# Patient Record
Sex: FEMALE | Race: WHITE | NOT HISPANIC OR LATINO | Employment: FULL TIME | URBAN - METROPOLITAN AREA
[De-identification: names, ages, dates, MRNs, and addresses within clinical notes are randomized per-mention and may not be internally consistent; named-entity substitution may affect disease eponyms.]

---

## 2017-01-06 ENCOUNTER — ROUTINE PRENATAL (OUTPATIENT)
Dept: OBSTETRICS AND GYNECOLOGY | Facility: CLINIC | Age: 32
End: 2017-01-06
Payer: COMMERCIAL

## 2017-01-06 VITALS — WEIGHT: 179 LBS | BODY MASS INDEX: 26.43 KG/M2 | SYSTOLIC BLOOD PRESSURE: 130 MMHG | DIASTOLIC BLOOD PRESSURE: 80 MMHG

## 2017-01-06 DIAGNOSIS — Z34.02 ENCOUNTER FOR PRENATAL CARE IN SECOND TRIMESTER OF FIRST PREGNANCY: Primary | ICD-10-CM

## 2017-01-06 PROCEDURE — 99999 PR PBB SHADOW E&M-EST. PATIENT-LVL II: CPT | Mod: PBBFAC,,, | Performed by: ADVANCED PRACTICE MIDWIFE

## 2017-01-06 PROCEDURE — 0502F SUBSEQUENT PRENATAL CARE: CPT | Mod: S$GLB,,, | Performed by: ADVANCED PRACTICE MIDWIFE

## 2017-01-06 PROCEDURE — 99212 OFFICE O/P EST SF 10 MIN: CPT | Mod: PBBFAC | Performed by: ADVANCED PRACTICE MIDWIFE

## 2017-01-06 NOTE — PROGRESS NOTES
Here for routine OB appt, with no complaints.  Here with her sister today.  Reports good FM; daily FMC reinforced. Denies LOF, denies VB, denies contractions.  Glucola & CBC with next appt  Reviewed warning signs of PTL and Preeclampsia

## 2017-01-13 ENCOUNTER — PATIENT MESSAGE (OUTPATIENT)
Dept: OBSTETRICS AND GYNECOLOGY | Facility: CLINIC | Age: 32
End: 2017-01-13

## 2017-01-19 ENCOUNTER — LAB VISIT (OUTPATIENT)
Dept: LAB | Facility: OTHER | Age: 32
End: 2017-01-19
Attending: ADVANCED PRACTICE MIDWIFE
Payer: COMMERCIAL

## 2017-01-19 ENCOUNTER — ROUTINE PRENATAL (OUTPATIENT)
Dept: OBSTETRICS AND GYNECOLOGY | Facility: CLINIC | Age: 32
End: 2017-01-19
Payer: COMMERCIAL

## 2017-01-19 VITALS — BODY MASS INDEX: 26.14 KG/M2 | WEIGHT: 177 LBS | DIASTOLIC BLOOD PRESSURE: 76 MMHG | SYSTOLIC BLOOD PRESSURE: 120 MMHG

## 2017-01-19 DIAGNOSIS — Z34.02 ENCOUNTER FOR PRENATAL CARE IN SECOND TRIMESTER OF FIRST PREGNANCY: ICD-10-CM

## 2017-01-19 DIAGNOSIS — O22.13: ICD-10-CM

## 2017-01-19 DIAGNOSIS — Z34.93 PRENATAL CARE IN THIRD TRIMESTER: Primary | ICD-10-CM

## 2017-01-19 LAB
BASOPHILS # BLD AUTO: 0.02 K/UL
BASOPHILS NFR BLD: 0.2 %
DIFFERENTIAL METHOD: ABNORMAL
EOSINOPHIL # BLD AUTO: 0.1 K/UL
EOSINOPHIL NFR BLD: 0.8 %
ERYTHROCYTE [DISTWIDTH] IN BLOOD BY AUTOMATED COUNT: 12.4 %
GLUCOSE SERPL-MCNC: 86 MG/DL
HCT VFR BLD AUTO: 36.7 %
HGB BLD-MCNC: 12.4 G/DL
LYMPHOCYTES # BLD AUTO: 1.5 K/UL
LYMPHOCYTES NFR BLD: 14.2 %
MCH RBC QN AUTO: 31.4 PG
MCHC RBC AUTO-ENTMCNC: 33.8 %
MCV RBC AUTO: 93 FL
MONOCYTES # BLD AUTO: 1 K/UL
MONOCYTES NFR BLD: 9.4 %
NEUTROPHILS # BLD AUTO: 7.8 K/UL
NEUTROPHILS NFR BLD: 73.6 %
PLATELET # BLD AUTO: 237 K/UL
PMV BLD AUTO: 10.1 FL
RBC # BLD AUTO: 3.95 M/UL
WBC # BLD AUTO: 10.59 K/UL

## 2017-01-19 PROCEDURE — 82950 GLUCOSE TEST: CPT

## 2017-01-19 PROCEDURE — 0502F SUBSEQUENT PRENATAL CARE: CPT | Mod: S$GLB,,, | Performed by: ADVANCED PRACTICE MIDWIFE

## 2017-01-19 PROCEDURE — 36415 COLL VENOUS BLD VENIPUNCTURE: CPT

## 2017-01-19 PROCEDURE — 99999 PR PBB SHADOW E&M-EST. PATIENT-LVL II: CPT | Mod: PBBFAC,,, | Performed by: ADVANCED PRACTICE MIDWIFE

## 2017-01-19 PROCEDURE — 85025 COMPLETE CBC W/AUTO DIFF WBC: CPT

## 2017-01-19 RX ORDER — FAMOTIDINE 20 MG/1
20 TABLET, FILM COATED ORAL 2 TIMES DAILY
COMMUNITY
End: 2017-09-21

## 2017-01-19 NOTE — PROGRESS NOTES
31 y.o. female  at 28w4d by LMP c/w 8 week US  Doing well except for constant left labia pain which she describes as stabbing in nature, has caused her to stop jogging.  On exam she points to the site of her pain as a mild varicosity of left labia majora.  Comfort measures discussed.  TW lbs   To have 28wk labs today (A POS)  Reviewed warning signs, normal FKCs,  labor precautions and how/when to call.  RTC x 2 wks, call or present sooner prn.

## 2017-02-01 ENCOUNTER — ROUTINE PRENATAL (OUTPATIENT)
Dept: OBSTETRICS AND GYNECOLOGY | Facility: CLINIC | Age: 32
End: 2017-02-01
Payer: COMMERCIAL

## 2017-02-01 VITALS — BODY MASS INDEX: 26.29 KG/M2 | SYSTOLIC BLOOD PRESSURE: 112 MMHG | DIASTOLIC BLOOD PRESSURE: 68 MMHG | WEIGHT: 178 LBS

## 2017-02-01 DIAGNOSIS — Z34.02 ENCOUNTER FOR PRENATAL CARE IN SECOND TRIMESTER OF FIRST PREGNANCY: Primary | ICD-10-CM

## 2017-02-01 PROCEDURE — 99999 PR PBB SHADOW E&M-EST. PATIENT-LVL I: CPT | Mod: PBBFAC,,, | Performed by: ADVANCED PRACTICE MIDWIFE

## 2017-02-01 PROCEDURE — 0502F SUBSEQUENT PRENATAL CARE: CPT | Mod: S$GLB,,, | Performed by: ADVANCED PRACTICE MIDWIFE

## 2017-02-01 NOTE — PROGRESS NOTES
31 y.o. female  at 30w3d by LMP c/w 8wk US  Reports + FM, denies VB, LOF or CTX  Doing well without concerns  Plans to breastfeed, anabel Adams, considering PP BCMs  [place] gender, will plan circ if boy  TW lbs for pre preg BMI of 22 - great job!  Reviewed 28wk lab results (A POS)  Tdap offered and ordered  Reviewed upcoming 36wk labs   Reviewed warning signs, normal FKCs,  labor precautions and how/when to call.  RTC x 2 wks, call or present sooner prn.

## 2017-02-15 ENCOUNTER — CLINICAL SUPPORT (OUTPATIENT)
Dept: OBSTETRICS AND GYNECOLOGY | Facility: CLINIC | Age: 32
End: 2017-02-15
Payer: COMMERCIAL

## 2017-02-15 ENCOUNTER — ROUTINE PRENATAL (OUTPATIENT)
Dept: OBSTETRICS AND GYNECOLOGY | Facility: CLINIC | Age: 32
End: 2017-02-15
Payer: COMMERCIAL

## 2017-02-15 ENCOUNTER — OFFICE VISIT (OUTPATIENT)
Dept: OPTOMETRY | Facility: CLINIC | Age: 32
End: 2017-02-15
Payer: COMMERCIAL

## 2017-02-15 VITALS — SYSTOLIC BLOOD PRESSURE: 120 MMHG | DIASTOLIC BLOOD PRESSURE: 80 MMHG | WEIGHT: 181 LBS | BODY MASS INDEX: 26.73 KG/M2

## 2017-02-15 DIAGNOSIS — Z46.0 FITTING AND ADJUSTMENT OF SPECTACLES AND CONTACT LENSES: ICD-10-CM

## 2017-02-15 DIAGNOSIS — Z46.0 FITTING AND ADJUSTMENT OF SPECTACLES AND CONTACT LENSES: Primary | ICD-10-CM

## 2017-02-15 DIAGNOSIS — Z3A.32 32 WEEKS GESTATION OF PREGNANCY: ICD-10-CM

## 2017-02-15 DIAGNOSIS — Z23 NEED FOR TDAP VACCINATION: Primary | ICD-10-CM

## 2017-02-15 DIAGNOSIS — H52.13 MYOPIA, BILATERAL: Primary | ICD-10-CM

## 2017-02-15 DIAGNOSIS — Z34.03 ENCOUNTER FOR SUPERVISION OF NORMAL FIRST PREGNANCY IN THIRD TRIMESTER: Primary | ICD-10-CM

## 2017-02-15 PROCEDURE — 99999 PR PBB SHADOW E&M-EST. PATIENT-LVL I: CPT | Mod: PBBFAC,,, | Performed by: ADVANCED PRACTICE MIDWIFE

## 2017-02-15 PROCEDURE — 99213 OFFICE O/P EST LOW 20 MIN: CPT | Mod: S$GLB,,, | Performed by: ADVANCED PRACTICE MIDWIFE

## 2017-02-15 PROCEDURE — 92014 COMPRE OPH EXAM EST PT 1/>: CPT | Mod: S$GLB,,, | Performed by: OPTOMETRIST

## 2017-02-15 PROCEDURE — 90715 TDAP VACCINE 7 YRS/> IM: CPT | Mod: S$GLB,,, | Performed by: ADVANCED PRACTICE MIDWIFE

## 2017-02-15 PROCEDURE — 99999 PR PBB SHADOW E&M-EST. PATIENT-LVL II: CPT | Mod: PBBFAC,,, | Performed by: OPTOMETRIST

## 2017-02-15 PROCEDURE — 92310 CONTACT LENS FITTING OU: CPT | Mod: S$GLB,,, | Performed by: OPTOMETRIST

## 2017-02-15 PROCEDURE — 99999 PR PBB SHADOW E&M-EST. PATIENT-LVL I: CPT | Mod: PBBFAC,,,

## 2017-02-15 PROCEDURE — 92015 DETERMINE REFRACTIVE STATE: CPT | Mod: S$GLB,,, | Performed by: OPTOMETRIST

## 2017-02-15 PROCEDURE — 90471 IMMUNIZATION ADMIN: CPT | Mod: S$GLB,,, | Performed by: ADVANCED PRACTICE MIDWIFE

## 2017-02-15 NOTE — PROGRESS NOTES
Doing well today except for teary. Insomnia and labia varicosity   Recommended benedryl HS for insomnia  Using compression pants for varicosity  Discussed delivery options  Answered questions regarding breech lie

## 2017-02-15 NOTE — PROGRESS NOTES
HPI     Patient's age: 31 y.o.      Approximate date of last eye examination:  5/20/15  Name of last eye doctor seen: Dr. walsh    Wears glasses? yes     Wears CLs?:  yes           If yes:              Type of CL worn:  Dailies              Wears full-time or part-time:  paRT-TIME               Sleeps with contact lenses:  NO                 Headaches?  no  Eye pain/discomfort?  no                                                                                     Flashes?  no  Floaters?  no  Diplopia/Double vision?  no    Patient's Ocular History:         Any eye surgeries? no           Family history of eye disease?  no  Significant patient medical history:         1. Diabetes?  no       If yes, IDDM or NIDDM? no   2. HBP?  no                 ! OTC eyedrops currently using:  none   ! Prescription eye meds currently using:  none            Last edited by Brittney Workman MA on 2/15/2017  8:12 AM.         Assessment /Plan     For exam results, see Encounter Report.    Myopia, bilateral   Rx specs    Fitting and adjustment of spectacles and contact lenses   Dispensed trials of acuvue 1 day moist    Remove/ replace daily   Ok to order if happy  RTC 1 year DFE

## 2017-02-27 ENCOUNTER — ROUTINE PRENATAL (OUTPATIENT)
Dept: OBSTETRICS AND GYNECOLOGY | Facility: CLINIC | Age: 32
End: 2017-02-27
Payer: COMMERCIAL

## 2017-02-27 VITALS — WEIGHT: 185 LBS | DIASTOLIC BLOOD PRESSURE: 80 MMHG | SYSTOLIC BLOOD PRESSURE: 120 MMHG | BODY MASS INDEX: 27.32 KG/M2

## 2017-02-27 DIAGNOSIS — Z34.02 ENCOUNTER FOR PRENATAL CARE IN SECOND TRIMESTER OF FIRST PREGNANCY: Primary | ICD-10-CM

## 2017-02-27 PROCEDURE — 0502F SUBSEQUENT PRENATAL CARE: CPT | Mod: S$GLB,,, | Performed by: ADVANCED PRACTICE MIDWIFE

## 2017-02-27 PROCEDURE — 99999 PR PBB SHADOW E&M-EST. PATIENT-LVL I: CPT | Mod: PBBFAC,,, | Performed by: ADVANCED PRACTICE MIDWIFE

## 2017-02-27 NOTE — PROGRESS NOTES
31 y.o. female  at 34w1d by LMP c/w 8wk US  Reports + FM, denies VB, LOF or CTX  Accompanied by Rishabh today  Enjoying some of the [place] parades  Varicosities improving with compression shorts  TW lbs for pre preg BMI of 22; discussed recommendations and criteria (she is already aware)  Reviewed 28wk lab results (A POS)  S/p Tdap  Discussed breastfeeding and BCM: mini pill  Reviewed upcoming 36wk labs - she plans to consent to GBS and repeat HIV/RPR (orders placed for serum labs)  Reviewed warning signs, normal FKCs,  labor precautions and how/when to call.  RTC x 2 wks, call or present sooner prn.

## 2017-03-14 ENCOUNTER — LAB VISIT (OUTPATIENT)
Dept: LAB | Facility: OTHER | Age: 32
End: 2017-03-14
Attending: ADVANCED PRACTICE MIDWIFE
Payer: COMMERCIAL

## 2017-03-14 ENCOUNTER — LACTATION CONSULT (OUTPATIENT)
Dept: LACTATION | Facility: CLINIC | Age: 32
End: 2017-03-14
Payer: COMMERCIAL

## 2017-03-14 DIAGNOSIS — Z71.9 HEALTH EDUCATION/COUNSELING: ICD-10-CM

## 2017-03-14 DIAGNOSIS — Z34.02 ENCOUNTER FOR PRENATAL CARE IN SECOND TRIMESTER OF FIRST PREGNANCY: ICD-10-CM

## 2017-03-14 PROCEDURE — 86592 SYPHILIS TEST NON-TREP QUAL: CPT

## 2017-03-14 PROCEDURE — 36415 COLL VENOUS BLD VENIPUNCTURE: CPT

## 2017-03-14 PROCEDURE — S9443 LACTATION CLASS: HCPCS | Mod: S$GLB,,,

## 2017-03-14 PROCEDURE — 86703 HIV-1/HIV-2 1 RESULT ANTBDY: CPT

## 2017-03-14 NOTE — PROGRESS NOTES
Presents for basic breastfeeding information. Questions about sizing for nursing bra and pump flange. Recommended most accurate time to be fitted for bra is a couple weeks after delivery. Reinforced wearing something that provides good support but is not too tight. Intends to get breast pump through insurance, but isn't sure yet which brand. Assessed breasts which are both round and soft with elastic areola and everted nipples, no retraction. Recommended starting with 24 or 27 mm flanges, and explained how to ensure proper fit. Reviewed basics including skin to skin, infant positioning and latch, feeding on cue, etc. Encouraged to call lactation warmline as needed with any questions/concerns, and to request help from staff as needed, while in hospital. Voices understanding.

## 2017-03-15 LAB
HIV 1+2 AB+HIV1 P24 AG SERPL QL IA: NEGATIVE
RPR SER QL: NORMAL

## 2017-03-16 ENCOUNTER — HOSPITAL ENCOUNTER (OUTPATIENT)
Dept: PERINATAL CARE | Facility: OTHER | Age: 32
Discharge: HOME | End: 2017-03-16
Attending: ADVANCED PRACTICE MIDWIFE
Payer: COMMERCIAL

## 2017-03-16 ENCOUNTER — ROUTINE PRENATAL (OUTPATIENT)
Dept: OBSTETRICS AND GYNECOLOGY | Facility: CLINIC | Age: 32
End: 2017-03-16
Payer: COMMERCIAL

## 2017-03-16 VITALS — SYSTOLIC BLOOD PRESSURE: 128 MMHG | DIASTOLIC BLOOD PRESSURE: 82 MMHG | WEIGHT: 190 LBS | BODY MASS INDEX: 28.06 KG/M2

## 2017-03-16 DIAGNOSIS — O22.13: Primary | ICD-10-CM

## 2017-03-16 DIAGNOSIS — N90.89 VULVAR LESION: ICD-10-CM

## 2017-03-16 DIAGNOSIS — Z91.89 AT RISK FOR SEXUALLY TRANSMITTED DISEASE DUE TO PARTNER WITH GENITAL HERPES: ICD-10-CM

## 2017-03-16 DIAGNOSIS — Z34.03 ENCOUNTER FOR PRENATAL CARE IN THIRD TRIMESTER OF FIRST PREGNANCY: ICD-10-CM

## 2017-03-16 PROCEDURE — 76815 OB US LIMITED FETUS(S): CPT

## 2017-03-16 PROCEDURE — 76815 OB US LIMITED FETUS(S): CPT | Mod: 26,59,, | Performed by: OBSTETRICS & GYNECOLOGY

## 2017-03-16 PROCEDURE — 87081 CULTURE SCREEN ONLY: CPT

## 2017-03-16 PROCEDURE — 0502F SUBSEQUENT PRENATAL CARE: CPT | Mod: S$GLB,,, | Performed by: ADVANCED PRACTICE MIDWIFE

## 2017-03-16 PROCEDURE — 99999 PR PBB SHADOW E&M-EST. PATIENT-LVL II: CPT | Mod: PBBFAC,,, | Performed by: ADVANCED PRACTICE MIDWIFE

## 2017-03-16 NOTE — PROGRESS NOTES
"3T warning signs, FMR protocol reviewed.   states he had one episode of genital HSV type 1 10 years ago. Recommendation of using condoms during second half of pregnancy discussed.  Planning on-line classes.  GBS today.  TWG of 40 pounds discussed. When asked about diet and exercise, stated, "I am doing the best I can!" States she is feeling fatigued since she has trouble sleeping due to discomfort.     S:C/o of severe almost constant pain in her vulva and vagina. Reports vulvar varicosities and "a painful bump" on her right labia. 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 Position of hips raised on pillows and feet elevated up the wall suggested.   O:Examination of vulva revealed mild vulvar varisocities and a flesh colored papule with a visible "head" on the right labia.   A: Vulvar lesion probable sebaceous gland cyst. Will rule out Herpes.  P: Secretion from papule collected on swab and sent to lab for Herpes by rapid PCR testing.    GBS done. Presentation could not be confidently identified with Leopolds or vaginal exam today. Patient sent to PN testing for JULIETTE. Vertex presentation confirmed with ultrasound.  "

## 2017-03-18 LAB — BACTERIA SPEC AEROBE CULT: NORMAL

## 2017-03-20 PROBLEM — Z67.10 BLOOD TYPE A+: Status: ACTIVE | Noted: 2017-03-20

## 2017-03-21 ENCOUNTER — ROUTINE PRENATAL (OUTPATIENT)
Dept: OBSTETRICS AND GYNECOLOGY | Facility: CLINIC | Age: 32
End: 2017-03-21
Payer: COMMERCIAL

## 2017-03-21 VITALS — WEIGHT: 191 LBS | DIASTOLIC BLOOD PRESSURE: 82 MMHG | BODY MASS INDEX: 28.21 KG/M2 | SYSTOLIC BLOOD PRESSURE: 122 MMHG

## 2017-03-21 DIAGNOSIS — Z34.03 ENCOUNTER FOR SUPERVISION OF NORMAL FIRST PREGNANCY IN THIRD TRIMESTER: Primary | ICD-10-CM

## 2017-03-21 DIAGNOSIS — Z3A.37 37 WEEKS GESTATION OF PREGNANCY: ICD-10-CM

## 2017-03-21 PROCEDURE — 99213 OFFICE O/P EST LOW 20 MIN: CPT | Mod: S$GLB,,, | Performed by: ADVANCED PRACTICE MIDWIFE

## 2017-03-21 PROCEDURE — 99999 PR PBB SHADOW E&M-EST. PATIENT-LVL II: CPT | Mod: PBBFAC,,, | Performed by: ADVANCED PRACTICE MIDWIFE

## 2017-03-28 ENCOUNTER — PATIENT MESSAGE (OUTPATIENT)
Dept: OBSTETRICS AND GYNECOLOGY | Facility: CLINIC | Age: 32
End: 2017-03-28

## 2017-03-28 ENCOUNTER — ROUTINE PRENATAL (OUTPATIENT)
Dept: OBSTETRICS AND GYNECOLOGY | Facility: CLINIC | Age: 32
End: 2017-03-28
Payer: COMMERCIAL

## 2017-03-28 VITALS — BODY MASS INDEX: 28.35 KG/M2 | SYSTOLIC BLOOD PRESSURE: 128 MMHG | DIASTOLIC BLOOD PRESSURE: 84 MMHG | WEIGHT: 192 LBS

## 2017-03-28 DIAGNOSIS — Z34.93 PREGNANCY, OBSTETRICAL CARE, THIRD TRIMESTER: Primary | ICD-10-CM

## 2017-03-28 DIAGNOSIS — Z34.03 ENCOUNTER FOR PRENATAL CARE IN THIRD TRIMESTER OF FIRST PREGNANCY: Primary | ICD-10-CM

## 2017-03-28 PROCEDURE — 99999 PR PBB SHADOW E&M-EST. PATIENT-LVL II: CPT | Mod: PBBFAC,,, | Performed by: ADVANCED PRACTICE MIDWIFE

## 2017-03-28 PROCEDURE — 0502F SUBSEQUENT PRENATAL CARE: CPT | Mod: S$GLB,,, | Performed by: ADVANCED PRACTICE MIDWIFE

## 2017-03-28 NOTE — MR AVS SNAPSHOT
[facility]  [address]  [place] [address]  Phone: [phone]  Fax: [phone]                  Catie Monge   3/28/2017 11:00 AM   Appointment    Description:  Female : 1985   Provider:  Jacklyn Kerr CNM   Department:  [facility]                To Do List           Future Appointments        Provider Department Dept Phone    3/28/2017 11:00 AM NAREN Leonardo [phone]      Goals (5 Years of Data)     None      [facility] On Call     [facility] On Call Nurse Care Line -  Assistance  Registered nurses in the [facility] On Call Center provide clinical advisement, health education, appointment booking, and other advisory services.  Call for this free service at [phone].             Medications           Message regarding Medications     Verify the changes and/or additions to your medication regime listed below are the same as discussed with your clinician today.  If any of these changes or additions are incorrect, please notify your healthcare provider.             Verify that the below list of medications is an accurate representation of the medications you are currently taking.  If none reported, the list may be blank. If incorrect, please contact your healthcare provider. Carry this list with you in case of emergency.           Current Medications     cetirizine (ZYRTEC) 10 MG tablet Take 10 mg by mouth every evening.    famotidine (PEPCID) 20 MG tablet Take 20 mg by mouth 2 (two) times daily.    PNV95/FERROUS FUMARATE/FA (PRENATAL ORAL) Take by mouth.           Clinical Reference Information           Prenatal Vitals     Enc. Date GA Prenatal Vitals Prenatal Pulse Pain Level Urine Albumin/Glucose Edema Presentation Dilation/Effacement/Station    3/21/17 37w2d 122/82 / 86.6 kg (191 lb) 38 cm / 147 / Present   Negative / Negative  Vertex 0 / 0 / -3    3/16/17 36w4d 128/82 / 86.2 kg (190 lb) 36.5 cm / present / Present    Negative / Negative  Vertex 0 / 0 / -3    17 34w1d 120/80 / 83.9 kg (185 lb) 34 cm / 140 / Present   Negative / Negative       2/15/17 32w3d 120/80 / 82.1 kg (181 lb) 33 cm / 146 / Present   Negative / Negative       17 30w3d 112/68 / 80.7 kg (178 lb) 31 cm / 145 / Present   Negative / Negative       17 28w4d 120/76 / 80.3 kg (177 lb) 29.5 cm / 140 / Present   Negative / Negative       17 26w5d 130/80 / 81.2 kg (179 lb) 28 cm / 142 / Present   Negative / Negative       16 22w1d 132/82 / 77.1 kg (170 lb) 22 cm / 146 / Present   Negative / Negative       16 17w3d 140/90 (A) / 73 kg (161 lb) 17 cm / 143   Negative / Negative       10/6/16 13w4d 134/82 / 70.5 kg (155 lb 8 oz) 13 cm / 150 / Absent   Negative / Negative       16 9w4d 130/74              TW.6 kg (41 lb)   Pregravid weight: 68 kg (150 lb)   Number of fetuses: 1       Your Vitals Were     Last Period                   2016           Allergies as of 3/28/2017     No Known Allergies      Immunizations Administered on Date of Encounter - 3/28/2017     None      Language Assistance Services     ATTENTION: Language assistance services are available, free of charge. Please call [phone].      ATENCIÓN: Si habla español, tiene a moffett disposición servicios gratuitos de asistencia lingüística. Llame al [phone].     CHÚ Ý: N?u b?n nói Ti?ng Vi?t, có các d?ch v? h? tr? ngôn ng? mi?n phí dành cho b?n. G?i s? [phone].         [facility] complies with applicable Federal civil rights laws and does not discriminate on the basis of race, color, national origin, age, disability, or sex.

## 2017-03-28 NOTE — PROGRESS NOTES
Irregular UCs, especially at night.  No LOF or VB.  Reports good FM; daily FMC reinforced.   Reviewed labor precautions

## 2017-04-03 ENCOUNTER — ROUTINE PRENATAL (OUTPATIENT)
Dept: OBSTETRICS AND GYNECOLOGY | Facility: CLINIC | Age: 32
End: 2017-04-03
Payer: COMMERCIAL

## 2017-04-03 VITALS — DIASTOLIC BLOOD PRESSURE: 78 MMHG | BODY MASS INDEX: 28.5 KG/M2 | SYSTOLIC BLOOD PRESSURE: 118 MMHG | WEIGHT: 193 LBS

## 2017-04-03 DIAGNOSIS — Z34.93 PREGNANCY, OBSTETRICAL CARE, THIRD TRIMESTER: ICD-10-CM

## 2017-04-03 DIAGNOSIS — Z87.59 PERSONAL HISTORY OF PREVIOUS POSTDATES PREGNANCY: Primary | ICD-10-CM

## 2017-04-03 DIAGNOSIS — O48.0 POST-TERM PREGNANCY, 40-42 WEEKS OF GESTATION: ICD-10-CM

## 2017-04-03 PROCEDURE — 0502F SUBSEQUENT PRENATAL CARE: CPT | Mod: S$GLB,,, | Performed by: ADVANCED PRACTICE MIDWIFE

## 2017-04-03 PROCEDURE — 99999 PR PBB SHADOW E&M-EST. PATIENT-LVL II: CPT | Mod: PBBFAC,,, | Performed by: ADVANCED PRACTICE MIDWIFE

## 2017-04-03 NOTE — PROGRESS NOTES
31 y.o. female  at 39w1d by LMP c/w 8wk US  Reports + FM, denies VB, LOF or regular CTX  Accompanied by Rishabh today  Has stopped working and happy about this decision  Requesting SVE  She wants to discuss postdates management/planning  Discussed postdates management and IOL - she prefers to defer IOL/schedule at 41w6d prn  Scheduled NST/JULIETTE at 41w1d on  and again at 41w4d on   Would need IOL at 41w6d on  if still pregnant  Reviewed warning signs, normal FKCs, labor precautions and how/when to call.  RTC x 1 wks, call or present sooner prn.

## 2017-04-11 ENCOUNTER — TELEPHONE (OUTPATIENT)
Dept: OBSTETRICS AND GYNECOLOGY | Facility: CLINIC | Age: 32
End: 2017-04-11

## 2017-04-11 ENCOUNTER — ROUTINE PRENATAL (OUTPATIENT)
Dept: OBSTETRICS AND GYNECOLOGY | Facility: CLINIC | Age: 32
End: 2017-04-11
Payer: COMMERCIAL

## 2017-04-11 VITALS — SYSTOLIC BLOOD PRESSURE: 118 MMHG | DIASTOLIC BLOOD PRESSURE: 70 MMHG | BODY MASS INDEX: 28.65 KG/M2 | WEIGHT: 194 LBS

## 2017-04-11 DIAGNOSIS — Z34.03 ENCOUNTER FOR PRENATAL CARE IN THIRD TRIMESTER OF FIRST PREGNANCY: Primary | ICD-10-CM

## 2017-04-11 PROCEDURE — 0502F SUBSEQUENT PRENATAL CARE: CPT | Mod: S$GLB,,, | Performed by: ADVANCED PRACTICE MIDWIFE

## 2017-04-11 PROCEDURE — 99999 PR PBB SHADOW E&M-EST. PATIENT-LVL I: CPT | Mod: PBBFAC,,, | Performed by: ADVANCED PRACTICE MIDWIFE

## 2017-04-11 NOTE — PROGRESS NOTES
31 y.o. female  at 40w2d by LMP c/w 8wk US  Reports + FM, denies VB, LOF or regular CTX  She and Rishabh want to talk about postdates management and want to schedule IOL at 41w6d  Prenatal testing has already been scheduled   Scheduled IOL at 41w6d on  at 8pm   Reviewed warning signs, normal FKCs, labor precautions and how/when to call.  RTC x 1 wks, call or present sooner prn.

## 2017-04-12 ENCOUNTER — TELEPHONE (OUTPATIENT)
Dept: OBSTETRICS AND GYNECOLOGY | Facility: HOSPITAL | Age: 32
End: 2017-04-12

## 2017-04-12 ENCOUNTER — ROUTINE PRENATAL (OUTPATIENT)
Dept: OBSTETRICS AND GYNECOLOGY | Facility: CLINIC | Age: 32
End: 2017-04-12
Payer: COMMERCIAL

## 2017-04-12 DIAGNOSIS — O26.893 VAGINAL DISCHARGE IN PREGNANCY IN THIRD TRIMESTER: Primary | ICD-10-CM

## 2017-04-12 DIAGNOSIS — Z34.93 PRENATAL CARE IN THIRD TRIMESTER: ICD-10-CM

## 2017-04-12 DIAGNOSIS — N89.8 VAGINAL DISCHARGE IN PREGNANCY IN THIRD TRIMESTER: Primary | ICD-10-CM

## 2017-04-12 PROCEDURE — 0502F SUBSEQUENT PRENATAL CARE: CPT | Mod: S$GLB,,, | Performed by: ADVANCED PRACTICE MIDWIFE

## 2017-04-12 PROCEDURE — 99999 PR PBB SHADOW E&M-EST. PATIENT-LVL I: CPT | Mod: PBBFAC,,, | Performed by: ADVANCED PRACTICE MIDWIFE

## 2017-04-12 NOTE — TELEPHONE ENCOUNTER
Phone call to pt, states slept through the night with a pad on but is not sure if it's really wet or not.  Feels damp now but no active leaking.  Did soak two pairs of panties last night however and is certain it was not urine.  No fever, no chills, no bleeding.  Pos fetal movement.  Mild ctx q 30 minutes or so.  Discussed PROM again with pt and offered come to hospital now for evaluation and IOL if membranes ruptured vs waiting for up to 24 hours.  Pt will come to office at 4:30 for evaluation if no active labor by then and does understand will need to stay for IOL if membranes are ruptured.  To call with fever, bleeding, foul smell, chills, decreased fetal movement or active labor.

## 2017-04-12 NOTE — TELEPHONE ENCOUNTER
Returned Catie's call  Reports possible LOF at ~ 2100  Continues to have leakage of clear fluid since  Reports + normal FM  Denies VB or CTX  Denies fever, chills, malaise  Reviewed that GBS is neg     Reviewed PROM information and document sent to [email or web address]    Offered immediate evaluation and discussed that if she opted for immediate evaluation and was determined to have ROM we would admit and offer either augmentation of prelabor PROM at term.     Also discussed option of expectant management including staying at home and awaiting labor.     Catie communicates her desire for expectant management at home as she was hoping to labor and birth at the [facility].    Reviewed r/b of expectant management vs augmentation/induction of prelabor PROM at term.    She communicates her desire to stay home.    Encouraged her to  -- Take T minimum of every 4 hours and call with fever  -- Call or present immediately with fever, chills, malaise, rafaela VB, change in fluid color, malodor of fluid, decrease in FM, or onset of labor/regular ctx  -- Present for induction of labor at any time should she decide to change her mind from expectant management  -- Present no later than 24 hours post ROM for evaluation and augmentation/induction of labor

## 2017-04-12 NOTE — PROGRESS NOTES
Pt and  here with reports of 3 cm damp spot on pt's underwear which happened yesterday after intercourse followed by walking.  Subsequently noted continued damp feeling but no actual fluid or soaking of pad or underwear.  SSE done, some white, mucous discharge noted.  No pooling, no fluid with cough, nitrazine negative.    Denies any hx bleeding and has mild, occasional ctx only.  Normal fetal movement.  Discussed do not think ruptured but should call with fever, chills, gush of fluid or leaking fluid  To keep next scheduled prenatal appointment.

## 2017-04-17 ENCOUNTER — TELEPHONE (OUTPATIENT)
Dept: OBSTETRICS AND GYNECOLOGY | Facility: HOSPITAL | Age: 32
End: 2017-04-17

## 2017-04-17 ENCOUNTER — ANESTHESIA (OUTPATIENT)
Dept: OBSTETRICS AND GYNECOLOGY | Facility: OTHER | Age: 32
End: 2017-04-17
Payer: COMMERCIAL

## 2017-04-17 ENCOUNTER — ANESTHESIA EVENT (OUTPATIENT)
Dept: OBSTETRICS AND GYNECOLOGY | Facility: OTHER | Age: 32
End: 2017-04-17
Payer: COMMERCIAL

## 2017-04-17 ENCOUNTER — HOSPITAL ENCOUNTER (INPATIENT)
Facility: OTHER | Age: 32
LOS: 3 days | Discharge: HOME | End: 2017-04-20
Attending: OBSTETRICS & GYNECOLOGY | Admitting: OBSTETRICS & GYNECOLOGY
Payer: COMMERCIAL

## 2017-04-17 DIAGNOSIS — Z37.9 NORMAL LABOR: ICD-10-CM

## 2017-04-17 DIAGNOSIS — O16.3 HYPERTENSION AFFECTING PREGNANCY IN THIRD TRIMESTER: ICD-10-CM

## 2017-04-17 DIAGNOSIS — O48.0 POST TERM PREGNANCY, 41 WEEKS: Primary | ICD-10-CM

## 2017-04-17 DIAGNOSIS — Z3A.41 POST TERM PREGNANCY, 41 WEEKS: Primary | ICD-10-CM

## 2017-04-17 PROBLEM — N90.89 VULVAR LESION: Status: RESOLVED | Noted: 2017-03-16 | Resolved: 2017-04-17

## 2017-04-17 LAB
ABO + RH BLD: NORMAL
ALBUMIN SERPL BCP-MCNC: 3 G/DL
ALP SERPL-CCNC: 155 U/L
ALT SERPL W/O P-5'-P-CCNC: 17 U/L
ANION GAP SERPL CALC-SCNC: 12 MMOL/L
AST SERPL-CCNC: 26 U/L
BASOPHILS # BLD AUTO: ABNORMAL K/UL
BASOPHILS NFR BLD: 0 %
BILIRUB SERPL-MCNC: 0.6 MG/DL
BLD GP AB SCN CELLS X3 SERPL QL: NORMAL
BUN SERPL-MCNC: 12 MG/DL
CALCIUM SERPL-MCNC: 8.8 MG/DL
CHLORIDE SERPL-SCNC: 107 MMOL/L
CO2 SERPL-SCNC: 17 MMOL/L
CREAT SERPL-MCNC: 0.8 MG/DL
CREAT UR-MCNC: 46.8 MG/DL
DIFFERENTIAL METHOD: ABNORMAL
EOSINOPHIL # BLD AUTO: ABNORMAL K/UL
EOSINOPHIL NFR BLD: 0 %
ERYTHROCYTE [DISTWIDTH] IN BLOOD BY AUTOMATED COUNT: 13.3 %
EST. GFR  (AFRICAN AMERICAN): >60 ML/MIN/1.73 M^2
EST. GFR  (NON AFRICAN AMERICAN): >60 ML/MIN/1.73 M^2
GLUCOSE SERPL-MCNC: 81 MG/DL
HCT VFR BLD AUTO: 39.8 %
HGB BLD-MCNC: 13.4 G/DL
LDH SERPL L TO P-CCNC: 326 U/L
LYMPHOCYTES # BLD AUTO: ABNORMAL K/UL
LYMPHOCYTES NFR BLD: 3 %
MCH RBC QN AUTO: 30.7 PG
MCHC RBC AUTO-ENTMCNC: 33.7 %
MCV RBC AUTO: 91 FL
METAMYELOCYTES NFR BLD MANUAL: 1 %
MONOCYTES # BLD AUTO: ABNORMAL K/UL
MONOCYTES NFR BLD: 3 %
NEUTROPHILS NFR BLD: 93 %
PLATELET # BLD AUTO: 200 K/UL
PMV BLD AUTO: 11.2 FL
POTASSIUM SERPL-SCNC: 4.3 MMOL/L
PROT SERPL-MCNC: 7 G/DL
PROT UR-MCNC: <7 MG/DL
PROT/CREAT RATIO, UR: NORMAL
RBC # BLD AUTO: 4.37 M/UL
SODIUM SERPL-SCNC: 136 MMOL/L
URATE SERPL-MCNC: 5.5 MG/DL
WBC # BLD AUTO: 19.12 K/UL

## 2017-04-17 PROCEDURE — 62326 NJX INTERLAMINAR LMBR/SAC: CPT | Performed by: ANESTHESIOLOGY

## 2017-04-17 PROCEDURE — 25000003 PHARM REV CODE 250: Performed by: STUDENT IN AN ORGANIZED HEALTH CARE EDUCATION/TRAINING PROGRAM

## 2017-04-17 PROCEDURE — 84550 ASSAY OF BLOOD/URIC ACID: CPT

## 2017-04-17 PROCEDURE — 63600175 PHARM REV CODE 636 W HCPCS: Performed by: STUDENT IN AN ORGANIZED HEALTH CARE EDUCATION/TRAINING PROGRAM

## 2017-04-17 PROCEDURE — 82570 ASSAY OF URINE CREATININE: CPT

## 2017-04-17 PROCEDURE — 27800517 HC TRAY,EPIDURAL-CONTINUOUS: Performed by: ANESTHESIOLOGY

## 2017-04-17 PROCEDURE — 72100002 HC LABOR CARE, 1ST 8 HOURS

## 2017-04-17 PROCEDURE — 86850 RBC ANTIBODY SCREEN: CPT

## 2017-04-17 PROCEDURE — 85007 BL SMEAR W/DIFF WBC COUNT: CPT

## 2017-04-17 PROCEDURE — 99283 EMERGENCY DEPT VISIT LOW MDM: CPT | Mod: ,,, | Performed by: OBSTETRICS & GYNECOLOGY

## 2017-04-17 PROCEDURE — 11000001 HC ACUTE MED/SURG PRIVATE ROOM

## 2017-04-17 PROCEDURE — 85027 COMPLETE CBC AUTOMATED: CPT

## 2017-04-17 PROCEDURE — 80053 COMPREHEN METABOLIC PANEL: CPT

## 2017-04-17 PROCEDURE — 25000003 PHARM REV CODE 250: Performed by: ADVANCED PRACTICE MIDWIFE

## 2017-04-17 PROCEDURE — 36415 COLL VENOUS BLD VENIPUNCTURE: CPT

## 2017-04-17 PROCEDURE — 99284 EMERGENCY DEPT VISIT MOD MDM: CPT

## 2017-04-17 PROCEDURE — 86900 BLOOD TYPING SEROLOGIC ABO: CPT

## 2017-04-17 PROCEDURE — 51702 INSERT TEMP BLADDER CATH: CPT

## 2017-04-17 PROCEDURE — 59409 OBSTETRICAL CARE: CPT | Mod: QK,,, | Performed by: ANESTHESIOLOGY

## 2017-04-17 PROCEDURE — 83615 LACTATE (LD) (LDH) ENZYME: CPT

## 2017-04-17 PROCEDURE — 27200710 HC EPIDURAL INFUSION PUMP SET: Performed by: ANESTHESIOLOGY

## 2017-04-17 RX ORDER — OXYTOCIN/RINGER'S LACTATE 20/1000 ML
41.65 PLASTIC BAG, INJECTION (ML) INTRAVENOUS ONCE AS NEEDED
Status: DISPENSED | OUTPATIENT
Start: 2017-04-17 | End: 2017-04-17

## 2017-04-17 RX ORDER — MISOPROSTOL 200 UG/1
800 TABLET ORAL
Status: DISCONTINUED | OUTPATIENT
Start: 2017-04-17 | End: 2017-04-19

## 2017-04-17 RX ORDER — FENTANYL CITRATE 50 UG/ML
INJECTION, SOLUTION INTRAMUSCULAR; INTRAVENOUS
Status: DISPENSED
Start: 2017-04-17 | End: 2017-04-17

## 2017-04-17 RX ORDER — METHYLERGONOVINE MALEATE 0.2 MG/ML
200 INJECTION INTRAVENOUS
Status: DISCONTINUED | OUTPATIENT
Start: 2017-04-17 | End: 2017-04-19

## 2017-04-17 RX ORDER — BUPIVACAINE HYDROCHLORIDE 2.5 MG/ML
INJECTION, SOLUTION EPIDURAL; INFILTRATION; INTRACAUDAL
Status: DISPENSED
Start: 2017-04-17 | End: 2017-04-17

## 2017-04-17 RX ORDER — LIDOCAINE HYDROCHLORIDE AND EPINEPHRINE 15; 5 MG/ML; UG/ML
INJECTION, SOLUTION EPIDURAL
Status: DISCONTINUED | OUTPATIENT
Start: 2017-04-17 | End: 2017-04-18

## 2017-04-17 RX ORDER — FENTANYL CITRATE 50 UG/ML
INJECTION, SOLUTION INTRAMUSCULAR; INTRAVENOUS
Status: DISPENSED
Start: 2017-04-17 | End: 2017-04-18

## 2017-04-17 RX ORDER — SODIUM CHLORIDE, SODIUM LACTATE, POTASSIUM CHLORIDE, CALCIUM CHLORIDE 600; 310; 30; 20 MG/100ML; MG/100ML; MG/100ML; MG/100ML
INJECTION, SOLUTION INTRAVENOUS CONTINUOUS
Status: DISCONTINUED | OUTPATIENT
Start: 2017-04-17 | End: 2017-04-19

## 2017-04-17 RX ORDER — BUPIVACAINE HYDROCHLORIDE 2.5 MG/ML
INJECTION, SOLUTION EPIDURAL; INFILTRATION; INTRACAUDAL
Status: DISPENSED
Start: 2017-04-17 | End: 2017-04-18

## 2017-04-17 RX ORDER — BUTORPHANOL TARTRATE 1 MG/ML
2 INJECTION INTRAMUSCULAR; INTRAVENOUS
Status: DISCONTINUED | OUTPATIENT
Start: 2017-04-17 | End: 2017-04-19

## 2017-04-17 RX ORDER — FENTANYL CITRATE 50 UG/ML
INJECTION, SOLUTION INTRAMUSCULAR; INTRAVENOUS
Status: DISCONTINUED | OUTPATIENT
Start: 2017-04-17 | End: 2017-04-18

## 2017-04-17 RX ORDER — FENTANYL/BUPIVACAINE/NS/PF 2MCG/ML-.1
PLASTIC BAG, INJECTION (ML) INJECTION CONTINUOUS PRN
Status: DISCONTINUED | OUTPATIENT
Start: 2017-04-17 | End: 2017-04-18

## 2017-04-17 RX ORDER — METOCLOPRAMIDE HYDROCHLORIDE 5 MG/ML
10 INJECTION INTRAMUSCULAR; INTRAVENOUS ONCE
Status: DISCONTINUED | OUTPATIENT
Start: 2017-04-17 | End: 2017-04-18

## 2017-04-17 RX ORDER — SODIUM CITRATE AND CITRIC ACID MONOHYDRATE 334; 500 MG/5ML; MG/5ML
30 SOLUTION ORAL ONCE
Status: DISCONTINUED | OUTPATIENT
Start: 2017-04-17 | End: 2017-04-18

## 2017-04-17 RX ORDER — FENTANYL/BUPIVACAINE/NS/PF 2MCG/ML-.1
PLASTIC BAG, INJECTION (ML) INJECTION
Status: DISPENSED
Start: 2017-04-17 | End: 2017-04-17

## 2017-04-17 RX ORDER — OXYTOCIN/RINGER'S LACTATE 20/1000 ML
2 PLASTIC BAG, INJECTION (ML) INTRAVENOUS CONTINUOUS
Status: DISCONTINUED | OUTPATIENT
Start: 2017-04-17 | End: 2017-04-18

## 2017-04-17 RX ORDER — FAMOTIDINE 10 MG/ML
20 INJECTION INTRAVENOUS ONCE
Status: DISCONTINUED | OUTPATIENT
Start: 2017-04-17 | End: 2017-04-18

## 2017-04-17 RX ORDER — ONDANSETRON 8 MG/1
8 TABLET, ORALLY DISINTEGRATING ORAL EVERY 8 HOURS PRN
Status: DISCONTINUED | OUTPATIENT
Start: 2017-04-17 | End: 2017-04-19

## 2017-04-17 RX ADMIN — Medication 10 ML: at 08:04

## 2017-04-17 RX ADMIN — LIDOCAINE HYDROCHLORIDE AND EPINEPHRINE 3 ML: 15; 5 INJECTION, SOLUTION EPIDURAL at 09:04

## 2017-04-17 RX ADMIN — Medication 10 ML: at 09:04

## 2017-04-17 RX ADMIN — FENTANYL CITRATE 100 MCG: 50 INJECTION, SOLUTION INTRAMUSCULAR; INTRAVENOUS at 08:04

## 2017-04-17 RX ADMIN — Medication 10 ML/HR: at 09:04

## 2017-04-17 RX ADMIN — FENTANYL CITRATE 100 MCG: 50 INJECTION, SOLUTION INTRAMUSCULAR; INTRAVENOUS at 09:04

## 2017-04-17 RX ADMIN — Medication 2 MILLI-UNITS/MIN: at 08:04

## 2017-04-17 NOTE — TELEPHONE ENCOUNTER
Returned phone call and spoke with Catie Keating's .  Having ctx since 0100, every 4 min for several hours.  No bleeding or ROM, t states ready to come in.  Rishabh ruiz will meet me at 0700 on 6th floor.

## 2017-04-17 NOTE — ANESTHESIA PREPROCEDURE EVALUATION
2017  Catie Monge is a 31 y.o., female.  Catie Monge is a 31 y.o. female     OB History    Para Term  AB SAB TAB Ectopic Multiple Living   1 0 0 0 0 0 0 0 0 0      # Outcome Date GA Lbr Roger/2nd Weight Sex Delivery Anes PTL Lv   1 Current                   Wt Readings from Last 1 Encounters:   17 1602 88 kg (194 lb)       BP Readings from Last 3 Encounters:   17 (!) 161/96   17 118/70   17 118/78       Patient Active Problem List   Diagnosis    Pityriasis rosea    Encounter for prenatal care in third trimester of first pregnancy    Varicose veins of vulva and perineum in third trimester, antepartum    Partner with genital herpes    Vulvar lesion    Body mass index (BMI) 22.0-22.9, adult    Blood type A POS    Hypertension affecting pregnancy in third trimester    Normal labor    Post term pregnancy, 41 weeks       Past Surgical History:   Procedure Laterality Date    NASAL SEPTUM SURGERY      OTHER SURGICAL HISTORY      anal fissure     TONSILLECTOMY, ADENOIDECTOMY      WISDOM TOOTH EXTRACTION         Social History     Social History    Marital status:      Spouse name: N/A    Number of children: N/A    Years of education: N/A     Occupational History    Not on file.     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         Chemistry        Component Value Date/Time     2016 1135    K 4.4 2016 1135     2016 1135    CO2 21 (L) 2016 1135     BUN 11 09/08/2016 1135    CREATININE 0.6 09/08/2016 1135    GLU 63 (L) 09/08/2016 1135        Component Value Date/Time    CALCIUM 9.5 09/08/2016 1135            Lab Results   Component Value Date    WBC 19.12 (H) 04/17/2017    HGB 13.4 04/17/2017    HCT 39.8 04/17/2017    MCV 91 04/17/2017     04/17/2017       No results for input(s): INR, PROTIME, APTT in the last 72 hours.    Invalid input(s): PT                  Anesthesia Evaluation    I have reviewed the Patient Summary Reports.    I have reviewed the Nursing Notes.   I have reviewed the Medications.     Review of Systems  Anesthesia Hx:  History of prior surgery of interest to airway management or planning: Previous anesthesia: General Denies Family Hx of Anesthesia complications.   Denies Personal Hx of Anesthesia complications.          Anesthesia Plan  Type of Anesthesia, risks & benefits discussed:  Anesthesia Type:  CSE, epidural, general, spinal  Patient's Preference:   Intra-op Monitoring Plan:   Intra-op Monitoring Plan Comments:   Post Op Pain Control Plan:   Post Op Pain Control Plan Comments:   Induction:    Beta Blocker:  Patient is not currently on a Beta-Blocker (No further documentation required).       Informed Consent: Patient understands risks and agrees with Anesthesia plan.  Questions answered. Anesthesia consent signed with patient.  ASA Score: 2     Day of Surgery Review of History & Physical:    H&P update referred to the surgeon.         Ready For Surgery From Anesthesia Perspective.

## 2017-04-17 NOTE — ASSESSMENT & PLAN NOTE
Blood pressures are now in the mild hypertensive to normal range of 150/85, 143/83, 127/72 post epidural administration. PreE labs essentially WNL  Will continue expectant manage of hypertension at this time.

## 2017-04-17 NOTE — ANESTHESIA PROCEDURE NOTES
Epidural    Patient location during procedure: OB   Reason for block: primary anesthetic   Diagnosis: IUP   Start time: 4/17/2017 9:18 AM  Timeout: 4/17/2017 9:17 AM  End time: 4/17/2017 9:30 AM  Staffing  Anesthesiologist: DAINA JIMENEZ  Resident/CRNA: ANUPAMA TURNER  Performed by: resident/CRNA   Preanesthetic Checklist  Completed: patient identified, site marked, surgical consent, pre-op evaluation, timeout performed, IV checked, risks and benefits discussed, monitors and equipment checked, anesthesia consent given, hand hygiene performed and patient being monitored  Preparation  Patient position: sitting  Prep: ChloraPrep  Epidural  Skin Anesthetic: lidocaine 1%  Skin Wheal: 3 mL  Administration type: continuous  Approach: midline  Interspace: L3-4  Injection technique: BRIA saline  Needle and Epidural Catheter  Needle type: Tuohy   Needle gauge: 17  Needle length: 3.5 inches  Needle insertion depth: 5 cm  Catheter type: end hole, springwound and multi-orifice  Catheter size: 18 G  Catheter at skin depth: 9 cm  Test dose: 3 mL of lidocaine 1.5% with Epi 1-to-200,000  Additional Documentation: incremental injection, negative aspiration for heme and CSF, no paresthesia on injection, no signs/symptoms of IV or SA injection, no significant complaints from patient and no significant pain on injection  Needle localization: anatomical landmarks  Medications:  Bolus administered: 10 mL of 0.125% bupivacaine  Opioid administered: 100 mcg of   fentanyl  Assessment  Ease of block: easy  Patient's tolerance of the procedure: comfortable throughout block and no complaints

## 2017-04-17 NOTE — IP AVS SNAPSHOT
[facility], [place], operated by [facility]  [address] [place], [place] [id number]  Phone: [phone]           Patient Discharge Instructions   Our goal is to set you up for success. This packet includes information on your condition, medications, and your home care.  It will help you care for yourself to prevent having to return to the hospital.     Please ask your nurse if you have any questions.      There are many details to remember when preparing to leave the hospital. Here is what you will need to do:    1. Take your medicine. If you are prescribed medications, review your Medication List on the following pages. You may have new medications to  at the pharmacy and others that you'll need to stop taking. Review the instructions for how and when to take your medications. Talk with your doctor or nurses if you are unsure of what to do.     2. Go to your follow-up appointments. Specific follow-up information is listed in the following pages. Your may be contacted by a nurse or clinical provider about future appointments. Be sure we have all of the phone numbers to reach you. Please contact your provider's office if you are unable to make an appointment.     3. Watch for warning signs. Your doctor or nurse will give you detailed warning signs to watch for and when to call for assistance. These instructions may also include educational information about your condition. If you experience any of warning signs to your health, call your doctor.           [facility]  Unless otherwise directed by your provider, please   contact [facility], our nurse care line   that is available for 24/7 assistance.     [phone] (toll-free)     Registered nurses in the [facility]   provide: appointment scheduling, clinical advisement, health education, and other advisory services.                  ** Verify the list of medication(s) below is accurate and up to date. Carry this with you in case of  emergency. If your medications have changed, please notify your healthcare provider.             Medication List      CONTINUE taking these medications        Additional Info                      cetirizine 10 MG tablet   Commonly known as:  ZYRTEC   Refills:  0   Dose:  10 mg    Instructions:  Take 10 mg by mouth every evening.     Begin Date    AM    Noon    PM    Bedtime       famotidine 20 MG tablet   Commonly known as:  PEPCID   Refills:  0   Dose:  20 mg    Instructions:  Take 20 mg by mouth 2 (two) times daily.     Begin Date    AM    Noon    PM    Bedtime       PRENATAL ORAL   Refills:  0    Instructions:  Take by mouth.     Begin Date    AM    Noon    PM    Bedtime                  Please bring to all follow up appointments:    1. A copy of your discharge instructions.  2. All medicines you are currently taking in their original bottles.  3. Identification and insurance card.    Please arrive 15 minutes ahead of scheduled appointment time.    Please call 24 hours in advance if you must reschedule your appointment and/or time.        Follow-up Information     Follow up In 1 week.    Why:  B/P check         Discharge Instructions     Future Orders    Activity as tolerated     Diet general     Questions:    Total calories:      Fat restriction, if any:      Protein restriction, if any:      Na restriction, if any:      Fluid restriction:      Additional restrictions:          Discharge Instructions       Breastfeeding Discharge Instructions       Feed the baby at the earliest sign of hunger or comfort  o Hands to mouth, sucking motions  o Rooting or searching for something to suck on  o Dont wait for crying - it is a late sign of hunger and comfort.     The feedings may be 8-12 times per 24hrs and will not follow a schedule   Avoid pacifiers and bottles for the first 4 weeks   Alternate the breast you start the feeding with, or start with the breast that feels the fullest   Switch breasts when the baby  takes himself off the breast or falls asleep   Keep offering breasts until the baby looks full, no longer gives hunger signs, and stays asleep when placed on his back in the crib   If the baby is sleepy and wont wake for a feeding, put the baby skin-to-skin dressed in a diaper against the mothers bare chest   Sleep near your baby   The baby should be positioned and latched on to the breast correctly  o Chest-to-chest, chin in the breast  o Babys lips are flipped outward  o Babys mouth is stretched open wide like a shout  o Babys sucking should feel like tugging to the mother  - The baby should be drinking at the breast:  o You should hear swallowing or gulping throughout the feeding  o You should see milk on the babys lips when he comes off the breast  o Your breasts should be softer when the baby is finished feeding  o The baby should look relaxed at the end of feedings  o After the 4th day and your milk is in:  o The babys poop should turn bright yellow and be loose, watery, and seedy  o The baby should have at least 3-4 poops the size of the palm of your hand per day  o The baby should have at least 6-8 wet diapers per day  o The urine should be light yellow in color  You should drink when you are thirsty and eat a healthy diet when you are    hungry.     Take naps to get the rest you need.   Take medications and/or drink alcohol only with permission of your obstetrician    or the babys pediatrician.  You can also call the [facility] Risk [facility],   ([phone]), Monday-Friday, 8am-5pm Central time, to get the most   up-to-date evidence-based information on the use of medications during   pregnancy and breastfeeding.      The baby should be examined by a pediatrician at 3-5 days of age.  Once your   milk comes in, the baby should be gaining at least ½ - 1oz each day and should be back to birthweight no later than 10-14 days of age.          Community Resources    [facility]  Warmline:  [phone]  Local [facility] clinics: provide incentives and breastpumps to eligible mothers  La Leche League International (LLLI):  mother-to-mother support group website        www.lll.org  Local La Leche League mother-to-mother support groups:        www.llpaulPrefundia.HouseTab        La Leche League [facility]         [email or web address]  Dr. Maninder Reid website for latch videos and general information:        www.breastfeedinginc.ca  [facility] is a call center that provides information about the safety of taking medications while breastfeeding.  Call [phone], M-F, 8am-5pm, [place].  International Lactation Consultant Association provides resources for assistance:        www.ilca.org  [place] Breastfeeding Coalition provides informationand resources for parents  and the community    http://[facility].org     Christal mom provides resources for assistance:        www.nolamom.org  [employer] for Healthy Babies:  5-444-194-BABY(9049)  [employer] provides a list of breastfeeding services by zip code:        www.Change [address].The [place]  Caf au Lait:  459.401.6961 a breastfeeding support group for women of color.    Primary Engorgement    If the milk is flowing, use wet or dry heat applied to the breasts for approximately 10min prior to each feeding as a comfort measure to facilitate the milk ejection reflex    Follow heat treatment with breast massage to soften hard/lumpy areas of the breast    Use unrestricted, frequent, effective feedings.      Wake baby to feed if necessary    Avoid pacifier and bottle feedings    Hand express or pump breasts to the point of comfort prn    Use cold treatments in the form of ice packs/gel packs/ frozen vegetables wrapped in a soft thin cloth and applied to the breasts for approximately 20min after each feeding until engorgement is resolved    Wear comfortable, supportive bra    Take pain medicine prn    Use anti-inflammatory medications if prescribed by  "physician    Other:                    Primary Diagnosis     Your primary diagnosis was:  Normal Vaginal Delivery      Admission Information     Date & Time Provider Department CSN    4/17/2017  7:11 AM Pricila Hannah MD [facility] [id number]      Care Providers     Provider Role Specialty Primary office phone    Pricila Hannah MD Attending Provider Obstetrics [phone]    Brianna Kelley MD Consulting Physician  Obstetrics and Gynecology [phone]      Your Vitals Were     BP Pulse Temp Resp Height Weight    133/82 75 98.3 °F (36.8 °C) (Oral) 18 5' 9" (1.753 m) 88 kg (194 lb)    Last Period SpO2 BMI          07/03/2016 99% 28.65 kg/m2        Recent Lab Values     No lab values to display.      Allergies as of 4/20/2017     No Known Allergies      Advance Directives     An advance directive is a document which, in the event you are no longer able to make decisions for yourself, tells your healthcare team what kind of treatment you do or do not want to receive, or who you would like to make those decisions for you.  If you do not currently have an advance directive, [facility] encourages you to create one.  For more information call:  [phone]), [phone] ([phone]),  or log on to [email or web address].        Language Assistance Services     ATTENTION: Language assistance services are available, free of charge. Please call [phone].      ATENCIÓN: Si habla español, tiene a moffett disposición servicios gratuitos de asistencia lingüística. Llame al [phone].     CHÚ Ý: N?u b?n nói Ti?ng Vi?t, có các d?ch v? h? tr? ngôn ng? mi?n phí dành cho b?n. G?i s? [phone].         [facility] complies with applicable Federal civil rights laws and does not discriminate on the basis of race, color, national origin, age, disability, or sex.        "

## 2017-04-17 NOTE — H&P
[facility]  Obstetrics  History & Physical    Patient Name: Catie Monge  MRN: [id number]  Admission Date: 2017  Primary Care Provider: Primary Doctor No    Subjective:     Principal Problem:Hypertension affecting pregnancy in third trimester    History of Present Illness: Patient reported onset of regular uterine contractions at 0100 this morning and mucous discharge without blood or clear fluid. Denies headache, visual disturbances and epigastric pain. Reports normal fetal movement.     Obstetric HPI:     This pregnancy has been complicated by late term gestation and varicose veins of vulva and perineum. Her  has a history of genital herpes, Type 1 with only one noted outbreak 10 years ago. A vulvar papule during pregnancy was not typical of a herpes lesion and PCR for Herpes was negative.     Obstetric History       T0      TAB0   SAB0   E0   M0   L0       # Outcome Date GA Lbr Roger/2nd Weight Sex Delivery Anes PTL Lv   1 Current                 Past Medical History:   Diagnosis Date    Allergy     Blood type A POS 3/20/2017    Fever blister     Oral cold sores only; no hx genital outbreak    Hypertension affecting pregnancy in third trimester 2017    Other and unspecified ovarian cysts      Past Surgical History:   Procedure Laterality Date    NASAL SEPTUM SURGERY      OTHER SURGICAL HISTORY      anal fissure     TONSILLECTOMY, ADENOIDECTOMY      WISDOM TOOTH EXTRACTION         PTA Medications   Medication Sig    cetirizine (ZYRTEC) 10 MG tablet Take 10 mg by mouth every evening.    famotidine (PEPCID) 20 MG tablet Take 20 mg by mouth 2 (two) times daily.    PNV95/FERROUS FUMARATE/FA (PRENATAL ORAL) Take by mouth.       Review of patient's allergies indicates:  No Known Allergies     Family History     Problem Relation (Age of Onset)    Glaucoma Father    Melanoma Paternal Grandfather        Social History Main Topics    Smoking status: Never  Smoker    Smokeless tobacco: Never Used    Alcohol use No      Comment: sociallly    Drug use: No    Sexual activity: Yes     Partners: Male     Birth control/ protection: None     Review of Systems   Objective:     Vital Signs (Most Recent):  Temp: 97.5 °F (36.4 °C) (17 0747)  Pulse: 83 (17 0820)  Resp: 20 (17 07)  BP: (!) 161/96 (17 08)  SpO2: 100 % (17) Vital Signs (24h Range):  Temp:  [97.5 °F (36.4 °C)-98 °F (36.7 °C)] 97.5 °F (36.4 °C)  Pulse:  [75-85] 83  Resp:  [20] 20  SpO2:  [100 %] 100 %  BP: (156-161)/(94-96) 161/96     Weight: 88 kg (194 lb)  Body mass index is 28.65 kg/(m^2).    FHT: 125 with Moderate variability Cat 1   TOCO: contraction Q 3-4 minutes/ seconds/moderate    Physical Exam    Cervix:  Dilation:  3  Effacement:  805%  Station: -3  Presentation: Vertex     Significant Labs:  Lab Results   Component Value Date    GROUPTRH A POS 2017    HEPBSAG Negative 2016       I have personallly reviewed all pertinent lab results from the last 24 hours.  GBS negative  Rubella: Immune  HepBsAg: Negative  HIV and RPR: Negative first and third trimesters  PreE lab work WNL  PCR: unable to calculate        Assessment/Plan:     31 y.o. female  at 41w1d for:  Early labor at late term gestation  Hypertension on admission to [facility] without other S&S of PreE  Desired epidural anesthesia so epidural now in place with good results    Active Diagnoses:    Diagnosis Date Noted POA    PRINCIPAL PROBLEM:  Hypertension affecting pregnancy in third trimester [O16.3] 2017 Yes    Normal labor [O80, Z37.9] 2017 Not Applicable    Post term pregnancy, 41 weeks [O48.0, Z3A.41] 2017 Yes    Varicose veins of vulva and perineum in third trimester, antepartum [O22.13] 2017 Yes      Problems Resolved During this Admission:    Diagnosis Date Noted Date Resolved POA     Expectant management at this time.  Dr. Pricila Hannah is aware of this  patient's status.  Shona De Paz CNM  Obstetrics  [facility]

## 2017-04-17 NOTE — ASSESSMENT & PLAN NOTE
Thin rim of cervix still felt with vertex in OA position. Cervix not reduced with position change. Patient was uncomfortable breathing through contractions until anesthesiologist adjusted catheter and redosed epidural. Patient now more comfortable.  Contractions inadequate..  Will start Pitocin augmentation per protocol.

## 2017-04-17 NOTE — ED PROVIDER NOTES
Encounter Date: 4/17/2017  Lesley is a 32yo     History     Chief Complaint   Patient presents with    Contractions     Review of patient's allergies indicates:  No Known Allergies  Patient is a 31 y.o. female presenting with the following complaint: abdominal pain. The history is provided by the patient and the spouse. No  was used.   Abdominal Pain   The current episode started today (contractions started at 0100). The onset of the illness was abrupt. The problem has been gradually worsening. The abdominal pain is generalized (abdomen and back). The abdominal pain radiates to the back. The abdominal pain is relieved by nothing (improved with warm bath). The abdominal pain is exacerbated by certain positions. The other symptoms of the illness include vomiting and vaginal discharge. Primary symptoms comment: no ROM, mucousy d/c with spotting.   The vaginal discharge was first noticed today. Vaginal discharge is a new problem. The amount of discharge is profuse. The color of the discharge is clear. The discharge is described as high viscosity.   The vomiting began today. Vomiting occurred once. The emesis contains stomach contents.   The patient states that she believes she is currently pregnant. The patient has not had a change in bowel habit. Additional symptoms associated with the illness include back pain.     Past Medical History:   Diagnosis Date    Allergy     Blood type A POS 3/20/2017    Fever blister     Oral cold sores only; no hx genital outbreak    Hypertension affecting pregnancy in third trimester 4/17/2017    Other and unspecified ovarian cysts 2003     Past Surgical History:   Procedure Laterality Date    NASAL SEPTUM SURGERY      OTHER SURGICAL HISTORY      anal fissure     TONSILLECTOMY, ADENOIDECTOMY      WISDOM TOOTH EXTRACTION       Family History   Problem Relation Age of Onset    Glaucoma Father     Melanoma Paternal Grandfather     Breast cancer Neg Hx      Cancer Neg Hx     Ovarian cancer Neg Hx     Colon cancer Neg Hx     Macular degeneration Neg Hx     Retinal detachment Neg Hx      Social History   Substance Use Topics    Smoking status: Never Smoker    Smokeless tobacco: Never Used    Alcohol use No      Comment: sociallly     Review of Systems   HENT: Negative.    Eyes: Negative.    Gastrointestinal: Positive for abdominal pain and vomiting.   Endocrine: Negative.    Genitourinary: Positive for vaginal discharge.   Musculoskeletal: Positive for back pain.   Neurological: Negative.  Negative for headaches.       Physical Exam   Initial Vitals   BP Pulse Resp Temp SpO2   04/17/17 0727 04/17/17 0727 -- -- --   156/94 75        Physical Exam    Constitutional: She appears well-developed and well-nourished.   HENT:   Head: Normocephalic.   Cardiovascular: Normal rate, regular rhythm, normal heart sounds and intact distal pulses.   Pulmonary/Chest: Breath sounds normal.   Abdominal: Soft.   Genitourinary: Vaginal discharge found.   Genitourinary Comments: Reports mucous discharge only. Denies leaking clear fluids.   Neurological: She has normal reflexes.     OB LABOR EXAM:   Pre-Term Labor: No.   Membranes ruptured: No.   Method: Other (see comments).   Vaginal Bleeding: none present.   Engagement: engaged.   Dilatation: 3.   Station: -3.   Amniotic Fluid Color: no fluid.   Amniotic Fluid Amount: none noted.         ED Course   Procedures  Labs Reviewed   PROTEIN / CREATININE RATIO, URINE             Medical Decision Making:   Initial Assessment:   Hypertension third trimester  Early labor  Differential Diagnosis:   R/o Preeclampsia  Clinical Tests:   Lab Tests: Ordered  Medical Tests: Ordered  ED Management:  Admit to L&D  Consult with MD regarding hypertension                     ED Course     Clinical Impression:   Early labor  Hypertension    Disposition:   Disposition: Admitted  PreE labs   Consult with LORRI Mix  04/17/17 6467

## 2017-04-17 NOTE — PROGRESS NOTES
Principal Problem: Normal labor at late term gestation      History of Present Illness: Patient reported onset of regular uterine contractions at 0100 this morning and mucous discharge without blood or clear fluid. Denies headache, visual disturbances and epigastric pain. Reports normal fetal movement. Was admitted to L&D for early labor due to elevated blood pressures with no S&S of PreE. Blood pressures are now in the mild hypertensive to normal range of 150/85, 143/83, 127/72 post epidural administration. PreE labs essentially WNL            Obstetric HPI:       This pregnancy has been complicated by late term gestation and varicose veins of vulva and perineum. Her  has a history of genital herpes, Type 1 with only one noted outbreak 10 years ago. A vulvar papule during pregnancy was not typical of a herpes lesion and PCR for Herpes was negative.                               Obstetric History    T0  TAB0 SAB0 E0 M0 L0         # Outcome Date GA Lbr Roger/2nd Weight Sex Delivery Anes PTL Lv   1 Current                                               Past Medical History:   Diagnosis Date    Allergy       Blood type A POS 3/20/2017    Fever blister         Oral cold sores only; no hx genital outbreak    Hypertension affecting pregnancy in third trimester 2017    Other and unspecified ovarian cysts                  Past Surgical History:   Procedure Laterality Date    NASAL SEPTUM SURGERY          OTHER SURGICAL HISTORY            anal fissure     TONSILLECTOMY, ADENOIDECTOMY          WISDOM TOOTH EXTRACTION                         PTA Medications   Medication Sig    cetirizine (ZYRTEC) 10 MG tablet Take 10 mg by mouth every evening.    famotidine (PEPCID) 20 MG tablet Take 20 mg by mouth 2 (two) times daily.    PNV95/FERROUS FUMARATE/FA (PRENATAL ORAL) Take by mouth.           Review of patient's allergies indicates:  No Known Allergies            Family History      Problem  Relation (Age of Onset)      Glaucoma Father      Melanoma Paternal Grandfather                       Social History Main Topics    Smoking status: Never Smoker    Smokeless tobacco: Never Used    Alcohol use No            Comment: sociallly    Drug use: No    Sexual activity: Yes         Partners: Male         Birth control/ protection: None       Review of Systems   Objective:         Weight: 88 kg (194 lb)  Body mass index is 28.65 kg/(m^2).      FHT: 135 with moderate variability and accelerations; Cat 1   TOCO: contraction Q 2.5-4.5 minutes/ seconds/moderate      Physical Exam      Cervix per SVE:  Dilation: 9, left side only  Effacement: 100%  Station: -1  Presentation: Vertex     BPs: 140/87, 143/83            Lab Results   Component Value Date      GROUPTRH A POS 2017      HEPBSAG Negative 2016           I have personallly reviewed all pertinent lab results from the last 24 hours.  GBS negative  Rubella: Immune  HepBsAg: Negative  HIV and RPR: Negative first and third trimesters  PreE lab work WNL  PCR: unable to calculate              Assessment/Plan:       31 y.o. female  at 41w1d for:  Active labor at late term gestation  Hypertension without other S&S of PreE  Epidural anesthesia in place                  Active Diagnoses:      Diagnosis Date Noted POA    PRINCIPAL PROBLEM: Hypertension affecting pregnancy in third trimester [O16.3] 2017 Yes    Normal labor [O80, Z37.9] 2017 Not Applicable    Post term pregnancy, 41 weeks [O48.0, Z3A.41] 2017 Yes    Varicose veins of vulva and perineum in third trimester, antepartum [O22.13] 2017 Yes        Problems Resolved During this Admission:      Diagnosis Date Noted Date Resolved POA       Continue expectant management at this time.     Shona De Paz CNM  Obstetrics  [facility]

## 2017-04-17 NOTE — PROGRESS NOTES
Principal Problem: Normal labor at late term gestation     History of Present Illness: Patient reported onset of regular uterine contractions at 0100 this morning and mucous discharge without blood or clear fluid. Denies headache, visual disturbances and epigastric pain. Reports normal fetal movement. Was admitted to L&D for early labor due to elevated blood pressures with no S&S of PreE. Blood pressures are now in the mild hypertensive to normal range of 150/85, 143/83, 127/72 post epidural administration. PreE labs essentially WNL         Obstetric HPI:      This pregnancy has been complicated by late term gestation and varicose veins of vulva and perineum. Her  has a history of genital herpes, Type 1 with only one noted outbreak 10 years ago. A vulvar papule during pregnancy was not typical of a herpes lesion and PCR for Herpes was negative.                   Obstetric History    T0  TAB0 SAB0 E0 M0 L0        # Outcome Date GA Lbr Roger/2nd Weight Sex Delivery Anes PTL Lv   1 Current                                 Past Medical History:   Diagnosis Date    Allergy      Blood type A POS 3/20/2017    Fever blister       Oral cold sores only; no hx genital outbreak    Hypertension affecting pregnancy in third trimester 2017    Other and unspecified ovarian cysts             Past Surgical History:   Procedure Laterality Date    NASAL SEPTUM SURGERY        OTHER SURGICAL HISTORY         anal fissure     TONSILLECTOMY, ADENOIDECTOMY        WISDOM TOOTH EXTRACTION                  PTA Medications   Medication Sig    cetirizine (ZYRTEC) 10 MG tablet Take 10 mg by mouth every evening.    famotidine (PEPCID) 20 MG tablet Take 20 mg by mouth 2 (two) times daily.    PNV95/FERROUS FUMARATE/FA (PRENATAL ORAL) Take by mouth.         Review of patient's allergies indicates:  No Known Allergies      Family History     Problem Relation (Age of Onset)     Glaucoma Father     Melanoma Paternal  Grandfather                Social History Main Topics    Smoking status: Never Smoker    Smokeless tobacco: Never Used    Alcohol use No         Comment: sociallly    Drug use: No    Sexual activity: Yes       Partners: Male       Birth control/ protection: None      Review of Systems   Objective:      Vital Signs (Most Recent):  Temp: 97.5 °F (36.4 °C) (17 0747)  Pulse: 83 (17)  Resp: 20 (17)  BP: (!) 161/96 (17)  SpO2: 100 % (17) Vital Signs (24h Range):  Temp: [97.5 °F (36.4 °C)-98 °F (36.7 °C)] 97.5 °F (36.4 °C)  Pulse: [75-85] 83  Resp: [20] 20  SpO2: [100 %] 100 %  BP: (156-161)/(94-96) 161/96      Weight: 88 kg (194 lb)  Body mass index is 28.65 kg/(m^2).     FHT: 125 with Moderate variability Cat 1   TOCO: contraction Q 3-4 minutes/ seconds/moderate     Physical Exam     Cervix:  Dilation:  7  Effacement:  100%  Station: -3  Presentation: Vertex      Significant Labs:        Lab Results   Component Value Date     GROUPTRH A POS 2017     HEPBSAG Negative 2016         I have personallly reviewed all pertinent lab results from the last 24 hours.  GBS negative  Rubella: Immune  HepBsAg: Negative  HIV and RPR: Negative first and third trimesters  PreE lab work WNL  PCR: unable to calculate           Assessment/Plan:      31 y.o. female  at 41w1d for:  Active labor at late term gestation  Hypertension without other S&S of PreE  Epidural anesthesia in place            Active Diagnoses:     Diagnosis Date Noted POA    PRINCIPAL PROBLEM: Hypertension affecting pregnancy in third trimester [O16.3] 2017 Yes    Normal labor [O80, Z37.9] 2017 Not Applicable    Post term pregnancy, 41 weeks [O48.0, Z3A.41] 2017 Yes    Varicose veins of vulva and perineum in third trimester, antepartum [O22.13] 2017 Yes       Problems Resolved During this Admission:     Diagnosis Date Noted Date Resolved POA      Continue expectant  management at this time.    Shona De Paz CNM  Obstetrics  [facility]

## 2017-04-18 PROBLEM — O48.0 POST TERM PREGNANCY, 41 WEEKS: Status: RESOLVED | Noted: 2017-04-17 | Resolved: 2017-04-18

## 2017-04-18 PROBLEM — Z37.9 NORMAL LABOR: Status: RESOLVED | Noted: 2017-04-17 | Resolved: 2017-04-18

## 2017-04-18 PROBLEM — Z3A.41 POST TERM PREGNANCY, 41 WEEKS: Status: RESOLVED | Noted: 2017-04-17 | Resolved: 2017-04-18

## 2017-04-18 LAB
CREAT UR-MCNC: 23.4 MG/DL
PROT UR-MCNC: 12 MG/DL
PROT/CREAT RATIO, UR: 0.51

## 2017-04-18 PROCEDURE — 25000003 PHARM REV CODE 250: Performed by: ADVANCED PRACTICE MIDWIFE

## 2017-04-18 PROCEDURE — 84156 ASSAY OF PROTEIN URINE: CPT

## 2017-04-18 PROCEDURE — 63600175 PHARM REV CODE 636 W HCPCS: Performed by: STUDENT IN AN ORGANIZED HEALTH CARE EDUCATION/TRAINING PROGRAM

## 2017-04-18 PROCEDURE — 72200005 HC VAGINAL DELIVERY LEVEL II

## 2017-04-18 PROCEDURE — 11000001 HC ACUTE MED/SURG PRIVATE ROOM

## 2017-04-18 RX ORDER — MAGNESIUM SULFATE HEPTAHYDRATE 40 MG/ML
2 INJECTION, SOLUTION INTRAVENOUS ONCE
Status: COMPLETED | OUTPATIENT
Start: 2017-04-18 | End: 2017-04-18

## 2017-04-18 RX ORDER — MAGNESIUM SULFATE HEPTAHYDRATE 40 MG/ML
2 INJECTION, SOLUTION INTRAVENOUS CONTINUOUS
Status: DISCONTINUED | OUTPATIENT
Start: 2017-04-18 | End: 2017-04-19

## 2017-04-18 RX ORDER — SODIUM CHLORIDE, SODIUM LACTATE, POTASSIUM CHLORIDE, CALCIUM CHLORIDE 600; 310; 30; 20 MG/100ML; MG/100ML; MG/100ML; MG/100ML
1000 INJECTION, SOLUTION INTRAVENOUS CONTINUOUS
Status: DISCONTINUED | OUTPATIENT
Start: 2017-04-18 | End: 2017-04-19

## 2017-04-18 RX ORDER — OXYCODONE AND ACETAMINOPHEN 5; 325 MG/1; MG/1
1 TABLET ORAL EVERY 4 HOURS PRN
Status: DISCONTINUED | OUTPATIENT
Start: 2017-04-18 | End: 2017-04-20 | Stop reason: HOSPADM

## 2017-04-18 RX ORDER — CALCIUM GLUCONATE 98 MG/ML
1 INJECTION, SOLUTION INTRAVENOUS
Status: DISCONTINUED | OUTPATIENT
Start: 2017-04-18 | End: 2017-04-19

## 2017-04-18 RX ORDER — ACETAMINOPHEN 325 MG/1
650 TABLET ORAL EVERY 6 HOURS PRN
Status: DISCONTINUED | OUTPATIENT
Start: 2017-04-18 | End: 2017-04-19

## 2017-04-18 RX ORDER — OXYTOCIN/RINGER'S LACTATE 20/1000 ML
41.65 PLASTIC BAG, INJECTION (ML) INTRAVENOUS
Status: ACTIVE | OUTPATIENT
Start: 2017-04-18 | End: 2017-04-18

## 2017-04-18 RX ORDER — AMOXICILLIN 250 MG
1 CAPSULE ORAL DAILY PRN
Status: DISCONTINUED | OUTPATIENT
Start: 2017-04-18 | End: 2017-04-20 | Stop reason: HOSPADM

## 2017-04-18 RX ORDER — ONDANSETRON 8 MG/1
8 TABLET, ORALLY DISINTEGRATING ORAL EVERY 8 HOURS PRN
Status: DISCONTINUED | OUTPATIENT
Start: 2017-04-18 | End: 2017-04-20 | Stop reason: HOSPADM

## 2017-04-18 RX ORDER — HYDRALAZINE HYDROCHLORIDE 20 MG/ML
5 INJECTION INTRAMUSCULAR; INTRAVENOUS ONCE
Status: COMPLETED | OUTPATIENT
Start: 2017-04-18 | End: 2017-04-18

## 2017-04-18 RX ORDER — ACETAMINOPHEN 325 MG/1
650 TABLET ORAL EVERY 6 HOURS PRN
Status: DISCONTINUED | OUTPATIENT
Start: 2017-04-18 | End: 2017-04-20 | Stop reason: HOSPADM

## 2017-04-18 RX ADMIN — ACETAMINOPHEN 650 MG: 325 TABLET ORAL at 04:04

## 2017-04-18 RX ADMIN — HYDRALAZINE HYDROCHLORIDE 5 MG: 20 INJECTION INTRAMUSCULAR; INTRAVENOUS at 03:04

## 2017-04-18 RX ADMIN — MAGNESIUM SULFATE IN WATER 2 G: 40 INJECTION, SOLUTION INTRAVENOUS at 03:04

## 2017-04-18 RX ADMIN — MISOPROSTOL 800 MCG: 200 TABLET ORAL at 12:04

## 2017-04-18 RX ADMIN — ACETAMINOPHEN 650 MG: 325 TABLET ORAL at 10:04

## 2017-04-18 RX ADMIN — ACETAMINOPHEN 650 MG: 325 TABLET ORAL at 09:04

## 2017-04-18 NOTE — SUBJECTIVE & OBJECTIVE
Interval History:  Catie is a 31 y.o.  at 41w1d     Objective:     Vital Signs (Most Recent):  Temp: 97.9 °F (36.6 °C) (17)  Pulse: 90 (17)  Resp: 16 (17)  BP: (!) 151/90 (17)  SpO2: 100 % (17) Vital Signs (24h Range):  Temp:  [97 °F (36.1 °C)-98 °F (36.7 °C)] 97.9 °F (36.6 °C)  Pulse:  [] 90  Resp:  [16-20] 16  SpO2:  [91 %-100 %] 100 %  BP: (121-189)/() 151/90     Weight: 88 kg (194 lb)  Body mass index is 28.65 kg/(m^2).    FHT: 135 with moderate variability and accelerations Cat 1 (reassuring)  TOCO:  Q 3-5 minutes    Cervical Exam:  Dilation:  9  Effacement:  100%  Station: -1  Presentation: Vertex     Significant Labs:  Lab Results   Component Value Date    GROUPTRH A POS 2017    HEPBSAG Negative 2016       CBC:   Recent Labs  Lab 17  0852   WBC 19.12*   RBC 4.37   HGB 13.4   HCT 39.8      MCV 91   MCH 30.7   MCHC 33.7     CMP:   Recent Labs  Lab 17  0852   GLU 81   CALCIUM 8.8   ALBUMIN 3.0*   PROT 7.0      K 4.3   CO2 17*      BUN 12   CREATININE 0.8   ALKPHOS 155*   ALT 17   AST 26   BILITOT 0.6     I have personallly reviewed all pertinent lab results from the last 24 hours.    Physical Exam: See above.

## 2017-04-18 NOTE — MEDICAL/APP STUDENT
"MAG NOTE     Catie Monge is a 31 y.o. female    Patient {Denies / Reports:78155} headaches, {Denies / Reports:34755} blurry vision and {Denies / Reports:79969} right upper quadrant pain. {Denies / Reports:82648} CP, {Denies / Reports:17719} SOB. Patient reports {Nausea/vomitin}.     Objective:       BP (!) 142/93  Pulse 93  Temp 98.5 °F (36.9 °C) (Oral)   Resp 18  Ht 5' 9" (1.753 m)  Wt 88 kg (194 lb)  LMP 2016  SpO2 100%  Breastfeeding? Unknown  BMI 28.65 kg/m2  Vitals:    17 0329 17 0430 17 0542 17 0614   BP: (!) 144/80 (!) 148/91 137/86 (!) 142/93   BP Location:  Left arm Left arm    Patient Position:  Sitting Sitting    BP Method:  Automatic Automatic    Pulse: 99 80  93   Resp:  18     Temp:  98 °F (36.7 °C)  98.5 °F (36.9 °C)   TempSrc:    Oral   SpO2:  100%     Weight:       Height:             I/O last 3 completed shifts:  In: -   Out:  [Urine:]    I/O this shift:  In: 3863.3 [I.V.:2863.3; IV Piggyback:1000]  Out: 4350 [Urine:3950; Blood:400]           General:   {gen appearance:49758::"alert","appears stated age","cooperative"}   Lungs:   {lun::"clear to auscultation bilaterally"}   Heart:   {heart exam:5510::"regular rate and rhythm, S1, S2 normal, no murmur, click, rub or gallop"}   Abdomen:  {abdomen exam:24009::"soft, non-tender; bowel sounds normal; no masses,  no organomegaly"}   Uterine Size:  {firm/med/soft:56636} located at the umblicus.    Extremities: {extremities:993442::"peripheral pulses normal, no pedal edema, no clubbing or cyanosis"}   Reflexes - {Exam; reflexes:01131} bilaterally     SVE: ***/***/***   FHTs: ***, Cat {NUMBERS; 0-3:75937}, {gen reassuring/ unreassurin}    Lab Review  Recent Results (from the past 48 hour(s))   Protein / creatinine ratio, urine    Collection Time: 17  7:45 AM   Result Value Ref Range    Protein, Urine Random <7 0 - 15 mg/dL    Creatinine, Random Ur 46.8 15.0 - 325.0 mg/dL    " Prot/Creat Ratio, Ur Unable to calculate 0.00 - 0.20   CBC auto differential    Collection Time: 04/17/17  8:52 AM   Result Value Ref Range    WBC 19.12 (H) 3.90 - 12.70 K/uL    RBC 4.37 4.00 - 5.40 M/uL    Hemoglobin 13.4 12.0 - 16.0 g/dL    Hematocrit 39.8 37.0 - 48.5 %    MCV 91 82 - 98 fL    MCH 30.7 27.0 - 31.0 pg    MCHC 33.7 32.0 - 36.0 %    RDW 13.3 11.5 - 14.5 %    Platelets 200 150 - 350 K/uL    MPV 11.2 9.2 - 12.9 fL    Lymph # CANCELED 1.0 - 4.8 K/uL    Mono # CANCELED 0.3 - 1.0 K/uL    Eos # CANCELED 0.0 - 0.5 K/uL    Baso # CANCELED 0.00 - 0.20 K/uL    Gran% 93.0 (H) 38.0 - 73.0 %    Lymph% 3.0 (L) 18.0 - 48.0 %    Mono% 3.0 (L) 4.0 - 15.0 %    Eosinophil% 0.0 0.0 - 8.0 %    Basophil% 0.0 0.0 - 1.9 %    Metamyelocytes 1.0 %    Differential Method Manual    Comprehensive metabolic panel    Collection Time: 04/17/17  8:52 AM   Result Value Ref Range    Sodium 136 136 - 145 mmol/L    Potassium 4.3 3.5 - 5.1 mmol/L    Chloride 107 95 - 110 mmol/L    CO2 17 (L) 23 - 29 mmol/L    Glucose 81 70 - 110 mg/dL    BUN, Bld 12 6 - 20 mg/dL    Creatinine 0.8 0.5 - 1.4 mg/dL    Calcium 8.8 8.7 - 10.5 mg/dL    Total Protein 7.0 6.0 - 8.4 g/dL    Albumin 3.0 (L) 3.5 - 5.2 g/dL    Total Bilirubin 0.6 0.1 - 1.0 mg/dL    Alkaline Phosphatase 155 (H) 55 - 135 U/L    AST 26 10 - 40 U/L    ALT 17 10 - 44 U/L    Anion Gap 12 8 - 16 mmol/L    eGFR if African American >60 >60 mL/min/1.73 m^2    eGFR if non African American >60 >60 mL/min/1.73 m^2   Lactate dehydrogenase    Collection Time: 04/17/17  8:52 AM   Result Value Ref Range     (H) 110 - 260 U/L   Uric acid    Collection Time: 04/17/17  8:52 AM   Result Value Ref Range    Uric Acid 5.5 2.4 - 5.7 mg/dL   Type & Screen    Collection Time: 04/17/17  8:53 AM   Result Value Ref Range    Group & Rh A POS     Indirect Ynes NEG    Protein / creatinine ratio, urine    Collection Time: 04/18/17  2:17 AM   Result Value Ref Range    Protein, Urine Random 12 0 - 15 mg/dL     Creatinine, Random Ur 23.4 15.0 - 325.0 mg/dL    Prot/Creat Ratio, Ur 0.51 (H) 0.00 - 0.20          Assessment:     31 y.o.  female , on magnesium sulfate    Active Hospital Problems    Diagnosis  POA    * (normal spontaneous vaginal delivery) [O80]  Not Applicable    Pregnancy induced hypertension, postpartum [O13.5]  Yes    Post term pregnancy, 41 weeks [O48.0, Z3A.41]  Yes    Varicose veins of vulva and perineum in third trimester, antepartum [O22.13]  Yes      Resolved Hospital Problems    Diagnosis Date Resolved POA    Normal labor [O80, Z37.9] 2017 Not Applicable        Plan:   1. Continue magnesium sulfate, no signs of toxicity at this time  2. Monitor for s/s of eclampsia  3. Close maternal monitoring including UOP and BP, ***  4. Recheck in 2-4 hours or PRN  5. Intrapartum - ***

## 2017-04-18 NOTE — PROGRESS NOTES
[facility]  Obstetrics  Postpartum Progress Note    Patient Name: Catie Monge  MRN: [id number]  Admission Date: 2017  Hospital Length of Stay: 1 days  Attending Physician: Pricila Hannah MD  Primary Care Provider: Primary Doctor No    Subjective:     Principal Problem: (normal spontaneous vaginal delivery)    Interval History:  Catie Monge is a 31 y.o. female status post Spontaneous vaginal delivery on 17 12:09 AM  at 41w2d. Patient is doing well today. Vaginal bleeding is moderate..        Objective:     Vital Signs (Most Recent):  Temp: 98.1 °F (36.7 °C) (17)  Pulse: 90 (17)  Resp: 16 (17)  BP: (!) 167/83 (17)  SpO2: (!) 90 % (17) Vital Signs (24h Range):  Temp:  [97 °F (36.1 °C)-98.8 °F (37.1 °C)] 98.1 °F (36.7 °C)  Pulse:  [] 90  Resp:  [16-20] 16  SpO2:  [84 %-100 %] 90 %  BP: (121-189)/() 167/83   Other PP BPs 152/66 and 176/97        Intake/Output Summary (Last 24 hours) at 17 0126  Last data filed at 17 0009   Gross per 24 hour   Intake                0 ml   Output             2575 ml   Net            -2575 ml       Significant Labs:  Lab Results   Component Value Date    GROUPTRH A POS 2017    HEPBSAG Negative 2016       Recent Labs  Lab 17  0852   HGB 13.4   HCT 39.8       Physical Exam   FF deviated to right with moderate rubra lochia.    Assessment/Plan:     31 y.o. female  at 41w2d for:    *  (normal spontaneous vaginal delivery)  Abrasions not needing repair noted.    Varicose veins of vulva and perineum in third trimester, antepartum  Moderate varicosities of vulva were very uncomfortable for patient during her pregnancy.  Anticipate partial to full relief pp.    Pregnancy induced hypertension, postpartum  Blood pressures pp have been elevated 160s-170/70s-90s. PreE labs essentially WNL  Dr. Pricila Hannah is aware of status. Will continue  expectant management and reevaluate if systolic BPs do not resolve to mild range.      Disposition: Will transfer to L&D when stable.    Shona De Paz CNM  Obstetrics  [facility]

## 2017-04-18 NOTE — ASSESSMENT & PLAN NOTE
Moderate varicosities of vulva were very uncomfortable for patient during her pregnancy.  Anticipate partial to full relief pp.

## 2017-04-18 NOTE — ADDENDUM NOTE
Addendum  created 04/18/17 0902 by Iván Gonzales MD    Anesthesia Event edited, Procedure Event Log accessed

## 2017-04-18 NOTE — SUBJECTIVE & OBJECTIVE
Interval History:  Catie Monge is a 31 y.o. female status post Spontaneous vaginal delivery on 4/18/17 12:09 AM  at 41w2d. Patient is doing well today. Vaginal bleeding is moderate..        Objective:     Vital Signs (Most Recent):  Temp: 98.1 °F (36.7 °C) (04/18/17 0115)  Pulse: 90 (04/18/17 0117)  Resp: 16 (04/18/17 0115)  BP: (!) 167/83 (04/18/17 0117)  SpO2: (!) 90 % (04/18/17 0116) Vital Signs (24h Range):  Temp:  [97 °F (36.1 °C)-98.8 °F (37.1 °C)] 98.1 °F (36.7 °C)  Pulse:  [] 90  Resp:  [16-20] 16  SpO2:  [84 %-100 %] 90 %  BP: (121-189)/() 167/83   Other PP BPs 152/66 and 176/97        Intake/Output Summary (Last 24 hours) at 04/18/17 0126  Last data filed at 04/18/17 0009   Gross per 24 hour   Intake                0 ml   Output             2575 ml   Net            -2575 ml       Significant Labs:  Lab Results   Component Value Date    GROUPTRH A POS 04/17/2017    HEPBSAG Negative 09/08/2016       Recent Labs  Lab 04/17/17  0852   HGB 13.4   HCT 39.8       Physical Exam   FF deviated to right with moderate rubra lochia.

## 2017-04-18 NOTE — DISCHARGE INSTRUCTIONS
Breastfeeding Discharge Instructions       Feed the baby at the earliest sign of hunger or comfort  o Hands to mouth, sucking motions  o Rooting or searching for something to suck on  o Dont wait for crying - it is a late sign of hunger and comfort.     The feedings may be 8-12 times per 24hrs and will not follow a schedule   Avoid pacifiers and bottles for the first 4 weeks   Alternate the breast you start the feeding with, or start with the breast that feels the fullest   Switch breasts when the baby takes himself off the breast or falls asleep   Keep offering breasts until the baby looks full, no longer gives hunger signs, and stays asleep when placed on his back in the crib   If the baby is sleepy and wont wake for a feeding, put the baby skin-to-skin dressed in a diaper against the mothers bare chest   Sleep near your baby   The baby should be positioned and latched on to the breast correctly  o Chest-to-chest, chin in the breast  o Babys lips are flipped outward  o Babys mouth is stretched open wide like a shout  o Babys sucking should feel like tugging to the mother  - The baby should be drinking at the breast:  o You should hear swallowing or gulping throughout the feeding  o You should see milk on the babys lips when he comes off the breast  o Your breasts should be softer when the baby is finished feeding  o The baby should look relaxed at the end of feedings  o After the 4th day and your milk is in:  o The babys poop should turn bright yellow and be loose, watery, and seedy  o The baby should have at least 3-4 poops the size of the palm of your hand per day  o The baby should have at least 6-8 wet diapers per day  o The urine should be light yellow in color  You should drink when you are thirsty and eat a healthy diet when you are    hungry.     Take naps to get the rest you need.   Take medications and/or drink alcohol only with permission of your obstetrician    or the babys  pediatrician.  You can also call the Infant Risk Center,   ([phone]), Monday-Friday, 8am-5pm Central time, to get the most   up-to-date evidence-based information on the use of medications during   pregnancy and breastfeeding.      The baby should be examined by a pediatrician at 3-5 days of age.  Once your   milk comes in, the baby should be gaining at least ½ - 1oz each day and should be back to birthweight no later than 10-14 days of age.          Community Resources    [facility] Breastfeeding Warmline:  [phone]  Local [facility] clinics: provide incentives and breastpumps to eligible mothers  La Leche League International (LLLI):  mother-to-mother support group website        www.Holidog.AppSlingr  Local La Leche League mother-to-mother support groups:        www.AMX.SecondMarket        [facility] Leche [facility]         [email or web address]@[employer].com  Dr. Maninder Reid website for latch videos and general information:        www.breastfeedinginc.ca  [facility] Risk Center is a call center that provides information about the safety of taking medications while breastfeeding.  Call 7-116-902-3730, M-F, 8am-5pm, [place].  International Lactation Consultant Association provides resources for assistance:        www.ilca.org  [place] Breastfeeding Coalition provides informationand resources for parents  and the community    http://[place]abreastfeeding.org     Christal mom provides resources for assistance:        www.nolamom.org  [employer] for Healthy Babies:  9-827-962-BABY(9739)  [facility] provides a list of breastfeeding services by zip code:        www.[facility] Center[employer] Media Education Ltd.AppSlingr  C[employer]ait:  [phone] a breastfeeding support group for women of color.    Primary Engorgement    If the milk is flowing, use wet or dry heat applied to the breasts for approximately 10min prior to each feeding as a comfort measure to facilitate the milk ejection reflex    Follow heat treatment with breast massage to soften hard/lumpy areas of the  breast    Use unrestricted, frequent, effective feedings.      Wake baby to feed if necessary    Avoid pacifier and bottle feedings    Hand express or pump breasts to the point of comfort prn    Use cold treatments in the form of ice packs/gel packs/ frozen vegetables wrapped in a soft thin cloth and applied to the breasts for approximately 20min after each feeding until engorgement is resolved    Wear comfortable, supportive bra    Take pain medicine prn    Use anti-inflammatory medications if prescribed by physician    Other:

## 2017-04-18 NOTE — L&D DELIVERY NOTE
Delivery Information for  Zonia Monge    Birth information:  YOB: 2015   Time of birth: 12:09 AM   Sex: female   Head Delivery Date/Time: 2017 12:09 AM   Delivery type: Vaginal, Spontaneous Delivery   Gestational Age: 41w2d    Delivery Providers    Delivering clinician:  WILFRIDO HANSEN   Other personnel:   Provider Role   DAWIT KELLEY Registered Nurse   MAJO SONG Registered Nurse   MARIA T RODRIGUEZ Registered Nurse                       Assessment    Living status:  Yes   Apgars    1 Minute:   5 Minute:   10 Minute 15 Minute 20 Minute   Skin Color: 0  1       Heart Rate: 2  2       Reflex Irritability: 2  2       Muscle Tone: 2  2       Respiratory Effort: 1  2       Total: 7  9                  Apgars Assigned By:  JOHANA SONG          Assisted Delivery Details:    Forceps attempted?:  No   Vacuum extractor attempted?:  No             Shoulder Dystocia    Shoulder dystocia present?:  No                                             Presentation and Position    Presentation: Vertex   Position: Left    Occiput    Anterior               Interventions/Resuscitation    Method:  Tactile Stimulation, Bulb Suctioning        Cord    Vessels:  3 vessels   Complications:  None   Delayed Cord Clamping?:  Yes   Cord Clamped Date/Time:  2017 12:14 AM   Cord Blood Disposition:  Sent with Baby   Gases Sent?:  No   Stem Cell Collection (by MD):  No        Placenta    Date and time:  2017 12:17 AM            Labor Events:       labor: No     Labor Onset Date/Time: 2017 08:40     Dilation Complete Date/Time: 2017 23:00     Start Pushing Date/Time: 2017 23:00     Rupture Date/Time:              Rupture type:           Fluid Amount:        Fluid Color:        Fluid Odor:        Membrane Status (PeriCalm): SRM (Spontaneous Rupture)      Rupture Date/Time (PeriCalm): 2017 13:09:00      Fluid Amount (PeriCalm): Small      Fluid Color (PeriCalm):  Clear       steroids:       Antibiotics given for GBS: No     Induction:       Indications for induction:        Augmentation: oxytocin     Indications for augmentation: Ineffective Contraction Pattern     Labor complications: None     Additional complications:          Cervical ripening:                     Delivery:      Episiotomy: None     Indication for Episiotomy:       Perineal Lacerations: None Repaired:      Periurethral Laceration: none Repaired:     Labial Laceration: none Repaired:     Sulcus Laceration: none Repaired:     Vaginal Laceration: No Repaired:     Cervical Laceration: No Repaired:     Repair suture: None     Repair # of packets:       Blood loss (ml): 400     Vaginal Sweep Performed: No     Surgicount Correct: No       Other providers:       Anesthesia    Method:  Epidural              Details (if applicable):  Trial of Labor      Categorization:      Priority:     Indications for :     Incision Type:       Additional  information:  Forceps:    Vacuum:    Breech:    Observed anomalies    Other (Comments):          of female infant over intact perineum lifted by mother to her chest. Tactile stimulation and bulb suctioning only needed for resuscitation. Placenta delivered spontaneously and was found to be intact with three vessel cord. Hemostasis achieved with infusion of 20u Pitocin in 1000cc IV fluid and 800mcg Cytotec given pr. Inspection of perineum revealed several abrasions which were not bleeding and did not warrant repair. Infant held skin to skin with mom, then dad, then back to mom for nursing.

## 2017-04-18 NOTE — LACTATION NOTE
"   04/18/17 1114   Maternal Infant Assessment   Breast Shape round   Breast Density soft   Areola elastic   Infant Assessment   Sucking Reflex present   Rooting Reflex present   Swallow Reflex present   LATCH Score   Latch 1-->repeated attempts, holds nipple in mouth, stimulate to suck   Audible Swallowing 1-->a few with stimulation   Type Of Nipple 2-->everted (after stimulation)   Comfort (Breast/Nipple) 2-->soft/nontender   Hold (Positioning) 0-->full assist (staff holds infant at breast)   Score (less than 7 for 2/more consecutive times, consult Lactation Consultant) 6   Maternal Infant Feeding   Maternal Emotional State assist needed;relaxed   Infant Positioning clutch/"football"  (did R & L)   Signs of Milk Transfer audible swallow;infant jaw motion present   Time Spent (min) 15-30 min   Latch Assistance yes   Feeding Infant   Feeding Readiness Cues rooting;smacking;sucking motion present   Satiety Cues calm after feeding   Audible Swallow yes   Lactation Interventions   Attachment Promotion breastfeeding assistance provided;infant-mother separation minimized;skin-to-skin contact encouraged;rooming-in promoted;role responsibility promoted;face-to-face positioning promoted;family involvement promoted   Breastfeeding Assistance infant latch-on verified;infant suck/swallow verified;feeding on demand promoted;both breasts offered each feeding;assisted with positioning   Maternal Breastfeeding Support lactation counseling provided   LC asst mother nursing baby on both sides. Baby nursed well with good pulls and tugs.  "

## 2017-04-18 NOTE — PROGRESS NOTES
Magnesium Assessment Note    Subjective:         Catie Monge is a 31 y.o. female at PPD #0 s/p  on IV MgSO4 for seizure prophylaxis.    Patient denies headaches, denies blurry vision and denies right upper quadrant pain. Denies CP, denies SOB. Patient reports no nausea/no vomiting.     Objective:      Temp:  [97.9 °F (36.6 °C)-98.8 °F (37.1 °C)] 98.5 °F (36.9 °C)  Pulse:  [] 85  Resp:  [16-18] 18  SpO2:  [16 %-100 %] 98 %  BP: (120-176)/() 143/91    I/O last 3 completed shifts:  In: 3863.3 [I.V.:2863.3; IV Piggyback:1000]  Out: 6275 [Urine:5875; Blood:400]  I/O this shift:  In: 300 [I.V.:300]  Out: 2650 [Urine:2650]    General:   alert, appears stated age and cooperative   Lungs:   clear to auscultation bilaterally   Heart::   regular rate and rhythm, S1, S2 normal, no murmur, click, rub or gallop   Extremities: peripheral pulses normal, no pedal edema, no clubbing or cyanosis   DTRs- 2+  bilaterally biceps brachii       Lab Review  Recent Results (from the past 24 hour(s))   Protein / creatinine ratio, urine    Collection Time: 17  2:17 AM   Result Value Ref Range    Protein, Urine Random 12 0 - 15 mg/dL    Creatinine, Random Ur 23.4 15.0 - 325.0 mg/dL    Prot/Creat Ratio, Ur 0.51 (H) 0.00 - 0.20        Assessment:     31 y.o.  female PPD #0, on IV magnesium sulfate.    Active Hospital Problems    Diagnosis  POA    * (normal spontaneous vaginal delivery) [O80]  Not Applicable    GHTN ; postpartum preE requiring magnesium [O13.5]  Yes    Varicose veins of vulva and perineum in third trimester, antepartum [O22.13]  Yes      Resolved Hospital Problems    Diagnosis Date Resolved POA    Normal labor [O80, Z37.9] 2017 Not Applicable    Post term pregnancy, 41 weeks [O48.0, Z3A.41] 2017 Yes        Plan:     - Continue magnesium sulfate, no signs of toxicity at this time  - Close maternal monitoring including UOP and BP   - BP: (120-176)/() 143/91   - UOP:  300cc.hr  - Recheck in 4 hours or PRN        B Sheldon Reza MD

## 2017-04-18 NOTE — ASSESSMENT & PLAN NOTE
Blood pressures pp have been elevated 160s-170/70s-90s. PreE labs essentially WNL  Dr. Pricila Hannah is aware of status. Will continue expectant management and reevaluate if systolic BPs do not resolve to mild range.

## 2017-04-18 NOTE — ANESTHESIA POSTPROCEDURE EVALUATION
"Anesthesia Post Evaluation    Patient: Catie Monge    Procedure(s) Performed: * No procedures listed *    Final Anesthesia Type: epidural  Patient location during evaluation: labor & delivery  Patient participation: Yes- Able to Participate  Level of consciousness: awake and alert  Post-procedure vital signs: reviewed and stable  Pain management: adequate  Airway patency: patent  PONV status at discharge: No PONV  Anesthetic complications: no      Cardiovascular status: hemodynamically stable  Respiratory status: unassisted, spontaneous ventilation and room air  Hydration status: euvolemic  Follow-up not needed.        Visit Vitals    BP (!) 149/92    Pulse 93    Temp 36.9 °C (98.5 °F) (Oral)    Resp 18    Ht 5' 9" (1.753 m)    Wt 88 kg (194 lb)    LMP 07/03/2016    SpO2 99%    Breastfeeding Unknown    BMI 28.65 kg/m2       Pain/Kelsi Score: Presence of Pain: complains of pain/discomfort (4/17/2017  8:05 AM)      "

## 2017-04-18 NOTE — PLAN OF CARE
Problem: Patient Care Overview  Goal: Plan of Care Review  Outcome: Ongoing (interventions implemented as appropriate)  Pt on bedrest, but moving in bed independently without assistance. Patient safety maintained, side rails up, bed low and locked position.  Pain well controlled without PRN pain medication. Fundus midline, firm, with moderate lochia. VSS. Significant other at bedside; parents responding to infant cues and bonding appropriately.Magnesium and LR infusing per MAR orders. Pt. With no complaints of visual changes, R upper Quad pain, HA, or shortness of breath. DTR WNL. Huff in place, UOP WNL. Will continue to monitor.

## 2017-04-18 NOTE — PROGRESS NOTES
Magnesium Assessment Note    Subjective:         Catie Monge is a 31 y.o. female at PPD #0 s/p  on IV MgSO4 for seizure prophylaxis.    Patient denies headaches, denies blurry vision and denies right upper quadrant pain. Denies CP, denies SOB. Patient reports no nausea/no vomiting.     Objective:      Temp:  [97.5 °F (36.4 °C)-98.8 °F (37.1 °C)] 98.5 °F (36.9 °C)  Pulse:  [] 87  Resp:  [16-18] 18  SpO2:  [16 %-100 %] 99 %  BP: (121-176)/(63-98) 144/94    I/O last 3 completed shifts:  In: 3863.3 [I.V.:2863.3; IV Piggyback:1000]  Out: 6275 [Urine:5875; Blood:400]  I/O this shift:  In: 300 [I.V.:300]  Out: 1300 [Urine:1300]    General:   alert, appears stated age and cooperative   Lungs:   clear to auscultation bilaterally   Heart::   regular rate and rhythm, S1, S2 normal, no murmur, click, rub or gallop   Extremities: peripheral pulses normal, no pedal edema, no clubbing or cyanosis   DTRs- 2+  bilaterally biceps brachii       Lab Review  Recent Results (from the past 24 hour(s))   Protein / creatinine ratio, urine    Collection Time: 17  2:17 AM   Result Value Ref Range    Protein, Urine Random 12 0 - 15 mg/dL    Creatinine, Random Ur 23.4 15.0 - 325.0 mg/dL    Prot/Creat Ratio, Ur 0.51 (H) 0.00 - 0.20        Assessment:     31 y.o.  female PPD #0, on IV magnesium sulfate.    Active Hospital Problems    Diagnosis  POA    * (normal spontaneous vaginal delivery) [O80]  Not Applicable    GHTN ; postpartum preE requiring magnesium [O13.5]  Yes    Varicose veins of vulva and perineum in third trimester, antepartum [O22.13]  Yes      Resolved Hospital Problems    Diagnosis Date Resolved POA    Normal labor [O80, Z37.9] 2017 Not Applicable    Post term pregnancy, 41 weeks [O48.0, Z3A.41] 2017 Yes        Plan:     - Continue magnesium sulfate, no signs of toxicity at this time  - Close maternal monitoring including UOP and BP   - BP: (121-176)/(63-98) 144/94   - UOP:  175cc.hr  - Recheck in 4 hours or PRN        B Sheldon Reza MD

## 2017-04-18 NOTE — PROGRESS NOTES
Magnesium Assessment Note    Subjective:         Catie Monge is a 31 y.o. female at PPD #0 s/p  on IV MgSO4 for seizure prophylaxis.    Patient denies headaches, denies blurry vision and denies right upper quadrant pain. Denies CP, denies SOB. Patient reports no nausea/no vomiting.     Objective:      Temp:  [97 °F (36.1 °C)-98.8 °F (37.1 °C)] 98.5 °F (36.9 °C)  Pulse:  [] 93  Resp:  [16-20] 18  SpO2:  [16 %-100 %] 100 %  BP: (121-189)/() 142/93    I/O last 3 completed shifts:  In: -   Out:  [Urine:]  I/O this shift:  In: 3863.3 [I.V.:2863.3; IV Piggyback:1000]  Out: 4350 [Urine:3950; Blood:400]    General:   alert, appears stated age and cooperative   Lungs:   clear to auscultation bilaterally   Heart::   regular rate and rhythm, S1, S2 normal, no murmur, click, rub or gallop   Extremities: peripheral pulses normal, no pedal edema, no clubbing or cyanosis   DTRs- 2+  bilaterally biceps        Lab Review  Recent Results (from the past 24 hour(s))   Protein / creatinine ratio, urine    Collection Time: 17  7:45 AM   Result Value Ref Range    Protein, Urine Random <7 0 - 15 mg/dL    Creatinine, Random Ur 46.8 15.0 - 325.0 mg/dL    Prot/Creat Ratio, Ur Unable to calculate 0.00 - 0.20   CBC auto differential    Collection Time: 17  8:52 AM   Result Value Ref Range    WBC 19.12 (H) 3.90 - 12.70 K/uL    RBC 4.37 4.00 - 5.40 M/uL    Hemoglobin 13.4 12.0 - 16.0 g/dL    Hematocrit 39.8 37.0 - 48.5 %    MCV 91 82 - 98 fL    MCH 30.7 27.0 - 31.0 pg    MCHC 33.7 32.0 - 36.0 %    RDW 13.3 11.5 - 14.5 %    Platelets 200 150 - 350 K/uL    MPV 11.2 9.2 - 12.9 fL    Lymph # CANCELED 1.0 - 4.8 K/uL    Mono # CANCELED 0.3 - 1.0 K/uL    Eos # CANCELED 0.0 - 0.5 K/uL    Baso # CANCELED 0.00 - 0.20 K/uL    Gran% 93.0 (H) 38.0 - 73.0 %    Lymph% 3.0 (L) 18.0 - 48.0 %    Mono% 3.0 (L) 4.0 - 15.0 %    Eosinophil% 0.0 0.0 - 8.0 %    Basophil% 0.0 0.0 - 1.9 %    Metamyelocytes 1.0 %    Differential  Method Manual    Comprehensive metabolic panel    Collection Time: 17  8:52 AM   Result Value Ref Range    Sodium 136 136 - 145 mmol/L    Potassium 4.3 3.5 - 5.1 mmol/L    Chloride 107 95 - 110 mmol/L    CO2 17 (L) 23 - 29 mmol/L    Glucose 81 70 - 110 mg/dL    BUN, Bld 12 6 - 20 mg/dL    Creatinine 0.8 0.5 - 1.4 mg/dL    Calcium 8.8 8.7 - 10.5 mg/dL    Total Protein 7.0 6.0 - 8.4 g/dL    Albumin 3.0 (L) 3.5 - 5.2 g/dL    Total Bilirubin 0.6 0.1 - 1.0 mg/dL    Alkaline Phosphatase 155 (H) 55 - 135 U/L    AST 26 10 - 40 U/L    ALT 17 10 - 44 U/L    Anion Gap 12 8 - 16 mmol/L    eGFR if African American >60 >60 mL/min/1.73 m^2    eGFR if non African American >60 >60 mL/min/1.73 m^2   Lactate dehydrogenase    Collection Time: 17  8:52 AM   Result Value Ref Range     (H) 110 - 260 U/L   Uric acid    Collection Time: 17  8:52 AM   Result Value Ref Range    Uric Acid 5.5 2.4 - 5.7 mg/dL   Type & Screen    Collection Time: 17  8:53 AM   Result Value Ref Range    Group & Rh A POS     Indirect Ynes NEG    Protein / creatinine ratio, urine    Collection Time: 17  2:17 AM   Result Value Ref Range    Protein, Urine Random 12 0 - 15 mg/dL    Creatinine, Random Ur 23.4 15.0 - 325.0 mg/dL    Prot/Creat Ratio, Ur 0.51 (H) 0.00 - 0.20        Assessment:     31 y.o.  female PPD #0, on IV magnesium sulfate.    Active Hospital Problems    Diagnosis  POA    * (normal spontaneous vaginal delivery) [O80]  Not Applicable    Pregnancy induced hypertension, postpartum [O13.5]  Yes    Post term pregnancy, 41 weeks [O48.0, Z3A.41]  Yes    Varicose veins of vulva and perineum in third trimester, antepartum [O22.13]  Yes      Resolved Hospital Problems    Diagnosis Date Resolved POA    Normal labor [O80, Z37.9] 2017 Not Applicable        Plan:     - Continue magnesium sulfate, no signs of toxicity at this time  - Close maternal monitoring including UOP and BP   - BP: (121-189)/()  142/93   - UOP: 680 cc/hr  - Recheck in 4 hours or PRN        Pro Chavarria MD

## 2017-04-18 NOTE — PROGRESS NOTES
[facility]  Obstetrics  Postpartum Progress Note    Patient Name: Catie Monge  MRN: [id number]  Admission Date: 2017  Hospital Length of Stay: 1 days  Attending Physician: Pricila Hannah MD  Primary Care Provider: Primary Doctor No    Subjective:     Principal Problem: (normal spontaneous vaginal delivery)    Interval History:  Catie Monge is a 31 y.o. female status post Spontaneous vaginal delivery on 17 12:09 AM  at 41w2d.     Patient is doing well today. She denies nausea, vomiting, fever or chills.Patient reports mild abdominal pain that is well relieved by oral pain medications. Vaginal bleeding is moderate. Huff catheter in place draining urine d/t magnesium drip. Breastfeeding well without difficulty. Not ambulating d/t magnesium. She is holding her infant Caliope and  Rishabh rat bedside.      Objective:     Vital Signs (Most Recent):  Temp: 98.5 °F (36.9 °C) (17 06)  Pulse: 93 (1714)  Resp: 18 (17 0430)  BP: (!) 142/93 (17)  SpO2: 100 % (17 0430) Vital Signs (24h Range):  Temp:  [97 °F (36.1 °C)-98.8 °F (37.1 °C)] 98.5 °F (36.9 °C)  Pulse:  [] 93  Resp:  [16-18] 18  SpO2:  [16 %-100 %] 100 %  BP: (121-189)/() 142/93     Weight: 88 kg (194 lb)  Body mass index is 28.65 kg/(m^2).      Intake/Output Summary (Last 24 hours) at 17 0743  Last data filed at 17 0620   Gross per 24 hour   Intake           3863.3 ml   Output             6275 ml   Net          -2411.7 ml       Significant Labs:  Lab Results   Component Value Date    GROUPTRH A POS 2017    HEPBSAG Negative 2016       Recent Labs  Lab 17  0852   HGB 13.4   HCT 39.8       CBC:   Recent Labs  Lab 17  0852   WBC 19.12*   RBC 4.37   HGB 13.4   HCT 39.8      MCV 91   MCH 30.7   MCHC 33.7     CMP:   Recent Labs  Lab 17  0852   GLU 81   CALCIUM 8.8   ALBUMIN 3.0*   PROT 7.0      K 4.3   CO2 17*   CL  107   BUN 12   CREATININE 0.8   ALKPHOS 155*   ALT 17   AST 26   BILITOT 0.6     LFTs:   Recent Labs  Lab 17  0852   ALT 17   AST 26   ALKPHOS 155*   BILITOT 0.6   PROT 7.0   ALBUMIN 3.0*       Physical Exam:   Constitutional: She is oriented to person, place, and time. She appears well-developed and well-nourished.        Pulmonary/Chest: Effort normal.   Breasts: bilaterally soft, non-tender, nipples intact        Abdominal: Soft.   Non-tender, uterus firm at U-2     Genitourinary:   Genitourinary Comments: Perineum: approximated, no edema  Lochia: minimal rubra           Musculoskeletal:   Ext: bilaterally trace pedal edema without signs of DVT         Neurological: She is alert and oriented to person, place, and time.     Psychiatric: She has a normal mood and affect. Her behavior is normal.       Assessment/Plan:     31 y.o. female  at 41w2d for:    *  (normal spontaneous vaginal delivery)  Continue routine postpartum care    Varicose veins of vulva and perineum in third trimester, antepartum  Moderate varicosities of vulva were very uncomfortable for patient during her pregnancy.  Anticipate partial to full relief pp.    GHTN ; postpartum preE requiring magnesium  OBGYN service consulted last night and started magnesium  Appreciate management of BPs by OBGYN service    Post term pregnancy, 41 weeks, resolved as of 2017  Induction had been scheduled for next week but patient went into spontaneous labor.    Disposition:   -- BP and magnesium under management by OBGYN service; appreciate consult and involvement in patient's care.   -- Continue routine postpartum care  -- Consider discharge tomorrow or Thursday pending status    Bria Emerson CNM  Obstetrics  [facility]

## 2017-04-18 NOTE — SUBJECTIVE & OBJECTIVE
Interval History:  Catie Monge is a 31 y.o. female status post Spontaneous vaginal delivery on 4/18/17 12:09 AM  at 41w2d.     Patient is doing well today. She denies nausea, vomiting, fever or chills.Patient reports mild abdominal pain that is well relieved by oral pain medications. Vaginal bleeding is moderate. Huff catheter in place draining urine d/t magnesium drip. Breastfeeding well without difficulty. Not ambulating d/t magnesium. She is holding her infant Caliope and  Rishabh rat bedside.      Objective:     Vital Signs (Most Recent):  Temp: 98.5 °F (36.9 °C) (04/18/17 0614)  Pulse: 93 (04/18/17 0614)  Resp: 18 (04/18/17 0430)  BP: (!) 142/93 (04/18/17 0614)  SpO2: 100 % (04/18/17 0430) Vital Signs (24h Range):  Temp:  [97 °F (36.1 °C)-98.8 °F (37.1 °C)] 98.5 °F (36.9 °C)  Pulse:  [] 93  Resp:  [16-18] 18  SpO2:  [16 %-100 %] 100 %  BP: (121-189)/() 142/93     Weight: 88 kg (194 lb)  Body mass index is 28.65 kg/(m^2).      Intake/Output Summary (Last 24 hours) at 04/18/17 0743  Last data filed at 04/18/17 0620   Gross per 24 hour   Intake           3863.3 ml   Output             6275 ml   Net          -2411.7 ml       Significant Labs:  Lab Results   Component Value Date    GROUPTRH A POS 04/17/2017    HEPBSAG Negative 09/08/2016       Recent Labs  Lab 04/17/17  0852   HGB 13.4   HCT 39.8       CBC:   Recent Labs  Lab 04/17/17  0852   WBC 19.12*   RBC 4.37   HGB 13.4   HCT 39.8      MCV 91   MCH 30.7   MCHC 33.7     CMP:   Recent Labs  Lab 04/17/17  0852   GLU 81   CALCIUM 8.8   ALBUMIN 3.0*   PROT 7.0      K 4.3   CO2 17*      BUN 12   CREATININE 0.8   ALKPHOS 155*   ALT 17   AST 26   BILITOT 0.6     LFTs:   Recent Labs  Lab 04/17/17  0852   ALT 17   AST 26   ALKPHOS 155*   BILITOT 0.6   PROT 7.0   ALBUMIN 3.0*       Physical Exam:   Constitutional: She is oriented to person, place, and time. She appears well-developed and well-nourished.        Pulmonary/Chest:  Effort normal.   Breasts: bilaterally soft, non-tender, nipples intact        Abdominal: Soft.   Non-tender, uterus firm at U-2     Genitourinary:   Genitourinary Comments: Perineum: approximated, no edema  Lochia: minimal rubra           Musculoskeletal:   Ext: bilaterally trace pedal edema without signs of DVT         Neurological: She is alert and oriented to person, place, and time.     Psychiatric: She has a normal mood and affect. Her behavior is normal.

## 2017-04-18 NOTE — ASSESSMENT & PLAN NOTE
OBGYN service consulted last night and started magnesium  Appreciate management of BPs by OBGYN service

## 2017-04-18 NOTE — PROGRESS NOTES
[facility]  Obstetrics  Labor Progress Note    Patient Name: Catie Monge  MRN: [id number]  Admission Date: 2017  Hospital Length of Stay: 0 days  Attending Physician: Pricila Hannah MD  Primary Care Provider: Primary Doctor No    Subjective:     Principal Problem:Normal labor    Interval History:  Catie is a 31 y.o.  at 41w1d     Objective:     Vital Signs (Most Recent):  Temp: 97.9 °F (36.6 °C) (17)  Pulse: 90 (17)  Resp: 16 (17)  BP: (!) 151/90 (17)  SpO2: 100 % (17) Vital Signs (24h Range):  Temp:  [97 °F (36.1 °C)-98 °F (36.7 °C)] 97.9 °F (36.6 °C)  Pulse:  [] 90  Resp:  [16-20] 16  SpO2:  [91 %-100 %] 100 %  BP: (121-189)/() 151/90     Weight: 88 kg (194 lb)  Body mass index is 28.65 kg/(m^2).    FHT: 135 with moderate variability and accelerations Cat 1 (reassuring)  TOCO:  Q 3-5 minutes    Cervical Exam:  Dilation:  9  Effacement:  100%  Station: -1  Presentation: Vertex     Significant Labs:  Lab Results   Component Value Date    GROUPTRH A POS 2017    HEPBSAG Negative 2016       CBC:   Recent Labs  Lab 17  0852   WBC 19.12*   RBC 4.37   HGB 13.4   HCT 39.8      MCV 91   MCH 30.7   MCHC 33.7     CMP:   Recent Labs  Lab 17  0852   GLU 81   CALCIUM 8.8   ALBUMIN 3.0*   PROT 7.0      K 4.3   CO2 17*      BUN 12   CREATININE 0.8   ALKPHOS 155*   ALT 17   AST 26   BILITOT 0.6     I have personallly reviewed all pertinent lab results from the last 24 hours.    Physical Exam: See above.        Assessment/Plan:     31 y.o. female  at 41w1d for:    * Normal labor  Thin rim of cervix still felt with vertex in OA position. Cervix not reduced with position change. Patient was uncomfortable breathing through contractions until anesthesiologist adjusted catheter and redosed epidural. Patient now more comfortable.  Contractions inadequate..  Will start Pitocin  augmentation per protocol.      Hypertension affecting pregnancy in third trimester  Blood pressures are now in the mild hypertensive to normal range of 150/85, 143/83, 127/72 post epidural administration. PreE labs essentially WNL  Will continue expectant manage of hypertension at this time.    Post term pregnancy, 41 weeks  Induction had been scheduled for next week but patient went into spontaneous labor.        Shona De Paz CNM  Obstetrics  [facility]

## 2017-04-19 LAB
BASOPHILS # BLD AUTO: 0.03 K/UL
BASOPHILS NFR BLD: 0.2 %
DIFFERENTIAL METHOD: ABNORMAL
EOSINOPHIL # BLD AUTO: 0.2 K/UL
EOSINOPHIL NFR BLD: 1.4 %
ERYTHROCYTE [DISTWIDTH] IN BLOOD BY AUTOMATED COUNT: 14 %
HCT VFR BLD AUTO: 34.1 %
HGB BLD-MCNC: 11.1 G/DL
LYMPHOCYTES # BLD AUTO: 2 K/UL
LYMPHOCYTES NFR BLD: 12 %
MCH RBC QN AUTO: 30.2 PG
MCHC RBC AUTO-ENTMCNC: 32.6 %
MCV RBC AUTO: 93 FL
MONOCYTES # BLD AUTO: 1.2 K/UL
MONOCYTES NFR BLD: 7.4 %
NEUTROPHILS # BLD AUTO: 13 K/UL
NEUTROPHILS NFR BLD: 77.8 %
PLATELET # BLD AUTO: 180 K/UL
PMV BLD AUTO: 10 FL
RBC # BLD AUTO: 3.68 M/UL
WBC # BLD AUTO: 16.77 K/UL

## 2017-04-19 PROCEDURE — 11000001 HC ACUTE MED/SURG PRIVATE ROOM

## 2017-04-19 PROCEDURE — 36415 COLL VENOUS BLD VENIPUNCTURE: CPT

## 2017-04-19 PROCEDURE — 85025 COMPLETE CBC W/AUTO DIFF WBC: CPT

## 2017-04-19 NOTE — PROGRESS NOTES
Magnesium Assessment Note    Subjective:         Catie Monge is a 31 y.o. female at PPD #0 s/p  with PreE w/ SF on IV MgSO4 for seizure prophylaxis.     Patient denies headaches, denies blurry vision and denies right upper quadrant pain. Denies CP, denies SOB. Patient reports no nausea/no vomiting.     Objective:      Temp:  [97.9 °F (36.6 °C)-98.5 °F (36.9 °C)] 98.5 °F (36.9 °C)  Pulse:  [] 93  Resp:  [16-18] 18  SpO2:  [16 %-100 %] 98 %  BP: (120-176)/() 156/99    I/O last 3 completed shifts:  In: 7563.3 [P.O.:2500; I.V.:4063.3; IV Piggyback:1000]  Out: 9625 [Urine:9225; Blood:400]       General:   alert, appears stated age and cooperative   Lungs:   clear to auscultation bilaterally   Heart::   regular rate and rhythm, S1, S2 normal, no murmur, click, rub or gallop   Extremities: peripheral pulses normal, no pedal edema, no clubbing or cyanosis   DTRs- 2+  bilaterally biceps brachii       Lab Review  Recent Results (from the past 24 hour(s))   Protein / creatinine ratio, urine    Collection Time: 17  2:17 AM   Result Value Ref Range    Protein, Urine Random 12 0 - 15 mg/dL    Creatinine, Random Ur 23.4 15.0 - 325.0 mg/dL    Prot/Creat Ratio, Ur 0.51 (H) 0.00 - 0.20        Assessment:     31 y.o.  female PPD #0, on IV magnesium sulfate.    Active Hospital Problems    Diagnosis  POA    * (normal spontaneous vaginal delivery) [O80]  Not Applicable    GHTN ; postpartum preE requiring magnesium [O13.5]  Yes    Varicose veins of vulva and perineum in third trimester, antepartum [O22.13]  Yes      Resolved Hospital Problems    Diagnosis Date Resolved POA    Normal labor [O80, Z37.9] 2017 Not Applicable    Post term pregnancy, 41 weeks [O48.0, Z3A.41] 2017 Yes        Plan:     - Continue magnesium sulfate, no signs of toxicity at this time  - Close maternal monitoring including UOP and BP   - BP: (120-176)/() 140/95   - UOP: 300cc/hr  - Recheck in 4 hours or  MARQUES Thompson MD

## 2017-04-19 NOTE — MEDICAL/APP STUDENT
Magnesium Assessment Note    Subjective:         Catie Monge is a 31 y.o. female at 41w2d on IV MgSO4 for seizure prophylaxis.    Patient denies headaches, denies blurry vision and denies right upper quadrant pain. Denies CP, denies SOB. Patient reports no nausea/no vomiting.     Objective:      Temp:  [97.9 °F (36.6 °C)-98.5 °F (36.9 °C)] 98.5 °F (36.9 °C)  Pulse:  [] 88  Resp:  [16-18] 18  SpO2:  [16 %-100 %] 97 %  BP: (120-176)/() 142/94    I/O last 3 completed shifts:  In: 7563.3 [P.O.:2500; I.V.:4063.3; IV Piggyback:1000]  Out: 9625 [Urine:9225; Blood:400]       General:   alert, appears stated age and cooperative   Lungs:   clear to auscultation bilaterally   Heart::   regular rate and rhythm, S1, S2 normal, no murmur, click, rub or gallop   Extremities: peripheral pulses normal, no pedal edema, no clubbing or cyanosis   DTRs- 2+  bilaterally       Lab Review  Recent Results (from the past 24 hour(s))   Protein / creatinine ratio, urine    Collection Time: 17  2:17 AM   Result Value Ref Range    Protein, Urine Random 12 0 - 15 mg/dL    Creatinine, Random Ur 23.4 15.0 - 325.0 mg/dL    Prot/Creat Ratio, Ur 0.51 (H) 0.00 - 0.20        Assessment:     31 y.o.  female at 41w2d, on IV magnesium sulfate.    Active Hospital Problems    Diagnosis  POA    * (normal spontaneous vaginal delivery) [O80]  Not Applicable    GHTN ; postpartum preE requiring magnesium [O13.5]  Yes    Varicose veins of vulva and perineum in third trimester, antepartum [O22.13]  Yes      Resolved Hospital Problems    Diagnosis Date Resolved POA    Normal labor [O80, Z37.9] 2017 Not Applicable    Post term pregnancy, 41 weeks [O48.0, Z3A.41] 2017 Yes        Plan:     - Continue magnesium sulfate, no signs of toxicity at this time  - Close maternal monitoring including UOP and BP  - Recheck in 2-4 hours or PRN  - ***    Vito Rojas

## 2017-04-19 NOTE — ASSESSMENT & PLAN NOTE
PP day 2  Breastfeeding well, requests rx for Dr. Maninder Oliveros's nipple cream.  Will call in to outpatient pharmacy.

## 2017-04-19 NOTE — LACTATION NOTE
"   04/19/17 1225   Maternal Infant Assessment   Breast Density Bilateral:;soft   Areola Bilateral:;elastic   Nipple(s) Bilateral:;everted   Infant Assessment   Sucking Reflex present   Rooting Reflex present   Swallow Reflex present   LATCH Score   Latch 2-->grasps breast, tongue down, lips flanged, rhythmic sucking   Audible Swallowing 1-->a few with stimulation   Type Of Nipple 2-->everted (after stimulation)   Comfort (Breast/Nipple) 2-->soft/nontender   Hold (Positioning) 1-->minimal assist, teach one side: mother does other, staff holds   Score (less than 7 for 2/more consecutive times, consult Lactation Consultant) 8   Breasts WDL   Breasts WDL WDL   Pain/Comfort Assessments   Pain Assessment Performed Yes       Number Scale   Presence of Pain denies   Location nipple(s)   Pain Rating: Rest 0   Pain Rating: Activity 0   Maternal Infant Feeding   Maternal Emotional State assist needed;relaxed   Infant Positioning clutch/"football"   Signs of Milk Transfer infant jaw motion present   Presence of Pain no   Comfort Measures Before/During Feeding infant position adjusted;latch adjusted;maternal position adjusted   Time Spent (min) 15-30 min   Latch Assistance yes   Breastfeeding Education importance of skin-to-skin contact;increasing milk supply   Feeding Infant   Effective Latch During Feeding yes   Audible Swallow yes   Skin-to-Skin Contact During Feeding yes   Lactation Referrals   Lactation Consult Breastfeeding assessment   Lactation Interventions   Attachment Promotion breastfeeding assistance provided   Breastfeeding Assistance assisted with positioning;feeding cue recognition promoted;feeding on demand promoted;feeding session observed;infant latch-on verified;infant suck/swallow verified   Maternal Breastfeeding Support diary/feeding log utilized;encouragement offered;infant-mother separation minimized;lactation counseling provided;maternal hydration promoted;maternal nutrition promoted;maternal rest " encouraged   Latch Promotion positioning assisted   follow basic education.

## 2017-04-19 NOTE — SUBJECTIVE & OBJECTIVE
Interval History:  Catie Monge is a 31 y.o. female status post Spontaneous vaginal delivery on 4/18/17 12:09 AM  at 41w2d. Patient is doing well today. She denies  nausea, vomiting, fever or chills.  Patient reports no abdominal pain that is adequately relieved by prn oral pain medications. Vaginal bleeding is moderate. Patient is voiding without difficulty and ambulating with no difficulty. She has passed flatus, and has had BM.     Patient is breastfeeding. Will consider contraception. She desires circumcision for her male baby: not applicable.     Objective:     Vital Signs (Most Recent):  Temp: 97.3 °F (36.3 °C) (04/19/17 0700)  Pulse: 86 (04/19/17 0700)  Resp: 18 (04/19/17 0700)  BP: (!) 143/95 (04/19/17 0700)  SpO2: (!) 84 % (04/19/17 0700) Vital Signs (24h Range):  Temp:  [97.3 °F (36.3 °C)-98.2 °F (36.8 °C)] 97.3 °F (36.3 °C)  Pulse:  [] 86  Resp:  [16-20] 18  SpO2:  [84 %-100 %] 84 %  BP: (120-157)/() 143/95     Weight: 88 kg (194 lb)  Body mass index is 28.65 kg/(m^2).      Intake/Output Summary (Last 24 hours) at 04/19/17 0847  Last data filed at 04/19/17 0600   Gross per 24 hour   Intake             4100 ml   Output             6560 ml   Net            -2460 ml       Significant Labs:  Lab Results   Component Value Date    GROUPTRH A POS 04/17/2017    HEPBSAG Negative 09/08/2016       Recent Labs  Lab 04/19/17  0536   HGB 11.1*   HCT 34.1*       I have personallly reviewed all pertinent lab results from the last 24 hours.    Physical Exam:   Constitutional: She is oriented to person, place, and time.        Pulmonary/Chest: Effort normal.   Breasts firm, mildly tender.  Nipples intact.        Abdominal: Soft.     Genitourinary:   Genitourinary Comments: FF@U               Neurological: She is alert and oriented to person, place, and time.    Skin: Skin is warm and dry.    Psychiatric: She has a normal mood and affect.

## 2017-04-19 NOTE — MEDICAL/APP STUDENT
Magnesium Assessment Note    Subjective:         Catie Monge is a 31 y.o. female at PPD #1 s/p  with PreE w/ SF on IV MgSO4 for seizure prophylaxis.    Patient denies headaches, denies blurry vision and denies right upper quadrant pain. Denies CP, denies SOB. Patient reports no nausea/no vomiting.     Objective:      Temp:  [97.9 °F (36.6 °C)-98.5 °F (36.9 °C)] 98.2 °F (36.8 °C)  Pulse:  [] 79  Resp:  [16-20] 20  SpO2:  [16 %-100 %] 97 %  BP: (120-176)/() 132/82    I/O last 3 completed shifts:  In: 7563.3 [P.O.:2500; I.V.:4063.3; IV Piggyback:1000]  Out: 9625 [Urine:9225; Blood:400]  I/O this shift:  In: 500 [I.V.:500]  Out: 1840 [Urine:1840]    General:   alert, appears stated age and cooperative   Lungs:   clear to auscultation bilaterally   Heart::   regular rate and rhythm, S1, S2 normal, no murmur, click, rub or gallop   Extremities: peripheral pulses normal, no pedal edema, no clubbing or cyanosis   DTRs- 2+  bilaterally     Lab Review  Recent Results (from the past 24 hour(s))   Protein / creatinine ratio, urine    Collection Time: 17  2:17 AM   Result Value Ref Range    Protein, Urine Random 12 0 - 15 mg/dL    Creatinine, Random Ur 23.4 15.0 - 325.0 mg/dL    Prot/Creat Ratio, Ur 0.51 (H) 0.00 - 0.20        Assessment:     31 y.o.  female at 41w2d, on IV magnesium sulfate.    Active Hospital Problems    Diagnosis  POA    * (normal spontaneous vaginal delivery) [O80]  Not Applicable    GHTN ; postpartum preE requiring magnesium [O13.5]  Yes    Varicose veins of vulva and perineum in third trimester, antepartum [O22.13]  Yes      Resolved Hospital Problems    Diagnosis Date Resolved POA    Normal labor [O80, Z37.9] 2017 Not Applicable    Post term pregnancy, 41 weeks [O48.0, Z3A.41] 2017 Yes        Plan:     - Continue magnesium sulfate, no signs of toxicity at this time  - Close maternal monitoring including UOP and BP  - Recheck in 2-4 hours or PRN  -  ***    Mirella Thompson MD

## 2017-04-19 NOTE — PLAN OF CARE
Problem: Breastfeeding (Adult,Obstetrics,Pediatric)  Goal: Signs and Symptoms of Listed Potential Problems Will be Absent, Minimized or Managed (Breastfeeding)  Signs and symptoms of listed potential problems will be absent, minimized or managed by discharge/transition of care (reference Breastfeeding (Adult,Obstetrics,Pediatric) CPG).   Outcome: Ongoing (interventions implemented as appropriate)    04/19/17 1446   Breastfeeding   Problems Assessed (Breastfeeding) all   Problems Present (Breastfeeding) other (see comments)   Follow basic education.

## 2017-04-19 NOTE — PROGRESS NOTES
[facility]  Obstetrics  Postpartum Progress Note    Patient Name: Catie Monge  MRN: [id number]  Admission Date: 2017  Hospital Length of Stay: 2 days  Attending Physician: Pricila Hannah MD  Primary Care Provider: Primary Doctor No    Subjective:     Principal Problem: (normal spontaneous vaginal delivery)    Interval History:  Catie Monge is a 31 y.o. female status post Spontaneous vaginal delivery on 17 12:09 AM  at 41w2d. Patient is doing well today. She denies  nausea, vomiting, fever or chills.  Patient reports no abdominal pain that is adequately relieved by prn oral pain medications. Vaginal bleeding is moderate. Patient is voiding without difficulty and ambulating with no difficulty. She has passed flatus, and has had BM.     Patient is breastfeeding. Will consider contraception. She desires circumcision for her male baby: not applicable.     Objective:     Vital Signs (Most Recent):  Temp: 97.3 °F (36.3 °C) (17 0700)  Pulse: 86 (17 0700)  Resp: 18 (17 0700)  BP: (!) 143/95 (17 0700)  SpO2: (!) 84 % (17 0700) Vital Signs (24h Range):  Temp:  [97.3 °F (36.3 °C)-98.2 °F (36.8 °C)] 97.3 °F (36.3 °C)  Pulse:  [] 86  Resp:  [16-20] 18  SpO2:  [84 %-100 %] 84 %  BP: (120-157)/() 143/95     Weight: 88 kg (194 lb)  Body mass index is 28.65 kg/(m^2).      Intake/Output Summary (Last 24 hours) at 17 0847  Last data filed at 17 0600   Gross per 24 hour   Intake             4100 ml   Output             6560 ml   Net            -2460 ml       Significant Labs:  Lab Results   Component Value Date    GROUPTRH A POS 2017    HEPBSAG Negative 2016       Recent Labs  Lab 17  0536   HGB 11.1*   HCT 34.1*       I have personallly reviewed all pertinent lab results from the last 24 hours.    Physical Exam:   Constitutional: She is oriented to person, place, and time.        Pulmonary/Chest: Effort normal.    Breasts firm, mildly tender.  Nipples intact.        Abdominal: Soft.     Genitourinary:   Genitourinary Comments: FF@U               Neurological: She is alert and oriented to person, place, and time.    Skin: Skin is warm and dry.    Psychiatric: She has a normal mood and affect.       Assessment/Plan:     31 y.o. female  at 41w2d for:    *  (normal spontaneous vaginal delivery)  PP day 2  Breastfeeding well, requests rx for Dr. Maninder Oliveros's nipple cream.  Will call in to outpatient pharmacy.    GHTN ; postpartum preE requiring magnesium  MgSO4 dc'd this am  Appreciate management of BPs by OBGYN service      Disposition: As patient meets milestones, will plan to discharge tomorrow or per MD plan.    Jacklyn Kerr CNM  Obstetrics  [facility]

## 2017-04-19 NOTE — PLAN OF CARE
Problem: Patient Care Overview  Goal: Plan of Care Review  Outcome: Ongoing (interventions implemented as appropriate)  Pt tolerating PO well, no acute distress, ambulating and voiding without difficulty, bleeding light, fundus firm, pain well controlled on prescribed meds, bonding well with infant-- breastfeeding. Pt safety maintained. Will continue to monitor.

## 2017-04-19 NOTE — PROGRESS NOTES
Magnesium Assessment Note    Subjective:         Catie Monge is a 31 y.o. female at PPD #1 s/p  with PreE w/ SF on IV MgSO4 for seizure prophylaxis.     Patient denies headaches, denies blurry vision and denies right upper quadrant pain. Denies CP, denies SOB. Patient reports no nausea/no vomiting.     Objective:      Temp:  [97.9 °F (36.6 °C)-98.5 °F (36.9 °C)] 98.1 °F (36.7 °C)  Pulse:  [] 88  Resp:  [16-20] 20  SpO2:  [16 %-100 %] 96 %  BP: (120-176)/() 148/86    I/O last 3 completed shifts:  In: 7563.3 [P.O.:2500; I.V.:4063.3; IV Piggyback:1000]  Out: 9625 [Urine:9225; Blood:400]  I/O this shift:  In: 500 [I.V.:500]  Out: 2350 [Urine:2350]    General:   alert, appears stated age and cooperative   Lungs:   clear to auscultation bilaterally   Heart::   regular rate and rhythm, S1, S2 normal, no murmur, click, rub or gallop   Extremities: peripheral pulses normal, no pedal edema, no clubbing or cyanosis   DTRs- 2+  bilaterally biceps brachii       Lab Review  Recent Results (from the past 24 hour(s))   Protein / creatinine ratio, urine    Collection Time: 17  2:17 AM   Result Value Ref Range    Protein, Urine Random 12 0 - 15 mg/dL    Creatinine, Random Ur 23.4 15.0 - 325.0 mg/dL    Prot/Creat Ratio, Ur 0.51 (H) 0.00 - 0.20        Assessment:     31 y.o.  female PPD #0, on IV magnesium sulfate.    Active Hospital Problems    Diagnosis  POA    * (normal spontaneous vaginal delivery) [O80]  Not Applicable    GHTN ; postpartum preE requiring magnesium [O13.5]  Yes    Varicose veins of vulva and perineum in third trimester, antepartum [O22.13]  Yes      Resolved Hospital Problems    Diagnosis Date Resolved POA    Normal labor [O80, Z37.9] 2017 Not Applicable    Post term pregnancy, 41 weeks [O48.0, Z3A.41] 2017 Yes        Plan:     - No signs of MgSO4 toxicity at this time  - Close maternal monitoring including UOP and BP   - BP: (120-176)/() 148/86   -  UOP: >400cc/hr      Okay to d/c MgSO4 at this time        Mirella Thompson MD

## 2017-04-20 VITALS
RESPIRATION RATE: 18 BRPM | SYSTOLIC BLOOD PRESSURE: 133 MMHG | TEMPERATURE: 98 F | WEIGHT: 194 LBS | HEIGHT: 69 IN | BODY MASS INDEX: 28.73 KG/M2 | OXYGEN SATURATION: 99 % | HEART RATE: 75 BPM | DIASTOLIC BLOOD PRESSURE: 82 MMHG

## 2017-04-20 PROCEDURE — 25000003 PHARM REV CODE 250: Performed by: ADVANCED PRACTICE MIDWIFE

## 2017-04-20 RX ADMIN — ACETAMINOPHEN 650 MG: 325 TABLET ORAL at 06:04

## 2017-04-20 NOTE — DISCHARGE SUMMARY
[facility]  Delivery Discharge Summary  Obstetrics    Admit Date: 2017    Discharge Date and Time:  2017 11:59 AM    Primary OB Clinician: NAREN Torrez    Attending Physician: Pricila Hannah MD     Discharge Provider: Alexandrea Burch    Reason for Admission: , Pre E    Estimated Date of Delivery: 17     Conditions: Hypertension: Mild Preeclampsia    Procedures Performed: * No surgery found *    Hospital Course (synopsis of major diagnoses, care, treatment, and services provided during the course of the hospital stay):    Catie Monge is a 31 y.o. now , PPD #2 who was admitted on 2017  for normal spontaneous vaginal delivery. On initial assessment, vital signs were stable and physical exam was normal. Infant was in cephalic presentation. Patient was subsequently admitted to labor and delivery unit with signed consents. Labor course was managed with epidural.  Patient delivered a single viable  female. Pt was in stable condition post-delivery and was transferred to the Mother-Baby Unit. Her postpartum course was uncomplicated except for HTN. On discharge day, patient's pain is controlled with oral pain medications. Patient is tolerating PO without nausea or vomiting, ambulating, voiding. She denies SOB and CP. Denies any HA, vision changes, F/C, LE swelling. Denies any breast pain/soreness.     Delivery:    Episiotomy: None   Lacerations: None   Repair suture: None   Repair # of packets:     Blood loss (ml): 400     Birth information:  YOB: 2015   Time of birth: 12:09 AM   Sex: female   Delivery type: Vaginal, Spontaneous Delivery   Gestational Age: 41w2d    Delivery Clinician:      Other providers:       Additional  information:  Forceps:    Vacuum:    Breech:    Observed anomalies      Living?:           APGARS  One minute Five minutes Ten minutes   Skin color:         Heart rate:         Grimace:         Muscle tone:          Breathing:         Totals: 7  9        Placenta: Delivered:       appearance    Tubal Ligation: n/a    Feeding Method: breast    Rh Immune Globulin Given: no    Rubella Vaccine Given: no    Tdap Vaccine Given: no    Consults: none    Significant Diagnostic Studies: Labs: All labs within the past 24 hours have been reviewed    Final Diagnoses:   Principal Problem:  (normal spontaneous vaginal delivery)   Secondary Diagnoses:   Active Hospital Problems    Diagnosis  POA    * (normal spontaneous vaginal delivery) [O80]  Not Applicable    GHTN ; postpartum preE requiring magnesium [O13.5]  Yes    Varicose veins of vulva and perineum in third trimester, antepartum [O22.13]  Yes      Resolved Hospital Problems    Diagnosis Date Resolved POA    Normal labor [O80, Z37.9] 2017 Not Applicable    Post term pregnancy, 41 weeks [O48.0, Z3A.41] 2017 Yes       Discharged Condition: good    Disposition: Home or Self Care    Follow Up/Patient Instructions:     Medications:  Reconciled Home Medications:   Current Discharge Medication List      CONTINUE these medications which have NOT CHANGED    Details   cetirizine (ZYRTEC) 10 MG tablet Take 10 mg by mouth every evening.      famotidine (PEPCID) 20 MG tablet Take 20 mg by mouth 2 (two) times daily.      PNV95/FERROUS FUMARATE/FA (PRENATAL ORAL) Take by mouth.             Discharge Procedure Orders  Diet general     Activity as tolerated       Follow-up Information     Follow up In 1 week.    Why:  B/P check

## 2017-04-20 NOTE — LACTATION NOTE
Discharge instructions reviewed with mother, including contact numbers and available resources. Mother reports feedings are going well and denies pain. Improving moderate discomfort with initial latch using asymmetric latch technique. Infant weight and output adequate. Reviewed what to expect as milk is coming in, how to tell baby is getting enough, manual expression of breastmilk, cue based feeding on demand, skin to skin, etc. Encouraged to call with any questions or concerns. Mother voices understanding.     04/20/17 1000   Maternal Infant Assessment   Breast Shape round   Breast Density soft   Areola elastic   Nipple(s) everted   Infant Assessment   Sucking Reflex present   Rooting Reflex present   Swallow Reflex present   LATCH Score   Latch 1-->repeated attempts, holds nipple in mouth, stimulate to suck   Audible Swallowing 1-->a few with stimulation   Type Of Nipple 2-->everted (after stimulation)   Comfort (Breast/Nipple) 1-->filling, red/small blisters/bruises, mild/mod discomfort   Hold (Positioning) 1-->minimal assist, teach one side: mother does other, staff holds   Score (less than 7 for 2/more consecutive times, consult Lactation Consultant) 6   Maternal Infant Feeding   Infant Positioning cross-cradle   Signs of Milk Transfer audible swallow;infant jaw motion present   Time Spent (min) 30-60 min   Latch Assistance yes   Lactation Interventions   Attachment Promotion breastfeeding assistance provided;counseling provided;skin-to-skin contact encouraged   Breastfeeding Assistance assisted with positioning;both breasts offered each feeding;feeding cue recognition promoted;feeding on demand promoted;feeding session observed;infant latch-on verified;infant suck/swallow verified;support offered   Maternal Breastfeeding Support encouragement offered;lactation counseling provided   Latch Promotion positioning assisted;infant moved to breast

## 2017-04-22 ENCOUNTER — PATIENT MESSAGE (OUTPATIENT)
Dept: OBSTETRICS AND GYNECOLOGY | Facility: CLINIC | Age: 32
End: 2017-04-22

## 2017-04-24 ENCOUNTER — TELEPHONE (OUTPATIENT)
Dept: OBSTETRICS AND GYNECOLOGY | Facility: OTHER | Age: 32
End: 2017-04-24

## 2017-04-24 NOTE — TELEPHONE ENCOUNTER
Called pt to schedule PP BP check.  Left voicemail message requesting pt to return call to the clinic

## 2017-04-24 NOTE — TELEPHONE ENCOUNTER
----- Message from Keri Perez MA sent at 4/24/2017  2:16 PM CDT -----  Contact: Self  Pt lvm needs to be scheduled for BP check per Maria Del Carmen.    Thanks  Ney

## 2017-04-25 ENCOUNTER — PATIENT MESSAGE (OUTPATIENT)
Dept: OBSTETRICS AND GYNECOLOGY | Facility: CLINIC | Age: 32
End: 2017-04-25

## 2017-04-27 ENCOUNTER — OFFICE VISIT (OUTPATIENT)
Dept: OBSTETRICS AND GYNECOLOGY | Facility: CLINIC | Age: 32
End: 2017-04-27
Payer: COMMERCIAL

## 2017-04-27 VITALS — DIASTOLIC BLOOD PRESSURE: 82 MMHG | SYSTOLIC BLOOD PRESSURE: 140 MMHG

## 2017-04-27 DIAGNOSIS — N30.01 ACUTE CYSTITIS WITH HEMATURIA: Primary | ICD-10-CM

## 2017-04-27 LAB
BILIRUB UR QL STRIP: NEGATIVE
CLARITY UR REFRACT.AUTO: CLEAR
COLOR UR AUTO: ABNORMAL
GLUCOSE UR QL STRIP: NEGATIVE
HGB UR QL STRIP: ABNORMAL
KETONES UR QL STRIP: NEGATIVE
LEUKOCYTE ESTERASE UR QL STRIP: ABNORMAL
MICROSCOPIC COMMENT: NORMAL
NITRITE UR QL STRIP: NEGATIVE
PH UR STRIP: 7 [PH] (ref 5–8)
PROT UR QL STRIP: NEGATIVE
RBC #/AREA URNS AUTO: 1 /HPF (ref 0–4)
SP GR UR STRIP: 1 (ref 1–1.03)
SQUAMOUS #/AREA URNS AUTO: 0 /HPF
URN SPEC COLLECT METH UR: ABNORMAL
UROBILINOGEN UR STRIP-ACNC: NEGATIVE EU/DL
WBC #/AREA URNS AUTO: 1 /HPF (ref 0–5)

## 2017-04-27 PROCEDURE — 99212 OFFICE O/P EST SF 10 MIN: CPT | Mod: S$GLB,,, | Performed by: ADVANCED PRACTICE MIDWIFE

## 2017-04-27 PROCEDURE — 81001 URINALYSIS AUTO W/SCOPE: CPT

## 2017-04-27 PROCEDURE — 87086 URINE CULTURE/COLONY COUNT: CPT

## 2017-04-27 PROCEDURE — 99999 PR PBB SHADOW E&M-EST. PATIENT-LVL II: CPT | Mod: PBBFAC,,, | Performed by: ADVANCED PRACTICE MIDWIFE

## 2017-04-27 NOTE — PROGRESS NOTES
Here today for B/P check  140/82 today   Has appt with Dr. Christianson PCP   Also ? UTI S/S, UA & Urine C&S ordered

## 2017-04-29 LAB
BACTERIA UR CULT: NORMAL
BACTERIA UR CULT: NORMAL

## 2017-05-01 ENCOUNTER — TELEPHONE (OUTPATIENT)
Dept: INTERNAL MEDICINE | Facility: CLINIC | Age: 32
End: 2017-05-01

## 2017-05-01 ENCOUNTER — TELEPHONE (OUTPATIENT)
Dept: OBSTETRICS AND GYNECOLOGY | Facility: CLINIC | Age: 32
End: 2017-05-01

## 2017-05-01 NOTE — TELEPHONE ENCOUNTER
----- Message from Rosendo Spaulding sent at 5/1/2017  8:38 AM CDT -----  Contact: Pt  Patient would like to go over U/A results to know if she has a UTI. Please advise

## 2017-05-01 NOTE — TELEPHONE ENCOUNTER
"Patient's phone call returned. Message left regarding urine culture result: "Multiple organisms isolated. None in predominance." Informed that this result is usually associated with a contaminated specimen. Patient encouraged to call back to discuss any symptoms that she is still having.  "

## 2017-05-01 NOTE — TELEPHONE ENCOUNTER
----- Message from Lana Dueñas sent at 4/28/2017  3:43 PM CDT -----  Contact: MURPHY MARTINEZ [[id number]]  _  1st Request  _  2nd Request  _  3rd Request        Who: MURPHY MARTINEZ [[id number]]    Why: pt would like a call back says she would like to establish care with MD. Pt states she knows MD. Please call, Thanks!    What Number to Call Back: [phone]    When to Expect a call back: (Before the end of the day)   -- if the call is after 12:00, the call back will be tomorrow.

## 2017-05-03 ENCOUNTER — TELEPHONE (OUTPATIENT)
Dept: OBSTETRICS AND GYNECOLOGY | Facility: CLINIC | Age: 32
End: 2017-05-03

## 2017-05-03 NOTE — TELEPHONE ENCOUNTER
Telephone call to pt.  Discussed urine culture.  States she has no S/S.  Has increased fluid intake.  Will call prn for any problems

## 2017-05-15 ENCOUNTER — POSTPARTUM VISIT (OUTPATIENT)
Dept: OBSTETRICS AND GYNECOLOGY | Facility: CLINIC | Age: 32
End: 2017-05-15
Payer: COMMERCIAL

## 2017-05-15 VITALS
BODY MASS INDEX: 25.46 KG/M2 | WEIGHT: 172.38 LBS | DIASTOLIC BLOOD PRESSURE: 78 MMHG | SYSTOLIC BLOOD PRESSURE: 134 MMHG

## 2017-05-15 DIAGNOSIS — Z30.011 ENCOUNTER FOR INITIAL PRESCRIPTION OF CONTRACEPTIVE PILLS: Primary | ICD-10-CM

## 2017-05-15 PROBLEM — Z91.89: Status: RESOLVED | Noted: 2017-03-16 | Resolved: 2017-05-15

## 2017-05-15 PROBLEM — Z67.10 BLOOD TYPE A+: Status: RESOLVED | Noted: 2017-03-20 | Resolved: 2017-05-15

## 2017-05-15 PROBLEM — O22.13: Status: RESOLVED | Noted: 2017-01-19 | Resolved: 2017-05-15

## 2017-05-15 PROCEDURE — 0503F POSTPARTUM CARE VISIT: CPT | Mod: S$GLB,,, | Performed by: ADVANCED PRACTICE MIDWIFE

## 2017-05-15 PROCEDURE — 99999 PR PBB SHADOW E&M-EST. PATIENT-LVL II: CPT | Mod: PBBFAC,,, | Performed by: ADVANCED PRACTICE MIDWIFE

## 2017-05-15 RX ORDER — ACETAMINOPHEN AND CODEINE PHOSPHATE 120; 12 MG/5ML; MG/5ML
1 SOLUTION ORAL DAILY
Qty: 28 TABLET | Refills: 11 | Status: SHIPPED | OUTPATIENT
Start: 2017-05-15 | End: 2018-03-11 | Stop reason: SDUPTHER

## 2017-05-15 NOTE — PROGRESS NOTES
CC: Post-partum follow-up    Catie Monge is a 31 y.o. female  who presents for post-partum visit.  She is 4 weeks S/P a .  She and the baby are doing well.  No pain.  No fever.   No bowel / bladder complaints.Rishabh will finish his fellowship  and starts his job at [employer] mid-August so they are looking forward to some time together. Has an appointment with Dr. Christianson to monitor blood pressure and as PCP. Still bleeding lightly on and off.  Took placenta pills. Desires Mirena. /78    Breast Feeding: YES  Depression: NO  Contraception: oral progesterone-only contraceptive    Wt 78.2 kg (172 lb 6.4 oz)  LMP 2016  BMI 25.46 kg/m2    ROS:  GENERAL: No fever, chills, fatigability.  VULVAR: No pain, no lesions and no itching.  VAGINAL: No relaxation, no itching, no discharge, no abnormal bleeding and no lesions.  ABDOMEN: No abdominal pain. Denies nausea. Denies vomiting. No diarrhea. No constipation  BREAST: Denies pain. No lumps. No discharge.  URINARY: No incontinence, no nocturia, no frequency and no dysuria.  CARDIOVASCULAR: No chest pain. No shortness of breath. No leg cramps.  NEUROLOGICAL: No headaches. No vision changes.    PHYSICAL EXAM:  BREASTs: Lactating with intact nipples  ABDOMEN:  Soft, non-tender, non-distended  VULVA:  Normal, no lesions  PERINEUM: Intact  CERVIX:  Long, closed, non-tender.  UTERUS:  Normal size, shape, consistency, no mass or tenderness.  ADNEXA:  Normal in size without mass or tenderness    IMP:  Normal postpartum involution  Instructions / precautions reviewed  Contraceptive counseling-Micronor ordered and use reviewed.      PLAN:  May resume normal activities    RTO PRN.

## 2017-05-19 ENCOUNTER — TELEPHONE (OUTPATIENT)
Dept: OBSTETRICS AND GYNECOLOGY | Facility: CLINIC | Age: 32
End: 2017-05-19

## 2017-05-19 ENCOUNTER — TELEPHONE (OUTPATIENT)
Dept: LACTATION | Facility: CLINIC | Age: 32
End: 2017-05-19

## 2017-05-19 DIAGNOSIS — N64.4 PAIN OF LEFT BREAST: ICD-10-CM

## 2017-05-19 NOTE — TELEPHONE ENCOUNTER
"Returning patient's message left on lactation warmline. She reports her left nipple became tender Monday and states that she began application of Maninder Oliveros's APNO on Wednesday after seeing midwife. Since that time, the tenderness/discomfort has progressed into the breast. She can identify no areas of harness within the breast or any lumps. There are no reddened areas of the breast. She feels fine except for the breast and nipple tenderness/discomfort. States that she would prefer to remain topless, as clothing makes it very uncomfortable. Reports the nipple pain radiates into the breast but specifies that it is not deep, but more "surfacy"... Reports pain is worse when breast is "at rest," as draining with breastfeeding or pumping is more comfortable. Discussed possibility of vasospasm and/or yeast infection of breast. Reviewed symptoms to watch for and potential interventions. Encouraged to call lactation warmline with update, and to consider outpatient consultation as well as following up with her OB provider.   "

## 2017-05-19 NOTE — TELEPHONE ENCOUNTER
Patient is one month postpartum and exclusively breastfeeding.    Patient called, reporting left nipple pain, which was evaluated by Shona Alegria CNM for her   scheduled postpartum visit on 05/15/17 and no visible signs visible.    The pain has continued and it is now causing her to wake up in pain.  She's been wearing bras and at other times, going topless around the house.  She is unable to see any cracks, redness, scabs or anything visually different between left and right nipples.  The pain does not increase when the baby latches or during the feed.  She has called the warm line and the lactation consultants recommended Maninder Oliveros's All Purpose nipple Ointment. She has used this for 48 hours and it has not made a difference in her pain.    I gave patient option of coming to see me here in the clinic today, coming to make an appointment to see the lactation consultants here or to make an appointment with the private lactation consultant at her home. She would like to come see the LC's here. Encouraged to call and make an appointment today (prior to the weekend).  No other concerns, she will call the LC's here today and attempt to be seen today.   ~ shayy patrick cnm      ----- Message from Keri Perez MA sent at 5/19/2017 10:31 AM CDT -----  Contact: Self  Pt states she is having nipple pain (left side) and has been using Maninder Oliveros's nipple cream since Tues with no change. Please call to advise.    Thanks  Ney

## 2017-05-22 ENCOUNTER — TELEPHONE (OUTPATIENT)
Dept: LACTATION | Facility: CLINIC | Age: 32
End: 2017-05-22

## 2017-05-24 ENCOUNTER — TELEPHONE (OUTPATIENT)
Dept: LACTATION | Facility: CLINIC | Age: 32
End: 2017-05-24

## 2017-05-24 NOTE — TELEPHONE ENCOUNTER
Follow up call placed to check on patient's previous nipple and breast pain. Reports breast pain has been eliminated, and nipple tenderness is improving. Will continue nipple care and working on ensuring adequate latch with feedings. To continue to call lactation warmline as needed.

## 2017-06-01 ENCOUNTER — TELEPHONE (OUTPATIENT)
Dept: LACTATION | Facility: CLINIC | Age: 32
End: 2017-06-01

## 2017-06-01 NOTE — TELEPHONE ENCOUNTER
Patient calls with recurrent nipple pain requesting outpatient consult. Would like to work on positioning with different pillows as well as latch technique. Has already viewed online latch videos that were recommended and has used Maninder Oliveros's APNO prescribed by OB provider. Reports nipple pain resolves for a few days at a time and returns. Scheduled outpatient consult for 1400 on Wednesday, June 7.

## 2017-09-07 ENCOUNTER — TELEPHONE (OUTPATIENT)
Dept: INTERNAL MEDICINE | Facility: CLINIC | Age: 32
End: 2017-09-07

## 2017-09-07 NOTE — TELEPHONE ENCOUNTER
Pt states she spoke with Dr. Christianson about scheduling a new pt appt. Please advise if pt can be scheduled as requested. Please advise/authorize?

## 2017-09-07 NOTE — TELEPHONE ENCOUNTER
----- Message from Anastacio Jimenez sent at 9/7/2017 11:03 AM CDT -----  Contact: Patient  _x  1st Request  _  2nd Request  _  3rd Request        Who: MURPHY MARTINEZ [[id number]]    Why: Requesting a call back in regards to discussing scheduling an appointment with the doctor. Pt states that she previously spoke with  who advised to call. Please call at earliest convenience.     Thanks !     What Number to Call Back:[phone]    When to Expect a call back: (With in 24 hours)

## 2017-09-20 ENCOUNTER — PATIENT MESSAGE (OUTPATIENT)
Dept: OBSTETRICS AND GYNECOLOGY | Facility: CLINIC | Age: 32
End: 2017-09-20

## 2017-09-21 ENCOUNTER — INITIAL CONSULT (OUTPATIENT)
Dept: UROGYNECOLOGY | Facility: CLINIC | Age: 32
End: 2017-09-21
Payer: COMMERCIAL

## 2017-09-21 VITALS
SYSTOLIC BLOOD PRESSURE: 124 MMHG | WEIGHT: 156.31 LBS | HEIGHT: 69 IN | DIASTOLIC BLOOD PRESSURE: 78 MMHG | BODY MASS INDEX: 23.15 KG/M2

## 2017-09-21 DIAGNOSIS — N81.89 WEAKNESS OF PELVIC FLOOR: ICD-10-CM

## 2017-09-21 DIAGNOSIS — M62.89 PELVIC FLOOR DYSFUNCTION: Primary | ICD-10-CM

## 2017-09-21 DIAGNOSIS — K59.01 SLOW TRANSIT CONSTIPATION: ICD-10-CM

## 2017-09-21 PROCEDURE — 3074F SYST BP LT 130 MM HG: CPT | Mod: S$GLB,,, | Performed by: OBSTETRICS & GYNECOLOGY

## 2017-09-21 PROCEDURE — 87086 URINE CULTURE/COLONY COUNT: CPT

## 2017-09-21 PROCEDURE — 3008F BODY MASS INDEX DOCD: CPT | Mod: S$GLB,,, | Performed by: OBSTETRICS & GYNECOLOGY

## 2017-09-21 PROCEDURE — 3078F DIAST BP <80 MM HG: CPT | Mod: S$GLB,,, | Performed by: OBSTETRICS & GYNECOLOGY

## 2017-09-21 PROCEDURE — 51701 INSERT BLADDER CATHETER: CPT | Mod: S$GLB,,, | Performed by: OBSTETRICS & GYNECOLOGY

## 2017-09-21 PROCEDURE — 99999 PR PBB SHADOW E&M-EST. PATIENT-LVL III: CPT | Mod: PBBFAC,,, | Performed by: OBSTETRICS & GYNECOLOGY

## 2017-09-21 PROCEDURE — 99204 OFFICE O/P NEW MOD 45 MIN: CPT | Mod: 25,S$GLB,, | Performed by: OBSTETRICS & GYNECOLOGY

## 2017-09-21 NOTE — LETTER
September 21, 2017        Madison Poole, CNM  [address]  [facility] [address]             [facility] [address] [place] [address]  Phone: [phone]   Patient: Catie Mogne   MR Number: [id number]   YOB: 1985   Date of Visit: 9/21/2017       Dear Dr. Poole:    Thank you for referring Catie Monge to me for evaluation. Below are the relevant portions of my assessment and plan of care.       1. Pelvic floor dysfunction    2. Weakness of pelvic floor                1. Pelvic pressures; pelvic floor dysfunction with obturator tenderness   --Daily pelvic floor exercises as assigned, start  Pelvic floor PT in future  --Non surgical options discussed with patient      2. Constipation:  Controlling constipation may help bladder urgency/leakage and fiber may better control cholesterol and blood glucose.  Start daily fiber.  Take 1 tsp of fiber powder (psyllium or other sugar-free powder).  Mix in 8 oz of water.  Take x 3-5 days.  Then, increase fiber by 1 tsp every 3-5 days until stool is easy to pass.  Stop and continue at that dose.   Do not exceed 6 tsps/day.  May also use over the counter stool softener 1-2 x/day.  AVOID laxatives.    Approximately 40 min were spent in consult, 90 % in discussion.     Thank you for requesting consultation of your patient.  I look forward to participating in her care.    Yany Mckeon DO  Female Pelvic Medicine and Reconstructive Surgery  [facility] [place], [place]        If you have questions, please do not hesitate to call me. I look forward to following Catie along with you.    Sincerely,      Yany Mckeon, DO           CC  No Recipients

## 2017-09-21 NOTE — PROGRESS NOTES
Subjective:       Patient ID: Catie Monge is a 32 y.o. female.    Chief Complaint:  Pelvic Discomfort (pressure)      History of Present Illness: 32 Y O F with P1 s/p VD  4/18/2017  complicated by post partum pre-eclampsia, monitored for evaluated pressure not currently on any antihypertensive medications. She also had bothersome back pain making it difficult to walk in the initial post partum period. She did see a massage therapist with some interval improvement. She has tried to resume her normal activity with exercise and walking and has noted increasing pain, which now lingers even after walking. Was taking Advil two to three times a day to walk at the height of the pain in July and August, not taken anything now. The delivery was essentially uneventful, she did have epidural which was noted to be somewhat positional, with poor pain control on the left side but improved with time. She had a spontaneous vaginal delviery within one hour of pushing, no episiotomy  no vaginal lacerations. She is referred Madison Poole NP  for an evaluation.     HPI [facility]     Question 9/21/2017  8:23 AM CDT - Filled by Yany Mckeon, DO   General Urogynecology: Are you experiencing the following?    Dysuria (painful urination) No   Nocturia:  waking up at night to empty your bladder  Yes   If you answered yes to the previous question, how many times does this happen per night? 1-2   Enuresis (urine loss during sleep) No   Dribbling urine after you urinate No   Hematuria (urine appears red) No   Type of stream Strong   Urinary Incontinence (General): Are you experiencing the following?    Past consultation for incontinence: Have you ever seen someone for the evaluation of incontinence? No   If you answered yes to the previous question, please select all the therapies you have tried.  Kegals   Please note the effectiveness of the therapies. Ineffective   Need to wear protection to keep clothes dry  Yes   If  "you answered yes to the previous question, please darryl the protection you use.  Panty liner   If you wear protection, how much wetness is typically on each pad? N/a- I do not wear protection   If you wear protection, how often do you have to change per day, if applicable?  0   Stress Symptoms: Are you experiencing the following?    Leakage of urine with cough, laugh and/or sneeze No   If you answered yes to the previous question, what is the frequency in days, weeks and/or months? Never   Leakage of urine with sex No   Leakage of urine with bending/ lifting No   Leakage of urine with briskly walking or jogging No   If you lose urine for any other reason not previously mentioned, please note it below, if applicable.     Urge Symptoms: Are you experiencing the following?    Urgency ("got to go" feeling) Yes   Urge: How frequently do you feel an urge to urinate (feeling like you "gotta go" to the bathroom and can't wait) Several times a day   Do you experience a leakage of urine when you have a feeling of urgency?  No   Leakage of urine when unaware No   Past use of anticholinergics (medications used to treat overactive bladder) No   If you answered yes to the previous question, please darryl the anticholinergics you have used:     Have you ever used Mirbetriq (aka Mirabegron)?  No   Prolapse Symptoms: Are you experiencing any of the following?     Falling out/ Bulging/ Heaviness in the vagina No   Vaginal/ Abdominal Pain/ Pressure Yes   Need to strain/ Push to void No   Need to wait on the toilet before you void No   Unusual position to urinate (using your hands to push back the vaginal bulge) No   Sensation of incomplete emptying Yes   Past use of pessary device No   If you answered yes to the previous question, please list the devices you have used below.     Bowel Symptoms: Are you experiencing any of the following?    Constipation Yes   Diarrhea  No   Hematochezia (bloody stool) No   Incomplete evacuation of stool " No   Involuntary loss of formed stool No   Fecal smearing/urgency No   Involuntary loss of gas No   Vaginal Symptoms: Are you experiencing any of the following?     Abnormal vaginal bleeding  Yes   Vaginal dryness No   Sexually active  Yes   Dyspareunia (painful intercourse) No   Estrogen use  No     GYN & OB History  No LMP recorded.   Date of Last Pap: 10/18/2014    OB History    Para Term  AB Living   1 1 1 0 0 1   SAB TAB Ectopic Multiple Live Births   0 0 0 0 1      # Outcome Date GA Lbr Roger/2nd Weight Sex Delivery Anes PTL Lv   1 Term 17 41w2d 14:20 / 01:09 3.38 kg (7 lb 7.2 oz) F Vag-Spont EPI N OSIRIS        Past Medical History:   Diagnosis Date    Allergy     Blood type A POS 3/20/2017    Fever blister     Oral cold sores only; no hx genital outbreak    Hypertension affecting pregnancy in third trimester 2017    Other and unspecified ovarian cysts      Past Surgical History:   Procedure Laterality Date    NASAL SEPTUM SURGERY      OTHER SURGICAL HISTORY      anal fissure     TONSILLECTOMY, ADENOIDECTOMY      WISDOM TOOTH EXTRACTION       Review of patient's allergies indicates:  No Known Allergies      Current Outpatient Prescriptions:     cetirizine (ZYRTEC) 10 MG tablet, Take 10 mg by mouth every evening., Disp: , Rfl:     norethindrone (ORTHO MICRONOR) 0.35 mg tablet, Take 1 tablet (0.35 mg total) by mouth once daily., Disp: 28 tablet, Rfl: 11    PNV95/FERROUS FUMARATE/FA (PRENATAL ORAL), Take by mouth., Disp: , Rfl:     Review of Systems  Review of Systems   Constitutional: Negative for activity change, appetite change, chills, diaphoresis, fatigue, fever and unexpected weight change.   Respiratory: Negative for cough.    Gastrointestinal: Negative for abdominal distention, abdominal pain, anal bleeding, blood in stool, constipation, diarrhea, nausea, rectal pain and vomiting.   Genitourinary: Negative for decreased urine volume, difficulty urinating, dyspareunia,  dysuria, enuresis, flank pain, frequency, genital sores, hematuria, menstrual problem, pelvic pain, urgency, vaginal bleeding, vaginal discharge and vaginal pain.   Musculoskeletal: Negative for back pain.   Skin: Negative for color change, pallor, rash and wound.   Allergic/Immunologic: Negative for environmental allergies, food allergies and immunocompromised state.   Hematological: Negative for adenopathy. Does not bruise/bleed easily.   Psychiatric/Behavioral: Negative for agitation, behavioral problems, confusion and sleep disturbance.           Objective:     Physical Exam   Constitutional: She is oriented to person, place, and time. She appears well-developed.   HENT:   Head: Normocephalic and atraumatic.   Eyes: Conjunctivae and EOM are normal.   Neck: Normal range of motion. Neck supple.   Cardiovascular: Normal rate, regular rhythm, S1 normal, S2 normal, normal heart sounds and intact distal pulses.    Pulmonary/Chest: Effort normal and breath sounds normal. She exhibits no tenderness.   Abdominal: Soft. Bowel sounds are normal. She exhibits no distension and no mass. There is no hepatosplenomegaly, splenomegaly or hepatomegaly. There is no tenderness. There is no rigidity, no rebound, no guarding and no CVA tenderness. No hernia.   Genitourinary: Pelvic exam was performed with patient supine. Rectum normal, vagina normal, uterus normal, cervix normal, skenes normal and bartholins normal.       Right labia normal and left labia normal. Urethra exhibits no urethral caruncle, no urethral diverticulum, no urethral mass and no hypermobility. Right bartholin is not enlarged and not tender. Left bartholin is not enlarged and not tender. Rectal exam shows resting tone normal and active tone normal. Rectal exam shows no external hemorrhoid, no fissure, no tenderness, anal tone normal and no dovetailing. Guaiac negative stool. No foreign body, tenderness, bleeding, unspecified prolapse of vaginal walls, atrophy,  fistula, mesh exposure or lavator tenderness in the vagina. No vaginal discharge found. Right adnexum displays no mass and no tenderness. Left adnexum displays no mass and no tenderness. Cervix exhibits no motion tenderness and no discharge. Uterus is not tender and not experiencing uterine prolapse.   PVR: 30 ML  Empty cough stress test: Negative.  Kegel: 2/5    POP-Q  Aa: -2 Ba: -2 C: -5   GH: 3 PB: 2 TVL: 8   Ap: -2 Bp: -2 D: -6                     Musculoskeletal: Normal range of motion.   Lymphadenopathy:     She has no axillary adenopathy.        Right: No inguinal adenopathy present.        Left: No inguinal adenopathy present.   Neurological: She is alert and oriented to person, place, and time. She has normal strength and normal reflexes. Cranial nerves II through XII intact. No cranial nerve deficit.   Skin: Skin is warm, dry and intact.   Psychiatric: She has a normal mood and affect. Her speech is normal and behavior is normal. Judgment normal. Cognition and memory are normal.          Assessment:        1. Pelvic floor dysfunction    2. Weakness of pelvic floor    3. Slow transit constipation               Plan:        1. Pelvic pressures; pelvic floor dysfunction with obturator tenderness   --Daily pelvic floor exercises as assigned, start  Pelvic floor PT in future  --Non surgical options discussed with patient      2. Constipation:  Controlling constipation may help bladder urgency/leakage and fiber may better control cholesterol and blood glucose.  Start daily fiber.  Take 1 tsp of fiber powder (psyllium or other sugar-free powder).  Mix in 8 oz of water.  Take x 3-5 days.  Then, increase fiber by 1 tsp every 3-5 days until stool is easy to pass.  Stop and continue at that dose.   Do not exceed 6 tsps/day.  May also use over the counter stool softener 1-2 x/day.  AVOID laxatives.    Approximately 40 min were spent in consult, 90 % in discussion.     Thank you for requesting consultation of your  patient.  I look forward to participating in her care.    Yany Mckeon DO  Female Pelvic Medicine and Reconstructive Surgery  [facility] [place], [place]

## 2017-09-22 ENCOUNTER — CLINICAL SUPPORT (OUTPATIENT)
Dept: REHABILITATION | Facility: HOSPITAL | Age: 32
End: 2017-09-22
Attending: OBSTETRICS & GYNECOLOGY
Payer: COMMERCIAL

## 2017-09-22 DIAGNOSIS — M62.89 PELVIC FLOOR DYSFUNCTION: Primary | ICD-10-CM

## 2017-09-22 LAB — BACTERIA UR CULT: NO GROWTH

## 2017-09-22 PROCEDURE — 97162 PT EVAL MOD COMPLEX 30 MIN: CPT | Mod: PO

## 2017-09-22 PROCEDURE — 97110 THERAPEUTIC EXERCISES: CPT | Mod: PO

## 2017-09-22 NOTE — PATIENT INSTRUCTIONS
Bracing With Bridging (Hook-Lying)        With neutral spine, tighten pelvic floor and abdominals and hold. Lift bottom. Repeat ___ times. Do ___ times a day.     Adduction: Hip - Knees Together With Pelvic Floor (Quadruped)        On hands and knees, put towel roll between knees. Squeeze pelvic floor while pushing knees together. Hold for ___ seconds. Rest for ___ seconds. Repeat ___ times. Do ___ times a day    Home Exercise Program: 09/22/2017    DIAPHRAGMATIC BREATHING     The diaphragm is a dome shaped muscle that forms the floor of the rib cage. It is the most efficient muscle for breathing and relaxation, although most people are not used to using the diaphragm. Diaphragmatic or belly breathing is an important technique to learn because it helps settle down or relax the autonomic nervous system. The correct use of diaphragmatic breathing can help to quiet brain activity resulting in the relaxation of all the muscles and organs of the body. This is accomplished by slow rhythmic breathing concentrated in the diaphragm muscle rather than the chest.    How to do proper relaxation breathing:     Start by lying on your back or reclining in a chair in a relaxed position. Place one hand on your chest and the other on your abdomen.   Relax your jaw by placing your tongue on the roof of your mouth and keeping your teeth slightly apart.    Take a deep breath in, letting the abdomen expand and rise while you keep your upper chest, neck and shoulders relaxed.    As you breathe out, allow your abdomen and chest to fall. Exhale completely.   It doesn't matter if you breathe in/out through your nose and/or mouth. Do whichever feels comfortable.   Remember to breathe slowly.  Do not force your breathing. Do not hold your breath.   Repeat for 5 minutes, several times every day.

## 2017-09-22 NOTE — PLAN OF CARE
OUTPATIENT PHYSICAL THERAPY EVALUATION        Patient: Catie Nevarez [facility] #:  [id number]    Date of treatment: 2017   Time in: 11:01  Time out: 12:00  Billable time: 55 minutes    HISTORY      Catie is a 32 y.o. female evaluated on 2017    Physician:  Yany Mckeon,*   Diagnosis:   Encounter Diagnosis   Name Primary?    Pelvic floor dysfunction Yes      Treatment ordered: Physical Therapy  Medical History:   Past Medical History:   Diagnosis Date    Allergy     Blood type A POS 3/20/2017    Fever blister     Oral cold sores only; no hx genital outbreak    Hypertension affecting pregnancy in third trimester 2017    Other and unspecified ovarian cysts       Surgical History:   Past Surgical History:   Procedure Laterality Date    NASAL SEPTUM SURGERY      OTHER SURGICAL HISTORY      anal fissure     TONSILLECTOMY, ADENOIDECTOMY      WISDOM TOOTH EXTRACTION        Medications:   Current Outpatient Prescriptions   Medication Sig    cetirizine (ZYRTEC) 10 MG tablet Take 10 mg by mouth every evening.    norethindrone (ORTHO MICRONOR) 0.35 mg tablet Take 1 tablet (0.35 mg total) by mouth once daily.    PNV95/FERROUS FUMARATE/FA (PRENATAL ORAL) Take by mouth.     No current facility-administered medications for this visit.        Allergies: Review of patient's allergies indicates:  No Known Allergies     Precautions: universal    OB/GYN History:   2017 (no tearing or epi)    Bladder/Bowel History: straining or pushing to empty bladder; double voids via pushing.  Sudden urge      SUBJECTIVE     Date of onset: 2017  History of current complaint: pt reports that she started having back pain during pregnancy that has not gotten any better.  She also reports vaginal pressure constantly since delivery.    Patient's goals for therapy: to decrease her pelvic pain.    Pain: Patient reports 2/10, with 0 being the lowest and 10 being the highest.    Activities that  cause symptoms: prolonged sitting and walking; has stopped running and rowing due to pain.     Previous treatment included medication    Sexually active? Yes but is avoiding due to fear of pain.      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    OBJECTIVE     ORTHO SCREEN  Posture: WNL  Pelvic alignment: in supine, L IC higher but ASIS's level    ABDOMINALS  Scarring: none  Diastasis: none   Abdominal strength: Rectus: 3/5     TA: well isolated, palpable contraction  Chaperone: declined  Consent signed: Yes    VAGINAL PELVIC FLOOR EXAM    EXTERNAL ASSESSMENT  Introitus: WNL  Skin condition: WNL  Scarring: none   Sensation: WNL   Pain:  none  Voluntary contraction: visible lift (on second try)  Voluntary relaxation: visible drop  Involuntary contraction: bulge  Bearing down: bulge  Perineal descent: absent      INTERNAL ASSESSMENT  Pain: tender areas noted as follows: L levator ani and OI   Sensation: able to localize pressure appropriately  Vaginal vault: WNL   Muscle Bulk: hypertonus on L  Muscle Power: 2/5  Muscle Endurance: 5 sec      Quality of contraction: decreased hold and slow relaxation   Specificity: WNL   Coordination: tends to hold breath during PFM contraction   Prolapse check:none noted      TREATMENT      Therapeutic Exercise to develop  flexibility and core stabilization for 10 minutes including: diaphragmatic breathing and quadruped add set, and bridging with pelvic brace    Education:  instructed on general anatomy/physiology of urinary/bowel system; discussed plan of care with patient and parent/guardian; instructed in benefits/risks of treatment; instructed in alternative methods of treatment; instructed in risks of refusing treatment; patient a parent agreed to treatment plan.     Also educated in: anatomy/physiology of pelvic floor, posture/body mechanices, diaphragmatic breathing and double voiding techniques    ASSESSMENT      This is a 32 y.o. female referred to outpatient physical therapy and presents with a medical diagnosis of pelvic floor dysfunction. Patient will benefit from skilled physical therapy to maximize her SI joint stability for decreased pain and increased tolerance to the exercise activities that she performed prior to pregnancy.    Educational/Spiritual/Cultural needs: none  Abuse/Neglect: no signs  Nutritional Status: WDWN   Fall Risk: none    Pt's spiritual, cultural and educational needs considered and pt agreeable to plan of care and goals as stated below:     Medical necessity is demonstrated by the following IMPAIRMENTS/PROMBLEMS     History  Co-morbidities and personal factors that may impact the plan of care Examination  Body Structures and Functions, activity limitations and participation restrictions that may impact the plan of care Clinical Presentation   Decision Making/ Complexity Score   Co-morbidities:     Less than 6 months post partum            Personal Factors:    Body Regions/Systems/Functions:    pelvic asymmetry, poor knowledge of body mechanics and posture, pelvic floor tenderness, increased tension of the pelvic muscles, poor quality of pelvic muscle contraction and dysfunctional voiding           Activity limitations:   Barriers to Learning: none  Environmental Barriers: none noted    Participation Restrictions:   Exercise activity limited by pelvic pain; sitting on floor to play with her daughter limited by pelvic pain.         Evolving (symptoms  progressed from intermittent to constant) moderate           PLAN    Frequency: once per 2 weeks  Duration: 12 weeks  Long Term Goals: 12 weeks   Pt will urinate without crede/Valsalva to prevent adverse effects to adjacent structures.  Pt will be independent with double voiding techniques 100% of the time to ensure full bladder emptying and decrease pt's risk of infection.   Pt will report successfully having intercourse with < or = 2/10 pain for an improvement in activity tolerance.   Pt will report improved ability to engage pelvic/abdominal brace with < or = 2/10 pain for improved tolerance of functional transfers/mobility.   Pt will report improved ability to tolerate standing > or = 60 minutes without vaginal pressure for improved ability to complete workouts and ADL's.   Pt/family will be independent with HEP for continued self-management of symptoms.    Physical therapy will include: therapeutic exercises, manual therapy, modalities PRN and patient/family education, dry needling if indicated      Therapist: Merary Kern, PT, BCB-PMD  9/22/2017

## 2017-10-03 ENCOUNTER — CLINICAL SUPPORT (OUTPATIENT)
Dept: REHABILITATION | Facility: HOSPITAL | Age: 32
End: 2017-10-03
Attending: OBSTETRICS & GYNECOLOGY
Payer: COMMERCIAL

## 2017-10-03 ENCOUNTER — PATIENT MESSAGE (OUTPATIENT)
Dept: REHABILITATION | Facility: HOSPITAL | Age: 32
End: 2017-10-03

## 2017-10-03 DIAGNOSIS — M62.89 PELVIC FLOOR DYSFUNCTION: ICD-10-CM

## 2017-10-03 PROCEDURE — 97140 MANUAL THERAPY 1/> REGIONS: CPT

## 2017-10-03 NOTE — PROGRESS NOTES
OUTPATIENT PHYSICAL THERAPY DAILY NOTE       Patient: Catie Nevarez [place]   [facility] #:  [id number]    Diagnosis:   Encounter Diagnosis   Name Primary?    Pelvic floor dysfunction       Date of treatment: 10/03/2017     Time in: 9:00  Time out: 9:55  Billable time: 55 minutes  Visit #: 2      SUBJECTIVE   Pt reports: that she is surprised by how much she can feel the quadruped add sets.  Has been compliant with her HEP.    Pain: 1/10 on VAS scale (increased after treatment)  Pain location: L groin and L SI area    OBJECTIVE     Manual Therapy to produce joint malaignment correction and decreased pain for 55 minutes including:ME self mob for correction of L post innominate rotation, SCS technique for reduction of L OI and L piriformis tender points.  Internal vaginal trigger point release also performed- passive L hip ER in hooklying produced reduction in symptoms and pain with palpation.  External palpation and trigger point release noted to be more painful and less effective.      Education: Discussed progression of plan of care with patient; educated pt in activity modification; reviewed HEP with pt., including instruction in self mob at home.  Pt demonstrated and verbalized understanding of all instruction and was provided with a handout of HEP (see Patient Instructions).      ASSESSMENT      Pt tolerated entire treatment fair with no visible adverse effects. Will see how she does for the next 24 hours, and will assess the effect of self mob at home.    Will the patient continue to benefit from skilled PT intervention? Yes  Medical necessity demonstrated by: skilled PT supervision required for HEP and treatment progression  Progress towards goals:  fair  New/Revised goals: none      PLAN     Continue with established Plan of Care, working toward established PT goals.

## 2017-10-10 ENCOUNTER — CLINICAL SUPPORT (OUTPATIENT)
Dept: REHABILITATION | Facility: HOSPITAL | Age: 32
End: 2017-10-10
Attending: OBSTETRICS & GYNECOLOGY
Payer: COMMERCIAL

## 2017-10-10 DIAGNOSIS — M62.89 PELVIC FLOOR DYSFUNCTION: ICD-10-CM

## 2017-10-10 PROCEDURE — 97140 MANUAL THERAPY 1/> REGIONS: CPT

## 2017-10-10 PROCEDURE — 97110 THERAPEUTIC EXERCISES: CPT

## 2017-10-10 NOTE — PROGRESS NOTES
OUTPATIENT PHYSICAL THERAPY DAILY NOTE       Patient: Catie Nevarez Pickens   [facility] #:  [id number]    Diagnosis:   Encounter Diagnosis   Name Primary?    Pelvic floor dysfunction       Date of treatment: 10/10/2017     Time in: 11:00  Time out: 11:45  Billable time: 40 minutes  Visit #: 3      SUBJECTIVE   Pt reports:that she hasn't been able to walk quite as much, but when she has her symptoms have been a little better.  She denies pain in the SI area- more in the L groin today.      Pain: 1/10 on VAS scale   Pain location: L groin     OBJECTIVE     Manual Therapy to produce joint malaignment correction and decreased pain for 30 minutes including: Internal vaginal trigger point release also performed- passive L hip ER in hooklying produced reduction in symptoms and pain with palpation.  Levator ani were less tender and tight today, but L OI was still fairly painful to palpate.  External palpation and trigger point release noted to be more painful and less effective.  With alignment check, pt presented in both supine and standing with L IC higher, ASIS's level, and L PSIS higher.  Gentle Gr 1 oscillatory mobs for correction of L ilial upslip and anterior innominate rotation were performed with patient in prone and R SL.  Alignment correction was achieved at end of session.      Pt then rec'd 10 minutes of therapeutic exercise instruction in the following to be added to her HEP: supine clams, prone alternating leg extension, and AP and lateral planks (pt advised to start with 30 sec hold at first, with forearm WB).      Education: Discussed progression of plan of care with patient; educated pt in activity modification; reviewed HEP with pt., including instruction in self mob at home.  Pt demonstrated and verbalized understanding of all instruction and was provided with a handout of HEP (see Patient Instructions).      ASSESSMENT      Pt tolerated entire treatment fair with no visible adverse effects. Will reassess the  OI and will consult with trained PT staff re: dry needling for myofascial trigger point in the L OI next session if needed.  Will the patient continue to benefit from skilled PT intervention? Yes  Medical necessity demonstrated by: skilled PT supervision required for HEP and treatment progression  Progress towards goals:  Fair/good  New/Revised goals: none      PLAN     Continue with established Plan of Care, working toward established PT goals.

## 2017-10-10 NOTE — PATIENT INSTRUCTIONS
Bracing With Leg Lift (Prone)        With pillow support, lie on abdomen and neutral spine, tighten pelvic floor and abdominals and hold. Alternately raise legs off floor and hold for 5 sec . Repeat _10__ times each side. Do _1-2__ times a day.    Bracing With Knee Fallout (Hook-Lying)        With neutral spine, tighten pelvic floor and abdominals and hold. Alternating legs, drop knee out to side. Keep opposite hip still. Repeat _10__ times each side. Do _1-2__ times a day.     ALSO: if not doing already, planks and side planks!

## 2017-10-16 ENCOUNTER — PATIENT OUTREACH (OUTPATIENT)
Dept: INTERNAL MEDICINE | Facility: CLINIC | Age: 32
End: 2017-10-16

## 2017-10-16 NOTE — PROGRESS NOTES
[facility] is committed to your overall health.  To help you get the most out of each of your visits, we will review your information to make sure you are up to date on all of your recommended tests and/or procedures.       Your PCP  found that you may be due for:       Health Maintenance Due   Topic Date Due    Lipid Panel  1987    Influenza Vaccine  08/01/2017    Pap Smear with HPV Cotest  09/24/2017             If you have had any of the above done at another facility, please bring the records or information with you so that your record at [facility] will be complete.  If you would like to schedule any of these, please contact me.     If you are currently taking medication, please bring it with you to your appointment for review.     Also, if you have any type of Advanced Directives, please bring them with you to your office visit so we may scan them into your chart.       Thank you for choosing [facility] for your healthcare needs.

## 2017-10-27 ENCOUNTER — CLINICAL SUPPORT (OUTPATIENT)
Dept: REHABILITATION | Facility: HOSPITAL | Age: 32
End: 2017-10-27
Attending: OBSTETRICS & GYNECOLOGY
Payer: COMMERCIAL

## 2017-10-27 DIAGNOSIS — M62.89 PELVIC FLOOR DYSFUNCTION: ICD-10-CM

## 2017-10-27 PROCEDURE — 97140 MANUAL THERAPY 1/> REGIONS: CPT | Mod: PO

## 2017-10-27 NOTE — PROGRESS NOTES
OUTPATIENT PHYSICAL THERAPY DAILY NOTE       Patient: Catie Monge   [facility] #:  [id number]    Diagnosis:   Encounter Diagnosis   Name Primary?    Pelvic floor dysfunction       Date of treatment: 10/27/2017     Time in: 10:07  Time out: 11:00  Billable time: 50 minutes  Visit #: 4      SUBJECTIVE   Pt reports: Pt reports that she is doing her bridges and planks, but hasn't been walking.  Was a little sore after last session but this subsided.    No pain with intercourse, just a feeling of her hips being tight.  She also reports not being able to sit crosslegged on the floor with her daughter due to pain with L hip ER.      Pain: 0/10 on VAS scale   Pain location: NA     OBJECTIVE   With alignment check, pt presented in both supine and standing with no ilial rotation or upslip, but in prone was noted to be lying with L sacral rotation. Session started with pt performing 10 minutes of brisk treadmill walking with no incline.      Manual Therapy to produce joint malaignment correction and decreased pain for 40 minutes including: SCS release of L S1 and S3 tender points, sacral rocking, Gr 1-2 mobs for correction of L sacral rotation, and myofascial mobilization of the lumbosacral area.  Pt also consented to and rec'd dry needling to the L OI positioned in L SL.  (Needling performed by Shannan Moss, PT)     She was instructed to monitor her symptoms, and we spoke about her obtaining an SI Loc belt.     Education: Discussed progression of plan of care with patient; educated pt in activity modification; reviewed HEP with pt., including instruction in self mob at home.  Pt demonstrated and verbalized understanding of all instruction and was provided with a handout of HEP (see Patient Instructions).      ASSESSMENT      Pt tolerated entire treatment fair with no visible adverse effects. Will look at OI both internally and externally again next session.    Will the patient continue to benefit from skilled PT  intervention? Yes  Medical necessity demonstrated by: skilled PT supervision required for HEP and treatment progression  Progress towards goals:  Fair/good  New/Revised goals: none      PLAN     Continue with established Plan of Care, working toward established PT goals.

## 2017-10-30 ENCOUNTER — HOSPITAL ENCOUNTER (OUTPATIENT)
Dept: RADIOLOGY | Facility: OTHER | Age: 32
Discharge: HOME | End: 2017-10-30
Attending: INTERNAL MEDICINE
Payer: COMMERCIAL

## 2017-10-30 ENCOUNTER — OFFICE VISIT (OUTPATIENT)
Dept: INTERNAL MEDICINE | Facility: CLINIC | Age: 32
End: 2017-10-30
Attending: INTERNAL MEDICINE
Payer: COMMERCIAL

## 2017-10-30 VITALS
WEIGHT: 150.81 LBS | OXYGEN SATURATION: 99 % | HEART RATE: 76 BPM | DIASTOLIC BLOOD PRESSURE: 82 MMHG | BODY MASS INDEX: 21.59 KG/M2 | SYSTOLIC BLOOD PRESSURE: 130 MMHG | HEIGHT: 70 IN

## 2017-10-30 DIAGNOSIS — N63.10 BREAST MASS, RIGHT: ICD-10-CM

## 2017-10-30 DIAGNOSIS — Z13.220 ENCOUNTER FOR LIPID SCREENING FOR CARDIOVASCULAR DISEASE: ICD-10-CM

## 2017-10-30 DIAGNOSIS — Z13.6 ENCOUNTER FOR LIPID SCREENING FOR CARDIOVASCULAR DISEASE: ICD-10-CM

## 2017-10-30 DIAGNOSIS — Z00.00 ANNUAL PHYSICAL EXAM: Primary | ICD-10-CM

## 2017-10-30 DIAGNOSIS — Z23 NEEDS FLU SHOT: ICD-10-CM

## 2017-10-30 DIAGNOSIS — Z12.4 PAP SMEAR FOR CERVICAL CANCER SCREENING: ICD-10-CM

## 2017-10-30 PROBLEM — Z30.011 ENCOUNTER FOR INITIAL PRESCRIPTION OF CONTRACEPTIVE PILLS: Status: RESOLVED | Noted: 2017-05-15 | Resolved: 2017-10-30

## 2017-10-30 PROBLEM — N64.4 PAIN OF LEFT BREAST: Status: RESOLVED | Noted: 2017-05-19 | Resolved: 2017-10-30

## 2017-10-30 PROCEDURE — 76642 ULTRASOUND BREAST LIMITED: CPT | Mod: TC,RT

## 2017-10-30 PROCEDURE — 90686 IIV4 VACC NO PRSV 0.5 ML IM: CPT | Mod: S$GLB,,, | Performed by: INTERNAL MEDICINE

## 2017-10-30 PROCEDURE — 76642 ULTRASOUND BREAST LIMITED: CPT | Mod: 26,RT,, | Performed by: RADIOLOGY

## 2017-10-30 PROCEDURE — 90471 IMMUNIZATION ADMIN: CPT | Mod: S$GLB,,, | Performed by: INTERNAL MEDICINE

## 2017-10-30 PROCEDURE — 99999 PR PBB SHADOW E&M-EST. PATIENT-LVL III: CPT | Mod: PBBFAC,,, | Performed by: INTERNAL MEDICINE

## 2017-10-30 PROCEDURE — 99385 PREV VISIT NEW AGE 18-39: CPT | Mod: 25,S$GLB,, | Performed by: INTERNAL MEDICINE

## 2017-10-30 NOTE — PROGRESS NOTES
Subjective:       Patient ID: Catie Monge is a 32 y.o. female.    Chief Complaint: Establish Care     Catie Monge is a 32 y.o.  female who presents for Establish Care  .  Patient Active Problem List   Diagnosis    Pelvic floor dysfunction    Breast feeding status of mother     Had htn during last quarter of pregnancy.  Not on bp meds. Doing well.  breast feeding new child.     Right breast mass, noticed it last month, does not change with breast feeding.  Has perhaps enlarged slightly. Non-tender.       Health Maintenance       Date Due Completion Date    Lipid Panel 1987 ---    Influenza Vaccine 08/01/2017 ---    Pap Smear with HPV Cotest 09/24/2017 9/24/2014    TETANUS VACCINE 02/15/2027 2/15/2017          Review of Systems   Constitutional: Negative for activity change, chills, fever and unexpected weight change.   HENT: Negative for hearing loss, rhinorrhea and trouble swallowing.    Eyes: Negative for discharge and visual disturbance.   Respiratory: Negative for cough, chest tightness, shortness of breath and wheezing.    Cardiovascular: Negative for chest pain and palpitations.   Gastrointestinal: Negative for blood in stool, constipation, diarrhea, nausea and vomiting.   Endocrine: Negative for polydipsia and polyuria.   Genitourinary: Negative for difficulty urinating, dysuria, hematuria and menstrual problem.   Musculoskeletal: Negative for arthralgias, joint swelling and neck pain.   Neurological: Negative for weakness and headaches.   Psychiatric/Behavioral: Negative for confusion and dysphoric mood.         Past Medical History:   Diagnosis Date    Allergy     Blood type A POS 3/20/2017    Fever blister     Oral cold sores only; no hx genital outbreak    Hypertension affecting pregnancy in third trimester 4/17/2017    Other and unspecified ovarian cysts 2003       Past Surgical History:   Procedure Laterality Date    NASAL SEPTUM SURGERY      OTHER SURGICAL HISTORY       "anal fissure     TONSILLECTOMY, ADENOIDECTOMY      WISDOM TOOTH EXTRACTION         Family History   Problem Relation Age of Onset    Glaucoma Father     Hypertension Father     Rheum arthritis Father     Melanoma Paternal Grandfather     Kyphosis Brother     Bipolar disorder Brother     Anxiety disorder Brother     Diabetes Mellitus Maternal Grandfather     Breast cancer Neg Hx     Cancer Neg Hx     Ovarian cancer Neg Hx     Colon cancer Neg Hx     Macular degeneration Neg Hx     Retinal detachment Neg Hx        Social History   Substance Use Topics    Smoking status: Never Smoker    Smokeless tobacco: Never Used    Alcohol use Not on file      Comment: sociallly             Objective:   Blood pressure 130/82, pulse 76, height 5' 10" (1.778 m), weight 68.4 kg (150 lb 12.7 oz), SpO2 99 %, currently breastfeeding.     Physical Exam   Constitutional: She is oriented to person, place, and time. She appears well-developed and well-nourished.   HENT:   Head: Normocephalic and atraumatic.   Neck: Carotid bruit is not present. No thyromegaly present.   Cardiovascular: Normal rate and normal heart sounds.  Exam reveals no gallop and no friction rub.    No murmur heard.  Pulmonary/Chest: Effort normal and breath sounds normal. She has no wheezes. She has no rales.       Abdominal: Soft. Bowel sounds are normal. She exhibits no distension. There is no tenderness.   Musculoskeletal: She exhibits no edema or tenderness.   Lymphadenopathy:     She has no cervical adenopathy.        Right: No supraclavicular adenopathy present.        Left: No supraclavicular adenopathy present.   Neurological: She is alert and oriented to person, place, and time. She has normal reflexes.   Skin: Skin is warm and dry.   Psychiatric: She has a normal mood and affect. Her behavior is normal.       Prior labs reviewed  Assessment/Plan:        Catie was seen today for establish care.    Diagnoses and all orders for this " visit:    Annual physical exam  Recommend daily sunscreen, cardiovascular exercise min 30 min 5 days per week. Seatbelts routinely.  Recommend monthly self breast exams and annual mammogram.  Also screening  pap with reflex HPV q 3 years or as recommended by gyn provider.    Encounter for lipid screening for cardiovascular disease    Breast feeding status of mother    Needs flu shot  -     Influenza - Quadrivalent (3 years & older)    Breast mass, right  -     Mammo Digital Diagnostic Bilat with Tomosynthesis_CAD; Future  -     US Breast Right Complete; Future    Other orders  -     Cancel: Lipid panel; Future  -     Cancel: Ambulatory referral to Obstetrics / Gynecology

## 2017-10-30 NOTE — PROGRESS NOTES
Patient given PF Influenza IM in the LD. Patient tolerated well and Band-Aid was applied. Lot#[id number] Exp:6/30/2018. Patient advised to wait in the lobby for 15 min to make sure no adverse reactions occur. Patient states verbal understanding and has no further questions. Patient given vaccine information sheet.

## 2017-11-04 ENCOUNTER — PATIENT MESSAGE (OUTPATIENT)
Dept: INTERNAL MEDICINE | Facility: CLINIC | Age: 32
End: 2017-11-04

## 2017-11-07 ENCOUNTER — CLINICAL SUPPORT (OUTPATIENT)
Dept: REHABILITATION | Facility: HOSPITAL | Age: 32
End: 2017-11-07
Attending: INTERNAL MEDICINE
Payer: COMMERCIAL

## 2017-11-07 DIAGNOSIS — M62.89 PELVIC FLOOR DYSFUNCTION: ICD-10-CM

## 2017-11-07 PROCEDURE — 97140 MANUAL THERAPY 1/> REGIONS: CPT

## 2017-11-07 NOTE — PROGRESS NOTES
OUTPATIENT PHYSICAL THERAPY DAILY NOTE       Patient: Catie Monge   [facility] #:  [id number]    Diagnosis:   Encounter Diagnosis   Name Primary?    Pelvic floor dysfunction       Date of treatment: 11/07/2017     Time in: 9:00  Time out: 9:50  Billable time: 45 minutes  Visit #: 5      SUBJECTIVE   Pt reports: Pt reports that she is very busy- she is in the process of packing her entire apartment in preparation for a move.  She has been walking but not running.  Walked every day after needling last session, and is generally feeling better.  Not sure if she's just distracted.  Has been wearing the SI belt to do lifting and packing, but sometimes forgets it when she walks.  Also reports a little low back soreness which she attributes to the move.       Pain: 0/10 on VAS scale   Pain location: NA     OBJECTIVE   With alignment check, pt presented in both supine and standing with no ilial rotation or upslip.  Passive L hip ER produced pain in the adductor tendon insertions; L hip scouring negative; she denied symptoms of popping/clicking/instability.        Manual Therapy to produce decreased pain and increased flexibility for 45 minutes including: TPR to L levator ani and OI via ischemic pressure and contract/relax.  L OI much less tender today.  Pt also rec'd stretching to the adductors on L utilizing reciprocal inhibition (push/relax).  She was able to tolerate gentle unilateral frogleg stretch and figure 4 stretch with minimal discomfort, and was able to sit cross legged on table with much less discomfort.  She was instructed in supine frogleg, figure 4, standing 3D adductor stretch and modified pigeon pose to add to her HEP.  (She took a picture with her phone as we were having printer issues at the time of treatment.).  We spoke about making another appointment for one more dry needling treatment, with reassessment of her ER ROM in the L hip.  Pt agreed with this plan.       Education: Discussed progression  of plan of care with patient; educated pt in activity modification; reviewed HEP with pt., .        ASSESSMENT      Pt tolerated entire treatment fair with no visible adverse effects. Pt with excellent response to stretching and hip openers.  She reports that she may not be able to run regularly for a while due to life being busy.      Will the patient continue to benefit from skilled PT intervention? Yes  Medical necessity demonstrated by: skilled PT supervision required for HEP and treatment progression  Progress towards goals:  good  New/Revised goals: none      PLAN     Continue with established Plan of Care, working toward established PT goals.

## 2017-11-16 ENCOUNTER — CLINICAL SUPPORT (OUTPATIENT)
Dept: REHABILITATION | Facility: HOSPITAL | Age: 32
End: 2017-11-16
Attending: OBSTETRICS & GYNECOLOGY
Payer: COMMERCIAL

## 2017-11-16 DIAGNOSIS — M62.89 PELVIC FLOOR DYSFUNCTION: ICD-10-CM

## 2017-11-16 PROCEDURE — 97140 MANUAL THERAPY 1/> REGIONS: CPT | Mod: PO

## 2017-11-16 NOTE — PROGRESS NOTES
OUTPATIENT PHYSICAL THERAPY DAILY NOTE       Patient: Catie Monge   [facility] #:  [id number]    Diagnosis:   Encounter Diagnosis   Name Primary?    Pelvic floor dysfunction       Date of treatment: 11/16/2017     Time in: 11:15  Time out: 12:00  Billable time: 40 minutes  Visit #: 6      SUBJECTIVE   Pt reports: she moved last week- she is now unpacking.     Can sit more comfortably in ring sitting but still feels a little tight.  Her SI belt has been very helpful while moving and lifting boxes.  Not really walking or running right now.      Pain: 3/10 on VAS scale   Pain location: palpable band of tissue noted lateral to L5 on R, just above the PSIS.  Tender to palpation more than with trunk movement/rotation.      OBJECTIVE        Manual Therapy to produce decreased pain and increased flexibility for 40 minutes including:  stretching to the adductors on L utilizing reciprocal inhibition (push/relax).  She was able to tolerate gentle unilateral frogleg stretch and figure 4 stretch with minimal discomfort, and was able to sit cross legged on table with much less discomfort.  Pt also rec'd dry needling to the L OI, positioned in L SL, as well as B L5 needling per Shannan Moss, PT.    Session concluded with sacral mobs for correction of R sacral rotation (f/b B hip add/abd shotgun).  We discussed her finding time to try running, to assess her symptoms and where we need to go with treatment.  She was advised to run no more than 1 mile at a time.   Pt agreed with this plan.       Education: Discussed progression of plan of care with patient; educated pt in activity modification; reviewed HEP with pt., .        ASSESSMENT      Pt tolerated entire treatment fair with no visible adverse effects. Will reassess symptoms after she tries running.       Will the patient continue to benefit from skilled PT intervention? Yes  Medical necessity demonstrated by: skilled PT supervision required for HEP and treatment  progression  Progress towards goals:  good  New/Revised goals: none      PLAN     Continue with established Plan of Care, working toward established PT goals.

## 2017-11-17 ENCOUNTER — LAB VISIT (OUTPATIENT)
Dept: LAB | Facility: OTHER | Age: 32
End: 2017-11-17
Attending: INTERNAL MEDICINE
Payer: COMMERCIAL

## 2017-11-17 DIAGNOSIS — Z00.00 ANNUAL PHYSICAL EXAM: ICD-10-CM

## 2017-11-17 LAB
ALBUMIN SERPL BCP-MCNC: 3.8 G/DL
ALP SERPL-CCNC: 59 U/L
ALT SERPL W/O P-5'-P-CCNC: 16 U/L
ANION GAP SERPL CALC-SCNC: 6 MMOL/L
AST SERPL-CCNC: 21 U/L
BASOPHILS # BLD AUTO: 0.01 K/UL
BASOPHILS NFR BLD: 0.2 %
BILIRUB SERPL-MCNC: 1.4 MG/DL
BUN SERPL-MCNC: 8 MG/DL
CALCIUM SERPL-MCNC: 9 MG/DL
CHLORIDE SERPL-SCNC: 106 MMOL/L
CHOLEST SERPL-MCNC: 152 MG/DL
CHOLEST/HDLC SERPL: 2.7 {RATIO}
CO2 SERPL-SCNC: 26 MMOL/L
CREAT SERPL-MCNC: 0.7 MG/DL
DIFFERENTIAL METHOD: NORMAL
EOSINOPHIL # BLD AUTO: 0.1 K/UL
EOSINOPHIL NFR BLD: 2.8 %
ERYTHROCYTE [DISTWIDTH] IN BLOOD BY AUTOMATED COUNT: 12.4 %
EST. GFR  (AFRICAN AMERICAN): >60 ML/MIN/1.73 M^2
EST. GFR  (NON AFRICAN AMERICAN): >60 ML/MIN/1.73 M^2
GLUCOSE SERPL-MCNC: 76 MG/DL
HCT VFR BLD AUTO: 40.8 %
HDLC SERPL-MCNC: 56 MG/DL
HDLC SERPL: 36.8 %
HGB BLD-MCNC: 13.4 G/DL
LDLC SERPL CALC-MCNC: 85.8 MG/DL
LYMPHOCYTES # BLD AUTO: 1.8 K/UL
LYMPHOCYTES NFR BLD: 38.3 %
MCH RBC QN AUTO: 29.5 PG
MCHC RBC AUTO-ENTMCNC: 32.8 G/DL
MCV RBC AUTO: 90 FL
MONOCYTES # BLD AUTO: 0.6 K/UL
MONOCYTES NFR BLD: 13.9 %
NEUTROPHILS # BLD AUTO: 2 K/UL
NEUTROPHILS NFR BLD: 44.6 %
NONHDLC SERPL-MCNC: 96 MG/DL
PLATELET # BLD AUTO: 345 K/UL
PMV BLD AUTO: 9.4 FL
POTASSIUM SERPL-SCNC: 4.1 MMOL/L
PROT SERPL-MCNC: 7.1 G/DL
RBC # BLD AUTO: 4.54 M/UL
SODIUM SERPL-SCNC: 138 MMOL/L
TRIGL SERPL-MCNC: 51 MG/DL
TSH SERPL DL<=0.005 MIU/L-ACNC: 1.3 UIU/ML
WBC # BLD AUTO: 4.59 K/UL

## 2017-11-17 PROCEDURE — 84443 ASSAY THYROID STIM HORMONE: CPT

## 2017-11-17 PROCEDURE — 36415 COLL VENOUS BLD VENIPUNCTURE: CPT

## 2017-11-17 PROCEDURE — 85025 COMPLETE CBC W/AUTO DIFF WBC: CPT

## 2017-11-17 PROCEDURE — 80053 COMPREHEN METABOLIC PANEL: CPT

## 2017-11-17 PROCEDURE — 80061 LIPID PANEL: CPT

## 2017-12-06 ENCOUNTER — PATIENT MESSAGE (OUTPATIENT)
Dept: REHABILITATION | Facility: HOSPITAL | Age: 32
End: 2017-12-06

## 2017-12-08 ENCOUNTER — PATIENT MESSAGE (OUTPATIENT)
Dept: REHABILITATION | Facility: HOSPITAL | Age: 32
End: 2017-12-08

## 2018-03-11 DIAGNOSIS — Z30.011 ENCOUNTER FOR INITIAL PRESCRIPTION OF CONTRACEPTIVE PILLS: ICD-10-CM

## 2018-03-12 RX ORDER — ACETAMINOPHEN AND CODEINE PHOSPHATE 120; 12 MG/5ML; MG/5ML
SOLUTION ORAL
Qty: 28 TABLET | Refills: 11 | Status: SHIPPED | OUTPATIENT
Start: 2018-03-12 | End: 2018-09-05

## 2018-03-14 ENCOUNTER — OFFICE VISIT (OUTPATIENT)
Dept: OBSTETRICS AND GYNECOLOGY | Facility: CLINIC | Age: 33
End: 2018-03-14
Payer: COMMERCIAL

## 2018-03-14 VITALS
BODY MASS INDEX: 21.18 KG/M2 | HEIGHT: 70 IN | SYSTOLIC BLOOD PRESSURE: 128 MMHG | DIASTOLIC BLOOD PRESSURE: 84 MMHG | WEIGHT: 147.94 LBS

## 2018-03-14 DIAGNOSIS — Z11.51 SCREENING FOR HPV (HUMAN PAPILLOMAVIRUS): ICD-10-CM

## 2018-03-14 DIAGNOSIS — Z30.9 ENCOUNTER FOR CONTRACEPTIVE MANAGEMENT, UNSPECIFIED TYPE: ICD-10-CM

## 2018-03-14 DIAGNOSIS — Z01.419 WOMEN'S ANNUAL ROUTINE GYNECOLOGICAL EXAMINATION: Primary | ICD-10-CM

## 2018-03-14 DIAGNOSIS — Z12.4 ENCOUNTER FOR PAPANICOLAOU SMEAR FOR CERVICAL CANCER SCREENING: ICD-10-CM

## 2018-03-14 PROCEDURE — 88175 CYTOPATH C/V AUTO FLUID REDO: CPT

## 2018-03-14 PROCEDURE — 99395 PREV VISIT EST AGE 18-39: CPT | Mod: S$GLB,,, | Performed by: NURSE PRACTITIONER

## 2018-03-14 PROCEDURE — 99999 PR PBB SHADOW E&M-EST. PATIENT-LVL III: CPT | Mod: PBBFAC,,, | Performed by: NURSE PRACTITIONER

## 2018-03-14 PROCEDURE — 87624 HPV HI-RISK TYP POOLED RSLT: CPT

## 2018-03-14 RX ORDER — ACETAMINOPHEN AND CODEINE PHOSPHATE 120; 12 MG/5ML; MG/5ML
1 SOLUTION ORAL DAILY
Qty: 84 TABLET | Refills: 3 | Status: SHIPPED | OUTPATIENT
Start: 2018-03-14 | End: 2018-09-05

## 2018-03-14 NOTE — PROGRESS NOTES
"CC: Annual  HPI: Pt is a 32 y.o.  female who presents for routine annual exam. She uses progesterone only pills for contraception. She does not want STD screening.  Denies any GYN complaints.  She is currently breastfeeding her 11 month old daughter "Narda."  Reports she had a breast lump in 10/2017- had US which was benign.    ROS:  GENERAL: Feeling well overall. Denies fever or chills.   SKIN: Denies rash or lesions.   HEAD: Denies head injury or headache.   NODES: Denies enlarged lymph nodes.   CHEST: Denies chest pain or shortness of breath.   CARDIOVASCULAR: Denies palpitations or left sided chest pain.   ABDOMEN: No abdominal pain, constipation, diarrhea, nausea, vomiting or rectal bleeding.   URINARY: No dysuria, hematuria, or burning on urination.  REPRODUCTIVE: See HPI.   BREASTS: Denies pain, lumps, or nipple discharge.   HEMATOLOGIC: No easy bruisability or excessive bleeding.   MUSCULOSKELETAL: Denies joint pain or swelling.   NEUROLOGIC: Denies syncope or weakness.   PSYCHIATRIC: Denies depression, anxiety or mood swings.    PE:   APPEARANCE: Well nourished, well developed, White female in no acute distress.  NODES: no cervical, supraclavicular, or inguinal lymphadenopathy  BREASTS: Symmetrical, no skin changes or visible lesions. No palpable masses, nipple discharge or adenopathy bilaterally.  ABDOMEN: Soft. No tenderness or masses. No distention. No hernias palpated. No CVA tenderness.  VULVA: No lesions. Normal external female genitalia.  URETHRAL MEATUS: Normal size and location, no lesions, no prolapse.  URETHRA: No masses, tenderness, or prolapse.  VAGINA: Moist. No lesions or lacerations noted. No abnormal discharge present. No odor present.   CERVIX: No lesions or discharge. No cervical motion tenderness.   UTERUS: Normal size, regular shape, mobile, non-tender.  ADNEXA: No tenderness. No fullness or masses palpated in the adnexal regions.   ANUS PERINEUM: Normal.      Diagnosis:  1. Women's " annual routine gynecological examination    2. Encounter for contraceptive management, unspecified type    3. Encounter for Papanicolaou smear for cervical cancer screening    4. Screening for HPV (human papillomavirus)        Plan:   Pap/ HPV  Micronor refilled    Orders Placed This Encounter    HPV High Risk Genotypes, PCR    Liquid-based pap smear, screening    norethindrone (ORTHO MICRONOR) 0.35 mg tablet       Patient was counseled today on the new ACS guidelines for cervical cytology screening as well as the current recommendations for breast cancer screening. She was counseled to follow up with her PCP for other routine health maintenance. Counseling session lasted approximately 10 minutes, and all her questions were answered.    Follow-up with me in 1 year for routine exam    Mirella Ambrose, LILLIAM-C

## 2018-03-16 LAB
HPV16 AG SPEC QL: NEGATIVE
HPV16+18+H RISK 12 DNA CVX-IMP: NEGATIVE
HPV18 DNA SPEC QL NAA+PROBE: NEGATIVE

## 2018-04-04 ENCOUNTER — DOCUMENTATION ONLY (OUTPATIENT)
Dept: REHABILITATION | Facility: HOSPITAL | Age: 33
End: 2018-04-04

## 2018-04-04 NOTE — PROGRESS NOTES
4/4/2018    Pt has not returned for PT visits since 11/16/2017 and will be discharged with goal status unknown.  This represents an unanticipated discharge with no further G codes dropped.

## 2018-06-04 ENCOUNTER — PATIENT MESSAGE (OUTPATIENT)
Dept: OPTOMETRY | Facility: CLINIC | Age: 33
End: 2018-06-04

## 2018-06-09 ENCOUNTER — OFFICE VISIT (OUTPATIENT)
Dept: OPTOMETRY | Facility: CLINIC | Age: 33
End: 2018-06-09
Payer: COMMERCIAL

## 2018-06-09 DIAGNOSIS — H00.024 HORDEOLUM INTERNUM OF LEFT UPPER EYELID: Primary | ICD-10-CM

## 2018-06-09 PROCEDURE — 99999 PR PBB SHADOW E&M-EST. PATIENT-LVL II: CPT | Mod: PBBFAC,,, | Performed by: OPTOMETRIST

## 2018-06-09 PROCEDURE — 92014 COMPRE OPH EXAM EST PT 1/>: CPT | Mod: S$GLB,,, | Performed by: OPTOMETRIST

## 2018-06-09 RX ORDER — MINOCYCLINE HYDROCHLORIDE 75 MG/1
75 CAPSULE ORAL 2 TIMES DAILY
Qty: 28 CAPSULE | Refills: 0 | Status: SHIPPED | OUTPATIENT
Start: 2018-06-09 | End: 2018-06-23

## 2018-06-11 NOTE — PROGRESS NOTES
HPI     DLS  02/15/17 by Dr RETA Caputo, OD    31 Y/O F presents today with lesion'/chalazion JESSENIA x 2 mo at the same   size. Pt has used warm compresses and ointments, but lesion is still   present. NO pain to touch. stj     Eye Meds: OTC ointment     Last edited by Nava Ruth MA on 6/9/2018 10:27 AM. (History)            Assessment /Plan     For exam results, see Encounter Report.    Hordeolum internum of left upper eyelid  -     minocycline (MINOCIN,DYNACIN) 75 MG capsule; Take 1 capsule (75 mg total) by mouth 2 (two) times daily.  Dispense: 28 capsule; Refill: 0  -Doxycycline BID PO. Reviewed sunlight sensitivity, impact on BCP, Taking with meal  -2 wk follow up full exam Harshal  -may need Chalazion referral if no improvement  -discussed DT1 as better Dry eye contact lens      RTC 2 wks

## 2018-06-21 ENCOUNTER — TELEPHONE (OUTPATIENT)
Dept: OPTOMETRY | Facility: CLINIC | Age: 33
End: 2018-06-21

## 2018-06-21 NOTE — TELEPHONE ENCOUNTER
EYEMED BENEFITS as of 6/22/18:    Member Name: MURPHY MARTINEZ  Member ID: [id number]  Social Security Number: [id number]  YOB: 1985  Address: [address] [place] [id number]  Phone Number: [phone]  Gender: Female  Responsible Member: SHERYL MARTINEZ    Network: [id number]  Group: [employer] Plan ([id number])  Benefit Level: 1  Service Eligibility  If you have more than one location under your User ID, choose a location then use the drop-down menu to choose the provider who is rendering services.     From the tabs below, select the type of benefit you will be providing, then check the box(es) next to the applicable service(s). You will not receive an authorization for this member. Instead, simply click Submit Claim to start the claim process.    Routine refers to routine vision benefits, including eye exams, lenses, frames and contact lenses.   Medical refers to benefits for medical eye care services, including diabetic eye care.   Additional Purchases will calculate member payments on additional pairs of glasses and other materials members receive discounts on so you can determine member out-of-pocket costs.  To learn more, download our Member Benefits Display Job Aid.          Location [facility], [address] ([id number]) (Change)  Provider   Date of Service: 06/22/2018  Routine Medical Additional Purchase   is Eligible? Member Eligible As Of   Exam Yes 01/01/2018   Lenses Yes 01/01/2018   Frames Yes 01/01/2018   Contact Lenses Yes 01/01/2018   Contact Lens Fit & Follow-up Yes 01/01/2018  You will not get an authorization for this member; simply click Submit Claim to start the claim process.         Where did my authorization button go? Click here    Where is the reimbursement button? Click here    Click here to learn more about migrated members.          This information is based on data available in our system today and does not take into account future changes to  the member's plan. Please verify eligibility immediately prior to receiving services.    Service Restrictions  Plan allows the member to receive either contacts and frame, or frame and lens services  Related Members  Below are other members covered under the same subscriber ID. Select the member name to start a claim.    Member Name Group Name Member ID SS#   MURPHY MARTINEZ [employer] Plan [id number]  SHERYL MARTINEZ [employer] Plan [id number] [id number]  Showing 1 to 2 of 2 family members  Member Benefits  Select the link below to view benefit details for this member. (Your browser must be java script-enabled to use this function.)    Note: you will not receive an authorization for this member. To complete the claim later, select Claims from the navigation window.     Hide Benefits    Routine In Network  Exam Services   Exam with Dilation as Necessary $10 Copay  Retinal Imaging Up to $39  Contact Lens Fit and Follow-Up   Fit and Follow-up - Standard Up to $40  Fit and Follow-up - Premium 10% off Retail Price  Frames   Frame $0 Copay; 20% off balance over $130 Allowance  Lenses   Single Vision $15 Copay  Bifocal $15 Copay  Trifocal $15 Copay  Lenticular $15 Copay  Progressive - Standard $30 Copay  Progressive - Premium $55 Copay  Lens Options   Anti Reflective Coating - Standard $45  Anti Reflective Coating - Premium Tier 1 $57  Anti Reflective Coating - Premium Tier 2 $68  Anti Reflective Coating - Premium Tier 3 20% off Retail Price  Photochromic - Plastic $75  Polycarbonate - Standard - age 19 and over $10 Copay  Polycarbonate - Standard - under age 19 $0 Copay  Scratch Coating - Standard Plastic $10 Copay  Tint - Solid or Gradient $15  UV Treatment $10 Copay  All Other Lens Options 20% off Retail Price  Contact Lenses   Contacts - Conventional $0 Copay; 15% off balance over $130 Allowance  Contacts - Disposable $0 Copay; 100% of balance over $130 Allowance  Contacts - Medically  Necessary $0 Copay  Medical In Network  Medical Office Visit and Services   Office Visit - Medical Follow Up Exam $0 Copay  Retinal Imaging $0 Copay  Extended Ophthalmoscopy $0 Copay  Gonioscopy $0 Copay  Scanning Laser Imaging Posterior Segment $0 Copay  Additional Purchase In Network  Frames   Frame 20% off Retail Price  Lenses   Lenses 20% off Retail Price  Lens Options   Lens Options 20% off Retail Price  Packages   Frame, Lens and Lens Options Purchased as Complete Pair 40% off Retail Price  Contact Lenses   Contacts - Conventional 15% off Retail Price

## 2018-06-22 ENCOUNTER — OFFICE VISIT (OUTPATIENT)
Dept: OPTOMETRY | Facility: CLINIC | Age: 33
End: 2018-06-22

## 2018-06-22 ENCOUNTER — OFFICE VISIT (OUTPATIENT)
Dept: OPTOMETRY | Facility: CLINIC | Age: 33
End: 2018-06-22
Payer: COMMERCIAL

## 2018-06-22 DIAGNOSIS — H00.14 CHALAZION OF LEFT UPPER EYELID: ICD-10-CM

## 2018-06-22 DIAGNOSIS — H52.13 MYOPIA, BILATERAL: Primary | ICD-10-CM

## 2018-06-22 DIAGNOSIS — Z46.0 FITTING AND ADJUSTMENT OF SPECTACLES AND CONTACT LENSES: ICD-10-CM

## 2018-06-22 DIAGNOSIS — Z46.0 FITTING AND ADJUSTMENT OF SPECTACLES AND CONTACT LENSES: Primary | ICD-10-CM

## 2018-06-22 PROCEDURE — 92015 DETERMINE REFRACTIVE STATE: CPT | Mod: S$GLB,,, | Performed by: OPTOMETRIST

## 2018-06-22 PROCEDURE — 92310 CONTACT LENS FITTING OU: CPT | Mod: ,,, | Performed by: OPTOMETRIST

## 2018-06-22 PROCEDURE — 99999 PR PBB SHADOW E&M-EST. PATIENT-LVL II: CPT | Mod: PBBFAC,,, | Performed by: OPTOMETRIST

## 2018-06-22 PROCEDURE — 92014 COMPRE OPH EXAM EST PT 1/>: CPT | Mod: S$GLB,,, | Performed by: OPTOMETRIST

## 2018-06-22 NOTE — PROGRESS NOTES
HPI     32yr old female here for Annual Exam. Patient states she unable to see   clear when wearing CTL. Patient notes having a stye JESSENIA for 2 months. Pt   was treated for the stye, but it hasn't resolved.     Last edited by Tl Lagunas on 6/22/2018  9:13 AM. (History)            Assessment /Plan     For exam results, see Encounter Report.    Myopia, bilateral   Rx specs    Fitting and adjustment of spectacles and contact lenses   Order trials of oasys and my day toric trials for pt to try   Call pt to  when trials arrive   Ok to order if happy   Return for adjustments if needed    Chalazion of left upper eyelid   No improvement with minocyline   Scheduled appt for excision      Good overall ocular health, monitor yearly with DFE

## 2018-07-03 ENCOUNTER — PATIENT MESSAGE (OUTPATIENT)
Dept: OPTOMETRY | Facility: CLINIC | Age: 33
End: 2018-07-03

## 2018-07-13 ENCOUNTER — PATIENT MESSAGE (OUTPATIENT)
Dept: OPTOMETRY | Facility: CLINIC | Age: 33
End: 2018-07-13

## 2018-07-16 ENCOUNTER — PROCEDURE VISIT (OUTPATIENT)
Dept: OPHTHALMOLOGY | Facility: CLINIC | Age: 33
End: 2018-07-16
Payer: COMMERCIAL

## 2018-07-16 VITALS — SYSTOLIC BLOOD PRESSURE: 118 MMHG | HEART RATE: 82 BPM | DIASTOLIC BLOOD PRESSURE: 79 MMHG

## 2018-07-16 DIAGNOSIS — H00.14 CHALAZION OF LEFT UPPER EYELID: Primary | ICD-10-CM

## 2018-07-16 PROCEDURE — 99499 UNLISTED E&M SERVICE: CPT | Mod: S$GLB,,, | Performed by: OPHTHALMOLOGY

## 2018-07-16 PROCEDURE — 67800 REMOVE EYELID LESION: CPT | Mod: E1,S$GLB,, | Performed by: OPHTHALMOLOGY

## 2018-07-16 NOTE — PROGRESS NOTES
Subjective:       Patient ID: Catie Monge is a 33 y.o. female.    Chief Complaint: Chalazion    HPI     33 y.o. Female is here for Chalazion excision JESSENIA. States that Chalazion   is uncomfortable when applying pressure. Chalazion burst on 6/30/2018.   Still feel slight discomfort. No itching, burning or tearing. No   discharge. Had Chalazion since April 2018. Use warm compresses in the   past. Also did 2 weeks of oral antibiotics per pt.     Meds: No gtt         Last edited by HAYDEE Murphy on 7/16/2018  3:13 PM. (History)             Assessment:       1. Chalazion of left upper eyelid        Plan:       JESSENIA chalazion-Pt wants it removed today.        JESSENIA chalazion excision today.  Maxitrol anton bid-tid OS x 5 days.  RTC prn.      Procedure note: 2 Xylocaine injected into JESSENIA. Betadine prep to JESSENIA & LLL. Chalazion clamp placed on JESSENIA & lid was everted. Vertical incision made into palpebral conj of JESSENIA. Curette & Q-tips were used to remove lesion. Cautery used for hemostasis. Clamp removed & Maxitrol anton applied OS. Pt tolerated procedure well.

## 2018-09-03 ENCOUNTER — TELEPHONE (OUTPATIENT)
Dept: OBSTETRICS AND GYNECOLOGY | Facility: HOSPITAL | Age: 33
End: 2018-09-03

## 2018-09-03 DIAGNOSIS — N91.2 AMENORRHEA: Primary | ICD-10-CM

## 2018-09-03 NOTE — TELEPHONE ENCOUNTER
Pt calls L&D with report of spotting.    LMP: 7/11/18 with EGA 7w 5d   Reports noted pink, scant spotting last night when wiping following void.  Bleeding has slightly increased today - now red, but still very light.  Noted some on underwear, but has not needed to use pad or panty liner.  Reports occasional, mild cramping, but no unilateral pain, no nausea or vomiting. No intercourse or rigorous exercise in >48hrs. Reviewed last labs: Blood type: A+. No additional concerns.  Discussed commonness of spotting in first trimester and potential causes - both benign vs serious.  Strict bleeding/pain precautions given, along with warning s/s to call or come to ED with.      Pt has appt scheduled for Wednesday (2d), but will call/come in if bleeding increases or additional s/s develop.

## 2018-09-05 ENCOUNTER — OFFICE VISIT (OUTPATIENT)
Dept: OBSTETRICS AND GYNECOLOGY | Facility: CLINIC | Age: 33
End: 2018-09-05
Payer: COMMERCIAL

## 2018-09-05 ENCOUNTER — HOSPITAL ENCOUNTER (OUTPATIENT)
Dept: RADIOLOGY | Facility: OTHER | Age: 33
Discharge: HOME | End: 2018-09-05
Attending: ADVANCED PRACTICE MIDWIFE
Payer: COMMERCIAL

## 2018-09-05 ENCOUNTER — TELEPHONE (OUTPATIENT)
Dept: OBSTETRICS AND GYNECOLOGY | Facility: CLINIC | Age: 33
End: 2018-09-05

## 2018-09-05 VITALS
WEIGHT: 152.13 LBS | HEIGHT: 70 IN | DIASTOLIC BLOOD PRESSURE: 74 MMHG | BODY MASS INDEX: 21.78 KG/M2 | SYSTOLIC BLOOD PRESSURE: 126 MMHG

## 2018-09-05 DIAGNOSIS — N91.2 AMENORRHEA: ICD-10-CM

## 2018-09-05 DIAGNOSIS — O26.851 SPOTTING AFFECTING PREGNANCY IN FIRST TRIMESTER: ICD-10-CM

## 2018-09-05 DIAGNOSIS — Z32.01 POSITIVE PREGNANCY TEST: ICD-10-CM

## 2018-09-05 DIAGNOSIS — O26.859 SPOTTING IN EARLY PREGNANCY: ICD-10-CM

## 2018-09-05 DIAGNOSIS — Z86.79 HISTORY OF POSTPARTUM HYPERTENSION: ICD-10-CM

## 2018-09-05 DIAGNOSIS — Z32.00 POSSIBLE PREGNANCY: ICD-10-CM

## 2018-09-05 DIAGNOSIS — Z87.59 HISTORY OF POSTPARTUM HYPERTENSION: ICD-10-CM

## 2018-09-05 DIAGNOSIS — O20.0 THREATENED MISCARRIAGE: ICD-10-CM

## 2018-09-05 DIAGNOSIS — O26.859 SPOTTING IN EARLY PREGNANCY: Primary | ICD-10-CM

## 2018-09-05 PROCEDURE — 99999 PR PBB SHADOW E&M-EST. PATIENT-LVL III: CPT | Mod: PBBFAC,,, | Performed by: ADVANCED PRACTICE MIDWIFE

## 2018-09-05 PROCEDURE — 76817 TRANSVAGINAL US OBSTETRIC: CPT | Mod: 26,,, | Performed by: RADIOLOGY

## 2018-09-05 PROCEDURE — 99214 OFFICE O/P EST MOD 30 MIN: CPT | Mod: S$GLB,,, | Performed by: ADVANCED PRACTICE MIDWIFE

## 2018-09-05 PROCEDURE — 76801 OB US < 14 WKS SINGLE FETUS: CPT | Mod: TC

## 2018-09-05 PROCEDURE — 87491 CHLMYD TRACH DNA AMP PROBE: CPT

## 2018-09-05 PROCEDURE — 76801 OB US < 14 WKS SINGLE FETUS: CPT | Mod: 26,,, | Performed by: RADIOLOGY

## 2018-09-05 NOTE — TELEPHONE ENCOUNTER
Spoke with Catie jacob: SAB and HCG  Reviewed warning signs  She still would prefer to RTC on Monday   Encouraged her to present soon prn      Bria Bravo CNM/NP  Certified Nurse Midwife/Nurse Practitioner  9/5/2018 4:02 PM

## 2018-09-05 NOTE — PROGRESS NOTES
Catie Monge is a 33 y.o. , presents today for amenorrhea.      C/C: amenorrhea  She has not seen any other provider for this pregnancy    HPI: Reports amenorrhea since 18. Still breastfeeding at night. Only had one cycle since last delivery. Certain of date. Also experiencing spotting this past week and is anxious about it naturally. Not taking mini pill any longer - stopped before .     SOCIAL HISTORY: Denies emotional/mental/physical/sexual violence or abuse. Feels safe at home. Accompanied today by her sister. Still in relationship with , Rishabh. He is attending at [employer]. 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     PAP HISTORY: last pap 3/18, result negative with negative HPV, denies hx of STDs.     Reports long-term chronic medical condition of anxiety however not taking any meds  Also experienced postpartum HTN after G1  requiring postpartum magnesium    Review of patient's allergies indicates:  No Known Allergies  Past Medical History:   Diagnosis Date    Allergy     Anxiety     No meds    Blood type A POS 3/20/2017    Fever blister     Oral cold sores only; no hx genital outbreak    Other and unspecified ovarian cysts     Postpartum hypertension     Requiring magnesium after delivery     Past Surgical History:   Procedure Laterality Date    NASAL SEPTUM SURGERY      OTHER SURGICAL HISTORY      anal fissure     TONSILLECTOMY, ADENOIDECTOMY      WISDOM TOOTH EXTRACTION       Past Surgical History:   Procedure Laterality Date    NASAL SEPTUM SURGERY      OTHER SURGICAL HISTORY      anal fissure     TONSILLECTOMY, ADENOIDECTOMY      WISDOM TOOTH EXTRACTION       OB History    Para Term  AB Living   1 1 1 0 0 1   SAB TAB Ectopic Multiple Live Births   0 0 0 0 1      # Outcome Date GA Lbr Roger/2nd Weight Sex Delivery Anes PTL Lv   1 Term 17 41w2d 14:20 / 01:09 3.38 kg (7 lb 7.2 oz) F  Vag-Spont EPI N OSIRIS      Birth Comments: postpartum preE with postpartum magnesium        OB History      Para Term  AB Living    1 1 1 0 0 1    SAB TAB Ectopic Multiple Live Births    0 0 0 0 1        Social History     Socioeconomic History    Marital status:      Spouse name: Rishabh    Number of children: 1    Years of education: Not on file    Highest education level: Not on file   Social Needs    Financial resource strain: Not hard at all    Food insecurity - worry: Never true    Food insecurity - inability: Never true    Transportation needs - medical: No    Transportation needs - non-medical: No   Occupational History    Occupation: [employer]   Tobacco Use    Smoking status: Never Smoker    Smokeless tobacco: Never Used   Substance and Sexual Activity    Alcohol use: Yes     Alcohol/week: 0.6 oz     Types: 1 Glasses of wine per week     Comment: Social - not with pregnancy    Drug use: No    Sexual activity: Yes     Partners: Male     Birth control/protection: None   Other Topics Concern    Are you pregnant or think you may be? 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     Family History   Problem Relation Age of Onset    Glaucoma Father     Hypertension Father     Rheum arthritis Father     Melanoma Paternal Grandfather     Kyphosis Brother     Bipolar disorder Brother     Anxiety disorder Brother     Diabetes Mellitus Maternal Grandfather     Breast cancer Neg Hx     Cancer Neg Hx     Ovarian cancer Neg Hx     Colon cancer Neg Hx     Macular degeneration Neg Hx     Retinal detachment Neg Hx     Amblyopia Neg Hx     Blindness Neg Hx     Cataracts Neg Hx     Strabismus Neg Hx      Social History     Substance and Sexual Activity   Sexual Activity Yes    Partners: Male    Birth control/protection: None       GENETIC  SCREENING   Patient's age 35 years or older as of estimated date of delivery? no  Neural tube defect (meningomyelocele, spina bifida, or anencephaly)? no  Down syndrome? no  Hank-Sachs (Ashkenazi Christianity, Cajun, Panamanian Surinamese)? no  Canavan disease (Ashkenazi Christianity)? no  Familial dysautonomia (Ashkenazi Christianity)? no  Sickle cell disease or trait ()? no  Hemophilia or other blood disorders? no  Cystic fibrosis? no  Muscular dystrophy? no  Dion's chorea? no  Thalassemia (Italian, Greek, Mediterranean, or  background) MCV less than 80? no  Congenital heart defect? no  Mental retardation/autism? no   If Yes, was person tested for Fragile X? n/a  Other inherited genetic or chromosomal disorder? no  Maternal metabolic disorder (e.g. type 1 diabetes, PKU)? no  Patient or baby's father had a child with birth defects not listed above? no  Recurrent pregnancy loss or a stillbirth: n/a  Medications (including supplements, vitamins, herbs or OTC drugs)/illicit/recreational drugs/alcohol since last menstrual period? no   If yes, agent(s) and strength/dose: n/a  List any other genetic risks: no  Comments/counseling: n/a    INFECTION HISTORY  Live with someone with TB or exposed to TB: no  Patient or partner has history of genital herpes: no  Rash or viral illness since last menstrual period: no  Patient or partner has hepatitis B or C: no  History of STD, gonorrhea, chlamydia, HPV, HIV, syphilis (list all that apply): no  List other infections: no  Additional comments: n/a    June Christianson MD     ROS:    Constitutional/Gen: Denies fevers, chills, malaise, or weight loss. + fatigue   Psych: Denies depression, anxiety  Eyes: Denies changes in vision or scotomata  Ears, nose, mouth, throat: Denies sinus tenderness, swelling, or dentition problems  CV/vasc: Denies heart palpitations or edema  Resp: Denies SOB or dyspnea  Breasts: Denies mass, nipple discharge, or trauma. + breast tenderness.  GI: Denies  "constipation, diarrhea, or vomiting. + nausea.  : Denies vaginal discharge, dysuria or pelvic pain.  + spotting  MS: Denies weakness, soreness, or changes in ROM    OBJECTIVE:  /74   Ht 5' 10" (1.778 m)   Wt 69 kg (152 lb 1.9 oz)   BMI 21.83 kg/m²   Constitutional/Gen: NAD, appears stated age, well groomed  Neurologic: A&O x 4, non-focal, cranial nerves 2-12 grossly intact  Psych: affect appropriate and without signs of mood, thought or memory difficulty appreciated    UPT + in office    TVUS today:  FINDINGS:  Intrauterine gestation(s): Single    Mean gestational sac diameter: 3.3 cm    Yolk sac: Not identified    Crown-rump length (CRL): 1.5 cm    Cardiac activity: None detected    Subchorionic hemorrhage: None.    Right ovary: Not identified.    Left ovary: Normal.    Miscellaneous: No Free Fluid.      Impression       There is an intrauterine gestational sac with a 1.5 cm fetal pole without cardiac activity, which is diagnostic for a failed early pregnancy.     HCG today 673    ASSESSMENT:  33 y.o. female  with amenorrhea, 8w0d by LMP with vaginal bleeding  Inevitable miscarriage  Rh POS  Body mass index is 21.83 kg/m².  Patient Active Problem List   Diagnosis    Pelvic floor dysfunction    Breast feeding status of mother    Chalazion of left upper eyelid    History of postpartum hypertension requiring magnesium    Amenorrhea    Spotting affecting pregnancy       PLAN:  1. Inevitable SAB  Reviewed criteria for diagnosis of SAB  [place] Journal Medicine 2014  Viable - pregnancy can potentially result in liveborn baby  Nonviable - pregnancy cannot possibly result in liveborn baby. Ectopic and failed intrauterine.   Intrauterine pregnancy of uncertain viability - TVUS shows intrauterine gestational sac with no embryonic heartbeat and no findings of definite pregnancy failure.  Pregnancy of unknown location - + UPT or + serum and no intrauterine or ectopic pregnancy seen on TVUS  HCG - + " threshold of 5    ABUS - dx pregnancy failure if no TVUS with embryo whose CRL is > 15mm with no cardiac activity    DX of pregnancy failure  --TVUS CRL > 7mm and no cardiac activity  --MSD (3 measurements - 2 sagittal, 1 coronal) > 25mm and no embryo  --Absence of embryo with heartbeat >2wks after scan that showed gestational sac without yolk sac  --Absence of embryo with heartbeat >11d after a scan that showed a gestational sac with a yolk sac    --Reviewed risks/benefits of expectant management vs. pharmacological management at this gestational age. Pt prefers to wait and talk to Rishabh and then we will chat about what she'd like to do tomorrow.  --Reviewed that even with expectant or pharm management, there is still possibility she would require a D&C.  --Reviewed warning signs and reasons to call including fever, aches, chills, malaise, bleeding > 1 heavy pad/hour, or symptoms of depression/anxiety.  --Advised to avoid sexual intercourse for minimum of 2 weeks.   --Reviewed warning signs, reasons to call immediately/seek immediate medical evaluation including heavy bleeding, fever, chills, malaise, or emotional concerns (SI/HI, depression/anxiety).   --Reviewed follow up care to include: TVUS, HCG levels.  --RTC Monday at latest        Updated Medication List:  Current Outpatient Medications   Medication Sig Dispense Refill    cetirizine (ZYRTEC) 10 MG tablet Take 10 mg by mouth every evening.      PNV95/FERROUS FUMARATE/FA (PRENATAL ORAL) Take by mouth.       No current facility-administered medications for this visit.          Bria Bravo CNM/NP  9/5/2018 8:42 AM

## 2018-09-06 ENCOUNTER — TELEPHONE (OUTPATIENT)
Dept: OBSTETRICS AND GYNECOLOGY | Facility: CLINIC | Age: 33
End: 2018-09-06

## 2018-09-06 LAB
C TRACH DNA SPEC QL NAA+PROBE: NOT DETECTED
N GONORRHOEA DNA SPEC QL NAA+PROBE: NOT DETECTED

## 2018-09-06 RX ORDER — MISOPROSTOL 200 UG/1
800 TABLET ORAL EVERY 12 HOURS
Qty: 8 TABLET | Refills: 0 | Status: SHIPPED | OUTPATIENT
Start: 2018-09-06 | End: 2018-09-12

## 2018-09-06 NOTE — TELEPHONE ENCOUNTER
Spoke with Catie this morning  She would like to proceed with cytotec this weekend as [place] will be home Saturday and Sunday for sure    Rx provided    Reviewed instructions and warning signs again    She will RTC to see me Monday, call/present sooner prn      Bria Bravo CNM/NP  Certified Nurse Midwife/Nurse Practitioner  9/6/2018 9:00 AM

## 2018-09-10 ENCOUNTER — INITIAL PRENATAL (OUTPATIENT)
Dept: OBSTETRICS AND GYNECOLOGY | Facility: CLINIC | Age: 33
End: 2018-09-10
Payer: COMMERCIAL

## 2018-09-10 ENCOUNTER — HOSPITAL ENCOUNTER (OUTPATIENT)
Dept: RADIOLOGY | Facility: OTHER | Age: 33
Discharge: HOME | End: 2018-09-10
Attending: ADVANCED PRACTICE MIDWIFE
Payer: COMMERCIAL

## 2018-09-10 DIAGNOSIS — O03.9 MISCARRIAGE: ICD-10-CM

## 2018-09-10 DIAGNOSIS — O20.0 THREATENED MISCARRIAGE: Primary | ICD-10-CM

## 2018-09-10 DIAGNOSIS — O20.0 THREATENED MISCARRIAGE: ICD-10-CM

## 2018-09-10 PROCEDURE — 99999 PR PBB SHADOW E&M-EST. PATIENT-LVL I: CPT | Mod: PBBFAC,,, | Performed by: ADVANCED PRACTICE MIDWIFE

## 2018-09-10 PROCEDURE — 99212 OFFICE O/P EST SF 10 MIN: CPT | Mod: S$GLB,,, | Performed by: ADVANCED PRACTICE MIDWIFE

## 2018-09-11 ENCOUNTER — TELEPHONE (OUTPATIENT)
Dept: OBSTETRICS AND GYNECOLOGY | Facility: CLINIC | Age: 33
End: 2018-09-11

## 2018-09-11 ENCOUNTER — HOSPITAL ENCOUNTER (OUTPATIENT)
Dept: RADIOLOGY | Facility: OTHER | Age: 33
Discharge: HOME | End: 2018-09-11
Attending: ADVANCED PRACTICE MIDWIFE
Payer: COMMERCIAL

## 2018-09-11 DIAGNOSIS — O02.1 MISSED ABORTION: Primary | ICD-10-CM

## 2018-09-11 DIAGNOSIS — O20.0 THREATENED MISCARRIAGE: ICD-10-CM

## 2018-09-11 PROBLEM — O03.9 MISCARRIAGE: Status: ACTIVE | Noted: 2018-09-11

## 2018-09-11 PROCEDURE — 76801 OB US < 14 WKS SINGLE FETUS: CPT | Mod: 26,,, | Performed by: RADIOLOGY

## 2018-09-11 PROCEDURE — 76801 OB US < 14 WKS SINGLE FETUS: CPT | Mod: TC

## 2018-09-11 PROCEDURE — 76817 TRANSVAGINAL US OBSTETRIC: CPT | Mod: 26,,, | Performed by: RADIOLOGY

## 2018-09-12 ENCOUNTER — OFFICE VISIT (OUTPATIENT)
Dept: OBSTETRICS AND GYNECOLOGY | Facility: CLINIC | Age: 33
End: 2018-09-12
Payer: COMMERCIAL

## 2018-09-12 ENCOUNTER — PATIENT MESSAGE (OUTPATIENT)
Dept: OBSTETRICS AND GYNECOLOGY | Facility: CLINIC | Age: 33
End: 2018-09-12

## 2018-09-12 DIAGNOSIS — O02.1 MISSED ABORTION: Primary | ICD-10-CM

## 2018-09-12 PROBLEM — N91.2 AMENORRHEA: Status: RESOLVED | Noted: 2018-09-05 | Resolved: 2018-09-12

## 2018-09-12 PROBLEM — O20.0 THREATENED MISCARRIAGE: Status: RESOLVED | Noted: 2018-09-05 | Resolved: 2018-09-12

## 2018-09-12 PROBLEM — O26.859 SPOTTING AFFECTING PREGNANCY: Status: RESOLVED | Noted: 2018-09-05 | Resolved: 2018-09-12

## 2018-09-12 PROBLEM — Z87.59 HISTORY OF POSTPARTUM HYPERTENSION: Status: RESOLVED | Noted: 2018-09-05 | Resolved: 2018-09-12

## 2018-09-12 PROBLEM — H00.14 CHALAZION OF LEFT UPPER EYELID: Status: RESOLVED | Noted: 2018-07-16 | Resolved: 2018-09-12

## 2018-09-12 PROBLEM — Z86.79 HISTORY OF POSTPARTUM HYPERTENSION: Status: RESOLVED | Noted: 2018-09-05 | Resolved: 2018-09-12

## 2018-09-12 PROCEDURE — 99499 UNLISTED E&M SERVICE: CPT | Mod: S$GLB,,, | Performed by: OBSTETRICS & GYNECOLOGY

## 2018-09-12 RX ORDER — LIDOCAINE HYDROCHLORIDE 10 MG/ML
1 INJECTION, SOLUTION EPIDURAL; INFILTRATION; INTRACAUDAL; PERINEURAL ONCE
Status: CANCELLED | OUTPATIENT
Start: 2018-09-12 | End: 2018-09-12

## 2018-09-12 RX ORDER — MISOPROSTOL 200 UG/1
TABLET ORAL
Qty: 3 TABLET | Refills: 0 | Status: ON HOLD | OUTPATIENT
Start: 2018-09-12 | End: 2018-09-13 | Stop reason: HOSPADM

## 2018-09-12 RX ORDER — SODIUM CHLORIDE, SODIUM LACTATE, POTASSIUM CHLORIDE, CALCIUM CHLORIDE 600; 310; 30; 20 MG/100ML; MG/100ML; MG/100ML; MG/100ML
INJECTION, SOLUTION INTRAVENOUS CONTINUOUS
Status: CANCELLED | OUTPATIENT
Start: 2018-09-12

## 2018-09-12 NOTE — PROGRESS NOTES
PT SEEN BY NARENW WITH MAB FAILED MEDICAL MANAGEMENT AND DESIRES D&C.  09/11/18 NOTE MD, NP  F/U s/p missed SAB s/p cytotec series over the weekend   C/C: Catie Monge presents for evaluation s/p missed SAB with s/p cytotec series over the weekend  Took 800mcg PV cytotec Friday night and again Saturday morning  Increased bleeding Friday night into Saturday but no increased cramping  Uncertain if she passed SAB or not    09/11/18 U/S  Intrauterine gestation(s): Single  Mean gestational sac diameter: 3.3 cm  Yolk sac: Not identified  Crown-rump length (CRL): 1.3 cm  Cardiac activity: Not detected  Subchorionic hemorrhage: 1.3 x 0.2 x 0.7 cm crescentic hypoechoic collection adjacent to the gestational sac.  Right ovary: Normal.  Left ovary: Normal.  Miscellaneous: No Free Fluid.      Impression     Intrauterine gestational sac with a 1.3 cm fetal pole, again without cardiac activity.  Crescentic hypoechoic collection adjacent to the gestational sac, likely a small subchorionic hemorrhage.     09/05/18 U/S  Intrauterine gestation(s): Single  Mean gestational sac diameter: 3.3 cm  Yolk sac: Not identified  Crown-rump length (CRL): 1.5 cm  Cardiac activity: None detected  Subchorionic hemorrhage: None.  Right ovary: Not identified.  Left ovary: Normal.  Miscellaneous: No Free Fluid.      Impression     There is an intrauterine gestational sac with a 1.5 cm fetal pole without cardiac activity, which is diagnostic for a failed early pregnancy.        9/5/2018  9/10/2018    hCG Quant  222   Group & Rh A POS    INDIRECT SHAD NEG        ROS:  GENERAL: No fever, chills, fatigability or weight loss.  VULVAR: No pain, no lesions and no itching.  VAGINAL: No relaxation, no itching, no discharge, no abnormal bleeding and no lesions.  ABDOMEN: No abdominal pain. Denies nausea. Denies vomiting. No diarrhea. No constipation  BREAST: Denies pain. No lumps. No discharge.  URINARY: No incontinence, no nocturia, no frequency and no  dysuria.  CARDIOVASCULAR: No chest pain. No shortness of breath. No leg cramps.  NEUROLOGICAL: No headaches. No vision changes.  The remainder of the review of systems was negative.    PE:  General Appearance: normal weight And Well developed. Well nourished. In no acute distress.  Vulva: Lesions: No.  Urethral Meatus: Normal size. Normal location. No lesions. No prolapse.  Urethra: No masses. No tenderness. No prolapse. No scarring.  Bladder: No masses. No tenderness.  Vagina: Mucosa NI:yes discharge no, atrophy no, cystocele no or rectocele no.  MIN OLD BLOOD  Cervix: Lesion: no  Stenotic: no Cervical motion tenderness: no  Uterus: Uterus size: 8 weeks. Support good. Uterus size: Normal  Adnexa: Masses: No Tenderness: No CDS Nodularity: No  Abdomen: normal weight No masses. No tenderness.  CHEST: CTA B  HEART: RRR  EXT: NO EDEMA    PROCEDURES:    PLAN:     DIAGNOSIS:  1. Missed         MEDICATIONS & ORDERS:  Orders Placed This Encounter    miSOPROStol (CYTOTEC) 200 MCG Tab     20 MIN D/W PT AND  ON MAB TX AND RISKS. WANTS D&C.    FOLLOW-UP: 2 WEEKS

## 2018-09-12 NOTE — PROGRESS NOTES
F/U s/p missed SAB s/p cytotec series over the weekend    C/C: Catie Monge presents for evaluation s/p missed SAB with s/p cytotec series over the weekend  Took 800mcg PV cytotec Friday night and again Saturday morning  Increased bleeding Friday night into Saturday but no increased cramping  Uncertain if she passed SAB or not    PMH:   Past Medical History:   Diagnosis Date    Allergy     Anxiety     No meds    Blood type A POS 3/20/2017    Fever blister     Oral cold sores only; no hx genital outbreak    Other and unspecified ovarian cysts 2003    Postpartum hypertension     Requiring magnesium after delivery     PSH:   Past Surgical History:   Procedure Laterality Date    NASAL SEPTUM SURGERY      OTHER SURGICAL HISTORY      anal fissure     TONSILLECTOMY, ADENOIDECTOMY      WISDOM TOOTH EXTRACTION       Meds:    Allergies:  Review of patient's allergies indicates:  No Known Allergies  FamH:   Family History   Problem Relation Age of Onset    Glaucoma Father     Hypertension Father     Rheum arthritis Father     Melanoma Paternal Grandfather     Kyphosis Brother     Bipolar disorder Brother     Anxiety disorder Brother     Diabetes Mellitus Maternal Grandfather     Breast cancer Neg Hx     Cancer Neg Hx     Ovarian cancer Neg Hx     Colon cancer Neg Hx     Macular degeneration Neg Hx     Retinal detachment Neg Hx     Amblyopia Neg Hx     Blindness Neg Hx     Cataracts Neg Hx     Strabismus Neg Hx      GynH: Last pap 3/18 negative  OBH:   OB History    Para Term  AB Living   1 1 1 0 0 1   SAB TAB Ectopic Multiple Live Births   0 0 0 0 1      # Outcome Date GA Lbr Roger/2nd Weight Sex Delivery Anes PTL Lv   1 Term 17 41w2d 14:20 / 01:09 3.38 kg (7 lb 7.2 oz) F Vag-Spont EPI N OSIRIS      Birth Comments: postpartum preE with postpartum magnesium        SocH: Unaccompanied today, long term relationship with  [place], feels safe at home, denies violence or  abuse, they have daughter Isabella together     ROS:    General: Denies fevers, chills, malaise, or weight loss.   Gastrointestinal: Denies constipation, diarrhea, or vomiting.   Genitourinary: Denies vaginal discharge, dysuria, or urinary frequency.  Psych: Denies depression    Labs  TVUS  FINDINGS:  Intrauterine gestation(s): Single    Mean gestational sac diameter: 3.3 cm    Yolk sac: Not identified    Crown-rump length (CRL): 1.3 cm    Cardiac activity: Not detected    Subchorionic hemorrhage: 1.3 x 0.2 x 0.7 cm crescentic hypoechoic collection adjacent to the gestational sac.    Right ovary: Normal.    Left ovary: Normal.    Miscellaneous: No Free Fluid.      Impression       Intrauterine gestational sac with a 1.3 cm fetal pole, again without cardiac activity.    Crescentic hypoechoic collection adjacent to the gestational sac, likely a small subchorionic hemorrhage.     HCG - 222; down from 673    Physical Exam:  General: appears stated age, well developed, well nourished, and in no acute distress  Musculoskeletal: normal, gait steady  Neurologic: grossly intact, non-focal    Assessment:  32yo  s/p unsuccessful cytotec series s/p missed SAB  Rh POS    Plan:  Discussed options for plan of care; Catie would like to proceed with D&C  Scheduled with Dr Landry tomorrow morning with likely D&C this Thursday or Friday   Warning signs reviewed including PPD/anxiety  Encouraged f/u     Updated Medication List:  Current Outpatient Medications   Medication Sig Dispense Refill    cetirizine (ZYRTEC) 10 MG tablet Take 10 mg by mouth every evening.      miSOPROStol (CYTOTEC) 200 MCG Tab Place 4 tablets (800 mcg total) vaginally every 12 (twelve) hours. for 2 doses 8 tablet 0    PNV95/FERROUS FUMARATE/FA (PRENATAL ORAL) Take by mouth.       No current facility-administered medications for this visit.          Bria Bravo CNM/NP  2018 7:19 PM

## 2018-09-13 ENCOUNTER — ANESTHESIA EVENT (OUTPATIENT)
Dept: SURGERY | Facility: OTHER | Age: 33
End: 2018-09-13
Payer: COMMERCIAL

## 2018-09-13 ENCOUNTER — ANESTHESIA (OUTPATIENT)
Dept: SURGERY | Facility: OTHER | Age: 33
End: 2018-09-13
Payer: COMMERCIAL

## 2018-09-13 ENCOUNTER — HOSPITAL ENCOUNTER (OUTPATIENT)
Facility: OTHER | Age: 33
Discharge: HOME | End: 2018-09-13
Attending: OBSTETRICS & GYNECOLOGY | Admitting: OBSTETRICS & GYNECOLOGY
Payer: COMMERCIAL

## 2018-09-13 VITALS
HEIGHT: 69 IN | DIASTOLIC BLOOD PRESSURE: 80 MMHG | SYSTOLIC BLOOD PRESSURE: 122 MMHG | BODY MASS INDEX: 21.92 KG/M2 | HEART RATE: 82 BPM | WEIGHT: 148 LBS | OXYGEN SATURATION: 100 % | TEMPERATURE: 98 F | RESPIRATION RATE: 16 BRPM

## 2018-09-13 DIAGNOSIS — Z98.890 S/P DILATION AND CURETTAGE: Primary | ICD-10-CM

## 2018-09-13 DIAGNOSIS — O02.1 MISSED ABORTION: ICD-10-CM

## 2018-09-13 PROCEDURE — 71000016 HC POSTOP RECOV ADDL HR: Performed by: OBSTETRICS & GYNECOLOGY

## 2018-09-13 PROCEDURE — 25000003 PHARM REV CODE 250: Performed by: NURSE ANESTHETIST, CERTIFIED REGISTERED

## 2018-09-13 PROCEDURE — 71000033 HC RECOVERY, INTIAL HOUR: Performed by: OBSTETRICS & GYNECOLOGY

## 2018-09-13 PROCEDURE — 88305 TISSUE EXAM BY PATHOLOGIST: CPT | Mod: 26,,, | Performed by: PATHOLOGY

## 2018-09-13 PROCEDURE — 25000003 PHARM REV CODE 250: Performed by: OBSTETRICS & GYNECOLOGY

## 2018-09-13 PROCEDURE — 88305 TISSUE EXAM BY PATHOLOGIST: CPT | Performed by: PATHOLOGY

## 2018-09-13 PROCEDURE — 36000704 HC OR TIME LEV I 1ST 15 MIN: Performed by: OBSTETRICS & GYNECOLOGY

## 2018-09-13 PROCEDURE — 37000009 HC ANESTHESIA EA ADD 15 MINS: Performed by: OBSTETRICS & GYNECOLOGY

## 2018-09-13 PROCEDURE — 63600175 PHARM REV CODE 636 W HCPCS: Performed by: ANESTHESIOLOGY

## 2018-09-13 PROCEDURE — 36000705 HC OR TIME LEV I EA ADD 15 MIN: Performed by: OBSTETRICS & GYNECOLOGY

## 2018-09-13 PROCEDURE — 25000003 PHARM REV CODE 250: Performed by: ANESTHESIOLOGY

## 2018-09-13 PROCEDURE — 37000008 HC ANESTHESIA 1ST 15 MINUTES: Performed by: OBSTETRICS & GYNECOLOGY

## 2018-09-13 PROCEDURE — 71000015 HC POSTOP RECOV 1ST HR: Performed by: OBSTETRICS & GYNECOLOGY

## 2018-09-13 PROCEDURE — 71000039 HC RECOVERY, EACH ADD'L HOUR: Performed by: OBSTETRICS & GYNECOLOGY

## 2018-09-13 PROCEDURE — 63600175 PHARM REV CODE 636 W HCPCS: Performed by: NURSE ANESTHETIST, CERTIFIED REGISTERED

## 2018-09-13 PROCEDURE — 59820 CARE OF MISCARRIAGE: CPT | Mod: ,,, | Performed by: OBSTETRICS & GYNECOLOGY

## 2018-09-13 RX ORDER — DOXYCYCLINE HYCLATE 100 MG
200 TABLET ORAL ONCE
Qty: 2 TABLET | Refills: 0 | Status: SHIPPED | OUTPATIENT
Start: 2018-09-13 | End: 2018-09-13

## 2018-09-13 RX ORDER — FENTANYL CITRATE 50 UG/ML
INJECTION, SOLUTION INTRAMUSCULAR; INTRAVENOUS
Status: DISCONTINUED | OUTPATIENT
Start: 2018-09-13 | End: 2018-09-13

## 2018-09-13 RX ORDER — HYDROMORPHONE HYDROCHLORIDE 2 MG/ML
0.4 INJECTION, SOLUTION INTRAMUSCULAR; INTRAVENOUS; SUBCUTANEOUS EVERY 5 MIN PRN
Status: DISCONTINUED | OUTPATIENT
Start: 2018-09-13 | End: 2018-09-13 | Stop reason: HOSPADM

## 2018-09-13 RX ORDER — OXYCODONE AND ACETAMINOPHEN 5; 325 MG/1; MG/1
1 TABLET ORAL EVERY 4 HOURS PRN
Qty: 5 TABLET | Refills: 0 | Status: SHIPPED | OUTPATIENT
Start: 2018-09-13 | End: 2019-01-21

## 2018-09-13 RX ORDER — MEPERIDINE HYDROCHLORIDE 50 MG/ML
12.5 INJECTION INTRAMUSCULAR; INTRAVENOUS; SUBCUTANEOUS ONCE AS NEEDED
Status: DISCONTINUED | OUTPATIENT
Start: 2018-09-13 | End: 2018-09-13 | Stop reason: HOSPADM

## 2018-09-13 RX ORDER — OXYCODONE HYDROCHLORIDE 5 MG/1
5 TABLET ORAL
Status: DISCONTINUED | OUTPATIENT
Start: 2018-09-13 | End: 2018-09-13 | Stop reason: HOSPADM

## 2018-09-13 RX ORDER — FENTANYL CITRATE 50 UG/ML
25 INJECTION, SOLUTION INTRAMUSCULAR; INTRAVENOUS EVERY 5 MIN PRN
Status: COMPLETED | OUTPATIENT
Start: 2018-09-13 | End: 2018-09-13

## 2018-09-13 RX ORDER — LIDOCAINE HYDROCHLORIDE 10 MG/ML
1 INJECTION, SOLUTION EPIDURAL; INFILTRATION; INTRACAUDAL; PERINEURAL ONCE
Status: DISCONTINUED | OUTPATIENT
Start: 2018-09-13 | End: 2018-09-13 | Stop reason: HOSPADM

## 2018-09-13 RX ORDER — LIDOCAINE HCL/PF 100 MG/5ML
SYRINGE (ML) INTRAVENOUS
Status: DISCONTINUED | OUTPATIENT
Start: 2018-09-13 | End: 2018-09-13

## 2018-09-13 RX ORDER — IBUPROFEN 600 MG/1
600 TABLET ORAL EVERY 6 HOURS PRN
Qty: 30 TABLET | Refills: 2 | Status: SHIPPED | OUTPATIENT
Start: 2018-09-13 | End: 2019-01-21

## 2018-09-13 RX ORDER — SODIUM CHLORIDE 0.9 % (FLUSH) 0.9 %
3 SYRINGE (ML) INJECTION
Status: DISCONTINUED | OUTPATIENT
Start: 2018-09-13 | End: 2018-09-13 | Stop reason: HOSPADM

## 2018-09-13 RX ORDER — MIDAZOLAM HYDROCHLORIDE 1 MG/ML
INJECTION INTRAMUSCULAR; INTRAVENOUS
Status: DISCONTINUED | OUTPATIENT
Start: 2018-09-13 | End: 2018-09-13

## 2018-09-13 RX ORDER — DEXAMETHASONE SODIUM PHOSPHATE 4 MG/ML
INJECTION, SOLUTION INTRA-ARTICULAR; INTRALESIONAL; INTRAMUSCULAR; INTRAVENOUS; SOFT TISSUE
Status: DISCONTINUED | OUTPATIENT
Start: 2018-09-13 | End: 2018-09-13

## 2018-09-13 RX ORDER — ONDANSETRON 2 MG/ML
4 INJECTION INTRAMUSCULAR; INTRAVENOUS DAILY PRN
Status: DISCONTINUED | OUTPATIENT
Start: 2018-09-13 | End: 2018-09-13 | Stop reason: HOSPADM

## 2018-09-13 RX ORDER — DOXYCYCLINE HYCLATE 100 MG
200 TABLET ORAL DAILY
Status: DISCONTINUED | OUTPATIENT
Start: 2018-09-13 | End: 2018-09-13 | Stop reason: HOSPADM

## 2018-09-13 RX ORDER — GLYCOPYRROLATE 0.2 MG/ML
INJECTION INTRAMUSCULAR; INTRAVENOUS
Status: DISCONTINUED | OUTPATIENT
Start: 2018-09-13 | End: 2018-09-13

## 2018-09-13 RX ORDER — DOXYCYCLINE HYCLATE 100 MG
200 TABLET ORAL EVERY 12 HOURS
Status: DISCONTINUED | OUTPATIENT
Start: 2018-09-13 | End: 2018-09-13

## 2018-09-13 RX ORDER — SODIUM CHLORIDE, SODIUM LACTATE, POTASSIUM CHLORIDE, CALCIUM CHLORIDE 600; 310; 30; 20 MG/100ML; MG/100ML; MG/100ML; MG/100ML
INJECTION, SOLUTION INTRAVENOUS CONTINUOUS
Status: DISCONTINUED | OUTPATIENT
Start: 2018-09-13 | End: 2018-09-13 | Stop reason: HOSPADM

## 2018-09-13 RX ORDER — ONDANSETRON 2 MG/ML
INJECTION INTRAMUSCULAR; INTRAVENOUS
Status: DISCONTINUED | OUTPATIENT
Start: 2018-09-13 | End: 2018-09-13

## 2018-09-13 RX ORDER — PROPOFOL 10 MG/ML
VIAL (ML) INTRAVENOUS
Status: DISCONTINUED | OUTPATIENT
Start: 2018-09-13 | End: 2018-09-13

## 2018-09-13 RX ADMIN — FENTANYL CITRATE 25 MCG: 50 INJECTION, SOLUTION INTRAMUSCULAR; INTRAVENOUS at 12:09

## 2018-09-13 RX ADMIN — OXYCODONE HYDROCHLORIDE 5 MG: 5 TABLET ORAL at 12:09

## 2018-09-13 RX ADMIN — DOXYCYCLINE HYCLATE 200 MG: 100 TABLET, COATED ORAL at 01:09

## 2018-09-13 RX ADMIN — FENTANYL CITRATE 100 MCG: 50 INJECTION, SOLUTION INTRAMUSCULAR; INTRAVENOUS at 11:09

## 2018-09-13 RX ADMIN — DOXYCYCLINE 100 MG: 100 INJECTION, POWDER, LYOPHILIZED, FOR SOLUTION INTRAVENOUS at 11:09

## 2018-09-13 RX ADMIN — PROPOFOL 250 MG: 10 INJECTION, EMULSION INTRAVENOUS at 11:09

## 2018-09-13 RX ADMIN — GLYCOPYRROLATE 0.2 MG: 0.2 INJECTION, SOLUTION INTRAMUSCULAR; INTRAVENOUS at 11:09

## 2018-09-13 RX ADMIN — SODIUM CHLORIDE, SODIUM LACTATE, POTASSIUM CHLORIDE, AND CALCIUM CHLORIDE: 600; 310; 30; 20 INJECTION, SOLUTION INTRAVENOUS at 10:09

## 2018-09-13 RX ADMIN — MIDAZOLAM HYDROCHLORIDE 2 MG: 1 INJECTION, SOLUTION INTRAMUSCULAR; INTRAVENOUS at 10:09

## 2018-09-13 RX ADMIN — Medication 800 MG: at 11:09

## 2018-09-13 RX ADMIN — DEXAMETHASONE SODIUM PHOSPHATE 8 MG: 4 INJECTION, SOLUTION INTRAMUSCULAR; INTRAVENOUS at 11:09

## 2018-09-13 RX ADMIN — LIDOCAINE HYDROCHLORIDE 75 MG: 20 INJECTION, SOLUTION INTRAVENOUS at 11:09

## 2018-09-13 RX ADMIN — ONDANSETRON 4 MG: 2 INJECTION INTRAMUSCULAR; INTRAVENOUS at 11:09

## 2018-09-13 NOTE — OP NOTE
Operative Report    Procedure: Suction Dilation and Curettage    Date of Surgery:  (date: 09/13/2018)    Surgeon: Dr. Joselo Landry MD    Assistant: Ellie Lebron MD (PGY-1)    Pre-Op Diagnosis: Missed Ab    Post-op Diagnosis: same    Anesthesia:  LMP    EBL: 50 cc     IVF: 300 cc    Urine Output: 250cc    Complications:  None    Specimen: 1) products of conception    Findings:  Uterus measuring 6 weeks size.  All products of conception were removed with suction catheter and gentle curettage. Hemostasis obtained at the end of the procedure.    Statement of Medical Necessity:  Pt is a 33 y.o. with LMP on 6/22/18. Patient is pregnant. who presents for a D&C due to an abnormal pregnancy.  After discussing the r/b/i/a of the procedure, pt wished to proceed with the above stated procedure.    Procedure in Detail:  After informed consent was obtained, pt was given Doxycycline 100mg PO once in the pre-operative area and then patient was taken to the OR where LMA anesthesia was found to be adequate. She was placed in the dorsal lithotomy position and prepped and draped in the normal sterile fashion. The bladder was decompressed with an in and out catheter.  The right angle was inserted into the posterior vagina, and the anterior lip of the cervix was grasped with the Paloma clamp. The cervix was dilated with the Ervin dilators until the suction catheter was able to be inserted into the cervix without difficulty. The uterine cavity was suctioned and products of conception were obtained. A curette was then used for gentle curettage to remove any remaining products of conception until the four quadrants of the uterine cavity were left with a gritty texture. One final pass of the suction curette was made to ensure that all products had been removed. The single tooth tenaculum was removed from the anterior lip of the cervix, and the cervix was noted to be hemostatic. The weighted speculum was removed from the vagina. Sponge,  lap, and instrument count were correct times two.  The products of conception were sent to pathology for evaluation. The patient tolerated the procedure well. She was transferred to the PACU in stable condition. She will receive Doxycycline 200mg PO once in the PACU.  Pt is Rh positive and RHOGAM was not indicated.    Ellie Lebron MD  OB/GYN  PGY-1    Joselo Landry MD

## 2018-09-13 NOTE — DISCHARGE SUMMARY
[facility]  Brief Operative Note     SUMMARY     Surgery Date: 2018     Surgeon(s) and Role:     * Joselo Landry Jr., MD - Primary    Assisting Surgeon: Ellie Lebron MD (PGY-1)    Pre-op Diagnosis:  Missed  [O02.1]    Post-op Diagnosis:  Post-Op Diagnosis Codes:     * Missed  [O02.1]    Procedure(s) (LRB):  DILATION AND CURETTAGE, UTERUS, USING SUCTION (N/A)    Anesthesia: LMA    Description of the findings of the procedure:   1. Normal external female genitalia  2. On bimanual exam, 6 week uterus, mobile  3. Jane dilators used to easily dilate cervix to 10 cm  4. Products of conception returned using 10 cm suction curette  5. Curette used for gentle curettage to remove productions of conception until four quadrants of uterine cavity left with gritty texture  6. Hemostasis noted at completion of  case    Estimated Blood Loss: 50 cc         Specimens:   Specimen (12h ago, onward)    Start     Ordered    18 1132  Specimen to Pathology - Surgery  Once     Comments:  1. Products of conception     Start Status   18 1132 Collected (18 1143)       18 1142          Discharge Note    SUMMARY     Admit Date: 2018    Discharge Date and Time: 2018    Hospital Course (synopsis of major diagnoses, care, treatment, and services provided during the course of the hospital stay):     Final Diagnosis: Post-Op Diagnosis Codes:     * Missed  [O02.1]    Disposition: Home or Self Care    Follow Up/Patient Instructions:     Medications:  Reconciled Home Medications:      Medication List      START taking these medications    doxycycline 100 MG tablet  Commonly known as:  VIBRA-TABS  Take 2 tablets (200 mg total) by mouth once. for 1 dose     ibuprofen 600 MG tablet  Commonly known as:  ADVIL,MOTRIN  Take 1 tablet (600 mg total) by mouth every 6 (six) hours as needed for Pain.     oxyCODONE-acetaminophen 5-325 mg per tablet  Commonly known as:   PERCOCET  Take 1 tablet by mouth every 4 (four) hours as needed for Pain.        CONTINUE taking these medications    cetirizine 10 MG tablet  Commonly known as:  ZYRTEC  Take 10 mg by mouth every evening.     PRENATAL ORAL  Take by mouth.        STOP taking these medications    miSOPROStol 200 MCG Tab  Commonly known as:  CYTOTEC          Discharge Procedure Orders   Diet Adult Regular     Notify your health care provider if you experience any of the following:  temperature >100.4     Notify your health care provider if you experience any of the following:  persistent nausea and vomiting or diarrhea     Notify your health care provider if you experience any of the following:  severe uncontrolled pain     Notify your health care provider if you experience any of the following:  difficulty breathing or increased cough     Notify your health care provider if you experience any of the following:  severe persistent headache     Notify your health care provider if you experience any of the following:  persistent dizziness, light-headedness, or visual disturbances     Notify your health care provider if you experience any of the following:  increased confusion or weakness     Notify your health care provider if you experience any of the following:   Order Comments: Notify MD if bleed 1pad/hour over 2 consecutive hours.     No dressing needed     Activity as tolerated     Follow-up Information     Joselo Landry Jr, MD In 2 weeks.    Specialties:  Obstetrics, Obstetrics and Gynecology  Contact information:  [address] [id number] [phone]                 Ellie Lebron MD  OB/GYN  PGY-1    Joselo Landry MD

## 2018-09-13 NOTE — INTERVAL H&P NOTE
The patient has been examined and the H&P has been reviewed:    I concur with the findings and no changes have occurred since H&P was written.    Anesthesia/Surgery risks, benefits and alternative options discussed and understood by patient/family.    Ellie Lebron MD  OB/GYN  PGY-1      Joselo Landry MD          Active Hospital Problems    Diagnosis  POA    Missed  [O02.1]  Yes      Resolved Hospital Problems   No resolved problems to display.

## 2018-09-13 NOTE — PLAN OF CARE
Catie Monge has met all discharge criteria from Phase II. Vital Signs are stable, ambulating  without difficulty. Discharge instructions given, patient verbalized understanding. Discharged from facility via wheelchair in stable condition.

## 2018-09-13 NOTE — ANESTHESIA PREPROCEDURE EVALUATION
09/13/2018  Catie Monge is a 33 y.o., female.    Anesthesia Evaluation    I have reviewed the Patient Summary Reports.    I have reviewed the Nursing Notes.   I have reviewed the Medications.     Review of Systems  Anesthesia Hx:  No problems with previous Anesthesia  Denies Family Hx of Anesthesia complications.   Denies Personal Hx of Anesthesia complications.   Social:  Non-Smoker    Hematology/Oncology:  Hematology Normal   Oncology Normal     EENT/Dental:EENT/Dental Normal   Cardiovascular:   Exercise tolerance: good Denies Hypertension.     Pulmonary:  Pulmonary Normal    Renal/:  Renal/ Normal     Musculoskeletal:  Musculoskeletal Normal    Neurological:  Neurology Normal    Endocrine:  Endocrine Normal    Dermatological:  Skin Normal    Psych:  Psychiatric Normal           Physical Exam  General:  Well nourished    Airway/Jaw/Neck:  Airway Findings: Mouth Opening: Normal Tongue: Normal  General Airway Assessment: Adult  Mallampati: I  TM Distance: Normal, at least 6 cm  Jaw/Neck Findings:  Neck ROM: Normal ROM      Dental:  Dental Findings: In tact             Anesthesia Plan  Type of Anesthesia, risks & benefits discussed:  Anesthesia Type:  general  Patient's Preference:   Intra-op Monitoring Plan:   Intra-op Monitoring Plan Comments:   Post Op Pain Control Plan:   Post Op Pain Control Plan Comments:   Induction:    Beta Blocker:         Informed Consent: Patient understands risks and agrees with Anesthesia plan.  Questions answered. Anesthesia consent signed with patient.  ASA Score: 1     Day of Surgery Review of History & Physical:    H&P update referred to the surgeon.         Ready For Surgery From Anesthesia Perspective.

## 2018-09-13 NOTE — DISCHARGE INSTRUCTIONS
Suction Curettage (Therapeutic Dilation & Curettage, D&C)  Suction curettage is a procedure to remove the lining and contents of the womb (uterus). It may be done to stop bleeding, control pain, and prevent infection after a miscarriage, , or childbirth. It may also be done to remove a molar pregnancy. This is when tumors grow in the womb instead of or in addition to a fetus.  After the procedure, you should be able to return to your normal routine in 1 or 2 days. You may have some cramping and light bleeding, however. This is normal. These problems should go away within 5 to 7 days. You can expect to have your next period within 4 to 6 weeks.    Home care  · If you have pain or cramping, use pain medicine as directed.  · If you have light bleeding, use pads instead of tampons. Change these as often as needed.  · Avoid douching, using tampons, or having sex until your healthcare provider says its OK.  · Take showers instead of baths for 1 to 2 weeks.    Follow-up care  Follow-up with your healthcare provider as directed.    When to seek medical advice  Call your healthcare provider right away if any of these occur:  · Fever of 100.4ºF (38ºC) or higher, or as directed by your provider  · Heavy bleeding  · Bleeding that lasts longer than 1 week  · Pain or cramping worsens instead of getting better  · Foul-smelling discharge from the vagina  · Passage of anything that resembles tissue from the vagina (if possible, save the tissue and bring it to the healthcare provider)  · Weakness, dizziness or fainting        Discharge Instructions: After Your Surgery  Youve just had surgery. During surgery, you were given medicine called anesthesia to keep you relaxed and free of pain. After surgery, you may have some pain or nausea. This is common. Here are some tips for feeling better and getting well after surgery.     Stay on schedule with your medicine.     Going home  Your healthcare provider will show you how to  take care of yourself when you go home. He or she will also answer your questions. Have an adult family member or friend drive you home. For the first 24 hours after your surgery:    · Do not drive or use heavy equipment.  · Do not make important decisions or sign legal papers.  · Do not drink alcohol.  · Have someone stay with you, if needed. He or she can watch for problems and help keep you safe.    Be sure to go to all follow-up visits with your healthcare provider. And rest after your surgery for as long as your healthcare provider tells you to.    Coping with pain  If you have pain after surgery, pain medicine will help you feel better. Take it as told, before pain becomes severe. Also, ask your healthcare provider or pharmacist about other ways to control pain. This might be with heat, ice, or relaxation. And follow any other instructions your surgeon or nurse gives you.    Tips for taking pain medicine  To get the best relief possible, remember these points:    · Pain medicines can upset your stomach. Taking them with a little food may help.  · Most pain relievers taken by mouth need at least 20 to 30 minutes to start to work.  · Taking medicine on a schedule can help you remember to take it. Try to time your medicine so that you can take it before starting an activity. This might be before you get dressed, go for a walk, or sit down for dinner.  · Constipation is a common side effect of pain medicines. Call your healthcare provider before taking any medicines such as laxatives or stool softeners to help ease constipation. Also ask if you should skip any foods. Drinking lots of fluids and eating foods such as fruits and vegetables that are high in fiber can also help. Remember, do not take laxatives unless your surgeon has prescribed them.  · Drinking alcohol and taking pain medicine can cause dizziness and slow your breathing. It can even be deadly. Do not drink alcohol while taking pain medicine.  · Pain  medicine can make you react more slowly to things. Do not drive or run machinery while taking pain medicine.    Your healthcare provider may tell you to take acetaminophen to help ease your pain. Ask him or her how much you are supposed to take each day. Acetaminophen or other pain relievers may interact with your prescription medicines or other over-the-counter (OTC) medicines. Some prescription medicines have acetaminophen and other ingredients. Using both prescription and OTC acetaminophen for pain can cause you to overdose. Read the labels on your OTC medicines with care. This will help you to clearly know the list of ingredients, how much to take, and any warnings. It may also help you not take too much acetaminophen. If you have questions or do not understand the information, ask your pharmacist or healthcare provider to explain it to you before you take the OTC medicine.    Managing nausea  Some people have an upset stomach after surgery. This is often because of anesthesia, pain, or pain medicine, or the stress of surgery. These tips will help you handle nausea and eat healthy foods as you get better. If you were on a special food plan before surgery, ask your healthcare provider if you should follow it while you get better. These tips may help:    · Do not push yourself to eat. Your body will tell you when to eat and how much.  · Start off with clear liquids and soup. They are easier to digest.  · Next try semi-solid foods, such as mashed potatoes, applesauce, and gelatin, as you feel ready.  · Slowly move to solid foods. Dont eat fatty, rich, or spicy foods at first.  · Do not force yourself to have 3 large meals a day. Instead eat smaller amounts more often.  · Take pain medicines with a small amount of solid food, such as crackers or toast, to avoid nausea.     Call your surgeon if  · You still have pain an hour after taking medicine. The medicine may not be strong enough.  · You feel too sleepy,  dizzy, or groggy. The medicine may be too strong.  · You have side effects like nausea, vomiting, or skin changes, such as rash, itching, or hives.       If you have obstructive sleep apnea  You were given anesthesia medicine during surgery to keep you comfortable and free of pain. After surgery, you may have more apnea spells because of this medicine and other medicines you were given. The spells may last longer than usual.   At home:    · Keep using the continuous positive airway pressure (CPAP) device when you sleep. Unless your healthcare provider tells you not to, use it when you sleep, day or night. CPAP is a common device used to treat obstructive sleep apnea.  · Talk with your provider before taking any pain medicine, muscle relaxants, or sedatives. Your provider will tell you about the possible dangers of taking these medicines.    Date Last Reviewed: 12/1/2016  © 8845-9296 [employer]. [address] [place] [address], [place] [address]. All rights reserved. This information is not intended as a substitute for professional medical care. Always follow your healthcare professional's instructions.    PLEASE FOLLOW ANY OTHER INSTRUCTIONS PROVIDED TO YOU BY DR. HOWARD!

## 2018-09-13 NOTE — ANESTHESIA POSTPROCEDURE EVALUATION
"Anesthesia Post Evaluation    Patient: Catie Monge    Procedure(s) Performed: Procedure(s) (LRB):  DILATION AND CURETTAGE, UTERUS, USING SUCTION (N/A)    Final Anesthesia Type: general  Patient location during evaluation: PACU  Patient participation: Yes- Able to Participate  Level of consciousness: awake and alert  Post-procedure vital signs: reviewed and stable  Pain management: adequate  Airway patency: patent  PONV status at discharge: No PONV  Anesthetic complications: no      Cardiovascular status: blood pressure returned to baseline  Respiratory status: unassisted, spontaneous ventilation and room air  Hydration status: euvolemic  Follow-up not needed.        Visit Vitals  /88 (BP Location: Right arm, Patient Position: Lying)   Pulse 73   Temp 36.4 °C (97.6 °F) (Oral)   Resp 16   Ht 5' 9" (1.753 m)   Wt 67.1 kg (148 lb)   SpO2 98%   Breastfeeding? No   BMI 21.86 kg/m²       Pain/Kelsi Score: Pain Assessment Performed: Yes (9/13/2018  1:10 PM)  Presence of Pain: denies (9/13/2018  1:10 PM)  Pain Rating Prior to Med Admin: 4 (9/13/2018 12:30 PM)  Pain Rating Post Med Admin: 2 (9/13/2018 12:40 PM)  Kelsi Score: 10 (9/13/2018  1:10 PM)        "

## 2018-09-13 NOTE — TRANSFER OF CARE
"Anesthesia Transfer of Care Note    Patient: Catie Monge    Procedure(s) Performed: Procedure(s) (LRB):  DILATION AND CURETTAGE, UTERUS, USING SUCTION (N/A)    Patient location: PACU    Anesthesia Type: general    Transport from OR: Transported from OR on 2-3 L/min O2 by NC with adequate spontaneous ventilation    Post pain: adequate analgesia    Post assessment: no apparent anesthetic complications and tolerated procedure well    Post vital signs: stable    Level of consciousness: awake, alert and oriented    Nausea/Vomiting: no nausea/vomiting    Complications: none    Transfer of care protocol was followed      Last vitals:   Visit Vitals  /83 (BP Location: Right arm, Patient Position: Sitting)   Pulse 72   Temp 36.8 °C (98.2 °F) (Oral)   Resp 16   Ht 5' 9" (1.753 m)   Wt 67.1 kg (148 lb)   SpO2 100%   Breastfeeding? No   BMI 21.86 kg/m²     "

## 2018-09-18 DIAGNOSIS — O03.9 MISCARRIAGE: Primary | ICD-10-CM

## 2018-09-23 ENCOUNTER — PATIENT MESSAGE (OUTPATIENT)
Dept: OBSTETRICS AND GYNECOLOGY | Facility: CLINIC | Age: 33
End: 2018-09-23

## 2018-09-24 ENCOUNTER — TELEPHONE (OUTPATIENT)
Dept: INTERNAL MEDICINE | Facility: CLINIC | Age: 33
End: 2018-09-24

## 2018-09-24 ENCOUNTER — PATIENT MESSAGE (OUTPATIENT)
Dept: INTERNAL MEDICINE | Facility: CLINIC | Age: 33
End: 2018-09-24

## 2018-09-24 ENCOUNTER — LAB VISIT (OUTPATIENT)
Dept: LAB | Facility: OTHER | Age: 33
End: 2018-09-24
Payer: COMMERCIAL

## 2018-09-24 ENCOUNTER — PATIENT MESSAGE (OUTPATIENT)
Dept: OBSTETRICS AND GYNECOLOGY | Facility: CLINIC | Age: 33
End: 2018-09-24

## 2018-09-24 DIAGNOSIS — O03.9 MISCARRIAGE: ICD-10-CM

## 2018-09-24 LAB — HCG INTACT+B SERPL-ACNC: 2.3 MIU/ML

## 2018-09-24 PROCEDURE — 36415 COLL VENOUS BLD VENIPUNCTURE: CPT

## 2018-09-24 PROCEDURE — 84702 CHORIONIC GONADOTROPIN TEST: CPT

## 2018-09-24 NOTE — TELEPHONE ENCOUNTER
Attempted to call Ms. Monge to confirm her annual follow up appt set on 11/01/18 at 0830.  Left message on phone and set message to portal.

## 2018-09-24 NOTE — TELEPHONE ENCOUNTER
Attempted to call Ms. Monge to inform her of her annual check up set for 11/01/18 at 0830.  Left message to call the office and sent message on portal.      Ailin Pindea LPN

## 2018-09-30 ENCOUNTER — PATIENT MESSAGE (OUTPATIENT)
Dept: OBSTETRICS AND GYNECOLOGY | Facility: CLINIC | Age: 33
End: 2018-09-30

## 2018-10-18 ENCOUNTER — PATIENT MESSAGE (OUTPATIENT)
Dept: ADMINISTRATIVE | Facility: HOSPITAL | Age: 33
End: 2018-10-18

## 2018-10-28 ENCOUNTER — PATIENT MESSAGE (OUTPATIENT)
Dept: OBSTETRICS AND GYNECOLOGY | Facility: CLINIC | Age: 33
End: 2018-10-28

## 2018-10-29 ENCOUNTER — TELEPHONE (OUTPATIENT)
Dept: OBSTETRICS AND GYNECOLOGY | Facility: CLINIC | Age: 33
End: 2018-10-29

## 2018-10-29 ENCOUNTER — LAB VISIT (OUTPATIENT)
Dept: LAB | Facility: OTHER | Age: 33
End: 2018-10-29
Payer: COMMERCIAL

## 2018-10-29 DIAGNOSIS — F32.A DEPRESSION, UNSPECIFIED DEPRESSION TYPE: ICD-10-CM

## 2018-10-29 DIAGNOSIS — Z87.59 H/O ONE MISCARRIAGE: ICD-10-CM

## 2018-10-29 DIAGNOSIS — F32.A DEPRESSION, UNSPECIFIED DEPRESSION TYPE: Primary | ICD-10-CM

## 2018-10-29 LAB
ALBUMIN SERPL BCP-MCNC: 4.3 G/DL
ALP SERPL-CCNC: 45 U/L
ALT SERPL W/O P-5'-P-CCNC: 22 U/L
ANION GAP SERPL CALC-SCNC: 8 MMOL/L
AST SERPL-CCNC: 26 U/L
BASOPHILS # BLD AUTO: 0.02 K/UL
BASOPHILS NFR BLD: 0.4 %
BILIRUB SERPL-MCNC: 1 MG/DL
BUN SERPL-MCNC: 6 MG/DL
CALCIUM SERPL-MCNC: 9.2 MG/DL
CHLORIDE SERPL-SCNC: 108 MMOL/L
CO2 SERPL-SCNC: 27 MMOL/L
CREAT SERPL-MCNC: 0.8 MG/DL
DIFFERENTIAL METHOD: ABNORMAL
EOSINOPHIL # BLD AUTO: 0 K/UL
EOSINOPHIL NFR BLD: 0.6 %
ERYTHROCYTE [DISTWIDTH] IN BLOOD BY AUTOMATED COUNT: 12.2 %
EST. GFR  (AFRICAN AMERICAN): >60 ML/MIN/1.73 M^2
EST. GFR  (NON AFRICAN AMERICAN): >60 ML/MIN/1.73 M^2
GLUCOSE SERPL-MCNC: 67 MG/DL
HCT VFR BLD AUTO: 42 %
HGB BLD-MCNC: 13.5 G/DL
LYMPHOCYTES # BLD AUTO: 1.7 K/UL
LYMPHOCYTES NFR BLD: 32.7 %
MCH RBC QN AUTO: 29.2 PG
MCHC RBC AUTO-ENTMCNC: 32.1 G/DL
MCV RBC AUTO: 91 FL
MONOCYTES # BLD AUTO: 1 K/UL
MONOCYTES NFR BLD: 19 %
NEUTROPHILS # BLD AUTO: 2.5 K/UL
NEUTROPHILS NFR BLD: 47.1 %
PLATELET # BLD AUTO: 360 K/UL
PMV BLD AUTO: 9.4 FL
POTASSIUM SERPL-SCNC: 3.4 MMOL/L
PROT SERPL-MCNC: 7.8 G/DL
RBC # BLD AUTO: 4.63 M/UL
SODIUM SERPL-SCNC: 143 MMOL/L
TSH SERPL DL<=0.005 MIU/L-ACNC: 0.58 UIU/ML
WBC # BLD AUTO: 5.26 K/UL

## 2018-10-29 PROCEDURE — 84443 ASSAY THYROID STIM HORMONE: CPT

## 2018-10-29 PROCEDURE — 36415 COLL VENOUS BLD VENIPUNCTURE: CPT

## 2018-10-29 PROCEDURE — 80053 COMPREHEN METABOLIC PANEL: CPT

## 2018-10-29 PROCEDURE — 85025 COMPLETE CBC W/AUTO DIFF WBC: CPT

## 2018-10-29 RX ORDER — SERTRALINE HYDROCHLORIDE 25 MG/1
25 TABLET, FILM COATED ORAL DAILY
Qty: 30 TABLET | Refills: 2 | Status: SHIPPED | OUTPATIENT
Start: 2018-10-29 | End: 2019-01-21 | Stop reason: SDUPTHER

## 2018-10-29 NOTE — TELEPHONE ENCOUNTER
Spoke with aCtie re: My [facility] message    Continues to have some emotional difficulty s/p recent SAB  No HI/SI  Caring for self and daughter Isabella  Strong support in  [place] and sister  Also has reached out to counselor for grief therapy   Wants to discuss use of SSRI at this time    -- Discussed support at home.   -- Discussed social support and mom's group. Reviewed community support including [employer]ugg[employer] Strugg[employer], Mommy [employer], BYOB at [employer], [employer], [employer], [employer], and story times.   -- Reviewed warning signs including development of HI/SI or worsening PPD with anxiety progressing to psychosis.   -- After a discussion of options, Catie elects for starting zoloft. Rx of zoloft provided. Reviewed SE profile of medications used for anxiety/depression. Explained that benefits of medicaitons typically take 2-4 weeks to occur and up to 8-12 weeks. Instructed not to stop medication abruptly.   -- Educated about adjunct treatments including but not limited to avoidance of stimulants including caffeine, participation in regular exercise, yoga, meditation, and breathing exercises and daily exposure to sun/light therapy. Also discussed setting aside at least 1hr/week for which she has to herself (without childcare or other responsibilities) for which she can participate in self-care.   -- She voiced understanding.  -- Will also obtain TSH, CBC, and CMP today  -- Will plan to check in at about 2 week darryl or sooner prn  -- In the interim promises to call or present to ED immediately with any worsening of sx or development of HI/SI.   -- Info re: [place] and [place] Share program provided as well        Bria Bravo CNM/NP  Certified Nurse Midwife/Nurse Practitioner  10/29/2018 8:14 AM

## 2018-11-06 ENCOUNTER — OFFICE VISIT (OUTPATIENT)
Dept: INTERNAL MEDICINE | Facility: CLINIC | Age: 33
End: 2018-11-06
Attending: INTERNAL MEDICINE
Payer: COMMERCIAL

## 2018-11-06 VITALS
HEART RATE: 95 BPM | DIASTOLIC BLOOD PRESSURE: 84 MMHG | RESPIRATION RATE: 12 BRPM | SYSTOLIC BLOOD PRESSURE: 130 MMHG | BODY MASS INDEX: 22.63 KG/M2 | HEIGHT: 69 IN | OXYGEN SATURATION: 100 % | WEIGHT: 152.75 LBS

## 2018-11-06 DIAGNOSIS — Z00.00 ANNUAL PHYSICAL EXAM: Primary | ICD-10-CM

## 2018-11-06 DIAGNOSIS — F43.23 ADJUSTMENT DISORDER WITH MIXED ANXIETY AND DEPRESSED MOOD: ICD-10-CM

## 2018-11-06 DIAGNOSIS — Z23 NEED FOR IMMUNIZATION AGAINST INFLUENZA: ICD-10-CM

## 2018-11-06 PROCEDURE — 90471 IMMUNIZATION ADMIN: CPT | Mod: S$GLB,,, | Performed by: INTERNAL MEDICINE

## 2018-11-06 PROCEDURE — 90686 IIV4 VACC NO PRSV 0.5 ML IM: CPT | Mod: S$GLB,,, | Performed by: INTERNAL MEDICINE

## 2018-11-06 PROCEDURE — 99395 PREV VISIT EST AGE 18-39: CPT | Mod: 25,S$GLB,, | Performed by: INTERNAL MEDICINE

## 2018-11-06 PROCEDURE — 99999 PR PBB SHADOW E&M-EST. PATIENT-LVL IV: CPT | Mod: PBBFAC,,, | Performed by: INTERNAL MEDICINE

## 2018-11-06 NOTE — PROGRESS NOTES
Subjective:       Patient ID: Catie Monge is a 33 y.o. female.    Chief Complaint: Annual Exam     Catie Monge is a 33 y.o.  female who presents for Annual Exam  .  Patient Active Problem List   Diagnosis    Pelvic floor dysfunction    Miscarriage    Missed     S/P dilation and curettage      Catie Monge is a 33 y.o.  female who presents for Annual Exam  Recent miscarriage. Having some emotional distress over loss of pregnancy.  Started on zoloft 25 mg last week. Feeling fatigued and drained since starting medication. Sleeping well. No SI.  Exercising three time per week.  Good relationship with spouse despite his busy schedule.  She is home with her young daughter.  Has support of friends.      Health Maintenance       Date Due Completion Date    Influenza Vaccine 2018 10/30/2017    Pap Smear with HPV Cotest 2021 3/14/2018    TETANUS VACCINE 02/15/2027 2/15/2017          Review of Systems   Constitutional: Positive for fatigue. Negative for activity change and unexpected weight change.   HENT: Negative for hearing loss, rhinorrhea and trouble swallowing.    Eyes: Negative for discharge and visual disturbance.   Respiratory: Negative for chest tightness and wheezing.    Cardiovascular: Negative for chest pain and palpitations.   Gastrointestinal: Negative for blood in stool, constipation, diarrhea and vomiting.   Endocrine: Negative for polydipsia and polyuria.   Genitourinary: Negative for difficulty urinating, dysuria, hematuria and menstrual problem.   Musculoskeletal: Negative for arthralgias, joint swelling and neck pain.   Neurological: Negative for weakness and headaches.   Psychiatric/Behavioral: Positive for dysphoric mood. Negative for confusion, sleep disturbance and suicidal ideas.         Past Medical History:   Diagnosis Date    Allergy     Anxiety     No meds    Fever blister     Oral cold sores only; no hx genital outbreak    Postpartum hypertension  "    Requiring magnesium after delivery       Past Surgical History:   Procedure Laterality Date    ANUS SURGERY      FISSURE    DILATION AND CURETTAGE OF UTERUS USING SUCTION N/A 9/13/2018    Procedure: DILATION AND CURETTAGE, UTERUS, USING SUCTION;  Surgeon: Joselo Landry Jr., MD;  [facility];  Service: OB/GYN;  Laterality: N/A;    DILATION AND CURETTAGE, UTERUS, USING SUCTION N/A 9/13/2018    Per[facility]    NASAL SEPTUM SURGERY      TONSILLECTOMY, ADENOIDECTOMY      WISDOM TOOTH EXTRACTION         Family History   Problem Relation Age of Onset    Glaucoma Father     Hypertension Father     Rheum arthritis Father     Melanoma Paternal Grandfather     Kyphosis Brother     Bipolar disorder Brother     Anxiety disorder Brother     Diabetes Mellitus Maternal Grandfather     No Known Problems Paternal Grandmother     Breast cancer Neg Hx     Cancer Neg Hx     Ovarian cancer Neg Hx     Colon cancer Neg Hx     Macular degeneration Neg Hx     Retinal detachment Neg Hx     Amblyopia Neg Hx     Blindness Neg Hx     Cataracts Neg Hx     Strabismus Neg Hx        Social History     Tobacco Use    Smoking status: Never Smoker    Smokeless tobacco: Never Used   Substance Use Topics    Alcohol use: Yes     Alcohol/week: 0.6 oz     Types: 1 Glasses of wine per week     Comment: Social - not with pregnancy    Drug use: No             Objective:   Blood pressure 130/84, pulse 95, resp. rate 12, height 5' 9" (1.753 m), weight 69.3 kg (152 lb 12.5 oz), SpO2 100 %, not currently breastfeeding.     Physical Exam   Constitutional: She is oriented to person, place, and time. She appears well-developed and well-nourished. No distress.   HENT:   Head: Normocephalic and atraumatic.   Right Ear: External ear normal.   Left Ear: External ear normal.   Eyes: Conjunctivae are normal. No scleral icterus.   Neck: No JVD present. Carotid bruit is not present. No thyromegaly " present.   Cardiovascular: Normal rate and normal heart sounds. Exam reveals no gallop and no friction rub.   No murmur heard.  Pulmonary/Chest: Effort normal and breath sounds normal. She has no wheezes. She has no rales.   Abdominal: Soft. Bowel sounds are normal. She exhibits no distension. There is no tenderness.   Musculoskeletal: She exhibits no edema or tenderness.   Lymphadenopathy:     She has no cervical adenopathy.        Right: No supraclavicular adenopathy present.        Left: No supraclavicular adenopathy present.   Neurological: She is alert and oriented to person, place, and time. She has normal reflexes.   Skin: Skin is warm and dry.   Psychiatric: She has a normal mood and affect. Her behavior is normal. Thought content normal.       Prior labs reviewed  Assessment/Plan:        Catie was seen today for annual exam.    Diagnoses and all orders for this visit:    Annual physical exam  Recommend daily sunscreen, cardiovascular exercise min 30 min 5 days per week. Seatbelts routinely.  Recommend monthly self breast exams and annual mammogram starting at age 40.  Also screening  pap with reflex HPV q 3 years or as recommended by gyn provider.    Adjustment disorder with mixed anxiety and depressed mood  Comments:  agree with zoloft, if side effects do not improve within 1-2 weeks may consider alternative SSRI  Cont exercise, socializing and couseling    Need for immunization against influenza  Flu shot given           Medication List           Accurate as of 11/6/18  9:23 AM. If you have any questions, ask your nurse or doctor.               CONTINUE taking these medications    cetirizine 10 MG tablet  Commonly known as:  ZYRTEC     ibuprofen 600 MG tablet  Commonly known as:  ADVIL,MOTRIN  Take 1 tablet (600 mg total) by mouth every 6 (six) hours as needed for Pain.     oxyCODONE-acetaminophen 5-325 mg per tablet  Commonly known as:  PERCOCET  Take 1 tablet by mouth every 4 (four) hours as needed for  Pain.     PRENATAL ORAL     sertraline 25 MG tablet  Commonly known as:  ZOLOFT  Take 1 tablet (25 mg total) by mouth once daily.

## 2018-12-28 ENCOUNTER — PATIENT MESSAGE (OUTPATIENT)
Dept: OBSTETRICS AND GYNECOLOGY | Facility: CLINIC | Age: 33
End: 2018-12-28

## 2019-01-02 DIAGNOSIS — Z32.00 POSSIBLE PREGNANCY, NOT YET CONFIRMED: Primary | ICD-10-CM

## 2019-01-21 ENCOUNTER — OFFICE VISIT (OUTPATIENT)
Dept: OBSTETRICS AND GYNECOLOGY | Facility: CLINIC | Age: 34
End: 2019-01-21
Payer: COMMERCIAL

## 2019-01-21 ENCOUNTER — PROCEDURE VISIT (OUTPATIENT)
Dept: OBSTETRICS AND GYNECOLOGY | Facility: CLINIC | Age: 34
End: 2019-01-21
Payer: COMMERCIAL

## 2019-01-21 ENCOUNTER — LAB VISIT (OUTPATIENT)
Dept: LAB | Facility: OTHER | Age: 34
End: 2019-01-21
Payer: COMMERCIAL

## 2019-01-21 VITALS
SYSTOLIC BLOOD PRESSURE: 124 MMHG | BODY MASS INDEX: 23.36 KG/M2 | HEIGHT: 69 IN | WEIGHT: 157.75 LBS | DIASTOLIC BLOOD PRESSURE: 72 MMHG

## 2019-01-21 DIAGNOSIS — Z32.00 POSSIBLE PREGNANCY: Primary | ICD-10-CM

## 2019-01-21 DIAGNOSIS — Z34.81 ENCOUNTER FOR SUPERVISION OF OTHER NORMAL PREGNANCY IN FIRST TRIMESTER: ICD-10-CM

## 2019-01-21 DIAGNOSIS — Z32.00 POSSIBLE PREGNANCY, NOT YET CONFIRMED: ICD-10-CM

## 2019-01-21 PROBLEM — O03.9 MISCARRIAGE: Status: RESOLVED | Noted: 2018-09-11 | Resolved: 2019-01-21

## 2019-01-21 PROBLEM — Z98.890 S/P DILATION AND CURETTAGE: Status: RESOLVED | Noted: 2018-09-13 | Resolved: 2019-01-21

## 2019-01-21 PROBLEM — O02.1 MISSED ABORTION: Status: RESOLVED | Noted: 2018-09-13 | Resolved: 2019-01-21

## 2019-01-21 LAB
ABO + RH BLD: NORMAL
B-HCG UR QL: POSITIVE
BASOPHILS # BLD AUTO: 0.02 K/UL
BASOPHILS NFR BLD: 0.2 %
BLD GP AB SCN CELLS X3 SERPL QL: NORMAL
C TRACH DNA SPEC QL NAA+PROBE: NOT DETECTED
CTP QC/QA: YES
DIFFERENTIAL METHOD: NORMAL
EOSINOPHIL # BLD AUTO: 0.1 K/UL
EOSINOPHIL NFR BLD: 0.8 %
ERYTHROCYTE [DISTWIDTH] IN BLOOD BY AUTOMATED COUNT: 12.5 %
HCG INTACT+B SERPL-ACNC: NORMAL MIU/ML
HCT VFR BLD AUTO: 39.3 %
HGB BLD-MCNC: 13 G/DL
LYMPHOCYTES # BLD AUTO: 2 K/UL
LYMPHOCYTES NFR BLD: 19.8 %
MCH RBC QN AUTO: 29.5 PG
MCHC RBC AUTO-ENTMCNC: 33.1 G/DL
MCV RBC AUTO: 89 FL
MONOCYTES # BLD AUTO: 0.8 K/UL
MONOCYTES NFR BLD: 8 %
N GONORRHOEA DNA SPEC QL NAA+PROBE: NOT DETECTED
NEUTROPHILS # BLD AUTO: 7 K/UL
NEUTROPHILS NFR BLD: 70.8 %
PLATELET # BLD AUTO: 340 K/UL
PMV BLD AUTO: 9.3 FL
RBC # BLD AUTO: 4.4 M/UL
WBC # BLD AUTO: 9.85 K/UL

## 2019-01-21 PROCEDURE — 99215 OFFICE O/P EST HI 40 MIN: CPT | Mod: S$GLB,,, | Performed by: ADVANCED PRACTICE MIDWIFE

## 2019-01-21 PROCEDURE — 81025 POCT URINE PREGNANCY: ICD-10-PCS | Mod: S$GLB,,, | Performed by: ADVANCED PRACTICE MIDWIFE

## 2019-01-21 PROCEDURE — 3008F PR BODY MASS INDEX (BMI) DOCUMENTED: ICD-10-PCS | Mod: CPTII,S$GLB,, | Performed by: ADVANCED PRACTICE MIDWIFE

## 2019-01-21 PROCEDURE — 84702 CHORIONIC GONADOTROPIN TEST: CPT

## 2019-01-21 PROCEDURE — 76801 OB US < 14 WKS SINGLE FETUS: CPT | Mod: S$GLB,,, | Performed by: OBSTETRICS & GYNECOLOGY

## 2019-01-21 PROCEDURE — 85025 COMPLETE CBC W/AUTO DIFF WBC: CPT

## 2019-01-21 PROCEDURE — 87340 HEPATITIS B SURFACE AG IA: CPT

## 2019-01-21 PROCEDURE — 76801 PR US, OB <14WKS, TRANSABD, SINGLE GESTATION: ICD-10-PCS | Mod: S$GLB,,, | Performed by: OBSTETRICS & GYNECOLOGY

## 2019-01-21 PROCEDURE — 99999 PR PBB SHADOW E&M-EST. PATIENT-LVL III: ICD-10-PCS | Mod: PBBFAC,,, | Performed by: ADVANCED PRACTICE MIDWIFE

## 2019-01-21 PROCEDURE — 87491 CHLMYD TRACH DNA AMP PROBE: CPT

## 2019-01-21 PROCEDURE — 86762 RUBELLA ANTIBODY: CPT

## 2019-01-21 PROCEDURE — 86901 BLOOD TYPING SEROLOGIC RH(D): CPT

## 2019-01-21 PROCEDURE — 81025 URINE PREGNANCY TEST: CPT | Mod: S$GLB,,, | Performed by: ADVANCED PRACTICE MIDWIFE

## 2019-01-21 PROCEDURE — 99999 PR PBB SHADOW E&M-EST. PATIENT-LVL III: CPT | Mod: PBBFAC,,, | Performed by: ADVANCED PRACTICE MIDWIFE

## 2019-01-21 PROCEDURE — 86592 SYPHILIS TEST NON-TREP QUAL: CPT

## 2019-01-21 PROCEDURE — 99215 PR OFFICE/OUTPT VISIT, EST, LEVL V, 40-54 MIN: ICD-10-PCS | Mod: S$GLB,,, | Performed by: ADVANCED PRACTICE MIDWIFE

## 2019-01-21 PROCEDURE — 86703 HIV-1/HIV-2 1 RESULT ANTBDY: CPT

## 2019-01-21 PROCEDURE — 3008F BODY MASS INDEX DOCD: CPT | Mod: CPTII,S$GLB,, | Performed by: ADVANCED PRACTICE MIDWIFE

## 2019-01-21 RX ORDER — SERTRALINE HYDROCHLORIDE 25 MG/1
25 TABLET, FILM COATED ORAL DAILY
Qty: 30 TABLET | Refills: 0 | Status: SHIPPED | OUTPATIENT
Start: 2019-01-21 | End: 2019-02-22 | Stop reason: SDUPTHER

## 2019-01-21 NOTE — PROGRESS NOTES
Catie Monge is a 33 y.o. , presents today for amenorrhea.  She had pos home pregnancy test.  She is worried because she had SAB 2 months ago with D&C    Has not seen any other provider for this possible pregnancy.     C/C: amenorrhea    HPI: Reports amenorrhea since Patient's last menstrual period was 2018.. Prior to LMP, menses were not regular R/T SAB 2 months ago occuring every  She is not currently on any contraception. + UPT today. Also reports no nausea or vomiting. Has noticed mild breast tenderness. Denies vaginal bleeding since LMP.    Reports no long-term chronic medical conditions except depression controlled with zoloft    Review of patient's allergies indicates:  No Known Allergies  Past Medical History:   Diagnosis Date    Allergy     Anxiety     No meds    Fever blister     Oral cold sores only; no hx genital outbreak    Postpartum hypertension     Requiring magnesium after delivery     Past Surgical History:   Procedure Laterality Date    ANUS SURGERY      FISSURE    DILATION AND CURETTAGE, UTERUS, USING SUCTION N/A 2018    Performed by [facility]elo [facility] Jr., [facility] at [facility] OR    NASAL SEPTUM SURGERY      TONSILLECTOMY, ADENOIDECTOMY      WISDOM TOOTH EXTRACTION       Past Surgical History:   Procedure Laterality Date    ANUS SURGERY      FISSURE    DILATION AND CURETTAGE, UTERUS, USING SUCTION N/A 2018    Performed by [facility]ry Jr., [facility] at [facility] OR    NASAL SEPTUM SURGERY      TONSILLECTOMY, ADENOIDECTOMY      WISDOM TOOTH EXTRACTION       OB History    Para Term  AB Living   2 1 1 0 0 1   SAB TAB Ectopic Multiple Live Births   0 0 0 0 1      # Outcome Date GA Lbr Roger/2nd Weight Sex Delivery Anes PTL Lv   2 Current            1 Term 18/ 41w2d 14:20 / 01:09 3.38 kg (7 lb 7.2 oz) F Vag-Spont EPI N OSIRIS      Birth Comments: postpartum preE with postpartum magnesium        OB History      Para Term  AB Living    2 1 1 0 0 1     SAB TAB Ectopic Multiple Live Births    0 0 0 0 1        Social History     Socioeconomic History    Marital status:      Spouse name: Rishabh    Number of children: 1    Years of education: Not on file    Highest education level: Not on file   Social Needs    Financial resource strain: Not hard at all    Food insecurity - worry: Never true    Food insecurity - inability: Never true    Transportation needs - medical: No    Transportation needs - non-medical: No   Occupational History    Occupation: [employer]   Tobacco Use    Smoking status: Never Smoker    Smokeless tobacco: Never Used   Substance and Sexual Activity    Alcohol use: Yes     Alcohol/week: 0.6 oz     Types: 1 Glasses of wine per week     Comment: Social - not with pregnancy    Drug use: No    Sexual activity: Yes     Partners: Male     Birth control/protection: None   Other Topics Concern    Are you pregnant or think you may be? Not Asked    Breast-feeding Not Asked   Social History Narrative     [place]; attending at [employer]    She is RN however now SAHM with Isabella! 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     Family History   Problem Relation Age of Onset    Glaucoma Father     Hypertension Father     Rheum arthritis Father     Melanoma Paternal Grandfather     Kyphosis Brother     Bipolar disorder Brother     Anxiety disorder Brother     Diabetes Mellitus Maternal Grandfather     No Known Problems Paternal Grandmother     Breast cancer Neg Hx     Cancer Neg Hx     Ovarian cancer Neg Hx     Colon cancer Neg Hx     Macular degeneration Neg Hx     Retinal detachment Neg Hx     Amblyopia Neg Hx     Blindness Neg Hx     Cataracts Neg Hx     Strabismus Neg Hx      Social History     Substance and Sexual Activity   Sexual Activity Yes    Partners: Male    Birth control/protection: None       GENETIC SCREENING   Patient's age 35 years or older as of estimated date of delivery? no  Nural tube defect (meningomyelocele,  spina bifida, or anencephaly)? no  Down syndrome? no  Hank-Sachs (Ashkenazi Cheondoism, Cajun, [place])? no  Canavan disease (Ashkenazi Cheondoism)? no  Familial dysautonomia (Ashkenazi Cheondoism)? no  Sickle cell disease or trait ()? no  Hemophilia or other blood disorders? no  Cystic fibrosis? no  Muscular dystrophy? no  Crook's chorea? no  Thalassemia (Italian, Greek, Mediterranean, or  background) MCV less than 80? no  Congenital heart defect? no  Mental retardation/autism? no   If Yes, was person tested for Fragile X? N/A  Other inherited genetic or chromosomal disorder? no  Maternal metabolic disorder (e.g. type 1 diabetes, PKU)? No      Patient or baby's father had a child with birth defects not listed above? no  Recurrent pregnancy loss or a stillbirth: no  Medications (including supplements, vitamins, herbs or OTC drugs)/illicit/recreational drugs/alcohol since last menstrual period? Zoloft  Zytec  PNV  List any other genetic risks: none  Comments/counseling: declines genetic counseling/screening    INFECTION HISTORY  Live with someone with TB or exposed to TB: no  Patient or partner has history of genital herpes: yes both   Rash or viral illness since last menstrual period: no  Patient or partner has hepatitis B or C: no  History of STD, gonorrhea, chlamydia, HPV, HIV, syphilis (list all that apply): no  List other infections: no  Additional comments:     NAREN Torrez     ROS:  WNL  Constitutional/Gen: Denies fevers, chills, malaise, or weight loss. some fatigue   Psych: Denies depression, anxiety  Eyes: Denies changes in vision or scotomata  Ears, nose, mouth, throat: Denies sinus tenderness, swelling, or dentition problems  CV/vasc: Denies heart palpitations or edema  Resp: Denies SOB or dyspnea  Breasts: Denies mass, nipple discharge, or trauma. mild breast tenderness.  GI: Denies constipation, diarrhea, or vomiting. no nausea.  : Denies vaginal discharge, dysuria or pelvic pain. no  urinary frequency  MS: Denies weakness, soreness, or changes in ROM    OBJECTIVE:  General Appearance:    Alert, cooperative, no distress, appears stated age   Head:    Normocephalic, without obvious abnormality, atraumatic   Eyes:    PERRL, conjunctiva/corneas clear both eyes   Ears:    Normal TM's and external ear canals, both ears   Nose:   Nares normal, septum midline, mucosa normal, no drainage    or sinus tenderness   Throat:   Lips, mucosa, and tongue normal; teeth and gums normal   Neck:   Supple, symmetrical, trachea midline, no adenopathy;     thyroid:  no enlargement/tenderness/nodules   Back:     Symmetric, no curvature, ROM normal, no CVA tenderness   Lungs:     Clear to auscultation bilaterally, respirations unlabored   Chest Wall:    No tenderness or deformity    Heart:    Regular rate and rhythm, S1 and S2 normal, no murmur, rub   or gallop   Breast Exam:    No tenderness, masses, or nipple abnormality   Abdomen:     Soft, non-tender, bowel sounds active all four quadrants,     no masses, no organomegaly   Genitalia:    Normal female without lesion, discharge or tenderness                               Cervix:   Long, closed, non-tender   Uterus:   Enlargement compatible with 6-8 weeks gestation; regular, mobile and nontende   Adnexa:   Non-tender, without masses bilaterally       Extremities:   Extremities normal, atraumatic, no cyanosis or edema   Pulses:   2+ and symmetric all extremities   Skin:   Skin color, texture, turgor normal, no rashes or lesions   Lymph nodes:   Cervical, supraclavicular, and axillary nodes normal   Neurologic:   Normal strength, sensation and reflexes     throughout       UPT POS in office    ASSESSMENT:  33 y.o. female  with amenorrhea  Likely at 7w5d via LMP of 2018  Body mass index is 23.29 kg/m².  Patient Active Problem List   Diagnosis    Pelvic floor dysfunction    Miscarriage    Missed     S/P dilation and curettage     Genetic screening  offered and declined   CF offered and declined    PLAN:  Initial OB labs ordered.  Dating ultrasound ordered scheduled today  NT or MT21 declined   Prenatal vitamins ordered.  Pregnancy education and couseling; handouts and booklet provided and reviewed.  -- She has  already attended meet and greCME and has  toured facilityfor daughter Narda-- Oriented to practice and anticipated prenatal course of care  -- Precautions/warning signs and how to reach us reviewed  -- Common complaints of pregnancy  -- Routine prenatal labs including HIV will have today  -- Ultrasound today for dating -- Childbirth education/hospital/[facility] facilities  -- Nutrition and food safety  -- Toxoplasmosis precautions (Cats/Raw Meat)  -- Sexual activity and exercise  -- Environmental/Work hazards  -- Travel  -- Tobacco (Ask, Advise, Assess, Assist, and Arrange), as well as alcohol and drug use  -- Use of any medications (Including supplements, Vitamins, Herbs, or OTC Drugs)      Reviewed use of B6/Unisom prn and dietary modifications to reduce nausea. Pt will notify us if no relief/worsening symptoms, will consider alternative therapies prn    RTC x 4 weeks, call or present sooner prn.   irlanda Monge is a 33 y.o. , presents today for amenorrhea.    Has not seen any other provider for this possible pregnancy.     C/C: amenorrhea    HPI: Reports amenorrhea since Patient's last menstrual period was 2018.. Prior to LMP, menses were  regular occuring every 28 days.  She is not currently on any contraception. + UPT today. Also reports no nausea no vomiting. Has noticed breast tenderness. Denies vaginal bleeding since LMP.    Reports no long-term chronic medical conditions     Review of patient's allergies indicates:  No Known Allergies  Past Medical History:   Diagnosis Date    Allergy     Anxiety     No meds    Fever blister     Oral cold sores only; no hx genital outbreak    Postpartum hypertension     Requiring  magnesium after delivery     Past Surgical History:   Procedure Laterality Date    ANUS SURGERY      FISSURE    DILATION AND CURETTAGE, UTERUS, USING SUCTION N/A 2018    [facility]    NASAL SEPTUM SURGERY      TONSILLECTOMY, ADENOIDECTOMY      WISDOM TOOTH EXTRACTION       Past Surgical History:   Procedure Laterality Date    ANUS SURGERY      FISSURE    DILATION AND CURETTAGE, UTERUS, USING SUCTION N/A 2018    Per[facility] by [facility] at [facility] OR    NASAL SEPTUM SURGERY      TONSILLECTOMY, ADENOIDECTOMY      WISDOM TOOTH EXTRACTION       OB History    Para Term  AB Living   2 1 1 0 0 1   SAB TAB Ectopic Multiple Live Births   0 0 0 0 1      # Outcome Date GA Lbr Roger/2nd Weight Sex Delivery Anes PTL Lv   2 Current            1 Term 17 41w2d 14:20 / 01:09 3.38 kg (7 lb 7.2 oz) F Vag-Spont EPI N OSIRIS      Birth Comments: postpartum preE with postpartum magnesium        OB History      Para Term  AB Living    2 1 1 0 0 1    SAB TAB Ectopic Multiple Live Births    0 0 0 0 1        Social History     Socioeconomic History    Marital status:      Spouse name: [place]    Number of children: 1    Years of education: Not on file    Highest education level: Not on file   Social Needs    Financial resource strain: Not hard at all    Food insecurity - worry: Never true    Food insecurity - inability: Never true    Transportation needs - medical: No    Transportation needs - non-medical: No   Occupational History    Occupation: [employer]   Tobacco Use    Smoking status: Never Smoker    Smokeless tobacco: Never Used   Substance and Sexual Activity    Alcohol use: Yes     Alcohol/week: 0.6 oz     Types: 1 Glasses of wine per week     Comment: Social - not with pregnancy    Drug use: No    Sexual activity: Yes     Partners: Male     Birth control/protection: None   Other Topics Concern    Are you pregnant or think  you may be? 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     Family History   Problem Relation Age of Onset    Glaucoma Father     Hypertension Father     Rheum arthritis Father     Melanoma Paternal Grandfather     Kyphosis Brother     Bipolar disorder Brother     Anxiety disorder Brother     Diabetes Mellitus Maternal Grandfather     No Known Problems Paternal Grandmother     Breast cancer Neg Hx     Cancer Neg Hx     Ovarian cancer Neg Hx     Colon cancer Neg Hx     Macular degeneration Neg Hx     Retinal detachment Neg Hx     Amblyopia Neg Hx     Blindness Neg Hx     Cataracts Neg Hx     Strabismus Neg Hx      Social History     Substance and Sexual Activity   Sexual Activity Yes    Partners: Male    Birth control/protection: None       GENETIC SCREENING   Patient's age 35 years or older as of estimated date of delivery? no  Nural tube defect (meningomyelocele, spina bifida, or anencephaly)? no  Down syndrome? no  Hank-Sachs (Ashkenazi Spiritism, Cajun, [place])? no  Canavan disease (Ashkenazi Spiritism)? no  Familial dysautonomia (Ashkenazi Spiritism)? no  Sickle cell disease or trait ()? no  Hemophilia or other blood disorders? no  Cystic fibrosis? no  Muscular dystrophy? no  Jack's chorea? no  Thalassemia (Italian, Greek, Mediterranean, or  background) MCV less than 80? no  Congenital heart defect? no  Mental retardation/autism? no   If Yes, was person tested for Fragile X? n/a  Other inherited genetic or chromosomal disorder? no  Maternal metabolic disorder (e.g. type 1 diabetes, PKU)? no  Patient or baby's father had a child with birth defects not listed above? no  Recurrent pregnancy loss or a stillbirth: no  Medications (including supplements, vitamins, herbs or OTC drugs)/illicit/recreational drugs/alcohol since last menstrual period?  no   If yes, agent(s) and strength/dose: daily PNV  List any other genetic risks: none  Comments/counseling: declines     INFECTION HISTORY  Live with someone with TB or exposed to TB: no  Patient or partner has history of genital herpes:  has HSV1  Rash or viral illness since last menstrual period: no  Patient or partner has hepatitis B or C: no  History of STD, gonorrhea, chlamydia, HPV, HIV, syphilis (list all that apply): none  List other infections: none      CBadeaux, CNM, MS     ROS:  wnl  Constitutional/Gen: Denies fevers, chills, malaise, or weight loss. some fatigue   Psych: Denies depression, anxiety regarding pregnancy because of sab 2 months ago  Eyes: Denies changes in vision or scotomata  Ears, nose, mouth, throat: Denies sinus tenderness, swelling, or dentition problems  CV/vasc: Denies heart palpitations or edema  Resp: Denies SOB or dyspnea  Breasts: Denies mass, nipple discharge, or trauma. mild breast tenderness.  GI: Denies constipation, diarrhea, or vomiting. no nausea.  : Denies vaginal discharge, dysuria or pelvic pain. occas mild cramps  no urinary frequency  MS: Denies weakness, soreness, or changes in ROM    OBJECTIVE:  General Appearance:    Alert, cooperative, no distress, appears stated age   Head:    Normocephalic, without obvious abnormality, atraumatic   Eyes:    PERRL, conjunctiva/corneas clear both eyes   Ears:    Normal TM's and external ear canals, both ears   Nose:   Nares normal, septum midline, mucosa normal, no drainage    or sinus tenderness   Throat:   Lips, mucosa, and tongue normal; teeth and gums normal   Neck:   Supple, symmetrical, trachea midline, no adenopathy;     thyroid:  no enlargement/tenderness/nodules   Back:     Symmetric, no curvature, ROM normal, no CVA tenderness   Lungs:     Clear to auscultation bilaterally, respirations unlabored   Chest Wall:    No tenderness or deformity    Heart:    Regular rate and rhythm, S1 and S2 normal, no murmur, rub    or gallop   Breast Exam:    No tenderness, masses, or nipple abnormality   Abdomen:     Soft, non-tender, bowel sounds active all four quadrants,     no masses, no organomegaly   Genitalia:    Normal female without lesion, discharge or tenderness                               Cervix:   Long, closed, non-tender   Uterus:   Enlargement compatible with 6-8 weeks gestation; regular, mobile and nontende   Adnexa:   Non-tender, without masses bilaterally       Extremities:   Extremities normal, atraumatic, no cyanosis or edema   Pulses:   2+ and symmetric all extremities   Skin:   Skin color, texture, turgor normal, no rashes or lesions   Lymph nodes:   Cervical, supraclavicular, and axillary nodes normal   Neurologic:   Normal strength, sensation and reflexes     throughout       UPT pos in office    ASSESSMENT:  33 y.o. female  with amenorrhea  Likely at 7w5d via LMP  Body mass index is 23.29 kg/m².  Patient Active Problem List   Diagnosis    Pelvic floor dysfunction    Miscarriage    Missed     S/P dilation and curettage     Genetic screening offered and declined   CF offered and declined     PLAN:  Initial OB labs ordered.  Dating ultrasound ordered.  NT or MT21 ordered.  Prenatal vitamins ordered.  Pregnancy education and couseling; handouts and booklet provided and reviewed.  -- She has  already attended meet and [employer] and has  toured facility  -- Oriented to practice and anticipated prenatal course of care  -- Precautions/warning signs and how to reach us reviewed  -- Common complaints of pregnancy  -- Routine prenatal labs including HIV  -- Ultrasounds  -- Childbirth education/hospital/[facility] facilities  -- Nutrition and food safety  -- Toxoplasmosis precautions (Cats/Raw Meat)  -- Sexual activity and exercise  -- Environmental/Work hazards  -- Travel  -- Tobacco (Ask, Advise, Assess, Assist, and Arrange), as well as alcohol and drug use  -- Use of any medications (Including supplements, Vitamins,  Herbs, or OTC Drugs)      Reviewed use of B6/Unisom prn and dietary modifications to reduce nausea. Pt will notify us if no relief/worsening symptoms, will consider alternative therapies prn    RTC x 4 weeks, call or present sooner prn.   Alexandrea Burch CNM, MS

## 2019-01-22 LAB
HBV SURFACE AG SERPL QL IA: NEGATIVE
HIV 1+2 AB+HIV1 P24 AG SERPL QL IA: NEGATIVE
RPR SER QL: NORMAL
RUBV IGG SER-ACNC: 49.3 IU/ML
RUBV IGG SER-IMP: REACTIVE

## 2019-02-03 ENCOUNTER — PATIENT MESSAGE (OUTPATIENT)
Dept: INTERNAL MEDICINE | Facility: CLINIC | Age: 34
End: 2019-02-03

## 2019-02-18 ENCOUNTER — INITIAL PRENATAL (OUTPATIENT)
Dept: OBSTETRICS AND GYNECOLOGY | Facility: CLINIC | Age: 34
End: 2019-02-18
Payer: COMMERCIAL

## 2019-02-18 VITALS
BODY MASS INDEX: 22.79 KG/M2 | SYSTOLIC BLOOD PRESSURE: 122 MMHG | WEIGHT: 154.31 LBS | DIASTOLIC BLOOD PRESSURE: 68 MMHG

## 2019-02-18 DIAGNOSIS — Z34.91 PRENATAL CARE IN FIRST TRIMESTER: Primary | ICD-10-CM

## 2019-02-18 DIAGNOSIS — F41.8 DEPRESSION WITH ANXIETY: ICD-10-CM

## 2019-02-18 PROCEDURE — 0500F PR INITIAL PRENATAL CARE VISIT: ICD-10-PCS | Mod: S$GLB,,, | Performed by: ADVANCED PRACTICE MIDWIFE

## 2019-02-18 PROCEDURE — 87086 URINE CULTURE/COLONY COUNT: CPT

## 2019-02-18 PROCEDURE — 99999 PR PBB SHADOW E&M-EST. PATIENT-LVL II: ICD-10-PCS | Mod: PBBFAC,,, | Performed by: ADVANCED PRACTICE MIDWIFE

## 2019-02-18 PROCEDURE — 0500F INITIAL PRENATAL CARE VISIT: CPT | Mod: S$GLB,,, | Performed by: ADVANCED PRACTICE MIDWIFE

## 2019-02-18 PROCEDURE — 99999 PR PBB SHADOW E&M-EST. PATIENT-LVL II: CPT | Mod: PBBFAC,,, | Performed by: ADVANCED PRACTICE MIDWIFE

## 2019-02-18 NOTE — PROGRESS NOTES
33 y.o. female  at 11w5d  With Rishabh today  Denies VB, LOF or cramping  Dep/anx - zoloft daily, stable, plans to continue, seeing provider for this  Reviewed prenatal labs  Genetic testing declined  Anatomy US ordered  Reviewed warning signs, normal FM, pregnancy precautions and how/when to call.  RTC x 4 wks, call or present sooner prn.     Birth Center Risk Assessment: 0  0- CNM management in ABC  1- CNM management on L&D  2- Consultation with OB to develop plan of care  3- Collaborative CNM/OB management with delivery on L&D  4- Referral or transfer of care to MD

## 2019-02-19 LAB — BACTERIA UR CULT: NORMAL

## 2019-02-21 ENCOUNTER — OFFICE VISIT (OUTPATIENT)
Dept: INTERNAL MEDICINE | Facility: CLINIC | Age: 34
End: 2019-02-21
Attending: INTERNAL MEDICINE
Payer: COMMERCIAL

## 2019-02-21 VITALS
OXYGEN SATURATION: 100 % | BODY MASS INDEX: 23.12 KG/M2 | SYSTOLIC BLOOD PRESSURE: 113 MMHG | HEIGHT: 69 IN | WEIGHT: 156.06 LBS | DIASTOLIC BLOOD PRESSURE: 62 MMHG | HEART RATE: 78 BPM

## 2019-02-21 DIAGNOSIS — F41.8 DEPRESSION WITH ANXIETY: Primary | ICD-10-CM

## 2019-02-21 PROCEDURE — 99213 PR OFFICE/OUTPT VISIT, EST, LEVL III, 20-29 MIN: ICD-10-PCS | Mod: S$GLB,,, | Performed by: INTERNAL MEDICINE

## 2019-02-21 PROCEDURE — 99999 PR PBB SHADOW E&M-EST. PATIENT-LVL III: CPT | Mod: PBBFAC,,, | Performed by: INTERNAL MEDICINE

## 2019-02-21 PROCEDURE — 99999 PR PBB SHADOW E&M-EST. PATIENT-LVL III: ICD-10-PCS | Mod: PBBFAC,,, | Performed by: INTERNAL MEDICINE

## 2019-02-21 PROCEDURE — 3008F BODY MASS INDEX DOCD: CPT | Mod: CPTII,S$GLB,, | Performed by: INTERNAL MEDICINE

## 2019-02-21 PROCEDURE — 99213 OFFICE O/P EST LOW 20 MIN: CPT | Mod: S$GLB,,, | Performed by: INTERNAL MEDICINE

## 2019-02-21 PROCEDURE — 3008F PR BODY MASS INDEX (BMI) DOCUMENTED: ICD-10-PCS | Mod: CPTII,S$GLB,, | Performed by: INTERNAL MEDICINE

## 2019-02-21 NOTE — PROGRESS NOTES
Subjective:       Patient ID: Catie Monge is a 33 y.o. female.    Chief Complaint: Medication Problem (discuss meds while expecting)     Catie Monge is a 33 y.o.  female who presents for Medication Problem (discuss meds while expecting)  .  Patient Active Problem List   Diagnosis    Pelvic floor dysfunction    Prenatal care in first trimester    Depression with anxiety - zoloft daily     Pt is taking a low dose of zoloft, 25 mg. Sees a mid-wife who is supportive with this decision.  She reports significant improvement of her anxiety and stress and accepts risks of use which are small.  No SI/HI.  Overall doing well. Recent fying trip was a success which usually causes extreme anxiety.  Depression has improved dramatically. Seeing therapist weekly. No history of post partum depression.       Health Maintenance       Date Due Completion Date    Pap Smear with HPV Cotest 03/14/2021 3/14/2018    TETANUS VACCINE 02/15/2027 2/15/2017          Review of Systems   Constitutional: Negative for activity change and unexpected weight change.   HENT: Negative for hearing loss, rhinorrhea and trouble swallowing.    Eyes: Negative for discharge and visual disturbance.   Respiratory: Negative for chest tightness and wheezing.    Cardiovascular: Negative for chest pain and palpitations.   Gastrointestinal: Negative for blood in stool, constipation, diarrhea and vomiting.   Endocrine: Negative for polydipsia and polyuria.   Genitourinary: Negative for difficulty urinating, dysuria, hematuria and menstrual problem.   Musculoskeletal: Negative for arthralgias, joint swelling and neck pain.   Neurological: Negative for weakness and headaches.   Psychiatric/Behavioral: Negative for confusion and dysphoric mood.         Past Medical History:   Diagnosis Date    Allergy     Anxiety     No meds    Fever blister     Oral cold sores only; no hx genital outbreak    Postpartum hypertension     Requiring magnesium after  "delivery       Past Surgical History:   Procedure Laterality Date    ANUS SURGERY      FISSURE    DILATION AND CURETTAGE, UTERUS, USING SUCTION N/A 9/13/2018    Performed by [facility]ry Jr[facility]    NASAL SEPTUM SURGERY      TONSILLECTOMY, ADENOIDECTOMY      WISDOM TOOTH EXTRACTION         Family History   Problem Relation Age of Onset    Glaucoma Father     Hypertension Father     Rheum arthritis Father     Melanoma Paternal Grandfather     Kyphosis Brother     Bipolar disorder Brother     Anxiety disorder Brother     Diabetes Mellitus Maternal Grandfather     No Known Problems Paternal Grandmother     Breast cancer Neg Hx     Cancer Neg Hx     Ovarian cancer Neg Hx     Colon cancer Neg Hx     Macular degeneration Neg Hx     Retinal detachment Neg Hx     Amblyopia Neg Hx     Blindness Neg Hx     Cataracts Neg Hx     Strabismus Neg Hx        Social History     Tobacco Use    Smoking status: Never Smoker    Smokeless tobacco: Never Used   Substance Use Topics    Alcohol use: Yes     Alcohol/week: 0.6 oz     Types: 1 Glasses of wine per week     Comment: Social - not with pregnancy    Drug use: No             Objective:   Blood pressure 113/62, pulse 78, height 5' 9" (1.753 m), weight 70.8 kg (156 lb 1.4 oz), last menstrual period 11/28/2018, SpO2 100 %, not currently breastfeeding.     Physical Exam   Constitutional: She is oriented to person, place, and time. She appears well-developed and well-nourished. No distress.   HENT:   Head: Normocephalic and atraumatic.   Right Ear: External ear normal.   Left Ear: External ear normal.   Eyes: Conjunctivae are normal. No scleral icterus.   Neck: No JVD present. No thyromegaly present.   Cardiovascular: Normal heart sounds. Exam reveals no gallop and no friction rub.   No murmur heard.  Pulmonary/Chest: Effort normal and breath sounds normal. She has no wheezes. She has no rales.   Abdominal: Soft. Bowel sounds are normal. She " exhibits no distension. There is no tenderness.   Musculoskeletal: She exhibits no edema or tenderness.   Lymphadenopathy:     She has no cervical adenopathy.   Neurological: She is alert and oriented to person, place, and time.   Skin: Skin is warm and dry.   Psychiatric: She has a normal mood and affect. Thought content normal.       Prior labs reviewed  Assessment/Plan:        Catie was seen today for medication problem.    Diagnoses and all orders for this visit:    Depression with anxiety - zoloft daily  Comments:  doing well on low dose zoloft  risk/benefits evaluated with pt and chooses to continue  call if any issues        Medication List with Changes/Refills   Current Medications    CETIRIZINE (ZYRTEC) 10 MG TABLET    Take 10 mg by mouth every evening.    PNV95/FERROUS FUMARATE/FA (PRENATAL ORAL)    Take by mouth.    SERTRALINE (ZOLOFT) 25 MG TABLET    Take 1 tablet (25 mg total) by mouth once daily.

## 2019-02-22 ENCOUNTER — PATIENT MESSAGE (OUTPATIENT)
Dept: INTERNAL MEDICINE | Facility: CLINIC | Age: 34
End: 2019-02-22

## 2019-02-22 DIAGNOSIS — F41.8 DEPRESSION WITH ANXIETY: Primary | ICD-10-CM

## 2019-02-22 RX ORDER — SERTRALINE HYDROCHLORIDE 25 MG/1
25 TABLET, FILM COATED ORAL DAILY
Qty: 30 TABLET | Refills: 0 | Status: SHIPPED | OUTPATIENT
Start: 2019-02-22 | End: 2019-03-24 | Stop reason: SDUPTHER

## 2019-03-15 ENCOUNTER — TELEPHONE (OUTPATIENT)
Dept: INTERNAL MEDICINE | Facility: CLINIC | Age: 34
End: 2019-03-15

## 2019-03-15 NOTE — TELEPHONE ENCOUNTER
Spoke with pt and she states she was trying to get a message to her mid-wife because she is pregnant and was have flu like symptoms and she wanted to know what to do. And some how got routed to Dr. Christianson

## 2019-03-15 NOTE — TELEPHONE ENCOUNTER
----- Message from Alexandrea Burch CNM sent at 3/12/2019  4:29 PM CDT -----  Contact: PT      ----- Message -----  From: Pia Mireles  Sent: 3/12/2019   2:39 PM  To: [facility] Staff      Name of Who is Calling:MURPHY MARTINEZ [[id number]]    What is the request in detail: Patient states she my have the flu , wanted to know what should she do or where can she go to have a flu swab    Can the clinic reply by [facility]: YES    [facility] [phone]

## 2019-03-15 NOTE — TELEPHONE ENCOUNTER
Ok, please let her know it does not look like this was forwarded to her caregiver - rec she call their office if able due to time of day or go to an urgent care to be checked if she has not spoken with her provider yet

## 2019-03-15 NOTE — TELEPHONE ENCOUNTER
Spoke w/ pt and informed her for PCP rec and updates to plan   Pt verbally understand and agree   Pt has no further questions

## 2019-03-18 ENCOUNTER — ROUTINE PRENATAL (OUTPATIENT)
Dept: OBSTETRICS AND GYNECOLOGY | Facility: CLINIC | Age: 34
End: 2019-03-18
Payer: COMMERCIAL

## 2019-03-18 VITALS — WEIGHT: 155.13 LBS | SYSTOLIC BLOOD PRESSURE: 118 MMHG | DIASTOLIC BLOOD PRESSURE: 80 MMHG | BODY MASS INDEX: 22.9 KG/M2

## 2019-03-18 DIAGNOSIS — Z34.92 PREGNANT AND NOT YET DELIVERED IN SECOND TRIMESTER: Primary | ICD-10-CM

## 2019-03-18 PROCEDURE — 0502F SUBSEQUENT PRENATAL CARE: CPT | Mod: CPTII,S$GLB,, | Performed by: ADVANCED PRACTICE MIDWIFE

## 2019-03-18 PROCEDURE — 99999 PR PBB SHADOW E&M-EST. PATIENT-LVL II: CPT | Mod: PBBFAC,,, | Performed by: ADVANCED PRACTICE MIDWIFE

## 2019-03-18 PROCEDURE — 99999 PR PBB SHADOW E&M-EST. PATIENT-LVL II: ICD-10-PCS | Mod: PBBFAC,,, | Performed by: ADVANCED PRACTICE MIDWIFE

## 2019-03-18 PROCEDURE — 0502F PR SUBSEQUENT PRENATAL CARE: ICD-10-PCS | Mod: CPTII,S$GLB,, | Performed by: ADVANCED PRACTICE MIDWIFE

## 2019-03-18 NOTE — PROGRESS NOTES
33 y.o. female  at 15w5d   Pt not yet feeling flutters/FM, denies VB, LOF or cramping  Doing well without concerns. She states she feels great and her energy is coming back.   She is so grateful for Zoloft and states it is working very well for her at this low dose.   TWG: -2 lbs   Reviewed prenatal labs  Genetic testing again declined.   Anatomy US scheduled   Reviewed warning signs, normal FM, pregnancy precautions and how/when to call.  RTC x 4 wks, call or present sooner prn.     Birth Center Risk Assessment: 0- Meets birth center guidelines    0- CNM management in ABC  1- CNM management on L&D  2- Consultation with OB to develop  plan of care  3- Collaborative CNM/OB management with delivery on L&D  4- Permanent referral of care to MD

## 2019-03-24 DIAGNOSIS — F41.8 DEPRESSION WITH ANXIETY: ICD-10-CM

## 2019-03-25 RX ORDER — SERTRALINE HYDROCHLORIDE 25 MG/1
25 TABLET, FILM COATED ORAL DAILY
Qty: 30 TABLET | Refills: 6 | Status: SHIPPED | OUTPATIENT
Start: 2019-03-25 | End: 2019-10-04 | Stop reason: SDUPTHER

## 2019-04-08 ENCOUNTER — PATIENT MESSAGE (OUTPATIENT)
Dept: OBSTETRICS AND GYNECOLOGY | Facility: CLINIC | Age: 34
End: 2019-04-08

## 2019-04-22 ENCOUNTER — PROCEDURE VISIT (OUTPATIENT)
Dept: MATERNAL FETAL MEDICINE | Facility: CLINIC | Age: 34
End: 2019-04-22
Payer: COMMERCIAL

## 2019-04-22 ENCOUNTER — ROUTINE PRENATAL (OUTPATIENT)
Dept: OBSTETRICS AND GYNECOLOGY | Facility: CLINIC | Age: 34
End: 2019-04-22
Payer: COMMERCIAL

## 2019-04-22 VITALS — SYSTOLIC BLOOD PRESSURE: 122 MMHG | WEIGHT: 161.19 LBS | BODY MASS INDEX: 23.8 KG/M2 | DIASTOLIC BLOOD PRESSURE: 72 MMHG

## 2019-04-22 DIAGNOSIS — Z34.92 PREGNANT AND NOT YET DELIVERED IN SECOND TRIMESTER: Primary | ICD-10-CM

## 2019-04-22 DIAGNOSIS — Z34.91 PRENATAL CARE IN FIRST TRIMESTER: ICD-10-CM

## 2019-04-22 DIAGNOSIS — Z36.89 ENCOUNTER FOR FETAL ANATOMIC SURVEY: ICD-10-CM

## 2019-04-22 DIAGNOSIS — Z36.89 ENCOUNTER FOR ULTRASOUND TO CHECK FETAL GROWTH: Primary | ICD-10-CM

## 2019-04-22 PROCEDURE — 0502F PR SUBSEQUENT PRENATAL CARE: ICD-10-PCS | Mod: CPTII,S$GLB,, | Performed by: ADVANCED PRACTICE MIDWIFE

## 2019-04-22 PROCEDURE — 99999 PR PBB SHADOW E&M-EST. PATIENT-LVL II: ICD-10-PCS | Mod: PBBFAC,,, | Performed by: ADVANCED PRACTICE MIDWIFE

## 2019-04-22 PROCEDURE — 99499 NO LOS: ICD-10-PCS | Mod: S$GLB,,, | Performed by: OBSTETRICS & GYNECOLOGY

## 2019-04-22 PROCEDURE — 99999 PR PBB SHADOW E&M-EST. PATIENT-LVL II: CPT | Mod: PBBFAC,,, | Performed by: ADVANCED PRACTICE MIDWIFE

## 2019-04-22 PROCEDURE — 76805 PR US, OB 14+WKS, TRANSABD, SINGLE GESTATION: ICD-10-PCS | Mod: S$GLB,,, | Performed by: OBSTETRICS & GYNECOLOGY

## 2019-04-22 PROCEDURE — 76805 OB US >/= 14 WKS SNGL FETUS: CPT | Mod: S$GLB,,, | Performed by: OBSTETRICS & GYNECOLOGY

## 2019-04-22 PROCEDURE — 99499 UNLISTED E&M SERVICE: CPT | Mod: S$GLB,,, | Performed by: OBSTETRICS & GYNECOLOGY

## 2019-04-22 PROCEDURE — 0502F SUBSEQUENT PRENATAL CARE: CPT | Mod: CPTII,S$GLB,, | Performed by: ADVANCED PRACTICE MIDWIFE

## 2019-04-22 NOTE — PROGRESS NOTES
33 y.o. female  at 20w5d  Pt starting to feel flutters/FM, denies VB, LOF or cramping  Doing well without concerns - just returned from vacation.   Reports feels good on Zoloft dose  TWG: 3 lbs   Anatomy US today   Genetic testing declined   Reviewed warning signs, normal FM,  labor precautions and how/when to call.  RTC x 4 wks, call or present sooner prn.     Birth Center Risk Assessment: 0- Meets birth center guidelines    0- CNM management in ABC  1- CNM management on L&D  2- Consultation with OB to develop  plan of care  3- Collaborative CNM/OB management with delivery on L&D  4- Permanent referral of care to MD

## 2019-04-24 PROBLEM — Z34.90 PREGNANCY WITH ONE FETUS, ANTEPARTUM: Status: ACTIVE | Noted: 2019-04-24

## 2019-04-24 PROBLEM — Z34.91 PRENATAL CARE IN FIRST TRIMESTER: Status: RESOLVED | Noted: 2019-02-18 | Resolved: 2019-04-24

## 2019-05-20 ENCOUNTER — PROCEDURE VISIT (OUTPATIENT)
Dept: MATERNAL FETAL MEDICINE | Facility: CLINIC | Age: 34
End: 2019-05-20
Payer: COMMERCIAL

## 2019-05-20 ENCOUNTER — ROUTINE PRENATAL (OUTPATIENT)
Dept: OBSTETRICS AND GYNECOLOGY | Facility: CLINIC | Age: 34
End: 2019-05-20
Payer: COMMERCIAL

## 2019-05-20 VITALS
DIASTOLIC BLOOD PRESSURE: 70 MMHG | WEIGHT: 166.44 LBS | SYSTOLIC BLOOD PRESSURE: 122 MMHG | BODY MASS INDEX: 24.58 KG/M2

## 2019-05-20 DIAGNOSIS — Z23 NEED FOR TDAP VACCINATION: ICD-10-CM

## 2019-05-20 DIAGNOSIS — Z34.90 PREGNANCY WITH ONE FETUS, ANTEPARTUM: Primary | ICD-10-CM

## 2019-05-20 DIAGNOSIS — Z36.89 ENCOUNTER FOR ULTRASOUND TO CHECK FETAL GROWTH: ICD-10-CM

## 2019-05-20 DIAGNOSIS — Z3A.24 24 WEEKS GESTATION OF PREGNANCY: ICD-10-CM

## 2019-05-20 PROCEDURE — 99999 PR PBB SHADOW E&M-EST. PATIENT-LVL II: CPT | Mod: PBBFAC,,, | Performed by: ADVANCED PRACTICE MIDWIFE

## 2019-05-20 PROCEDURE — 99499 NO LOS: ICD-10-PCS | Mod: S$GLB,,, | Performed by: OBSTETRICS & GYNECOLOGY

## 2019-05-20 PROCEDURE — 76816 PR  US,PREGNANT UTERUS,F/U,TRANSABD APP: ICD-10-PCS | Mod: S$GLB,,, | Performed by: OBSTETRICS & GYNECOLOGY

## 2019-05-20 PROCEDURE — 76816 OB US FOLLOW-UP PER FETUS: CPT | Mod: S$GLB,,, | Performed by: OBSTETRICS & GYNECOLOGY

## 2019-05-20 PROCEDURE — 0502F PR SUBSEQUENT PRENATAL CARE: ICD-10-PCS | Mod: CPTII,S$GLB,, | Performed by: ADVANCED PRACTICE MIDWIFE

## 2019-05-20 PROCEDURE — 0502F SUBSEQUENT PRENATAL CARE: CPT | Mod: CPTII,S$GLB,, | Performed by: ADVANCED PRACTICE MIDWIFE

## 2019-05-20 PROCEDURE — 99499 UNLISTED E&M SERVICE: CPT | Mod: S$GLB,,, | Performed by: OBSTETRICS & GYNECOLOGY

## 2019-05-20 PROCEDURE — 99999 PR PBB SHADOW E&M-EST. PATIENT-LVL II: ICD-10-PCS | Mod: PBBFAC,,, | Performed by: ADVANCED PRACTICE MIDWIFE

## 2019-05-20 NOTE — PATIENT INSTRUCTIONS
Understanding  Labor  Going into labor before your 37th week of pregnancy is called  labor.  labor can cause your baby to be born too soon. This can lead to a number of health problems that may affect your baby.     Before labor, the cervix is thick and closed.       In  labor, the cervix begins to efface (thin) and dilate (open).      Symptoms of  labor  If you believe youre having  labor, get medical help right away. Contractions alone dont mean youre in  labor. What matters more are changes in your cervix (the lower end of the uterus). Symptoms of  labor include:  · Four or more contractions per hour  · Strong contractions  · Constant menstrual-like cramping  · Low-back pain  · Mucous or bloody vaginal discharge  · Bleeding or spotting in the second or third trimester  Evaluating  labor  Your healthcare provider will try to find out whether youre in  labor or whether youre just having contractions. He or she may watch you for a few hours. The following tests may be done:  · Pelvic exam to see if your cervix has effaced (thinned) and dilated (opened)  · Uterine activity monitoring to detect contractions  · Fetal monitoring to check the health of your baby  · Ultrasound to check your babys size and position  · Amniocentesis to check how mature your babys lungs are  Caring for yourself at home  If you have  contractions, but your cervix is still thick and closed, your healthcare provider may ask you to do the following at home:  · Drink plenty of water.  · Do fewer activities.  · Rest in bed on your side.  · Avoid intercourse and nipple stimulation.  When to call your healthcare provider  Call your healthcare provider if you notice any of these:  · Four or more contractions per hour  · Bag of water breaks  · Bleeding or spotting   If you need hospital care   labor often requires that you have hospital care and complete bed  rest. You may have an IV (intravenous) line to get fluids. You may be given pills or injections to help prevent contractions. Finally, you may receive medicine (corticosteroids) that helps your babys lungs mature more rapidly.  Are you at risk?  Any pregnant woman can have  labor. It may start for no reason. But these risk factors can increase your chances:  · Past  labor or past early birth  · Smoking, drug, or alcohol use during pregnancy  · Multiple fetuses (twins or more)  · Problems with the shape of the uterus  · Bleeding during the pregnancy  The dangers of  birth  A baby born too soon may have health problems. This is because the baby didnt have enough time to mature. Some of the risks for your baby include:  · Not breastfeeding or feeding well  · Having immature lungs  · Bleeding in the brain  · Dying  Reaching term  Your goal is to get as close to term as you can before giving birth. The closer you get to term, the higher your chance of having a healthy baby. Work with your healthcare provider. Together, you can take steps that may keep you from giving birth too early.  Date Last Reviewed: 2016 [employer]. [address] [place] [address]. All rights reserved. This information is not intended as a substitute for professional medical care. Always follow your healthcare professional's instructions.        Premature Labor    Premature labor ( labor) is when symptoms of labor occur before 37 weeks of pregnancy. (This is 3 weeks before your due date.) Premature labor can lead to premature delivery. This means giving birth to your baby early. Babies need at least 37 weeks of pregnancy for all the organs to develop normally. The earlier the delivery, the greater the risks to the baby.  In most cases, the cause of premature labor is unknown. But certain factors may make the problem more likely. These include:  · History of premature labor with  other pregnancies  · Smoking  · Alcohol or substance abuse  · Low pre-pregnancy weight or weight gain during pregnancy  · Short time period between pregnancies  · Being pregnant with twins, triplets, or more  · History of certain types of surgery on the cervix or uterus  · Having a short cervix  · Certain infections  There are a number of other risk factors. Ask your healthcare provider to help you understand the risk factors specific to your case. Then find out what you can do to control or reduce them.  Contractions are one of the main signs of premature labor. A contraction is different from cramping. It may feel painful and the belly (abdomen) may get hard. It can last from a few seconds to a few minutes. Some women may feel only a sense of pressure in the belly, thighs, rectum, or vagina. Some may feel only the hardening of the uterus without pain or pressure. Or there may be a constant pain the lower back, which spreads forward toward the belly.    Premature labor can often be treated with medicines. A hospital stay will likely be needed. If labor is stopped successfully and you and your baby are both healthy, you may be discharged to continue care at home.  Home care  · Ask your provider any questions you have. Be certain you understand how to care for yourself at home. Also follow all recommendations given by your healthcare providers.  · Learn the signs of premature labor. Watch for these signs when you get home.  · Limit or restrict activities as advised. This may include stopping certain physical activities and cutting back hours at work.  · Avoid doing any strenuous work. Ask family and friends for help with tasks and support at home, if needed.  · Dont smoke, drink alcohol, or use other harmful substances.  · Take steps to reduce stress.  · Report any unusual symptoms to your provider.  Follow-up care  Follow up with your healthcare provider, or as directed. Weekly visits with your provider may be  needed.  When to seek medical advice  Call your healthcare provider right away if any of these occur:  · Regular or frequent contractions, whether they are painful or not  · Pressure in the pelvis  · Pressure in the lower belly or mild cramping in your belly with or without diarrhea  · Constant low, dull backache  · Gush or slow leaking of water from your vagina  · Change in vaginal discharge (watery, mucus, or bloody)  · Any vaginal bleeding  · Decreased movement of your baby  Date Last Reviewed: 9/26/2015 © 2000-2017 [employer]. [address] [place] [address]. All rights reserved. This information is not intended as a substitute for professional medical care. Always follow your healthcare professional's instructions.        Understanding Preeclampsia  Preeclampsia is pregnancy-related hypertension that develops after 20 weeks' gestation. It can lead to health risks for you and your baby. No one knows what causes preeclampsia. But it is known that the only cure is delivery.     Your blood pressure will be monitored regularly throughout your pregnancy to help check for preeclampsia.   Signs and symptoms  A common sign of preeclampsia is high blood pressure. Other signs and symptoms may include:  · Rapid weight gain  · Protein in your urine  · Headache  · Abdominal pain on your right side  · Vision problems (flashes or spots)  · Edema (swelling) in your face or hands (this also commonly happens near the end of normal pregnancies, even without preeclampsia)  Tests you may have  Your healthcare provider will want to check your blood pressure throughout your pregnancy. If your blood pressure is high, you may have the following tests:  · Urine tests to look for protein  · Blood tests to confirm preeclampsia  · Fetal monitoring to ensure that your baby is healthy  Treating preeclampsia  A daily low dose of aspirin may be prescribed to those at risk for preeclampsia. Preeclampsia almost always  ends soon after you give birth. Until then, your healthcare provider can help manage your condition. If your symptoms are mild, you may need bed rest at home. If your symptoms are severe, you will be hospitalized. Hospital treatment includes:  · Complete bed rest to help control blood pressure  · Magnesium IV (intravenous) drip during labor to prevent seizures  · Induced labor or surgical delivery by  section  When to call your healthcare provider  Call your healthcare provider if swelling, weight gain, or other symptoms come on quickly or are severe. Some cases of preeclampsia are more severe than others. Your signs and symptoms also may change or worsen as you get closer to your due date.  Whos at risk?  Preeclampsia can happen in any pregnant woman. Factors that increase the risk include:  · Previous pregnancies. Preeclampsia, intrauterine growth retardation (IUGR),  birth, placental abruption, or fetal death  · Medical history of mother. Diabetes, high blood pressure, obesity, kidney disease, autoimmune disease (for example lupus), or family history of preeclampsia  · Current pregnancy. First pregnancy, multiple fetuses, over the age of 40 years, or in vitro fertilization  Dangers of preeclampsia  If not treated, preeclampsia can cause problems for you and your baby. The placenta (organ that nourishes your baby) may tear away from the uterine wall. This can lead to fetal distress (the baby is at risk for health problems) and premature delivery. Preeclampsia can also cause these health problems:  · Kidney failure or other organ damage  · Seizures  · Stroke  Once you give birth  In most cases, preeclampsia goes away on its own soon after you give birth. Within days of delivery, your blood pressure, swelling, and other signs should decrease.  Date Last Reviewed: 2016  © 0154-4076 [employer]. [address] [place] [address], [place] [address]. All rights reserved. This information is not  intended as a substitute for professional medical care. Always follow your healthcare professional's instructions.

## 2019-05-20 NOTE — PROGRESS NOTES
33 y.o. female  at 24w5d   Reports + FM, denies VB, LOF, or cramping  Doing well without concerns   TW lbs   Desires TDAP at [place]  Reviewed upcoming 28wk labs, (A POS) and orders placed  Reviewed warning signs, normal FM,  labor precautions and how/when to call.  RTC x 4 wks, call or present sooner prn.     Birth Center Risk Assessment: 0- Meets birth center guidelines    0- CNM management in ABC  1- CNM management on L&D  2- Consultation with OB to develop  plan of care  3- Collaborative CNM/OB management with delivery on L&D  4- Permanent referral of care to MD  .

## 2019-05-28 ENCOUNTER — HOSPITAL ENCOUNTER (EMERGENCY)
Facility: OTHER | Age: 34
Discharge: HOME | End: 2019-05-29
Attending: OBSTETRICS & GYNECOLOGY
Payer: COMMERCIAL

## 2019-05-28 VITALS
OXYGEN SATURATION: 100 % | TEMPERATURE: 98 F | DIASTOLIC BLOOD PRESSURE: 75 MMHG | SYSTOLIC BLOOD PRESSURE: 130 MMHG | RESPIRATION RATE: 18 BRPM | HEART RATE: 95 BPM

## 2019-05-28 DIAGNOSIS — Z3A.25 25 WEEKS GESTATION OF PREGNANCY: ICD-10-CM

## 2019-05-28 DIAGNOSIS — K52.9 GASTROENTERITIS, ACUTE: Primary | ICD-10-CM

## 2019-05-28 LAB
ALBUMIN SERPL BCP-MCNC: 3.3 G/DL (ref 3.5–5.2)
ALP SERPL-CCNC: 68 U/L (ref 55–135)
ALT SERPL W/O P-5'-P-CCNC: 26 U/L (ref 10–44)
ANION GAP SERPL CALC-SCNC: 12 MMOL/L (ref 8–16)
AST SERPL-CCNC: 37 U/L (ref 10–40)
BASOPHILS # BLD AUTO: 0.02 K/UL (ref 0–0.2)
BASOPHILS NFR BLD: 0.1 % (ref 0–1.9)
BILIRUB SERPL-MCNC: 0.7 MG/DL (ref 0.1–1)
BILIRUB SERPL-MCNC: NORMAL MG/DL
BLOOD URINE, POC: NORMAL
BUN SERPL-MCNC: 8 MG/DL (ref 6–20)
CALCIUM SERPL-MCNC: 8.6 MG/DL (ref 8.7–10.5)
CHLORIDE SERPL-SCNC: 105 MMOL/L (ref 95–110)
CO2 SERPL-SCNC: 20 MMOL/L (ref 23–29)
COLOR, POC UA: NORMAL
CREAT SERPL-MCNC: 0.6 MG/DL (ref 0.5–1.4)
DIFFERENTIAL METHOD: ABNORMAL
EOSINOPHIL # BLD AUTO: 0.1 K/UL (ref 0–0.5)
EOSINOPHIL NFR BLD: 0.5 % (ref 0–8)
ERYTHROCYTE [DISTWIDTH] IN BLOOD BY AUTOMATED COUNT: 12.5 % (ref 11.5–14.5)
EST. GFR  (AFRICAN AMERICAN): >60 ML/MIN/1.73 M^2
EST. GFR  (NON AFRICAN AMERICAN): >60 ML/MIN/1.73 M^2
GLUCOSE SERPL-MCNC: 91 MG/DL (ref 70–110)
GLUCOSE UR QL STRIP: NORMAL
HCT VFR BLD AUTO: 40.6 % (ref 37–48.5)
HGB BLD-MCNC: 13.6 G/DL (ref 12–16)
KETONES UR QL STRIP: NORMAL
LEUKOCYTE ESTERASE URINE, POC: NORMAL
LYMPHOCYTES # BLD AUTO: 0.7 K/UL (ref 1–4.8)
LYMPHOCYTES NFR BLD: 3.4 % (ref 18–48)
MCH RBC QN AUTO: 30.6 PG (ref 27–31)
MCHC RBC AUTO-ENTMCNC: 33.5 G/DL (ref 32–36)
MCV RBC AUTO: 91 FL (ref 82–98)
MONOCYTES # BLD AUTO: 1.2 K/UL (ref 0.3–1)
MONOCYTES NFR BLD: 5.8 % (ref 4–15)
NEUTROPHILS # BLD AUTO: 18.5 K/UL (ref 1.8–7.7)
NEUTROPHILS NFR BLD: 89 % (ref 38–73)
NITRITE, POC UA: NORMAL
PH, POC UA: 6
PLATELET # BLD AUTO: 332 K/UL (ref 150–350)
PMV BLD AUTO: 9.5 FL (ref 9.2–12.9)
POTASSIUM SERPL-SCNC: 4.4 MMOL/L (ref 3.5–5.1)
PROT SERPL-MCNC: 7.7 G/DL (ref 6–8.4)
PROTEIN, POC: NORMAL
RBC # BLD AUTO: 4.45 M/UL (ref 4–5.4)
SODIUM SERPL-SCNC: 137 MMOL/L (ref 136–145)
SPECIFIC GRAVITY, POC UA: NORMAL
UROBILINOGEN, POC UA: NORMAL
WBC # BLD AUTO: 20.78 K/UL (ref 3.9–12.7)

## 2019-05-28 PROCEDURE — 59025 PR FETAL 2N-STRESS TEST: ICD-10-PCS | Mod: 26,,, | Performed by: OBSTETRICS & GYNECOLOGY

## 2019-05-28 PROCEDURE — 99284 EMERGENCY DEPT VISIT MOD MDM: CPT | Mod: 25

## 2019-05-28 PROCEDURE — 96374 THER/PROPH/DIAG INJ IV PUSH: CPT

## 2019-05-28 PROCEDURE — 63600175 PHARM REV CODE 636 W HCPCS: Performed by: STUDENT IN AN ORGANIZED HEALTH CARE EDUCATION/TRAINING PROGRAM

## 2019-05-28 PROCEDURE — 85025 COMPLETE CBC W/AUTO DIFF WBC: CPT

## 2019-05-28 PROCEDURE — 81002 URINALYSIS NONAUTO W/O SCOPE: CPT

## 2019-05-28 PROCEDURE — 59025 FETAL NON-STRESS TEST: CPT | Mod: 26,,, | Performed by: OBSTETRICS & GYNECOLOGY

## 2019-05-28 PROCEDURE — 96361 HYDRATE IV INFUSION ADD-ON: CPT

## 2019-05-28 PROCEDURE — 99284 EMERGENCY DEPT VISIT MOD MDM: CPT | Mod: 25,,, | Performed by: OBSTETRICS & GYNECOLOGY

## 2019-05-28 PROCEDURE — 99284 PR EMERGENCY DEPT VISIT,LEVEL IV: ICD-10-PCS | Mod: 25,,, | Performed by: OBSTETRICS & GYNECOLOGY

## 2019-05-28 PROCEDURE — 59025 FETAL NON-STRESS TEST: CPT

## 2019-05-28 PROCEDURE — 80053 COMPREHEN METABOLIC PANEL: CPT

## 2019-05-28 RX ORDER — ONDANSETRON 2 MG/ML
4 INJECTION INTRAMUSCULAR; INTRAVENOUS ONCE
Status: COMPLETED | OUTPATIENT
Start: 2019-05-28 | End: 2019-05-28

## 2019-05-28 RX ORDER — ONDANSETRON 4 MG/1
4 TABLET, FILM COATED ORAL EVERY 6 HOURS
Qty: 12 TABLET | Refills: 0 | Status: SHIPPED | OUTPATIENT
Start: 2019-05-28 | End: 2019-06-17

## 2019-05-28 RX ADMIN — ONDANSETRON 4 MG: 2 INJECTION INTRAMUSCULAR; INTRAVENOUS at 11:05

## 2019-05-28 RX ADMIN — SODIUM CHLORIDE, SODIUM LACTATE, POTASSIUM CHLORIDE, AND CALCIUM CHLORIDE 1000 ML: .6; .31; .03; .02 INJECTION, SOLUTION INTRAVENOUS at 11:05

## 2019-05-29 ENCOUNTER — TELEPHONE (OUTPATIENT)
Dept: OBSTETRICS AND GYNECOLOGY | Facility: HOSPITAL | Age: 34
End: 2019-05-29

## 2019-05-29 PROCEDURE — 25000003 PHARM REV CODE 250: Performed by: STUDENT IN AN ORGANIZED HEALTH CARE EDUCATION/TRAINING PROGRAM

## 2019-05-29 NOTE — ED PROVIDER NOTES
Encounter Date: 2019       History     Chief Complaint   Patient presents with    Emesis    Diarrhea     32 yo  at 25w6d presents for nausea, vomiting, and diarrhea that started about 3 hours ago. She states that she was feeling fine and then after dinner began to have terrible nausea. She has vomited 3 times and has had about 12 episodes of diarrhea. She states that she tends to get really sick whenever there is a stomach bug going around and she wanted to be proactive about getting treatment because she is pregnant. She also notes a mild rash over her face that is not itchy. No contractions, vaginal bleeding, leakage of fluid. Reports good fetal movement. This pregnancy is complicated by depression.        Review of patient's allergies indicates:  No Known Allergies  Past Medical History:   Diagnosis Date    Allergy     Anxiety     No meds    Fever blister     Oral cold sores only; no hx genital outbreak    Postpartum hypertension     Requiring magnesium after delivery     Past Surgical History:   Procedure Laterality Date    ANUS SURGERY      FISSURE    DILATION AND CURETTAGE, UTERUS, USING SUCTION N/A 2018    Per[facility]    NASAL SEPTUM SURGERY      TONSILLECTOMY, ADENOIDECTOMY      WISDOM TOOTH EXTRACTION       Family History   Problem Relation Age of Onset    Glaucoma Father     Hypertension Father     Rheum arthritis Father     Melanoma Paternal Grandfather     Kyphosis Brother     Bipolar disorder Brother     Anxiety disorder Brother     Diabetes Mellitus Maternal Grandfather     No Known Problems Paternal Grandmother     Breast cancer Neg Hx     Cancer Neg Hx     Ovarian cancer Neg Hx     Colon cancer Neg Hx     Macular degeneration Neg Hx     Retinal detachment Neg Hx     Amblyopia Neg Hx     Blindness Neg Hx     Cataracts Neg Hx     Strabismus Neg Hx      Social History     Tobacco Use    Smoking status: Never Smoker     Smokeless tobacco: Never Used   Substance Use Topics    Alcohol use: Yes     Alcohol/week: 0.6 oz     Types: 1 Glasses of wine per week     Comment: Social - not with pregnancy    Drug use: No     Review of Systems   Constitutional: Negative for chills and fever.   Eyes: Negative for visual disturbance.   Respiratory: Negative for chest tightness and shortness of breath.    Cardiovascular: Negative for chest pain.   Gastrointestinal: Positive for abdominal pain, diarrhea, nausea and vomiting. Negative for constipation.   Genitourinary: Positive for pelvic pain. Negative for dysuria, vaginal bleeding and vaginal discharge.   Neurological: Negative for dizziness, light-headedness and headaches.       Physical Exam     Initial Vitals   BP Pulse Resp Temp SpO2   05/28/19 2245 05/28/19 2245 05/28/19 2245 05/28/19 2248 05/28/19 2246   130/75 95 18 97.8 °F (36.6 °C) 100 %      MAP       --                Physical Exam    Vitals reviewed.  Constitutional: She appears well-developed and well-nourished. She is not diaphoretic. No distress.   HENT:   Head: Normocephalic and atraumatic.   Nose: Nose normal.   Eyes: Conjunctivae are normal.   Neck: Normal range of motion.   Cardiovascular: Normal rate.   Pulmonary/Chest: No respiratory distress.   Abdominal: Soft. She exhibits no distension. There is no tenderness.   Gravid   Musculoskeletal: She exhibits no edema or tenderness.   Neurological: She is alert and oriented to person, place, and time.   Skin: Skin is warm and dry. Rash (Petechial rash over the face) noted.   Psychiatric: She has a normal mood and affect. Her behavior is normal. Judgment and thought content normal.         ED Course   Obtain Fetal nonstress test (NST)  Date/Time: 5/28/2019 10:59 PM  Performed by: Marie Fernandez MD  Authorized by: Marie Fernandez MD     Nonstress Test:     Variability:  6-25 BPM    Decelerations:  None    Accelerations:  10 bpm    Baseline:  135    Contractions:   Irregular    Contraction comment: Not feeling them.  Biophysical Profile:     Nonstress Test Interpretation: reactive      Overall Impression:  Reassuring      Labs Reviewed   CBC W/ AUTO DIFFERENTIAL   COMPREHENSIVE METABOLIC PANEL          Imaging Results    None          Medical Decision Making:   ED Management:  Vital signs stable. NST reassuring. 1 L bolus of LR and IV zofran given. CBC and CMP drawn. Will continue to monitor.    CBC with elevated white count. Labs otherwise normal. Patient reports improvement in symptoms after IV fluids and zofran. Patient stable for discharge. Rx for zofran sent to [facility] pharmacy. Encouraged patient to return to ED if unable to tolerate PO intake.                       Clinical Impression:       ICD-10-CM ICD-9-CM   1. 25 weeks gestation of pregnancy Z3A.25 V22.2   2. Gastroenteritis, acute K52.9 558.9         Disposition:   Disposition: Discharged  Condition: Stable                        Marie Fernandez MD  Resident  05/28/19 0861

## 2019-05-29 NOTE — TELEPHONE ENCOUNTER
Returned pt's call. Reports seen in OB ED last night for Gastroenteritis and recurrent diarrhea. No fever. Reports GI s/s resolved now (following fluids and zofran).  Reports had mild rash last night and now woke up from a nap with welps and what looks like hives to her to both lower extremeties and spreading to arms. Reports it itches. No other s/s. Denies swelling or tingling to mouth, lips, or tongue. Denies difficulty swallowing or breathing. No pregnancy c/o.    Recommend Benadryl 25-50mg PO x1 now and may use hydrocortisone cream. Pt to call if worsening or persistent condition.    ----- Message from Jessica Palumbo sent at 5/29/2019  4:07 PM CDT -----  Contact: Pt   Name of Who is Calling: MURPHY MARTINEZ [[id number]]      What is the request in detail: Patient is requesting a call from staff in regards to a rash she has, went to ED on 5/28. Please contact to further discuss and advise      Can the clinic reply by [facility]: No       What Number to [facility] Back [facility]: [phone]

## 2019-05-29 NOTE — DISCHARGE INSTRUCTIONS
Please return to Labor and Delivery if you experience any leaking of fluid (like your water bag broke), vaginal bleeding (soaking more than 1 pad per hour), decreased fetal movement, and/or having contractions every 2-5 minutes for 2 hours.     Labor and Delivery: [phone]

## 2019-06-05 ENCOUNTER — TELEPHONE (OUTPATIENT)
Dept: OBSTETRICS AND GYNECOLOGY | Facility: CLINIC | Age: 34
End: 2019-06-05

## 2019-06-05 NOTE — TELEPHONE ENCOUNTER
at 27w 0d    Abdominal tightening over the last 25min and pt reports uncomfortable. Rates pain 3/10.  Has had approx 60oz water this morning and voided recently. Diarrhea x1, no fever, no nausea/vomiting. Reports good FM. Denies VB or LOF.    Advised pt lay down and rest, continue hydration. Reviewed PTL warning s/s.

## 2019-06-06 ENCOUNTER — TELEPHONE (OUTPATIENT)
Dept: OBSTETRICS AND GYNECOLOGY | Facility: CLINIC | Age: 34
End: 2019-06-06

## 2019-06-06 ENCOUNTER — PATIENT MESSAGE (OUTPATIENT)
Dept: OBSTETRICS AND GYNECOLOGY | Facility: CLINIC | Age: 34
End: 2019-06-06

## 2019-06-17 ENCOUNTER — LAB VISIT (OUTPATIENT)
Dept: LAB | Facility: OTHER | Age: 34
End: 2019-06-17
Payer: COMMERCIAL

## 2019-06-17 ENCOUNTER — ROUTINE PRENATAL (OUTPATIENT)
Dept: OBSTETRICS AND GYNECOLOGY | Facility: CLINIC | Age: 34
End: 2019-06-17
Payer: COMMERCIAL

## 2019-06-17 ENCOUNTER — CLINICAL SUPPORT (OUTPATIENT)
Dept: OBSTETRICS AND GYNECOLOGY | Facility: CLINIC | Age: 34
End: 2019-06-17
Payer: COMMERCIAL

## 2019-06-17 VITALS
DIASTOLIC BLOOD PRESSURE: 70 MMHG | WEIGHT: 169.06 LBS | SYSTOLIC BLOOD PRESSURE: 110 MMHG | BODY MASS INDEX: 24.97 KG/M2

## 2019-06-17 DIAGNOSIS — Z34.93 PREGNANT AND NOT YET DELIVERED IN THIRD TRIMESTER: Primary | ICD-10-CM

## 2019-06-17 DIAGNOSIS — Z34.90 PREGNANCY WITH ONE FETUS, ANTEPARTUM: ICD-10-CM

## 2019-06-17 LAB
BASOPHILS # BLD AUTO: 0.02 K/UL (ref 0–0.2)
BASOPHILS NFR BLD: 0.2 % (ref 0–1.9)
DIFFERENTIAL METHOD: ABNORMAL
EOSINOPHIL # BLD AUTO: 0.1 K/UL (ref 0–0.5)
EOSINOPHIL NFR BLD: 1.2 % (ref 0–8)
ERYTHROCYTE [DISTWIDTH] IN BLOOD BY AUTOMATED COUNT: 12.9 % (ref 11.5–14.5)
GLUCOSE SERPL-MCNC: 90 MG/DL (ref 70–140)
HCT VFR BLD AUTO: 38.5 % (ref 37–48.5)
HGB BLD-MCNC: 12.6 G/DL (ref 12–16)
LYMPHOCYTES # BLD AUTO: 1.3 K/UL (ref 1–4.8)
LYMPHOCYTES NFR BLD: 13.5 % (ref 18–48)
MCH RBC QN AUTO: 30.4 PG (ref 27–31)
MCHC RBC AUTO-ENTMCNC: 32.7 G/DL (ref 32–36)
MCV RBC AUTO: 93 FL (ref 82–98)
MONOCYTES # BLD AUTO: 0.8 K/UL (ref 0.3–1)
MONOCYTES NFR BLD: 8.3 % (ref 4–15)
NEUTROPHILS # BLD AUTO: 7.3 K/UL (ref 1.8–7.7)
NEUTROPHILS NFR BLD: 74.1 % (ref 38–73)
PLATELET # BLD AUTO: 323 K/UL (ref 150–350)
PMV BLD AUTO: 9.3 FL (ref 9.2–12.9)
RBC # BLD AUTO: 4.15 M/UL (ref 4–5.4)
RPR SER QL: NORMAL
WBC # BLD AUTO: 9.84 K/UL (ref 3.9–12.7)

## 2019-06-17 PROCEDURE — 90715 TDAP VACCINE 7 YRS/> IM: CPT | Mod: S$GLB,,, | Performed by: MIDWIFE

## 2019-06-17 PROCEDURE — 99999 PR PBB SHADOW E&M-EST. PATIENT-LVL I: ICD-10-PCS | Mod: PBBFAC,,,

## 2019-06-17 PROCEDURE — 99999 PR PBB SHADOW E&M-EST. PATIENT-LVL II: CPT | Mod: PBBFAC,,, | Performed by: ADVANCED PRACTICE MIDWIFE

## 2019-06-17 PROCEDURE — 0502F SUBSEQUENT PRENATAL CARE: CPT | Mod: CPTII,S$GLB,, | Performed by: ADVANCED PRACTICE MIDWIFE

## 2019-06-17 PROCEDURE — 82950 GLUCOSE TEST: CPT

## 2019-06-17 PROCEDURE — 90471 IMMUNIZATION ADMIN: CPT | Mod: S$GLB,,, | Performed by: MIDWIFE

## 2019-06-17 PROCEDURE — 36415 COLL VENOUS BLD VENIPUNCTURE: CPT

## 2019-06-17 PROCEDURE — 86703 HIV-1/HIV-2 1 RESULT ANTBDY: CPT

## 2019-06-17 PROCEDURE — 99999 PR PBB SHADOW E&M-EST. PATIENT-LVL I: CPT | Mod: PBBFAC,,,

## 2019-06-17 PROCEDURE — 85025 COMPLETE CBC W/AUTO DIFF WBC: CPT

## 2019-06-17 PROCEDURE — 86592 SYPHILIS TEST NON-TREP QUAL: CPT

## 2019-06-17 PROCEDURE — 99999 PR PBB SHADOW E&M-EST. PATIENT-LVL II: ICD-10-PCS | Mod: PBBFAC,,, | Performed by: ADVANCED PRACTICE MIDWIFE

## 2019-06-17 PROCEDURE — 90715 TDAP VACCINE GREATER THAN OR EQUAL TO 7YO IM: ICD-10-PCS | Mod: S$GLB,,, | Performed by: MIDWIFE

## 2019-06-17 PROCEDURE — 90471 TDAP VACCINE GREATER THAN OR EQUAL TO 7YO IM: ICD-10-PCS | Mod: S$GLB,,, | Performed by: MIDWIFE

## 2019-06-17 PROCEDURE — 0502F PR SUBSEQUENT PRENATAL CARE: ICD-10-PCS | Mod: CPTII,S$GLB,, | Performed by: ADVANCED PRACTICE MIDWIFE

## 2019-06-17 NOTE — PROGRESS NOTES
Here for TDAP injection. Patient without complaint of pain at this time ,Injection given. Tolerated well. No pain noted after injection.  Advised to wait in lobby 15 minutes and report any adverse reactions.     Site: [facility]    Baby Friendly Handout Given to Patient

## 2019-06-17 NOTE — PROGRESS NOTES
33 y.o. female  at 28w5d   Reports + FM, denies VB, LOF or CTX  Doing well without concerns   TW lbs   28wk labs today (A POS)  Tdap today   Reviewed warning signs, normal FKCs,  labor precautions and how/when to call.  RTC x 2 wks, call or present sooner prn.   Birth Center Risk Assessment: 0- Meets birth center guidelines    0- CNM management in ABC  1- CNM management on L&D  2- Consultation with OB to develop  plan of care  3- Collaborative CNM/OB management with delivery on L&D  4- Permanent referral of care to MD

## 2019-06-18 LAB — HIV 1+2 AB+HIV1 P24 AG SERPL QL IA: NEGATIVE

## 2019-07-01 ENCOUNTER — ROUTINE PRENATAL (OUTPATIENT)
Dept: OBSTETRICS AND GYNECOLOGY | Facility: CLINIC | Age: 34
End: 2019-07-01
Payer: COMMERCIAL

## 2019-07-01 VITALS
SYSTOLIC BLOOD PRESSURE: 112 MMHG | BODY MASS INDEX: 25.49 KG/M2 | DIASTOLIC BLOOD PRESSURE: 72 MMHG | WEIGHT: 172.63 LBS

## 2019-07-01 DIAGNOSIS — Z34.93 PREGNANCY WITH ONE FETUS IN THIRD TRIMESTER: Primary | ICD-10-CM

## 2019-07-01 PROCEDURE — 99999 PR PBB SHADOW E&M-EST. PATIENT-LVL II: CPT | Mod: PBBFAC,,, | Performed by: ADVANCED PRACTICE MIDWIFE

## 2019-07-01 PROCEDURE — 0502F SUBSEQUENT PRENATAL CARE: CPT | Mod: CPTII,S$GLB,, | Performed by: ADVANCED PRACTICE MIDWIFE

## 2019-07-01 PROCEDURE — 0502F PR SUBSEQUENT PRENATAL CARE: ICD-10-PCS | Mod: CPTII,S$GLB,, | Performed by: ADVANCED PRACTICE MIDWIFE

## 2019-07-01 PROCEDURE — 99999 PR PBB SHADOW E&M-EST. PATIENT-LVL II: ICD-10-PCS | Mod: PBBFAC,,, | Performed by: ADVANCED PRACTICE MIDWIFE

## 2019-07-01 NOTE — PROGRESS NOTES
34 y.o. female  at 30w5d  Reports + FM, denies VB, LOF or CTX  Doing well without concerns   Here alone  TW lbs   Reviewed 28wk lab results (A POS)  Got Tdap  No anemia  36wk labs done  Reviewed warning signs, normal FKCs,  labor precautions and how/when to call.  RTC x 2 wks, call or present sooner prn.     Birth Center Risk Assessment: 0- Meets birth center guidelines    0- CNM management in ABC  1- CNM management on L&D  2- Consultation with OB to develop  plan of care  3- Collaborative CNM/OB management with delivery on L&D  4- Permanent referral of care to MD

## 2019-07-03 ENCOUNTER — PATIENT MESSAGE (OUTPATIENT)
Dept: OBSTETRICS AND GYNECOLOGY | Facility: CLINIC | Age: 34
End: 2019-07-03

## 2019-07-15 ENCOUNTER — ROUTINE PRENATAL (OUTPATIENT)
Dept: OBSTETRICS AND GYNECOLOGY | Facility: CLINIC | Age: 34
End: 2019-07-15
Payer: COMMERCIAL

## 2019-07-15 VITALS
BODY MASS INDEX: 25.56 KG/M2 | DIASTOLIC BLOOD PRESSURE: 76 MMHG | SYSTOLIC BLOOD PRESSURE: 114 MMHG | WEIGHT: 173.06 LBS

## 2019-07-15 DIAGNOSIS — Z3A.32 32 WEEKS GESTATION OF PREGNANCY: ICD-10-CM

## 2019-07-15 DIAGNOSIS — Z34.90 PREGNANCY WITH ONE FETUS, ANTEPARTUM: Primary | ICD-10-CM

## 2019-07-15 PROCEDURE — 99999 PR PBB SHADOW E&M-EST. PATIENT-LVL II: CPT | Mod: PBBFAC,,, | Performed by: ADVANCED PRACTICE MIDWIFE

## 2019-07-15 PROCEDURE — 99999 PR PBB SHADOW E&M-EST. PATIENT-LVL II: ICD-10-PCS | Mod: PBBFAC,,, | Performed by: ADVANCED PRACTICE MIDWIFE

## 2019-07-15 PROCEDURE — 0502F SUBSEQUENT PRENATAL CARE: CPT | Mod: CPTII,S$GLB,, | Performed by: ADVANCED PRACTICE MIDWIFE

## 2019-07-15 PROCEDURE — 0502F PR SUBSEQUENT PRENATAL CARE: ICD-10-PCS | Mod: CPTII,S$GLB,, | Performed by: ADVANCED PRACTICE MIDWIFE

## 2019-07-15 NOTE — PROGRESS NOTES
34 y.o. female  at 32w5d   Reports + FM, denies VB, LOF or CTX  Doing well without concerns   TWG: 15 lbs   Reviewed 28wk lab results (A POS)  Tdap done  Reviewed warning signs, normal FKCs,  labor precautions and how/when to call.  RTC x 2 wks, call or present sooner prn.

## 2019-07-25 ENCOUNTER — ROUTINE PRENATAL (OUTPATIENT)
Dept: OBSTETRICS AND GYNECOLOGY | Facility: CLINIC | Age: 34
End: 2019-07-25
Payer: COMMERCIAL

## 2019-07-25 VITALS — BODY MASS INDEX: 25.9 KG/M2 | SYSTOLIC BLOOD PRESSURE: 118 MMHG | DIASTOLIC BLOOD PRESSURE: 72 MMHG | WEIGHT: 175.38 LBS

## 2019-07-25 DIAGNOSIS — Z34.93 PREGNANT AND NOT YET DELIVERED IN THIRD TRIMESTER: Primary | ICD-10-CM

## 2019-07-25 PROCEDURE — 0502F PR SUBSEQUENT PRENATAL CARE: ICD-10-PCS | Mod: CPTII,S$GLB,, | Performed by: ADVANCED PRACTICE MIDWIFE

## 2019-07-25 PROCEDURE — 99999 PR PBB SHADOW E&M-EST. PATIENT-LVL II: ICD-10-PCS | Mod: PBBFAC,,, | Performed by: ADVANCED PRACTICE MIDWIFE

## 2019-07-25 PROCEDURE — 0502F SUBSEQUENT PRENATAL CARE: CPT | Mod: CPTII,S$GLB,, | Performed by: ADVANCED PRACTICE MIDWIFE

## 2019-07-25 PROCEDURE — 99999 PR PBB SHADOW E&M-EST. PATIENT-LVL II: CPT | Mod: PBBFAC,,, | Performed by: ADVANCED PRACTICE MIDWIFE

## 2019-07-25 NOTE — LETTER
July 25, 2019    Catie Monge  [address] [place] [address]         [facility]  [address]  [facility] [address]  Phone: [phone]  Fax: [phone] July 25, 2019     Patient: Catie Monge   YOB: 1985   Date of Visit: 7/25/2019       To Whom It May Concern:    It is my medical opinion that Catie Monge may fly to [place] and back next week.     If you have any questions or concerns, please don't hesitate to call.    Sincerely,        Rebecca Brannon CNM

## 2019-07-25 NOTE — PROGRESS NOTES
34 y.o. female  at 34w1d   Pt going to [place] this week - flight release provided.   Reports + FM, denies VB, LOF or CTX  Doing well with exception of vulvar varicosities. Pt wearing compression garment and it helps some.   TW lbs   Reviewed 28wk lab results (A POS)  Reviewed warning signs, normal FKCs,  labor precautions and how/when to call.  RTC x 2 wks, call or present sooner prn.     Birth Center Risk Assessment: 0- Meets birth center guidelines    0- CNM management in ABC  1- CNM management on L&D  2- Consultation with OB to develop  plan of care  3- Collaborative CNM/OB management with delivery on L&D  4- Permanent referral of care to MD

## 2019-08-04 ENCOUNTER — PATIENT MESSAGE (OUTPATIENT)
Dept: OBSTETRICS AND GYNECOLOGY | Facility: CLINIC | Age: 34
End: 2019-08-04

## 2019-08-08 ENCOUNTER — ROUTINE PRENATAL (OUTPATIENT)
Dept: OBSTETRICS AND GYNECOLOGY | Facility: CLINIC | Age: 34
End: 2019-08-08
Payer: COMMERCIAL

## 2019-08-08 VITALS
BODY MASS INDEX: 26.63 KG/M2 | DIASTOLIC BLOOD PRESSURE: 74 MMHG | WEIGHT: 180.31 LBS | SYSTOLIC BLOOD PRESSURE: 108 MMHG

## 2019-08-08 DIAGNOSIS — Z34.93 PRENATAL CARE IN THIRD TRIMESTER: ICD-10-CM

## 2019-08-08 DIAGNOSIS — Z3A.36 36 WEEKS GESTATION OF PREGNANCY: Primary | ICD-10-CM

## 2019-08-08 PROCEDURE — 87081 CULTURE SCREEN ONLY: CPT

## 2019-08-08 PROCEDURE — 99999 PR PBB SHADOW E&M-EST. PATIENT-LVL III: ICD-10-PCS | Mod: PBBFAC,,, | Performed by: NURSE PRACTITIONER

## 2019-08-08 PROCEDURE — 0502F SUBSEQUENT PRENATAL CARE: CPT | Mod: CPTII,S$GLB,, | Performed by: NURSE PRACTITIONER

## 2019-08-08 PROCEDURE — 0502F PR SUBSEQUENT PRENATAL CARE: ICD-10-PCS | Mod: CPTII,S$GLB,, | Performed by: NURSE PRACTITIONER

## 2019-08-08 PROCEDURE — 99999 PR PBB SHADOW E&M-EST. PATIENT-LVL III: CPT | Mod: PBBFAC,,, | Performed by: NURSE PRACTITIONER

## 2019-08-08 NOTE — PROGRESS NOTES
34 y.o. female  at 36w1d   Reports + FM, denies VB, LOF or regular CTX  Doing well without concerns.  TW lbs   Delivery consents signed today  GBS collected today   Reviewed warning signs, normal FKCs, labor precautions and how/when to call.  RTC x 1 wks, call or present sooner prn.     Birth Center Risk Assessment: 0- Meets birth center guidelines    0- CNM management in ABC  1- CNM management on L&D  2- Consultation with OB to develop  plan of care  3- Collaborative CNM/OB management with delivery on L&D  4- Permanent referral of care to MD

## 2019-08-10 LAB — BACTERIA SPEC AEROBE CULT: NORMAL

## 2019-08-13 ENCOUNTER — ROUTINE PRENATAL (OUTPATIENT)
Dept: OBSTETRICS AND GYNECOLOGY | Facility: CLINIC | Age: 34
End: 2019-08-13
Payer: COMMERCIAL

## 2019-08-13 VITALS — DIASTOLIC BLOOD PRESSURE: 72 MMHG | BODY MASS INDEX: 26.5 KG/M2 | WEIGHT: 179.44 LBS | SYSTOLIC BLOOD PRESSURE: 118 MMHG

## 2019-08-13 DIAGNOSIS — Z34.93 PREGNANCY WITH ONE FETUS IN THIRD TRIMESTER: Primary | ICD-10-CM

## 2019-08-13 DIAGNOSIS — Z91.89 AT RISK FOR INEFFECTIVE BREASTFEEDING: Primary | ICD-10-CM

## 2019-08-13 PROCEDURE — 0502F PR SUBSEQUENT PRENATAL CARE: ICD-10-PCS | Mod: CPTII,S$GLB,, | Performed by: ADVANCED PRACTICE MIDWIFE

## 2019-08-13 PROCEDURE — 99999 PR PBB SHADOW E&M-EST. PATIENT-LVL II: CPT | Mod: PBBFAC,,, | Performed by: ADVANCED PRACTICE MIDWIFE

## 2019-08-13 PROCEDURE — 99999 PR PBB SHADOW E&M-EST. PATIENT-LVL II: ICD-10-PCS | Mod: PBBFAC,,, | Performed by: ADVANCED PRACTICE MIDWIFE

## 2019-08-13 PROCEDURE — 0502F SUBSEQUENT PRENATAL CARE: CPT | Mod: CPTII,S$GLB,, | Performed by: ADVANCED PRACTICE MIDWIFE

## 2019-08-13 NOTE — PROGRESS NOTES
34 y.o. female  at 36w6d   Reports + FM, denies VB, LOF or regular CTX  Doing well without concerns   Daughter here  Joe AAKASH  TW lbs   Delivery consents already signed  Reviewed GBS neg   Reviewed repeat HIV/RPR neg/NR  Reviewed warning signs, normal FKCs, labor precautions and how/when to call.  RTC x 1 wks, call or present sooner prn.     Birth Center Risk Assessment: 0- Meets birth center guidelines    0- CNM management in ABC  1- CNM management on L&D  2- Consultation with OB to develop  plan of care  3- Collaborative CNM/OB management with delivery on L&D  4- Permanent referral of care to MD

## 2019-08-22 ENCOUNTER — ROUTINE PRENATAL (OUTPATIENT)
Dept: OBSTETRICS AND GYNECOLOGY | Facility: CLINIC | Age: 34
End: 2019-08-22
Payer: COMMERCIAL

## 2019-08-22 VITALS — SYSTOLIC BLOOD PRESSURE: 120 MMHG | WEIGHT: 183 LBS | BODY MASS INDEX: 27.02 KG/M2 | DIASTOLIC BLOOD PRESSURE: 82 MMHG

## 2019-08-22 DIAGNOSIS — Z3A.38 38 WEEKS GESTATION OF PREGNANCY: Primary | ICD-10-CM

## 2019-08-22 DIAGNOSIS — Z34.93 PRENATAL CARE IN THIRD TRIMESTER: ICD-10-CM

## 2019-08-22 PROCEDURE — 0502F SUBSEQUENT PRENATAL CARE: CPT | Mod: CPTII,S$GLB,, | Performed by: NURSE PRACTITIONER

## 2019-08-22 PROCEDURE — 99999 PR PBB SHADOW E&M-EST. PATIENT-LVL III: CPT | Mod: PBBFAC,,, | Performed by: NURSE PRACTITIONER

## 2019-08-22 PROCEDURE — 99999 PR PBB SHADOW E&M-EST. PATIENT-LVL III: ICD-10-PCS | Mod: PBBFAC,,, | Performed by: NURSE PRACTITIONER

## 2019-08-22 PROCEDURE — 0502F PR SUBSEQUENT PRENATAL CARE: ICD-10-PCS | Mod: CPTII,S$GLB,, | Performed by: NURSE PRACTITIONER

## 2019-08-22 RX ORDER — FAMOTIDINE 20 MG/1
TABLET, FILM COATED ORAL
COMMUNITY
End: 2021-06-21

## 2019-08-22 NOTE — PROGRESS NOTES
34 y.o. female  at 38w1d   Reports + FM, denies VB, LOF, regular CTX  Doing well without concerns.  TW lbs   Delivery consents already signed  Reviewed GBS (-)  Reviewed repeat HIV/RPR (-)/(non-reactive)  Reviewed warning signs, normal FKCs, labor precautions and how/when to call.  RTC x 1 wks, call or present sooner prn.     Birth Center Risk Assessment: 0- Meets birth center guidelines    0- CNM management in ABC  1- CNM management on L&D  2- Consultation with OB to develop  plan of care  3- Collaborative CNM/OB management with delivery on L&D  4- Permanent referral of care to MD

## 2019-08-28 ENCOUNTER — ROUTINE PRENATAL (OUTPATIENT)
Dept: OBSTETRICS AND GYNECOLOGY | Facility: CLINIC | Age: 34
End: 2019-08-28
Payer: COMMERCIAL

## 2019-08-28 VITALS
DIASTOLIC BLOOD PRESSURE: 82 MMHG | WEIGHT: 184.75 LBS | BODY MASS INDEX: 27.28 KG/M2 | SYSTOLIC BLOOD PRESSURE: 120 MMHG

## 2019-08-28 DIAGNOSIS — Z34.83 ENCOUNTER FOR SUPERVISION OF OTHER NORMAL PREGNANCY IN THIRD TRIMESTER: Primary | ICD-10-CM

## 2019-08-28 PROCEDURE — 99999 PR PBB SHADOW E&M-EST. PATIENT-LVL III: CPT | Mod: PBBFAC,,, | Performed by: ADVANCED PRACTICE MIDWIFE

## 2019-08-28 PROCEDURE — 0502F SUBSEQUENT PRENATAL CARE: CPT | Mod: CPTII,S$GLB,, | Performed by: ADVANCED PRACTICE MIDWIFE

## 2019-08-28 PROCEDURE — 0502F PR SUBSEQUENT PRENATAL CARE: ICD-10-PCS | Mod: CPTII,S$GLB,, | Performed by: ADVANCED PRACTICE MIDWIFE

## 2019-08-28 PROCEDURE — 99999 PR PBB SHADOW E&M-EST. PATIENT-LVL III: ICD-10-PCS | Mod: PBBFAC,,, | Performed by: ADVANCED PRACTICE MIDWIFE

## 2019-08-28 NOTE — PROGRESS NOTES
In with no c/o, reports good FM, reinforced BID. Denies LOF or bleeding. Reports irregualr contractions. Reviewed s/s active labor, rom, bleeding and if occur report to L&D.

## 2019-08-29 ENCOUNTER — HOSPITAL ENCOUNTER (INPATIENT)
Facility: OTHER | Age: 34
LOS: 1 days | Discharge: HOME | End: 2019-08-30
Attending: OBSTETRICS & GYNECOLOGY | Admitting: OBSTETRICS & GYNECOLOGY
Payer: COMMERCIAL

## 2019-08-29 DIAGNOSIS — Z37.9 NORMAL LABOR: ICD-10-CM

## 2019-08-29 LAB
ANISOCYTOSIS BLD QL SMEAR: SLIGHT
BASOPHILS NFR BLD: 0 % (ref 0–1.9)
DIFFERENTIAL METHOD: ABNORMAL
EOSINOPHIL NFR BLD: 0 % (ref 0–8)
ERYTHROCYTE [DISTWIDTH] IN BLOOD BY AUTOMATED COUNT: 12.9 % (ref 11.5–14.5)
HCT VFR BLD AUTO: 34.2 % (ref 37–48.5)
HGB BLD-MCNC: 11.7 G/DL (ref 12–16)
IMM GRANULOCYTES # BLD AUTO: ABNORMAL K/UL (ref 0–0.04)
IMM GRANULOCYTES NFR BLD AUTO: ABNORMAL % (ref 0–0.5)
LYMPHOCYTES NFR BLD: 4 % (ref 18–48)
MCH RBC QN AUTO: 30.8 PG (ref 27–31)
MCHC RBC AUTO-ENTMCNC: 34.2 G/DL (ref 32–36)
MCV RBC AUTO: 90 FL (ref 82–98)
MONOCYTES NFR BLD: 2 % (ref 4–15)
NEUTROPHILS NFR BLD: 91 % (ref 38–73)
NEUTS BAND NFR BLD MANUAL: 3 %
NRBC BLD-RTO: 0 /100 WBC
PLATELET # BLD AUTO: 255 K/UL (ref 150–350)
PLATELET BLD QL SMEAR: ABNORMAL
PMV BLD AUTO: 10.5 FL (ref 9.2–12.9)
POLYCHROMASIA BLD QL SMEAR: ABNORMAL
RBC # BLD AUTO: 3.8 M/UL (ref 4–5.4)
WBC # BLD AUTO: 26.45 K/UL (ref 3.9–12.7)

## 2019-08-29 PROCEDURE — 36415 COLL VENOUS BLD VENIPUNCTURE: CPT

## 2019-08-29 PROCEDURE — 25000003 PHARM REV CODE 250: Performed by: ADVANCED PRACTICE MIDWIFE

## 2019-08-29 PROCEDURE — 11000001 HC ACUTE MED/SURG PRIVATE ROOM

## 2019-08-29 PROCEDURE — 85007 BL SMEAR W/DIFF WBC COUNT: CPT

## 2019-08-29 PROCEDURE — 59400 OBSTETRICAL CARE: CPT | Mod: GB,,, | Performed by: ADVANCED PRACTICE MIDWIFE

## 2019-08-29 PROCEDURE — 59400 PR FULL ROUT OBSTE CARE,VAGINAL DELIV: ICD-10-PCS | Mod: GB,,, | Performed by: ADVANCED PRACTICE MIDWIFE

## 2019-08-29 PROCEDURE — 85027 COMPLETE CBC AUTOMATED: CPT

## 2019-08-29 PROCEDURE — 72200004 HC VAGINAL DELIVERY LEVEL I

## 2019-08-29 RX ORDER — MISOPROSTOL 200 UG/1
200 TABLET ORAL EVERY 6 HOURS
Status: DISCONTINUED | OUTPATIENT
Start: 2019-08-30 | End: 2019-08-30

## 2019-08-29 RX ORDER — ACETAMINOPHEN 325 MG/1
650 TABLET ORAL EVERY 6 HOURS PRN
Status: DISCONTINUED | OUTPATIENT
Start: 2019-08-29 | End: 2019-08-30 | Stop reason: HOSPADM

## 2019-08-29 RX ORDER — IBUPROFEN 600 MG/1
600 TABLET ORAL EVERY 6 HOURS PRN
Status: DISCONTINUED | OUTPATIENT
Start: 2019-08-29 | End: 2019-08-30 | Stop reason: HOSPADM

## 2019-08-29 RX ORDER — OXYTOCIN 10 [USP'U]/ML
INJECTION, SOLUTION INTRAMUSCULAR; INTRAVENOUS
Status: DISCONTINUED
Start: 2019-08-29 | End: 2019-08-29 | Stop reason: WASHOUT

## 2019-08-29 RX ORDER — ONDANSETRON 8 MG/1
8 TABLET, ORALLY DISINTEGRATING ORAL EVERY 8 HOURS PRN
Status: DISCONTINUED | OUTPATIENT
Start: 2019-08-29 | End: 2019-08-30 | Stop reason: HOSPADM

## 2019-08-29 RX ORDER — DIPHENHYDRAMINE HCL 25 MG
25 CAPSULE ORAL EVERY 4 HOURS PRN
Status: DISCONTINUED | OUTPATIENT
Start: 2019-08-29 | End: 2019-08-30 | Stop reason: HOSPADM

## 2019-08-29 RX ORDER — OXYTOCIN/RINGER'S LACTATE 20/1000 ML
41.65 PLASTIC BAG, INJECTION (ML) INTRAVENOUS CONTINUOUS
Status: ACTIVE | OUTPATIENT
Start: 2019-08-29 | End: 2019-08-29

## 2019-08-29 RX ORDER — DIPHENHYDRAMINE HYDROCHLORIDE 50 MG/ML
25 INJECTION INTRAMUSCULAR; INTRAVENOUS EVERY 4 HOURS PRN
Status: DISCONTINUED | OUTPATIENT
Start: 2019-08-29 | End: 2019-08-30 | Stop reason: HOSPADM

## 2019-08-29 RX ORDER — HYDROCORTISONE 25 MG/G
CREAM TOPICAL 3 TIMES DAILY PRN
Status: DISCONTINUED | OUTPATIENT
Start: 2019-08-29 | End: 2019-08-30 | Stop reason: HOSPADM

## 2019-08-29 RX ORDER — LIDOCAINE HYDROCHLORIDE 10 MG/ML
INJECTION INFILTRATION; PERINEURAL
Status: COMPLETED
Start: 2019-08-29 | End: 2019-08-29

## 2019-08-29 RX ORDER — DOCUSATE SODIUM 100 MG/1
200 CAPSULE, LIQUID FILLED ORAL 2 TIMES DAILY PRN
Status: DISCONTINUED | OUTPATIENT
Start: 2019-08-29 | End: 2019-08-30 | Stop reason: HOSPADM

## 2019-08-29 RX ADMIN — DOCUSATE SODIUM 200 MG: 100 CAPSULE, LIQUID FILLED ORAL at 04:08

## 2019-08-29 RX ADMIN — ACETAMINOPHEN 650 MG: 325 TABLET, FILM COATED ORAL at 11:08

## 2019-08-29 RX ADMIN — IBUPROFEN 600 MG: 600 TABLET, FILM COATED ORAL at 03:08

## 2019-08-29 RX ADMIN — IBUPROFEN 600 MG: 600 TABLET, FILM COATED ORAL at 10:08

## 2019-08-29 RX ADMIN — IBUPROFEN 600 MG: 600 TABLET, FILM COATED ORAL at 09:08

## 2019-08-29 RX ADMIN — ACETAMINOPHEN 650 MG: 325 TABLET, FILM COATED ORAL at 10:08

## 2019-08-29 RX ADMIN — ACETAMINOPHEN 650 MG: 325 TABLET, FILM COATED ORAL at 04:08

## 2019-08-29 NOTE — LACTATION NOTE
Basic lactation education reviewed using breastfeeding guide, all questions answered. LC left phone number on mother's white board for mother to call for asst as needed.Told mother what time LC leaves the floor. Mother also told that LC can see when she calls [employer] phone and if LC does not answer, she is busy but will come as soon as possible.

## 2019-08-29 NOTE — NURSING
RN x2 at bedside to assist patient up to bathroom. Pt attempted to stand with RNs at bedside and stated she felt light-headed. Pt lowered back down to bed and passed out while sitting back down. Pt turned on her side and staff-assist called. VSS. Bleeding moderate, fundus firm. CNM notified. IV started and IVF bolus initiated. Pt awake and alert, states she is feeling better.

## 2019-08-29 NOTE — L&D DELIVERY NOTE
[facility]  Vaginal Delivery   Operative Note    SUMMARY     Normal spontaneous vaginal delivery of live infant, was placed on mothers abdomen for skin to skin and bulb suctioning performed.  Infant delivered position OA over intact perineum.  Nuchal cord: Yes, cord reduced following delivery.    Spontaneous delivery of placenta and No pitocin given noting good uterine tone.  No lacerations noted.  Patient tolerated delivery well. Sponge needle and lap counted correctly x2.    Indications: Normal labor  Pregnancy complicated by:   Patient Active Problem List   Diagnosis    Pelvic floor dysfunction    Depression with anxiety - zoloft daily    Pregnancy with one fetus, antepartum    Normal labor     Admitting GA: 39w1d    Delivery Information for  Stewart Monge    Birth information:  YOB: 2017   Time of birth: 9:05 AM   Sex: male   Head Delivery Date/Time: 8/29/2019  9:03 AM   Delivery type: Vaginal, Spontaneous   Gestational Age: 39w1d    Delivery Providers    Delivering clinician:  Wendy D. Gerhardt, CNM   Provider Role    Shannan Brambila RN Registered Nurse    Lynette Greer RN Registered Nurse    Rayna Melendrez RN Registered Nurse    Griselda Zee RN Registered Nurse            Measurements    Weight:    Length:           Apgars    Living status:  Living  Apgars:   1 min.:   5 min.:   10 min.:   15 min.:   20 min.:     Skin color:   0  1       Heart rate:   2  2       Reflex irritability:   2  2       Muscle tone:   2  2       Respiratory effort:   2  2       Total:   8  9       Apgars assigned by:  SHARITA MELENDREZ RN         Operative Delivery    Forceps attempted?:  No  Vacuum extractor attempted?:  No         Shoulder Dystocia    Shoulder dystocia present?:  No           Presentation    Presentation:  Vertex  Position:  Right Occiput Anterior           Interventions/Resuscitation    Method:  Bulb Suctioning, Tactile Stimulation       Cord    Vessels:  3  vessels  Complications:  Nuchal  Nuchal Cord Description:  loose nuchal cord  Delayed Cord Clamping?:  Yes  Cord Clamped Date/Time:  2019  9:07 AM  Cord Blood Disposition:  Sent with Baby  Gases Sent?:  No  Stem Cell Collection (by MD):  No       Placenta    Placenta delivery date/time:  2019  Placenta removal:  Spontaneous  Placenta appearance:  Intact  Placenta disposition:  family           Labor Events:       labor: No     Labor Onset Date/Time: 2019 05:00     Dilation Complete Date/Time: 2019 09:40     Start Pushing Date/Time: 2019 09:40     Rupture Date/Time:              Rupture type:           Fluid Amount:        Fluid Color:        Fluid Odor:        Membrane Status (PeriCalm): SRM (Spontaneous Rupture)      Rupture Date/Time (PeriCalm): 2019 08:00:00      Fluid Amount (PeriCalm):        Fluid Color (PeriCalm): Clear       steroids: None     Antibiotics given for GBS: No     Induction: none     Indications for induction:        Augmentation:       Indications for augmentation:       Labor complications: None     Additional complications:          Cervical ripening:                     Delivery:      Episiotomy: None     Indication for Episiotomy:       Perineal Lacerations: None Repaired:      Periurethral Laceration:   Repaired:     Labial Laceration:   Repaired:     Sulcus Laceration:   Repaired:     Vaginal Laceration:   Repaired:     Cervical Laceration:   Repaired:     Repair suture: None     Repair # of packets:       Last Value - EBL - Nursing (mL): 200     Sum - EBL - Nursing (mL): 200     Last Value - EBL - Anesthesia (mL):      Calculated QBL (mL):        Vaginal Sweep Performed: Yes     Surgicount Correct: Yes       Other providers:       Anesthesia    Method:  None          Details (if applicable):  Trial of Labor      Categorization:      Priority:     Indications for :     Incision Type:        Additional  information:  Forceps:    Vacuum:    Breech:    Observed anomalies    Other (Comments): Client arrived to 6th floor very pushy and met OB doctor and myself in the hallway, went straight to L&D expecting eminent birth. Arrived in room 1, complete and +2. Went on bed on hands and knees and pushed with contrx. FHT's 110's - 120's via inter  mittent FHT's.

## 2019-08-29 NOTE — H&P
[facility]  Obstetrics  History & Physical    Patient Name: Catie Monge  MRN: [id number]  Admission Date: 2019  Primary Care Provider: June Christianson MD    Subjective:     Principal Problem:Normal labor    History of Present Illness:    Physical deferred client in transition and birth iminent  No notes on file    No new subjective & objective note has been filed under this hospital service since the last note was generated.    Assessment/Plan:     34 y.o. female  at 39w1d for:    No notes have been filed under this hospital service.  Service: Obstetrics and Gynecology  Complete on admit, very pushy and brought straight to L&D.    Wendy D Gerhardt, CNM  Obstetrics  [facility]

## 2019-08-29 NOTE — NURSING
RN x2 at bedside to assist pt up to bathroom. Pt able to ambulate with assistance and denied feeling light-headed or dizzy. Pt able to void 250cc. While assisting pt to wheelchair pt again became light-headed and had syncopal episode. Pt lowered into wheelchair and did not fall. Pt awake after a few seconds. Cold rag applied to pt neck and apple juice given. Pt states starting to feel better. W. Gerhardt, CNM called and updated on pt status. Verbal order placed for CBC. Pt still declines IV fluids, ok per CNM. Ok to move to MBU per CNM.

## 2019-08-29 NOTE — HOSPITAL COURSE
Client came in on 19@ 0840 feeling pushy and painful. Wheeled immediately to delivery room 1. Transferred per self to bed.  Complete on admission.    @ 0905 over intact perineum

## 2019-08-29 NOTE — NURSING
Pt called out due to IV infiltration. IV removed, warm pack placed on right arm near IV site, pt reports only slight tenderness to the area. Pt declines another IV and states she would like to drink her gatorade.

## 2019-08-29 NOTE — PROVIDER TRANSFER
[facility]  Transfer of Care Note      Patient Name: Catie Monge  MRN: [id number]  Admission Date: 2019  Hospital Length of Stay: 0 days  Transfer Date: 2019  Attending Physician: Melissa Trinh MD   Primary Care Provider: June Christianson MD  Reason for Admission: labor    HPI:   No notes on file    Hospital Course:   Client came in on 19@ 0840 feeling pushy and painful. Wheeled immediately to delivery room 1. Transferred per self to bed.  Complete on admission.    @ 0905 over intact perineum    No notes have been filed under this hospital service.  Service: Obstetrics and Gynecology      * No surgery found *    Diet Orders          Diet Adult Regular (IDDSI Level 7): Regular starting at  1040        Activity Orders          Diet Adult Regular (IDDSI Level 7): Regular starting at  1040        Pending Diagnostic Studies:     None        Wendy D Gerhardt, CNM  [facility]

## 2019-08-30 VITALS
RESPIRATION RATE: 18 BRPM | SYSTOLIC BLOOD PRESSURE: 118 MMHG | OXYGEN SATURATION: 99 % | TEMPERATURE: 98 F | DIASTOLIC BLOOD PRESSURE: 80 MMHG | HEART RATE: 81 BPM

## 2019-08-30 PROBLEM — M62.89 PELVIC FLOOR DYSFUNCTION: Status: RESOLVED | Noted: 2017-09-21 | Resolved: 2019-08-30

## 2019-08-30 PROBLEM — Z34.90 PREGNANCY WITH ONE FETUS, ANTEPARTUM: Status: RESOLVED | Noted: 2019-04-24 | Resolved: 2019-08-30

## 2019-08-30 LAB
BASOPHILS # BLD AUTO: 0.03 K/UL (ref 0–0.2)
BASOPHILS NFR BLD: 0.1 % (ref 0–1.9)
DIFFERENTIAL METHOD: ABNORMAL
EOSINOPHIL # BLD AUTO: 0 K/UL (ref 0–0.5)
EOSINOPHIL NFR BLD: 0.2 % (ref 0–8)
ERYTHROCYTE [DISTWIDTH] IN BLOOD BY AUTOMATED COUNT: 13 % (ref 11.5–14.5)
HCT VFR BLD AUTO: 28.4 % (ref 37–48.5)
HGB BLD-MCNC: 9.3 G/DL (ref 12–16)
IMM GRANULOCYTES # BLD AUTO: 0.22 K/UL (ref 0–0.04)
IMM GRANULOCYTES NFR BLD AUTO: 1 % (ref 0–0.5)
LYMPHOCYTES # BLD AUTO: 2.3 K/UL (ref 1–4.8)
LYMPHOCYTES NFR BLD: 10.4 % (ref 18–48)
MCH RBC QN AUTO: 30.2 PG (ref 27–31)
MCHC RBC AUTO-ENTMCNC: 32.7 G/DL (ref 32–36)
MCV RBC AUTO: 92 FL (ref 82–98)
MONOCYTES # BLD AUTO: 1.7 K/UL (ref 0.3–1)
MONOCYTES NFR BLD: 7.6 % (ref 4–15)
NEUTROPHILS # BLD AUTO: 17.9 K/UL (ref 1.8–7.7)
NEUTROPHILS NFR BLD: 80.7 % (ref 38–73)
NRBC BLD-RTO: 0 /100 WBC
PLATELET # BLD AUTO: 224 K/UL (ref 150–350)
PMV BLD AUTO: 10.1 FL (ref 9.2–12.9)
RBC # BLD AUTO: 3.08 M/UL (ref 4–5.4)
WBC # BLD AUTO: 22.19 K/UL (ref 3.9–12.7)

## 2019-08-30 PROCEDURE — 72100002 HC LABOR CARE, 1ST 8 HOURS

## 2019-08-30 PROCEDURE — 36415 COLL VENOUS BLD VENIPUNCTURE: CPT

## 2019-08-30 PROCEDURE — 85025 COMPLETE CBC W/AUTO DIFF WBC: CPT

## 2019-08-30 PROCEDURE — 25000003 PHARM REV CODE 250: Performed by: ADVANCED PRACTICE MIDWIFE

## 2019-08-30 RX ADMIN — ACETAMINOPHEN 650 MG: 325 TABLET, FILM COATED ORAL at 05:08

## 2019-08-30 RX ADMIN — IBUPROFEN 600 MG: 600 TABLET, FILM COATED ORAL at 09:08

## 2019-08-30 RX ADMIN — IBUPROFEN 600 MG: 600 TABLET, FILM COATED ORAL at 03:08

## 2019-08-30 NOTE — PLAN OF CARE
Problem: Breastfeeding  Goal: Effective Breastfeeding  Outcome: Ongoing (interventions implemented as appropriate)  Follow discharge education

## 2019-08-30 NOTE — PROGRESS NOTES
MD to bedside to assess pt after CNM mentioned multiple syncopal episodes of pt today. Pt delivered precipitously this AM without complication. Pt recently went to urinate, felt pressure like a BM, and had large blood clot (weighted to be 177cc) in toilet. Urinated 800cc following this and now feels much better.     Temp:  [97.9 °F (36.6 °C)-98.8 °F (37.1 °C)] 98 °F (36.7 °C)  Pulse:  [69-98] 86  Resp:  [16-18] 16  SpO2:  [96 %-100 %] 96 %  BP: (106-135)/(58-89) 126/67     Physical exam:  General: NAD  Abdominal: fundus displaced far to the right, no TTP at midline or left side of abdomen    A/P:  - patient with improvement in pain, not bleeding heavily at this time  - repeat CBC ordered  - VSS  - bladder scan to assess for displacement of uterus 2/2 retained urine.  - will hold off on cytotec and further imaging at this time  - d/w staff on call Dr. Oviedo who is in agreement with plan

## 2019-08-30 NOTE — PROGRESS NOTES
[facility]  Obstetrics  Postpartum Progress Note    Patient Name: Catie Monge  MRN: [id number]  Admission Date: 2019  Hospital Length of Stay: 0 days  Attending Physician: Melissa Trinh MD  Primary Care Provider: June Christianson MD    Subjective:     Principal Problem:Normal delivery    RN called client had gotten up to BR x4 withour fainting and was starting to feel better and then reported client feeling like she needed to have a BM and passed a 177cc clot in toilet. sm mod lochia before and slightly more after. But ~ 45 min after passing clot client voided 900cc. Uterus noted to be displaced to the right side.  Dr Oviedo noitified and plan made    Assessment/Plan:     34 y.o. female  for:    No notes have been filed under this hospital service.  Service: Obstetrics and Gynecology  Get Stat CBC  Monitor pain, ability to urinate, lochia, and dizziness.  Will keep MD updated         Wendy D Gerhardt, CNM  Obstetrics  [facility]

## 2019-08-30 NOTE — PROGRESS NOTES
Around the hour of 2030, pt called stating she was bleeding. Rin, charge nurse, immediately reported to room to find softball sized clot in toilet. Pt was assisted safely back to bed after voiding. Pt denies symptoms. Upon assessment, fundus was deviated significantly to the right, firm without massage. VS WNL. Clot was weighed (charted in flowsheet) and set aside for MD to assess. Gloria, midwife on call, was immediately notified and updated of patient's status. Reported to bedside to assess patient shortly after. Dr. Raphael also informed of pt status, reported to bedside to assess patient. Orders received to bladder scan patient to ensure patient was fully emptying bladder, also to discontinue cytotec order that was recently added. Scan revealed patient had retained over 1 L of urine even after recently voiding. Encouraged patient to void frequently. On assessment, pt fundus still slightly shifted to the right. MD aware. No other orders received at this time. Will continue to monitor.

## 2019-08-30 NOTE — NURSING
D/C info given for patient and infant.  Carseat law and footprint sheet signed.  One bracelet off of infant.  Patient will call when ready for transport.

## 2019-08-30 NOTE — PROGRESS NOTES
08/30/19 1350   Maternal Assessment   Breast Density Bilateral:;soft   Areola Bilateral:;elastic   Nipples Bilateral:;everted   Maternal Infant Feeding   Maternal Emotional State relaxed   Infant Positioning clutch/football;cross-cradle   Signs of Milk Transfer infant jaw motion present   Pain with Feeding no   Latch Assistance yes   lactation discharge education reviewed. Latch assessment provided, infant latched and nursing well.

## 2019-08-30 NOTE — DISCHARGE SUMMARY
[facility]  Delivery Discharge Summary  Obstetrics    Admit Date: 2019    Discharge Date and Time:  2019 12:57 PM    Primary OB Clinician: CORA Burch CNM    Attending Physician: Melissa Trinh MD     Discharge Provider: Alexandrea Burch CNM    Reason for Admission:     Estimated Date of Delivery: 19     Conditions: N/A    Procedures Performed: * No surgery found *    Hospital Course (synopsis of major diagnoses, care, treatment, and services provided during the course of the hospital stay):    Catie Monge is a 34 y.o. now , PPD #1 who was admitted on 2019  for normal spontaneous vaginal delivery. On initial assessment, vital signs were stable and physical exam was normal. Infant was in cephalic presentation. Patient was subsequently admitted to labor and delivery unit with signed consents. Labor course was managed with support.  Patient delivered a single viable  male. Pt was in stable condition post-delivery and was transferred to the Mother-Baby Unit where she had a fainting episode after passing large clot.. Her postpartum course was uncomplicated. On discharge day, patient's pain is controlled with oral pain medications. Patient is tolerating PO without nausea or vomiting, ambulating, voiding. She denies SOB and CP. Denies any HA, vision changes, F/C, LE swelling. Denies any breast pain/soreness.     Delivery:    Episiotomy: None   Lacerations: None   Repair suture: None   Repair # of packets:     Blood loss (ml): 200     Birth information:  YOB: 2017   Time of birth: 9:05 AM   Sex: male   Delivery type: Vaginal, Spontaneous   Gestational Age: 39w1d    Delivery Clinician:      Other providers:       Additional  information:  Forceps:    Vacuum:    Breech:    Observed anomalies      Living?:           APGARS  One minute Five minutes Ten minutes   Skin color:         Heart rate:         Grimace:         Muscle tone:          Breathing:         Totals: 8  9        Placenta: Delivered:       appearance    Tubal Ligation: n/a    Feeding Method: breast    Rh Immune Globulin Given: no    Rubella Vaccine Given: no    Tdap Vaccine Given: no    Consults: none    Significant Diagnostic Studies: Labs: All labs within the past 24 hours have been reviewed    Final Diagnoses:   Principal Problem:  (normal spontaneous vaginal delivery)   Secondary Diagnoses:   Active Hospital Problems    Diagnosis  POA    * (normal spontaneous vaginal delivery) [O80]  Not Applicable      Resolved Hospital Problems    Diagnosis Date Resolved POA    Normal labor [O80, Z37.9] 2019 Not Applicable       Discharged Condition: good    Disposition: Home or Self Care    Follow Up/Patient Instructions:     Medications:  Reconciled Home Medications:      Medication List      CONTINUE taking these medications    cetirizine 10 MG tablet  Commonly known as:  ZYRTEC  Take 10 mg by mouth every evening.     PEPCID 20 MG tablet  Generic drug:  famotidine     PRENATAL ORAL  Take by mouth.     sertraline 25 MG tablet  Commonly known as:  ZOLOFT  Take 1 tablet (25 mg total) by mouth once daily.        ASK your doctor about these medications    gary clifton ointment  Apply topically. Apply after feeding. Do not wash off. This compounded medication expires in 30 days.          Discharge Procedure Orders   Diet Adult Regular     Notify your health care provider if you experience any of the following:  increased confusion or weakness     Notify your health care provider if you experience any of the following:  persistent dizziness, light-headedness, or visual disturbances     Notify your health care provider if you experience any of the following:  worsening rash     Notify your health care provider if you experience any of the following:  severe persistent headache     Notify your health care provider if you experience any of the following:  difficulty breathing or  increased cough     Notify your health care provider if you experience any of the following:  redness, tenderness, or signs of infection (pain, swelling, redness, odor or green/yellow discharge around incision site)     Notify your health care provider if you experience any of the following:  severe uncontrolled pain     Notify your health care provider if you experience any of the following:  persistent nausea and vomiting or diarrhea     Notify your health care provider if you experience any of the following:  temperature >100.4     Activity as tolerated   RX for Maninder Oliveros Nipple Ointment ordered from  Pharmacy in house.  Follow-up Information     Follow up In 6 weeks.

## 2019-09-09 ENCOUNTER — PATIENT MESSAGE (OUTPATIENT)
Dept: INTERNAL MEDICINE | Facility: CLINIC | Age: 34
End: 2019-09-09

## 2019-09-13 ENCOUNTER — IMMUNIZATION (OUTPATIENT)
Dept: PHARMACY | Facility: CLINIC | Age: 34
End: 2019-09-13
Payer: COMMERCIAL

## 2019-10-02 ENCOUNTER — POSTPARTUM VISIT (OUTPATIENT)
Dept: OBSTETRICS AND GYNECOLOGY | Facility: CLINIC | Age: 34
End: 2019-10-02
Payer: COMMERCIAL

## 2019-10-02 VITALS
BODY MASS INDEX: 24.69 KG/M2 | HEIGHT: 69 IN | DIASTOLIC BLOOD PRESSURE: 78 MMHG | WEIGHT: 166.69 LBS | SYSTOLIC BLOOD PRESSURE: 116 MMHG

## 2019-10-02 PROCEDURE — 0503F PR POSTPARTUM CARE VISIT: ICD-10-PCS | Mod: S$GLB,,, | Performed by: ADVANCED PRACTICE MIDWIFE

## 2019-10-02 PROCEDURE — 99999 PR PBB SHADOW E&M-EST. PATIENT-LVL III: CPT | Mod: PBBFAC,,, | Performed by: ADVANCED PRACTICE MIDWIFE

## 2019-10-02 PROCEDURE — 0503F POSTPARTUM CARE VISIT: CPT | Mod: S$GLB,,, | Performed by: ADVANCED PRACTICE MIDWIFE

## 2019-10-02 PROCEDURE — 99999 PR PBB SHADOW E&M-EST. PATIENT-LVL III: ICD-10-PCS | Mod: PBBFAC,,, | Performed by: ADVANCED PRACTICE MIDWIFE

## 2019-10-02 RX ORDER — ACETAMINOPHEN AND CODEINE PHOSPHATE 120; 12 MG/5ML; MG/5ML
1 SOLUTION ORAL DAILY
Qty: 28 TABLET | Refills: 11 | Status: SHIPPED | OUTPATIENT
Start: 2019-10-02 | End: 2020-06-08

## 2019-10-02 NOTE — PROGRESS NOTES
Postpartum Visit  Catie Monge is a 34 y.o. female  is here for a postpartum visit. She is 5 weeks postpartum following a spontaneous vaginal delivery, of a female infant weighinlb 14oz, with Anesthesia: epidural. The delivery was at 41w 2d.     Pregnancy was complicated by: none.      OB History    Para Term  AB Living   3 2 2 0 1 2   SAB TAB Ectopic Multiple Live Births   1 0 0 0 2      # Outcome Date GA Lbr Roger/2nd Weight Sex Delivery Anes PTL Lv   3 Term 19 39w1d 03:40 / 00:25 3.572 kg (7 lb 14 oz) M Vag-Spont None N OSIRIS   2 SAB 2018              Birth Comments: missed ab, failed cytotec, D&C   1 Term 17 41w2d 14:20 / 01:09 3.38 kg (7 lb 7.2 oz) F Vag-Spont EPI N OSIRIS      Birth Comments: postpartum preE with postpartum magnesium       Postpartum course has been uncomplicated.   Bleeding no bleeding. A little when wipe, no pad. Bowel/ bladder function is normal.   Her last pap was 9.18.19.    Patient is not sexually active. Desired contraception method is oral progesterone-only contraceptive.   Postpartum depression screening: negative. EPDS score:       Baby's course has been uncomplicated. Baby is feeding by breast.     ROS:  GENERAL: No fever, chills, fatigability.  VULVAR: No pain, no lesions and no itching.  VAGINAL: No relaxation, no itching, no discharge, no abnormal bleeding and no lesions.  ABDOMEN: No abdominal pain. Denies nausea. Denies vomiting. No diarrhea. No constipation  BREAST: Denies pain. No lumps. No discharge.  URINARY: No incontinence, no nocturia, no frequency and no dysuria.  CARDIOVASCULAR: No chest pain. No shortness of breath. No leg cramps.  NEUROLOGICAL: No headaches. No vision changes.    Past Medical History:   Diagnosis Date    Allergy     Anxiety     No meds    Fever blister     Oral cold sores only; no hx genital outbreak    Postpartum hypertension     Requiring magnesium after delivery     Past Surgical History:   Procedure  Laterality Date    ANUS SURGERY      FISSURE    DILATION AND CURETTAGE OF UTERUS USING SUCTION N/A 9/13/2018    Procedure: DILATION AND CURETTAGE, UTERUS, USING SUCTION;  Surgeon: Joselo Landry Jr., MD;  Location: [facility];  Service: OB/GYN;  Laterality: N/A;    NASAL SEPTUM SURGERY      TONSILLECTOMY, ADENOIDECTOMY      WISDOM TOOTH EXTRACTION       Review of patient's allergies indicates:  No Known Allergies    Current Outpatient Medications:     gary clifton ointment, Apply topically. Apply after feeding. Do not wash off. This compounded medication expires in 30 days., Disp: 30 g, Rfl: 1    PNV95/FERROUS FUMARATE/FA (PRENATAL ORAL), Take by mouth., Disp: , Rfl:     sertraline (ZOLOFT) 25 MG tablet, Take 1 tablet (25 mg total) by mouth once daily., Disp: 30 tablet, Rfl: 6    cetirizine (ZYRTEC) 10 MG tablet, Take 10 mg by mouth every evening., Disp: , Rfl:     famotidine (PEPCID) 20 MG tablet, , Disp: , Rfl:       Vitals:    10/02/19 0951   BP: 116/78       General appearance - alert, well appearing, and in no distress  Mental status - alert, oriented to person, place, and time  Skin - coloration normal for race, good turgor, warm to touch, no rashes  Abdomen - soft, nontender, nondistended, no masses or organomegaly  Pelvic - uterus anetverted NT, ML,Adnexa wnl  External genitalia postpartum: normal, well-healed, without lesions or masses.  Normal female hair distribution. Adequate perineal body. Urethral meatus without lesions or prolapse. Urethra: no masses, tenderness, or scarring.  Bladder: without tenderness or masses.  Vaginal mucosa moist and pink, normal rugae, without lesions, abnormal discharge, or foul odor.  Cervix pink, no lesions, no cervical motion tenderness.  Uterus: midline, non tender, smooth, not enlarged, not prolapsed  No adnexal masses or tenderness.  Extremities - no edema, redness or tenderness in the calves or thighs      There are no diagnoses linked to this  encounter.    Discussed contraception - pt desires OC's, Micronor ordered  Counseling regarding resuming normal activities of exercise and work.  Postpartum precautions reviewed    Routine follow up in 1 yr

## 2019-10-04 DIAGNOSIS — F41.8 DEPRESSION WITH ANXIETY: ICD-10-CM

## 2019-10-04 RX ORDER — SERTRALINE HYDROCHLORIDE 25 MG/1
25 TABLET, FILM COATED ORAL DAILY
Qty: 30 TABLET | Refills: 6 | Status: SHIPPED | OUTPATIENT
Start: 2019-10-04 | End: 2020-04-27 | Stop reason: SDUPTHER

## 2020-04-14 ENCOUNTER — PATIENT MESSAGE (OUTPATIENT)
Dept: INTERNAL MEDICINE | Facility: CLINIC | Age: 35
End: 2020-04-14

## 2020-04-27 ENCOUNTER — PATIENT MESSAGE (OUTPATIENT)
Dept: INTERNAL MEDICINE | Facility: CLINIC | Age: 35
End: 2020-04-27

## 2020-04-27 ENCOUNTER — OFFICE VISIT (OUTPATIENT)
Dept: INTERNAL MEDICINE | Facility: CLINIC | Age: 35
End: 2020-04-27
Attending: INTERNAL MEDICINE
Payer: COMMERCIAL

## 2020-04-27 DIAGNOSIS — Z86.2 HISTORY OF LEUKOCYTOSIS: ICD-10-CM

## 2020-04-27 DIAGNOSIS — F41.8 DEPRESSION WITH ANXIETY: Primary | ICD-10-CM

## 2020-04-27 PROCEDURE — 99214 PR OFFICE/OUTPT VISIT, EST, LEVL IV, 30-39 MIN: ICD-10-PCS | Mod: 95,,, | Performed by: INTERNAL MEDICINE

## 2020-04-27 PROCEDURE — 99214 OFFICE O/P EST MOD 30 MIN: CPT | Mod: 95,,, | Performed by: INTERNAL MEDICINE

## 2020-04-27 RX ORDER — SERTRALINE HYDROCHLORIDE 25 MG/1
25 TABLET, FILM COATED ORAL DAILY
Qty: 90 TABLET | Refills: 3 | Status: SHIPPED | OUTPATIENT
Start: 2020-04-27 | End: 2021-03-24 | Stop reason: SDUPTHER

## 2020-04-27 NOTE — PROGRESS NOTES
"The patient location is:  Patient Home   The chief complaint leading to consultation is:  No chief complaint on file.    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  Still breast feeding her son, requires her to avoid dairy and soy.  Also has 3 yo.  Quarantine has been challenging a her  has working long hours and her staying home with two young childen.   Mood is good but "could be better". Denies SI/HI. Exercising daiy during naptime. Feels she is doing well considering current circumstances. Does not want to increase her medication due to her history of intolerance of on boarding medications.     Review of her chart shows leukocytosis during her delivery which she describes as very rapid with urgent delivery and subsequent passing of a large blood clot.  She does not know if she received steroids during this time.  She reports no increase in infections, no wt loss, night sweats or fevers.      Catie Monge is a 34 y.o.  female who presents for No chief complaint on file.  .    Review of Systems   Constitutional: Negative for chills and fever.   Respiratory: Negative for cough and shortness of breath.    Cardiovascular: Negative for chest pain and palpitations.   Gastrointestinal: Negative for nausea and vomiting.   Genitourinary: Negative for dysuria and hematuria.   Skin: Negative for pallor and rash.   Psychiatric/Behavioral: Negative for dysphoric mood, self-injury, sleep disturbance and suicidal ideas. The patient is not nervous/anxious.        Patient Active Problem List   Diagnosis    Depression with anxiety - zoloft daily       Past Medical History:   Diagnosis Date    Allergy     Anxiety     No meds    Fever blister     Oral cold sores only; no hx genital outbreak    Postpartum hypertension     Requiring magnesium after delivery       Past Surgical History:   Procedure Laterality Date    ANUS SURGERY      FISSURE    DILATION " AND CURETTAGE OF UTERUS USING SUCTION N/A 9/13/2018    Procedure: DILATION AND CURETTAGE, UTERUS, USING SUCTION;  Surgeon: Joselo Landry Jr., MD;  Location: [facility];  Service: OB/GYN;  Laterality: N/A;    NASAL SEPTUM SURGERY      TONSILLECTOMY, ADENOIDECTOMY      WISDOM TOOTH EXTRACTION         Family History   Problem Relation Age of Onset    Glaucoma Father     Hypertension Father     Rheum arthritis Father     Melanoma Paternal Grandfather     Kyphosis Brother     Bipolar disorder Brother     Anxiety disorder Brother     Diabetes Mellitus Maternal Grandfather     No Known Problems Paternal Grandmother     Breast cancer Neg Hx     Cancer Neg Hx     Ovarian cancer Neg Hx     Colon cancer Neg Hx     Macular degeneration Neg Hx     Retinal detachment Neg Hx     Amblyopia Neg Hx     Blindness Neg Hx     Cataracts Neg Hx     Strabismus Neg Hx        Social History     Tobacco Use    Smoking status: Never Smoker    Smokeless tobacco: Never Used   Substance Use Topics    Alcohol use: Yes     Alcohol/week: 1.0 standard drinks     Types: 1 Glasses of wine per week     Comment: Social - not with pregnancy    Drug use: No       Objective:   currently breastfeeding.     Physical Exam   Constitutional: She is oriented to person, place, and time. She appears well-developed and well-nourished. No distress.   HENT:   Head: Normocephalic and atraumatic.   Eyes: Right eye exhibits no discharge. Left eye exhibits no discharge. No scleral icterus.   Pulmonary/Chest: Effort normal. No respiratory distress.   Neurological: She is alert and oriented to person, place, and time.   Skin: She is not diaphoretic. No erythema. No pallor.   Psychiatric: Her behavior is normal. Thought content normal.         Prior labs reviewed  Assessment/Plan:        Diagnoses and all orders for this visit:    Depression with anxiety - zoloft daily  -   cont  sertraline (ZOLOFT) 25 MG tablet; Take 1 tablet (25 mg total) by mouth  once daily.  Cont regular exercise, good sleep schedule  Call if any issues    History of leukocytosis  -     Suspect transient and related to her delivery  Will repeat CBC auto differential; Future  Pt will schedule this if she is to be near our labs for other reasons or in 6 mo or so to decrease risk of covid exposure  Pt will call if any new issues develop that make this more concerning         Visit type: Virtual visit with synchronous audio and video    Total time spent with patient: 20 minutes    Each patient to whom he or she provides medical services by telemedicine is:  (1) informed of the relationship between the physician and patient and the respective role of any other health care provider with respect to management of the patient; and (2) notified that he or she may decline to receive medical services by telemedicine and may withdraw from such care at any time.  Medication List with Changes/Refills   Current Medications    CETIRIZINE (ZYRTEC) 10 MG TABLET    Take 10 mg by mouth every evening.    FAMOTIDINE (PEPCID) 20 MG TABLET        FARIDEH TAN OINTMENT    Apply topically. Apply after feeding. Do not wash off. This compounded medication expires in 30 days.    NORETHINDRONE (ORTHO MICRONOR) 0.35 MG TABLET    Take 1 tablet (0.35 mg total) by mouth once daily.    PNV95/FERROUS FUMARATE/FA (PRENATAL ORAL)    Take by mouth.   Changed and/or Refilled Medications    Modified Medication Previous Medication    SERTRALINE (ZOLOFT) 25 MG TABLET sertraline (ZOLOFT) 25 MG tablet       Take 1 tablet (25 mg total) by mouth once daily.    Take 1 tablet (25 mg total) by mouth once daily.

## 2020-06-09 RX ORDER — ACETAMINOPHEN AND CODEINE PHOSPHATE 120; 12 MG/5ML; MG/5ML
1 SOLUTION ORAL DAILY
Qty: 28 TABLET | Refills: 11 | Status: SHIPPED | OUTPATIENT
Start: 2020-06-09 | End: 2021-06-21

## 2020-09-02 ENCOUNTER — OFFICE VISIT (OUTPATIENT)
Dept: URGENT CARE | Facility: CLINIC | Age: 35
End: 2020-09-02
Payer: COMMERCIAL

## 2020-09-02 VITALS
WEIGHT: 166 LBS | TEMPERATURE: 98 F | HEIGHT: 69 IN | BODY MASS INDEX: 24.59 KG/M2 | HEART RATE: 66 BPM | SYSTOLIC BLOOD PRESSURE: 124 MMHG | RESPIRATION RATE: 18 BRPM | OXYGEN SATURATION: 99 % | DIASTOLIC BLOOD PRESSURE: 96 MMHG

## 2020-09-02 DIAGNOSIS — J02.9 SORE THROAT: Primary | ICD-10-CM

## 2020-09-02 DIAGNOSIS — R50.9 FEVER: ICD-10-CM

## 2020-09-02 DIAGNOSIS — R50.9 FEVER, UNSPECIFIED FEVER CAUSE: ICD-10-CM

## 2020-09-02 DIAGNOSIS — R05.9 COUGH: ICD-10-CM

## 2020-09-02 DIAGNOSIS — R06.02 SHORTNESS OF BREATH: ICD-10-CM

## 2020-09-02 DIAGNOSIS — R52 BODY ACHES: ICD-10-CM

## 2020-09-02 LAB
CTP QC/QA: YES
CTP QC/QA: YES
S PYO RRNA THROAT QL PROBE: NEGATIVE
SARS-COV-2 RDRP RESP QL NAA+PROBE: NEGATIVE

## 2020-09-02 PROCEDURE — 99214 PR OFFICE/OUTPT VISIT, EST, LEVL IV, 30-39 MIN: ICD-10-PCS | Mod: 25,S$GLB,CS, | Performed by: NURSE PRACTITIONER

## 2020-09-02 PROCEDURE — 87880 STREP A ASSAY W/OPTIC: CPT | Mod: QW,S$GLB,, | Performed by: NURSE PRACTITIONER

## 2020-09-02 PROCEDURE — 87070 CULTURE OTHR SPECIMN AEROBIC: CPT

## 2020-09-02 PROCEDURE — U0002: ICD-10-PCS | Mod: S$GLB,,, | Performed by: NURSE PRACTITIONER

## 2020-09-02 PROCEDURE — 99214 OFFICE O/P EST MOD 30 MIN: CPT | Mod: 25,S$GLB,CS, | Performed by: NURSE PRACTITIONER

## 2020-09-02 PROCEDURE — 87880 POCT RAPID STREP A: ICD-10-PCS | Mod: QW,S$GLB,, | Performed by: NURSE PRACTITIONER

## 2020-09-02 PROCEDURE — U0002 COVID-19 LAB TEST NON-CDC: HCPCS | Mod: S$GLB,,, | Performed by: NURSE PRACTITIONER

## 2020-09-02 RX ORDER — AMOXICILLIN 500 MG/1
1000 CAPSULE ORAL DAILY
Qty: 20 CAPSULE | Refills: 0 | Status: SHIPPED | OUTPATIENT
Start: 2020-09-02 | End: 2020-09-12

## 2020-09-02 NOTE — PROGRESS NOTES
"Subjective:       Patient ID: Catie Monge is a 35 y.o. female.    Vitals:  height is 5' 9" (1.753 m) and weight is 75.3 kg (166 lb). Her temperature is 97.7 °F (36.5 °C). Her blood pressure is 124/96 (abnormal) and her pulse is 66. Her respiration is 18 and oxygen saturation is 99%.     Chief Complaint: Fever    States she was having a sore throat and body aches yesterday with mild fatigue, temp 101.6. kids started school. 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 Took tylenol.     Fever   This is a new problem. The current episode started yesterday. The problem occurs constantly. The problem has been unchanged. The temperature was taken using an oral thermometer. Associated symptoms include headaches, muscle aches and a sore throat. Pertinent negatives include no chest pain, congestion, coughing, diarrhea, nausea, rash, urinary pain or vomiting. She has tried acetaminophen for the symptoms. The treatment provided mild relief.       Constitution: Positive for fever. Negative for chills and fatigue.   HENT: Positive for sore throat. Negative for congestion.    Neck: Positive for painful lymph nodes.   Cardiovascular: Negative for chest pain and leg swelling.   Eyes: Negative for double vision and blurred vision.   Respiratory: Negative for cough and shortness of breath.    Gastrointestinal: Negative for nausea, vomiting and diarrhea.   Genitourinary: Negative for dysuria, frequency, urgency and history of kidney stones.   Musculoskeletal: Positive for muscle ache. Negative for joint pain, joint swelling and muscle cramps.   Skin: Negative for color change, pale, rash and bruising.   Allergic/Immunologic: Negative for seasonal allergies.   Neurological: Positive for headaches. Negative for dizziness, history of vertigo, light-headedness and passing out.   Hematologic/Lymphatic: Positive for swollen lymph nodes.   Psychiatric/Behavioral: Negative for nervous/anxious, sleep " disturbance and depression. The patient is not nervous/anxious.        Objective:      Physical Exam   Constitutional: She is oriented to person, place, and time. She appears well-developed. She is cooperative.  Non-toxic appearance. She does not appear ill. No distress.      Comments:Appears run down   HENT:   Head: Normocephalic and atraumatic.   Ears:   Right Ear: Hearing, tympanic membrane, external ear and ear canal normal.   Left Ear: Hearing, tympanic membrane, external ear and ear canal normal.   Nose: Nose normal. No mucosal edema, rhinorrhea or nasal deformity. No epistaxis. Right sinus exhibits no maxillary sinus tenderness and no frontal sinus tenderness. Left sinus exhibits no maxillary sinus tenderness and no frontal sinus tenderness.   Mouth/Throat: Uvula is midline and mucous membranes are normal. Mucous membranes are moist. No trismus in the jaw. Normal dentition. No uvula swelling. Posterior oropharyngeal erythema and cobblestoning present. No oropharyngeal exudate or posterior oropharyngeal edema.   Eyes: Conjunctivae and lids are normal. No scleral icterus.   Neck: Trachea normal, normal range of motion, full passive range of motion without pain and phonation normal. Neck supple. No neck rigidity. No edema and no erythema present.   Cardiovascular: Normal rate, regular rhythm, S1 normal, S2 normal, normal heart sounds and normal pulses. Exam reveals no gallop and no friction rub.   No murmur heard.  Pulmonary/Chest: Effort normal and breath sounds normal. No stridor. No respiratory distress. She has no decreased breath sounds. She has no wheezes. She has no rhonchi.   Abdominal: Normal appearance.   Musculoskeletal: Normal range of motion.         General: No deformity.   Lymphadenopathy:     She has cervical adenopathy.        Right cervical: Superficial cervical adenopathy present.        Left cervical: Superficial cervical adenopathy present.   Neurological: She is alert and oriented to  person, place, and time. She exhibits normal muscle tone. Coordination normal.   Skin: Skin is warm, dry, intact, not diaphoretic and not pale. Psychiatric: Her speech is normal and behavior is normal. Judgment and thought content normal.   Nursing note and vitals reviewed.        Results for orders placed or performed in visit on 09/02/20   POCT COVID-19 Rapid Screening   Result Value Ref Range    POC Rapid COVID Negative Negative     Acceptable Yes    POCT rapid strep A   Result Value Ref Range    Rapid Strep A Screen Negative Negative     Acceptable Yes      Assessment:       1. Sore throat    2. Fever, unspecified fever cause    3. Body aches        Plan:         Sore throat  -     POCT COVID-19 Rapid Screening  -     POCT rapid strep A  -     diphenhydrAMINE-aluminum-magnesium hydroxide-simethicone-lidocaine HCl 2%; Swish and spit 15 mLs every 4 (four) hours as needed (sore throat).  Dispense: 390 mL; Refill: 0  -     Culture, Throat  -     amoxicillin (AMOXIL) 500 MG capsule; Take 2 capsules (1,000 mg total) by mouth once daily. for 10 days  Dispense: 20 capsule; Refill: 0    Fever, unspecified fever cause  -     POCT COVID-19 Rapid Screening  -     POCT rapid strep A  -     Culture, Throat  -     amoxicillin (AMOXIL) 500 MG capsule; Take 2 capsules (1,000 mg total) by mouth once daily. for 10 days  Dispense: 20 capsule; Refill: 0    Body aches  -     POCT COVID-19 Rapid Screening  -     POCT rapid strep A          Patient Instructions     Pharyngitis (Sore Throat), Report Pending    Pharyngitis (sore throat) is often due to a virus. It can also be caused by the streptococcus, or strep, bacterium, often called strep throat. Both viral and strep infections can cause throat pain that is worse when swallowing, aching all over with headache, and fever. Both types of infections are contagious. They may be spread by coughing, kissing, or touching others after touching your mouth or  nose.  A test has been done to find out whether you (or your child, if your child is the patient) have strep throat. Call this facility or your healthcare provider if you were not given your test results. If the test is positive for strep infection, you will need to take antibiotic medicines. A prescription can be called into your pharmacy at that time. If the test is negative, you probably have a viral pharyngitis. This does not need to be treated with antibiotics. Until you receive the results of the strep test, you should stay home from work. If your child is being tested, he or she should stay home from school.  Home care  · Rest at home. Drink plenty of fluids so you won't get dehydrated.  · If the test is positive for strep, don't go to work or school for the first 2 days of taking the antibiotics. After this time, you will not be contagious. You can then return to work or school if you are feeling better.   · Take the antibiotic medicine for the full 10 days, even if you feel better. This is very important to make sure the infection is treated. It is also important to prevent drug-resistant germs from developing. If you were given an antibiotic shot, you won't need more antibiotics.  · For children: Use acetaminophen for fever, fussiness, or discomfort. In infants older than 6 months of age, you may use ibuprofen instead of acetaminophen. Talk with your child's healthcare provider before giving these medicines if your child has chronic liver or kidney disease or ever had a stomach ulcer or GI bleeding. Never give aspirin to a child under 18 years of age who is ill with a fever. It may cause severe liver damage.  · For adults: Use acetaminophen or ibuprofen to control pain or fever, unless another medicine was prescribed for this. Talk with your healthcare provider before taking these medicines if you have chronic liver or kidney disease or ever had a stomach ulcer or GI bleeding.  · Use throat lozenges or  numbing throat sprays to help reduce pain. Gargling with warm salt water will also help reduce throat pain. For this, dissolve 1/2 teaspoon of salt in 1 glass of warm water. To help soothe a sore throat, children can sip on juice or a popsicle. Children 5 years and older can also suck on a lollipop or hard candy.  · Don't eat salty or spicy foods. These can irritate the throat.  Follow-up care  Follow up with your healthcare provider or our staff if you don't get better over the next week.  When to seek medical advice  Call your healthcare provider right away if any of these occur:  · Fever as directed by your healthcare provider. For children, seek care if:  ¨ Your child is of any age and has repeated fevers above 104°F (40°C).  ¨ Your child is younger than 2 years of age and has a fever of 100.4°F (38°C) that continues for more than 1 day.  ¨ Your child is 2 years old or older and has a fever of 100.4°F (38°C) that continues for more than 3 days.  · New or worsening ear pain, sinus pain, or headache  · Painful lumps in the back of neck  · Stiff neck  · Lymph nodes are getting larger  · Inability to swallow liquids, excessive drooling, or inability to open mouth wide due to throat pain  · Signs of dehydration (very dark urine or no urine, sunken eyes, dizziness)  · Trouble breathing or noisy breathing  · Muffled voice  · New rash  · Child appears to be getting sicker  Date Last Reviewed: 4/13/2015  © 1601-0196 [employer] [address] [place] [address], [place] [address]. All rights reserved. This information is not intended as a substitute for professional medical care. Always follow your healthcare professional's instructions.

## 2020-09-02 NOTE — PATIENT INSTRUCTIONS

## 2020-09-04 LAB — BACTERIA THROAT CULT: NORMAL

## 2020-09-08 ENCOUNTER — TELEPHONE (OUTPATIENT)
Dept: URGENT CARE | Facility: CLINIC | Age: 35
End: 2020-09-08

## 2020-09-08 NOTE — TELEPHONE ENCOUNTER
Attempted to contact patient in regards to her negative throat culture, but was unable to reach. Left message for patient to call clinic at earliest convience.----- Message from Timothy Daniel PA-C sent at 9/5/2020  9:59 AM CDT -----  Please inform pt that throat culture was negative.

## 2020-09-23 ENCOUNTER — PATIENT MESSAGE (OUTPATIENT)
Dept: INTERNAL MEDICINE | Facility: CLINIC | Age: 35
End: 2020-09-23

## 2020-10-01 ENCOUNTER — IMMUNIZATION (OUTPATIENT)
Dept: PHARMACY | Facility: CLINIC | Age: 35
End: 2020-10-01
Payer: COMMERCIAL

## 2020-12-01 ENCOUNTER — OFFICE VISIT (OUTPATIENT)
Dept: URGENT CARE | Facility: CLINIC | Age: 35
End: 2020-12-01
Payer: COMMERCIAL

## 2020-12-01 VITALS
SYSTOLIC BLOOD PRESSURE: 171 MMHG | BODY MASS INDEX: 21.48 KG/M2 | OXYGEN SATURATION: 100 % | HEIGHT: 69 IN | TEMPERATURE: 98 F | HEART RATE: 83 BPM | WEIGHT: 145 LBS | DIASTOLIC BLOOD PRESSURE: 92 MMHG

## 2020-12-01 DIAGNOSIS — Z11.59 SCREENING FOR VIRAL DISEASE: Primary | ICD-10-CM

## 2020-12-01 DIAGNOSIS — J02.9 SORE THROAT: ICD-10-CM

## 2020-12-01 LAB
CTP QC/QA: YES
CTP QC/QA: YES
MOLECULAR STREP A: NEGATIVE
SARS-COV-2 RDRP RESP QL NAA+PROBE: NEGATIVE

## 2020-12-01 PROCEDURE — 3008F BODY MASS INDEX DOCD: CPT | Mod: CPTII,S$GLB,, | Performed by: NURSE PRACTITIONER

## 2020-12-01 PROCEDURE — U0002 COVID-19 LAB TEST NON-CDC: HCPCS | Mod: QW,S$GLB,, | Performed by: NURSE PRACTITIONER

## 2020-12-01 PROCEDURE — 3008F PR BODY MASS INDEX (BMI) DOCUMENTED: ICD-10-PCS | Mod: CPTII,S$GLB,, | Performed by: NURSE PRACTITIONER

## 2020-12-01 PROCEDURE — 99214 OFFICE O/P EST MOD 30 MIN: CPT | Mod: S$GLB,,, | Performed by: NURSE PRACTITIONER

## 2020-12-01 PROCEDURE — 87651 POCT STREP A MOLECULAR: ICD-10-PCS | Mod: QW,S$GLB,, | Performed by: NURSE PRACTITIONER

## 2020-12-01 PROCEDURE — U0002: ICD-10-PCS | Mod: QW,S$GLB,, | Performed by: NURSE PRACTITIONER

## 2020-12-01 PROCEDURE — 87651 STREP A DNA AMP PROBE: CPT | Mod: QW,S$GLB,, | Performed by: NURSE PRACTITIONER

## 2020-12-01 PROCEDURE — 99214 PR OFFICE/OUTPT VISIT, EST, LEVL IV, 30-39 MIN: ICD-10-PCS | Mod: S$GLB,,, | Performed by: NURSE PRACTITIONER

## 2020-12-01 NOTE — PATIENT INSTRUCTIONS
"Use warm salt water gargles to ease your throat pain. Warm salt water gargles as needed for sore throat-  1/2 tsp salt to 1 cup warm water, gargle as desired. Hot tea with honey helps soothe.     Take ibuprofen or tylenol as directed for minor pain.     Increase fluid intake.     Follow up if symptoms persist or worsen.     You must understand that you've received an Urgent Care treatment only and that you may be released before all your medical problems are known or treated. You, the patient, will arrange for follow up care as instructed.  Follow up with your PCP or specialty clinic as directed in the next 1-2 weeks if not improved or as needed.  You can call [phone] to schedule an appointment with the appropriate provider.  If your condition worsens we recommend that you receive another evaluation at the emergency room immediately or contact your primary medical clinics after hours call service to discuss your concerns.  Please return here or go to the Emergency Department for any concerns or worsening of condition.      Viral Syndrome (Adult)  A viral illness may cause a number of symptoms. The symptoms depend on the part of the body that the virus affects. If it settles in your nose, throat, and lungs, it may cause cough, sore throat, congestion, and sometimes headache. If it settles in your stomach and intestinal tract, it may cause vomiting and diarrhea. Sometimes it causes vague symptoms like "aching all over," feeling tired, loss of appetite, or fever.  A viral illness usually lasts 1 to 2 weeks, but sometimes it lasts longer. In some cases, a more serious infection can look like a viral syndrome in the first few days of the illness. You may need another exam and additional tests to know the difference. Watch for the warning signs listed below.  Home care  Follow these guidelines for taking care of yourself at home:  · If symptoms are severe, rest at home for the first 2 to 3 days.  · Stay away from " cigarette smoke - both your smoke and the smoke from others.  · You may use over-the-counter acetaminophen or ibuprofen for fever, muscle aching, and headache, unless another medicine was prescribed for this. If you have chronic liver or kidney disease or ever had a stomach ulcer or GI bleeding, talk with your doctor before using these medicines. No one who is younger than 18 and ill with a fever should take aspirin. It may cause severe disease or death.  · Your appetite may be poor, so a light diet is fine. Avoid dehydration by drinking 8 to 12 8-ounce glasses of fluids each day. This may include water; orange juice; lemonade; apple, grape, and cranberry juice; clear fruit drinks; electrolyte replacement and sports drinks; and decaffeinated teas and coffee. If you have been diagnosed with a kidney disease, ask your doctor how much and what types of fluids you should drink to prevent dehydration. If you have kidney disease, drinking too much fluid can cause it build up in the your body and be dangerous to your health.  · Over-the-counter remedies won't shorten the length of the illness but may be helpful for cough, sore throat; and nasal and sinus congestion. Don't use decongestants if you have high blood pressure.  Follow-up care  Follow up with your healthcare provider if you do not improve over the next week.  Call 911  Get emergency medical care if any of the following occur:  · Convulsion  · Feeling weak, dizzy, or like you are going to faint  · Chest pain, shortness of breath, wheezing, or difficulty breathing  When to seek medical advice  Call your healthcare provider right away if any of these occur:  · Cough with lots of colored sputum (mucus) or blood in your sputum  · Chest pain, shortness of breath, wheezing, or difficulty breathing  · Severe headache; face, neck, or ear pain  · Severe, constant pain in the lower right side of your belly (abdominal)  · Continued vomiting (cant keep liquids  down)  · Frequent diarrhea (more than 5 times a day); blood (red or black color) or mucus in diarrhea  · Feeling weak, dizzy, or like you are going to faint  · Extreme thirst  · Fever of 100.4°F (38°C) or higher, or as directed by your healthcare provider  Date Last Reviewed: 9/25/2015  © 6245-0211 [employer]. [address] [place] [address]. All rights reserved. This information is not intended as a substitute for professional medical care. Always follow your healthcare professional's instructions.

## 2020-12-01 NOTE — PROGRESS NOTES
"Subjective:       Patient ID: Catie Monge is a 35 y.o. female.    Vitals:  height is 5' 9" (1.753 m) and weight is 65.8 kg (145 lb). Her temperature is 98.1 °F (36.7 °C). Her blood pressure is 171/92 (abnormal) and her pulse is 83. Her oxygen saturation is 100%.     Chief Complaint: No chief complaint on file.    URI   This is a new problem. The current episode started in the past 7 days. The problem has been unchanged. The maximum temperature recorded prior to her arrival was 100.4 - 100.9 F. The fever has been present for 1 to 2 days. Associated symptoms include headaches and a sore throat. Pertinent negatives include no congestion, coughing, ear pain, nausea, rash, sinus pain, vomiting or wheezing. She has tried nothing for the symptoms.       Constitution: Positive for fever. Negative for chills, sweating and fatigue.   HENT: Positive for sore throat. Negative for ear pain, congestion, sinus pain, sinus pressure and voice change.    Neck: Negative for painful lymph nodes.   Eyes: Negative for eye redness.   Respiratory: Negative for chest tightness, cough, sputum production, bloody sputum, COPD, shortness of breath, stridor, wheezing and asthma.    Gastrointestinal: Negative for nausea and vomiting.   Musculoskeletal: Positive for muscle ache.   Skin: Negative for rash.   Allergic/Immunologic: Negative for seasonal allergies and asthma.   Neurological: Positive for headaches.   Hematologic/Lymphatic: Negative for swollen lymph nodes.       Objective:      Physical Exam   Constitutional: She is oriented to person, place, and time. She appears well-developed. She is cooperative.  Non-toxic appearance. She does not appear ill. No distress.   HENT:   Head: Normocephalic and atraumatic.   Ears:   Right Ear: Hearing, tympanic membrane, external ear and ear canal normal.   Left Ear: Hearing, tympanic membrane, external ear and ear canal normal.   Nose: Nose normal. No mucosal edema, rhinorrhea or nasal " deformity. No epistaxis. Right sinus exhibits no maxillary sinus tenderness and no frontal sinus tenderness. Left sinus exhibits no maxillary sinus tenderness and no frontal sinus tenderness.   Mouth/Throat: Uvula is midline, oropharynx is clear and moist and mucous membranes are normal. No trismus in the jaw. Normal dentition. No uvula swelling. Cobblestoning present. No posterior oropharyngeal erythema.   Eyes: Conjunctivae and lids are normal. Right eye exhibits no discharge. Left eye exhibits no discharge. No scleral icterus.   Neck: Trachea normal, normal range of motion, full passive range of motion without pain and phonation normal. Neck supple.   Cardiovascular: Normal rate, regular rhythm, normal heart sounds and normal pulses.   Pulmonary/Chest: Effort normal and breath sounds normal. No respiratory distress.   Abdominal: Soft. Normal appearance and bowel sounds are normal. She exhibits no distension, no pulsatile midline mass and no mass. There is no abdominal tenderness.   Musculoskeletal: Normal range of motion.         General: No deformity.   Neurological: She is alert and oriented to person, place, and time. She exhibits normal muscle tone. Coordination normal.   Skin: Skin is warm, dry, intact, not diaphoretic and not pale. Psychiatric: Her speech is normal and behavior is normal. Judgment and thought content normal.   Nursing note and vitals reviewed.        Results for orders placed or performed in visit on 12/01/20   POCT COVID-19 Rapid Screening   Result Value Ref Range    POC Rapid COVID Negative Negative     Acceptable Yes    POCT Strep A, Molecular   Result Value Ref Range    Molecular Strep A, POC Negative Negative     Acceptable Yes      Assessment:       1. Screening for viral disease    2. Sore throat        Plan:         Screening for viral disease  -     POCT COVID-19 Rapid Screening    Sore throat  -     POCT Strep A, Molecular      Patient Instructions  "  Use warm salt water gargles to ease your throat pain. Warm salt water gargles as needed for sore throat-  1/2 tsp salt to 1 cup warm water, gargle as desired. Hot tea with honey helps soothe.     Take ibuprofen or tylenol as directed for minor pain.     Increase fluid intake.     Follow up if symptoms persist or worsen.     You must understand that you've received an Urgent Care treatment only and that you may be released before all your medical problems are known or treated. You, the patient, will arrange for follow up care as instructed.  Follow up with your PCP or specialty clinic as directed in the next 1-2 weeks if not improved or as needed.  You can call [phone] to schedule an appointment with the appropriate provider.  If your condition worsens we recommend that you receive another evaluation at the emergency room immediately or contact your primary medical clinics after hours call service to discuss your concerns.  Please return here or go to the Emergency Department for any concerns or worsening of condition.      Viral Syndrome (Adult)  A viral illness may cause a number of symptoms. The symptoms depend on the part of the body that the virus affects. If it settles in your nose, throat, and lungs, it may cause cough, sore throat, congestion, and sometimes headache. If it settles in your stomach and intestinal tract, it may cause vomiting and diarrhea. Sometimes it causes vague symptoms like "aching all over," feeling tired, loss of appetite, or fever.  A viral illness usually lasts 1 to 2 weeks, but sometimes it lasts longer. In some cases, a more serious infection can look like a viral syndrome in the first few days of the illness. You may need another exam and additional tests to know the difference. Watch for the warning signs listed below.  Home care  Follow these guidelines for taking care of yourself at home:  · If symptoms are severe, rest at home for the first 2 to 3 days.  · Stay away from " cigarette smoke - both your smoke and the smoke from others.  · You may use over-the-counter acetaminophen or ibuprofen for fever, muscle aching, and headache, unless another medicine was prescribed for this. If you have chronic liver or kidney disease or ever had a stomach ulcer or GI bleeding, talk with your doctor before using these medicines. No one who is younger than 18 and ill with a fever should take aspirin. It may cause severe disease or death.  · Your appetite may be poor, so a light diet is fine. Avoid dehydration by drinking 8 to 12 8-ounce glasses of fluids each day. This may include water; orange juice; lemonade; apple, grape, and cranberry juice; clear fruit drinks; electrolyte replacement and sports drinks; and decaffeinated teas and coffee. If you have been diagnosed with a kidney disease, ask your doctor how much and what types of fluids you should drink to prevent dehydration. If you have kidney disease, drinking too much fluid can cause it build up in the your body and be dangerous to your health.  · Over-the-counter remedies won't shorten the length of the illness but may be helpful for cough, sore throat; and nasal and sinus congestion. Don't use decongestants if you have high blood pressure.  Follow-up care  Follow up with your healthcare provider if you do not improve over the next week.  Call 911  Get emergency medical care if any of the following occur:  · Convulsion  · Feeling weak, dizzy, or like you are going to faint  · Chest pain, shortness of breath, wheezing, or difficulty breathing  When to seek medical advice  Call your healthcare provider right away if any of these occur:  · Cough with lots of colored sputum (mucus) or blood in your sputum  · Chest pain, shortness of breath, wheezing, or difficulty breathing  · Severe headache; face, neck, or ear pain  · Severe, constant pain in the lower right side of your belly (abdominal)  · Continued vomiting (cant keep liquids  down)  · Frequent diarrhea (more than 5 times a day); blood (red or black color) or mucus in diarrhea  · Feeling weak, dizzy, or like you are going to faint  · Extreme thirst  · Fever of 100.4°F (38°C) or higher, or as directed by your healthcare provider  Date Last Reviewed: 9/25/2015  © 9841-0038 [employer]. [address] [place] [address]. All rights reserved. This information is not intended as a substitute for professional medical care. Always follow your healthcare professional's instructions.

## 2021-03-24 ENCOUNTER — PATIENT MESSAGE (OUTPATIENT)
Dept: INTERNAL MEDICINE | Facility: CLINIC | Age: 36
End: 2021-03-24

## 2021-03-24 DIAGNOSIS — F41.8 DEPRESSION WITH ANXIETY: ICD-10-CM

## 2021-03-24 RX ORDER — SERTRALINE HYDROCHLORIDE 25 MG/1
25 TABLET, FILM COATED ORAL DAILY
Qty: 90 TABLET | Refills: 0 | Status: SHIPPED | OUTPATIENT
Start: 2021-03-24 | End: 2021-07-06 | Stop reason: SDUPTHER

## 2021-03-26 ENCOUNTER — OFFICE VISIT (OUTPATIENT)
Dept: OPTOMETRY | Facility: CLINIC | Age: 36
End: 2021-03-26

## 2021-03-26 ENCOUNTER — OFFICE VISIT (OUTPATIENT)
Dept: OPTOMETRY | Facility: CLINIC | Age: 36
End: 2021-03-26
Payer: COMMERCIAL

## 2021-03-26 DIAGNOSIS — Z04.9 DISEASE RULED OUT AFTER EXAMINATION: ICD-10-CM

## 2021-03-26 DIAGNOSIS — Z46.0 FITTING AND ADJUSTMENT OF SPECTACLES AND CONTACT LENSES: Primary | ICD-10-CM

## 2021-03-26 DIAGNOSIS — H52.13 MYOPIA, BILATERAL: Primary | ICD-10-CM

## 2021-03-26 DIAGNOSIS — H35.9 WHITE WITHOUT PRESSURE OF PERIPHERAL RETINA OF BOTH EYES: ICD-10-CM

## 2021-03-26 PROCEDURE — 92014 COMPRE OPH EXAM EST PT 1/>: CPT | Mod: S$GLB,,, | Performed by: OPTOMETRIST

## 2021-03-26 PROCEDURE — 99999 PR PBB SHADOW E&M-EST. PATIENT-LVL I: ICD-10-PCS | Mod: PBBFAC,,, | Performed by: OPTOMETRIST

## 2021-03-26 PROCEDURE — 92310 CONTACT LENS FITTING OU: CPT | Mod: CSM,,, | Performed by: OPTOMETRIST

## 2021-03-26 PROCEDURE — 92015 DETERMINE REFRACTIVE STATE: CPT | Mod: S$GLB,,, | Performed by: OPTOMETRIST

## 2021-03-26 PROCEDURE — 92014 PR EYE EXAM, EST PATIENT,COMPREHESV: ICD-10-PCS | Mod: S$GLB,,, | Performed by: OPTOMETRIST

## 2021-03-26 PROCEDURE — 99999 PR PBB SHADOW E&M-EST. PATIENT-LVL III: CPT | Mod: PBBFAC,,, | Performed by: OPTOMETRIST

## 2021-03-26 PROCEDURE — 92310 PR CONTACT LENS FITTING (NO CHANGE): ICD-10-PCS | Mod: CSM,,, | Performed by: OPTOMETRIST

## 2021-03-26 PROCEDURE — 99999 PR PBB SHADOW E&M-EST. PATIENT-LVL I: CPT | Mod: PBBFAC,,, | Performed by: OPTOMETRIST

## 2021-03-26 PROCEDURE — 99999 PR PBB SHADOW E&M-EST. PATIENT-LVL III: ICD-10-PCS | Mod: PBBFAC,,, | Performed by: OPTOMETRIST

## 2021-03-26 PROCEDURE — 92015 PR REFRACTION: ICD-10-PCS | Mod: S$GLB,,, | Performed by: OPTOMETRIST

## 2021-06-21 ENCOUNTER — OFFICE VISIT (OUTPATIENT)
Dept: OBSTETRICS AND GYNECOLOGY | Facility: CLINIC | Age: 36
End: 2021-06-21
Payer: COMMERCIAL

## 2021-06-21 VITALS
HEIGHT: 69 IN | SYSTOLIC BLOOD PRESSURE: 134 MMHG | WEIGHT: 151.69 LBS | BODY MASS INDEX: 22.47 KG/M2 | DIASTOLIC BLOOD PRESSURE: 79 MMHG

## 2021-06-21 DIAGNOSIS — Z01.419 WELL WOMAN EXAM WITH ROUTINE GYNECOLOGICAL EXAM: Primary | ICD-10-CM

## 2021-06-21 DIAGNOSIS — Z12.4 SCREENING FOR CERVICAL CANCER: ICD-10-CM

## 2021-06-21 PROCEDURE — 3008F BODY MASS INDEX DOCD: CPT | Mod: CPTII,S$GLB,, | Performed by: OBSTETRICS & GYNECOLOGY

## 2021-06-21 PROCEDURE — 1126F AMNT PAIN NOTED NONE PRSNT: CPT | Mod: S$GLB,,, | Performed by: OBSTETRICS & GYNECOLOGY

## 2021-06-21 PROCEDURE — 99999 PR PBB SHADOW E&M-EST. PATIENT-LVL III: ICD-10-PCS | Mod: PBBFAC,,, | Performed by: OBSTETRICS & GYNECOLOGY

## 2021-06-21 PROCEDURE — 99395 PR PREVENTIVE VISIT,EST,18-39: ICD-10-PCS | Mod: S$GLB,,, | Performed by: OBSTETRICS & GYNECOLOGY

## 2021-06-21 PROCEDURE — 88175 CYTOPATH C/V AUTO FLUID REDO: CPT | Performed by: OBSTETRICS & GYNECOLOGY

## 2021-06-21 PROCEDURE — 87624 HPV HI-RISK TYP POOLED RSLT: CPT | Performed by: OBSTETRICS & GYNECOLOGY

## 2021-06-21 PROCEDURE — 99395 PREV VISIT EST AGE 18-39: CPT | Mod: S$GLB,,, | Performed by: OBSTETRICS & GYNECOLOGY

## 2021-06-21 PROCEDURE — 1126F PR PAIN SEVERITY QUANTIFIED, NO PAIN PRESENT: ICD-10-PCS | Mod: S$GLB,,, | Performed by: OBSTETRICS & GYNECOLOGY

## 2021-06-21 PROCEDURE — 99999 PR PBB SHADOW E&M-EST. PATIENT-LVL III: CPT | Mod: PBBFAC,,, | Performed by: OBSTETRICS & GYNECOLOGY

## 2021-06-21 PROCEDURE — 3008F PR BODY MASS INDEX (BMI) DOCUMENTED: ICD-10-PCS | Mod: CPTII,S$GLB,, | Performed by: OBSTETRICS & GYNECOLOGY

## 2021-06-25 LAB
FINAL PATHOLOGIC DIAGNOSIS: NORMAL
Lab: NORMAL

## 2021-06-28 LAB
HPV HR 12 DNA SPEC QL NAA+PROBE: NEGATIVE
HPV16 AG SPEC QL: NEGATIVE
HPV18 DNA SPEC QL NAA+PROBE: NEGATIVE

## 2021-07-04 ENCOUNTER — PATIENT MESSAGE (OUTPATIENT)
Dept: INTERNAL MEDICINE | Facility: CLINIC | Age: 36
End: 2021-07-04

## 2021-07-04 DIAGNOSIS — F41.8 DEPRESSION WITH ANXIETY: ICD-10-CM

## 2021-07-06 RX ORDER — SERTRALINE HYDROCHLORIDE 25 MG/1
25 TABLET, FILM COATED ORAL DAILY
Qty: 90 TABLET | Refills: 0 | Status: SHIPPED | OUTPATIENT
Start: 2021-07-06 | End: 2021-10-01 | Stop reason: SDUPTHER

## 2021-07-19 ENCOUNTER — PATIENT MESSAGE (OUTPATIENT)
Dept: OBSTETRICS AND GYNECOLOGY | Facility: CLINIC | Age: 36
End: 2021-07-19

## 2021-07-19 DIAGNOSIS — R10.2 FEMALE PELVIC PAIN: Primary | ICD-10-CM

## 2021-07-22 ENCOUNTER — HOSPITAL ENCOUNTER (OUTPATIENT)
Dept: RADIOLOGY | Facility: OTHER | Age: 36
Discharge: HOME | End: 2021-07-22
Attending: OBSTETRICS & GYNECOLOGY
Payer: COMMERCIAL

## 2021-07-22 DIAGNOSIS — R10.2 FEMALE PELVIC PAIN: ICD-10-CM

## 2021-07-22 PROCEDURE — 76856 US EXAM PELVIC COMPLETE: CPT | Mod: TC

## 2021-07-22 PROCEDURE — 76856 US EXAM PELVIC COMPLETE: CPT | Mod: 26,,, | Performed by: RADIOLOGY

## 2021-07-22 PROCEDURE — 76830 TRANSVAGINAL US NON-OB: CPT | Mod: 26,,, | Performed by: RADIOLOGY

## 2021-07-22 PROCEDURE — 76830 US PELVIS COMP WITH TRANSVAG NON-OB (XPD): ICD-10-PCS | Mod: 26,,, | Performed by: RADIOLOGY

## 2021-07-22 PROCEDURE — 76856 US PELVIS COMP WITH TRANSVAG NON-OB (XPD): ICD-10-PCS | Mod: 26,,, | Performed by: RADIOLOGY

## 2021-07-26 NOTE — PATIENT INSTRUCTIONS
Self mob for correction of L post innominate rotation:    Lie supine with R knee bent, L knee straight.    Extend the L lumbar spine, rotating the L pelvis forward, with L ankle PF (like reaching down to hit something with your toe.  Hold 5 sec and release.  Repeat 5-8 times.      Do this twice per day.     Awake

## 2021-08-23 ENCOUNTER — OFFICE VISIT (OUTPATIENT)
Dept: INTERNAL MEDICINE | Facility: CLINIC | Age: 36
End: 2021-08-23
Attending: INTERNAL MEDICINE
Payer: COMMERCIAL

## 2021-08-23 VITALS
HEIGHT: 69 IN | OXYGEN SATURATION: 99 % | BODY MASS INDEX: 22.85 KG/M2 | HEART RATE: 82 BPM | SYSTOLIC BLOOD PRESSURE: 122 MMHG | DIASTOLIC BLOOD PRESSURE: 84 MMHG | WEIGHT: 154.31 LBS

## 2021-08-23 DIAGNOSIS — Z00.00 ANNUAL PHYSICAL EXAM: Primary | ICD-10-CM

## 2021-08-23 DIAGNOSIS — F41.8 DEPRESSION WITH ANXIETY: ICD-10-CM

## 2021-08-23 PROCEDURE — 1159F MED LIST DOCD IN RCRD: CPT | Mod: CPTII,S$GLB,, | Performed by: INTERNAL MEDICINE

## 2021-08-23 PROCEDURE — 3079F DIAST BP 80-89 MM HG: CPT | Mod: CPTII,S$GLB,, | Performed by: INTERNAL MEDICINE

## 2021-08-23 PROCEDURE — 3074F SYST BP LT 130 MM HG: CPT | Mod: CPTII,S$GLB,, | Performed by: INTERNAL MEDICINE

## 2021-08-23 PROCEDURE — 99395 PR PREVENTIVE VISIT,EST,18-39: ICD-10-PCS | Mod: S$GLB,,, | Performed by: INTERNAL MEDICINE

## 2021-08-23 PROCEDURE — 3008F BODY MASS INDEX DOCD: CPT | Mod: CPTII,S$GLB,, | Performed by: INTERNAL MEDICINE

## 2021-08-23 PROCEDURE — 1159F PR MEDICATION LIST DOCUMENTED IN MEDICAL RECORD: ICD-10-PCS | Mod: CPTII,S$GLB,, | Performed by: INTERNAL MEDICINE

## 2021-08-23 PROCEDURE — 1126F PR PAIN SEVERITY QUANTIFIED, NO PAIN PRESENT: ICD-10-PCS | Mod: CPTII,S$GLB,, | Performed by: INTERNAL MEDICINE

## 2021-08-23 PROCEDURE — 3008F PR BODY MASS INDEX (BMI) DOCUMENTED: ICD-10-PCS | Mod: CPTII,S$GLB,, | Performed by: INTERNAL MEDICINE

## 2021-08-23 PROCEDURE — 3074F PR MOST RECENT SYSTOLIC BLOOD PRESSURE < 130 MM HG: ICD-10-PCS | Mod: CPTII,S$GLB,, | Performed by: INTERNAL MEDICINE

## 2021-08-23 PROCEDURE — 1126F AMNT PAIN NOTED NONE PRSNT: CPT | Mod: CPTII,S$GLB,, | Performed by: INTERNAL MEDICINE

## 2021-08-23 PROCEDURE — 3079F PR MOST RECENT DIASTOLIC BLOOD PRESSURE 80-89 MM HG: ICD-10-PCS | Mod: CPTII,S$GLB,, | Performed by: INTERNAL MEDICINE

## 2021-08-23 PROCEDURE — 99395 PREV VISIT EST AGE 18-39: CPT | Mod: S$GLB,,, | Performed by: INTERNAL MEDICINE

## 2021-08-23 PROCEDURE — 99999 PR PBB SHADOW E&M-EST. PATIENT-LVL III: ICD-10-PCS | Mod: PBBFAC,,, | Performed by: INTERNAL MEDICINE

## 2021-08-23 PROCEDURE — 99999 PR PBB SHADOW E&M-EST. PATIENT-LVL III: CPT | Mod: PBBFAC,,, | Performed by: INTERNAL MEDICINE

## 2021-08-24 ENCOUNTER — LAB VISIT (OUTPATIENT)
Dept: LAB | Facility: OTHER | Age: 36
End: 2021-08-24
Attending: INTERNAL MEDICINE
Payer: COMMERCIAL

## 2021-08-24 DIAGNOSIS — Z00.00 ANNUAL PHYSICAL EXAM: ICD-10-CM

## 2021-08-24 LAB
ALBUMIN SERPL BCP-MCNC: 4.1 G/DL (ref 3.5–5.2)
ALP SERPL-CCNC: 40 U/L (ref 55–135)
ALT SERPL W/O P-5'-P-CCNC: 22 U/L (ref 10–44)
ANION GAP SERPL CALC-SCNC: 8 MMOL/L (ref 8–16)
AST SERPL-CCNC: 27 U/L (ref 10–40)
BASOPHILS # BLD AUTO: 0.03 K/UL (ref 0–0.2)
BASOPHILS NFR BLD: 0.7 % (ref 0–1.9)
BILIRUB SERPL-MCNC: 1.9 MG/DL (ref 0.1–1)
BUN SERPL-MCNC: 13 MG/DL (ref 6–20)
CALCIUM SERPL-MCNC: 9.1 MG/DL (ref 8.7–10.5)
CHLORIDE SERPL-SCNC: 107 MMOL/L (ref 95–110)
CO2 SERPL-SCNC: 25 MMOL/L (ref 23–29)
CREAT SERPL-MCNC: 0.8 MG/DL (ref 0.5–1.4)
DIFFERENTIAL METHOD: NORMAL
EOSINOPHIL # BLD AUTO: 0.1 K/UL (ref 0–0.5)
EOSINOPHIL NFR BLD: 2 % (ref 0–8)
ERYTHROCYTE [DISTWIDTH] IN BLOOD BY AUTOMATED COUNT: 12.1 % (ref 11.5–14.5)
EST. GFR  (AFRICAN AMERICAN): >60 ML/MIN/1.73 M^2
EST. GFR  (NON AFRICAN AMERICAN): >60 ML/MIN/1.73 M^2
GLUCOSE SERPL-MCNC: 87 MG/DL (ref 70–110)
HCT VFR BLD AUTO: 42.2 % (ref 37–48.5)
HGB BLD-MCNC: 13.5 G/DL (ref 12–16)
IMM GRANULOCYTES # BLD AUTO: 0.01 K/UL (ref 0–0.04)
IMM GRANULOCYTES NFR BLD AUTO: 0.2 % (ref 0–0.5)
LYMPHOCYTES # BLD AUTO: 1.5 K/UL (ref 1–4.8)
LYMPHOCYTES NFR BLD: 33.9 % (ref 18–48)
MCH RBC QN AUTO: 30.2 PG (ref 27–31)
MCHC RBC AUTO-ENTMCNC: 32 G/DL (ref 32–36)
MCV RBC AUTO: 94 FL (ref 82–98)
MONOCYTES # BLD AUTO: 0.5 K/UL (ref 0.3–1)
MONOCYTES NFR BLD: 12.2 % (ref 4–15)
NEUTROPHILS # BLD AUTO: 2.3 K/UL (ref 1.8–7.7)
NEUTROPHILS NFR BLD: 51 % (ref 38–73)
NRBC BLD-RTO: 0 /100 WBC
PLATELET # BLD AUTO: 288 K/UL (ref 150–450)
PMV BLD AUTO: 9.5 FL (ref 9.2–12.9)
POTASSIUM SERPL-SCNC: 4.4 MMOL/L (ref 3.5–5.1)
PROT SERPL-MCNC: 7.6 G/DL (ref 6–8.4)
RBC # BLD AUTO: 4.47 M/UL (ref 4–5.4)
SODIUM SERPL-SCNC: 140 MMOL/L (ref 136–145)
TSH SERPL DL<=0.005 MIU/L-ACNC: 1.4 UIU/ML (ref 0.4–4)
WBC # BLD AUTO: 4.42 K/UL (ref 3.9–12.7)

## 2021-08-24 PROCEDURE — 80053 COMPREHEN METABOLIC PANEL: CPT | Performed by: INTERNAL MEDICINE

## 2021-08-24 PROCEDURE — 85025 COMPLETE CBC W/AUTO DIFF WBC: CPT | Performed by: INTERNAL MEDICINE

## 2021-08-24 PROCEDURE — 86803 HEPATITIS C AB TEST: CPT | Performed by: INTERNAL MEDICINE

## 2021-08-24 PROCEDURE — 36415 COLL VENOUS BLD VENIPUNCTURE: CPT | Performed by: INTERNAL MEDICINE

## 2021-08-24 PROCEDURE — 84443 ASSAY THYROID STIM HORMONE: CPT | Performed by: INTERNAL MEDICINE

## 2021-08-25 LAB — HCV AB SERPL QL IA: NEGATIVE

## 2021-09-30 ENCOUNTER — PATIENT MESSAGE (OUTPATIENT)
Dept: INTERNAL MEDICINE | Facility: CLINIC | Age: 36
End: 2021-09-30

## 2021-09-30 DIAGNOSIS — F41.8 DEPRESSION WITH ANXIETY: ICD-10-CM

## 2021-10-01 RX ORDER — SERTRALINE HYDROCHLORIDE 25 MG/1
25 TABLET, FILM COATED ORAL DAILY
Qty: 90 TABLET | Refills: 3 | Status: SHIPPED | OUTPATIENT
Start: 2021-10-01 | End: 2022-09-27 | Stop reason: SDUPTHER

## 2021-10-11 ENCOUNTER — IMMUNIZATION (OUTPATIENT)
Dept: PHARMACY | Facility: CLINIC | Age: 36
End: 2021-10-11
Payer: COMMERCIAL

## 2021-10-27 ENCOUNTER — IMMUNIZATION (OUTPATIENT)
Dept: PHARMACY | Facility: CLINIC | Age: 36
End: 2021-10-27
Payer: COMMERCIAL

## 2021-10-27 DIAGNOSIS — Z23 NEED FOR VACCINATION: Primary | ICD-10-CM

## 2022-01-04 ENCOUNTER — PATIENT MESSAGE (OUTPATIENT)
Dept: ADMINISTRATIVE | Facility: OTHER | Age: 37
End: 2022-01-04
Payer: COMMERCIAL

## 2022-01-04 ENCOUNTER — LAB VISIT (OUTPATIENT)
Dept: PRIMARY CARE CLINIC | Facility: OTHER | Age: 37
End: 2022-01-04
Attending: INTERNAL MEDICINE
Payer: COMMERCIAL

## 2022-01-04 DIAGNOSIS — R05.9 COUGH: ICD-10-CM

## 2022-01-04 PROCEDURE — U0003 INFECTIOUS AGENT DETECTION BY NUCLEIC ACID (DNA OR RNA); SEVERE ACUTE RESPIRATORY SYNDROME CORONAVIRUS 2 (SARS-COV-2) (CORONAVIRUS DISEASE [COVID-19]), AMPLIFIED PROBE TECHNIQUE, MAKING USE OF HIGH THROUGHPUT TECHNOLOGIES AS DESCRIBED BY CMS-2020-01-R: HCPCS | Performed by: INTERNAL MEDICINE

## 2022-01-08 LAB
SARS-COV-2 RNA RESP QL NAA+PROBE: NOT DETECTED
SARS-COV-2- CYCLE NUMBER: NORMAL

## 2022-01-09 ENCOUNTER — PATIENT MESSAGE (OUTPATIENT)
Dept: ADMINISTRATIVE | Facility: OTHER | Age: 37
End: 2022-01-09
Payer: COMMERCIAL

## 2022-01-09 ENCOUNTER — LAB VISIT (OUTPATIENT)
Dept: PRIMARY CARE CLINIC | Facility: OTHER | Age: 37
End: 2022-01-09
Attending: INTERNAL MEDICINE
Payer: COMMERCIAL

## 2022-01-09 DIAGNOSIS — Z20.822 ENCOUNTER FOR LABORATORY TESTING FOR COVID-19 VIRUS: ICD-10-CM

## 2022-01-09 PROCEDURE — U0003 INFECTIOUS AGENT DETECTION BY NUCLEIC ACID (DNA OR RNA); SEVERE ACUTE RESPIRATORY SYNDROME CORONAVIRUS 2 (SARS-COV-2) (CORONAVIRUS DISEASE [COVID-19]), AMPLIFIED PROBE TECHNIQUE, MAKING USE OF HIGH THROUGHPUT TECHNOLOGIES AS DESCRIBED BY CMS-2020-01-R: HCPCS | Performed by: INTERNAL MEDICINE

## 2022-01-11 LAB
SARS-COV-2 RNA RESP QL NAA+PROBE: NOT DETECTED
SARS-COV-2- CYCLE NUMBER: NORMAL

## 2022-03-19 ENCOUNTER — HOSPITAL ENCOUNTER (EMERGENCY)
Facility: OTHER | Age: 37
Discharge: HOME | End: 2022-03-19
Attending: EMERGENCY MEDICINE
Payer: COMMERCIAL

## 2022-03-19 VITALS
HEART RATE: 88 BPM | TEMPERATURE: 99 F | RESPIRATION RATE: 18 BRPM | DIASTOLIC BLOOD PRESSURE: 86 MMHG | OXYGEN SATURATION: 100 % | SYSTOLIC BLOOD PRESSURE: 138 MMHG

## 2022-03-19 DIAGNOSIS — S52.124A CLOSED NONDISPLACED FRACTURE OF HEAD OF RIGHT RADIUS, INITIAL ENCOUNTER: Primary | ICD-10-CM
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
## 2022-03-19 LAB
B-HCG UR QL: NEGATIVE
CTP QC/QA: YES

## 2022-03-19 PROCEDURE — 29105 APPLICATION LONG ARM SPLINT: CPT | Mod: RT

## 2022-03-19 PROCEDURE — 25000003 PHARM REV CODE 250: Performed by: NURSE PRACTITIONER

## 2022-03-19 PROCEDURE — 81025 URINE PREGNANCY TEST: CPT | Performed by: EMERGENCY MEDICINE

## 2022-03-19 PROCEDURE — 99284 EMERGENCY DEPT VISIT MOD MDM: CPT | Mod: 25

## 2022-03-19 RX ORDER — OXYCODONE AND ACETAMINOPHEN 5; 325 MG/1; MG/1
1 TABLET ORAL EVERY 4 HOURS PRN
Qty: 18 TABLET | Refills: 0 | Status: SHIPPED | OUTPATIENT
Start: 2022-03-19 | End: 2022-03-22

## 2022-03-19 RX ORDER — NAPROXEN 500 MG/1
500 TABLET ORAL 2 TIMES DAILY WITH MEALS
Qty: 10 TABLET | Refills: 0 | Status: SHIPPED | OUTPATIENT
Start: 2022-03-19 | End: 2022-03-24

## 2022-03-19 RX ORDER — OXYCODONE AND ACETAMINOPHEN 5; 325 MG/1; MG/1
1 TABLET ORAL
Status: COMPLETED | OUTPATIENT
Start: 2022-03-19 | End: 2022-03-19

## 2022-03-19 RX ORDER — METHOCARBAMOL 750 MG/1
750 TABLET, FILM COATED ORAL
Status: COMPLETED | OUTPATIENT
Start: 2022-03-19 | End: 2022-03-19

## 2022-03-19 RX ORDER — METHOCARBAMOL 750 MG/1
750 TABLET, FILM COATED ORAL 3 TIMES DAILY PRN
Qty: 15 TABLET | Refills: 0 | Status: SHIPPED | OUTPATIENT
Start: 2022-03-19 | End: 2022-03-24

## 2022-03-19 RX ORDER — KETOROLAC TROMETHAMINE 10 MG/1
10 TABLET, FILM COATED ORAL
Status: COMPLETED | OUTPATIENT
Start: 2022-03-19 | End: 2022-03-19

## 2022-03-19 RX ADMIN — METHOCARBAMOL 750 MG: 750 TABLET ORAL at 12:03

## 2022-03-19 RX ADMIN — KETOROLAC TROMETHAMINE 10 MG: 10 TABLET, FILM COATED ORAL at 12:03

## 2022-03-19 RX ADMIN — OXYCODONE HYDROCHLORIDE AND ACETAMINOPHEN 1 TABLET: 5; 325 TABLET ORAL at 01:03

## 2022-03-19 NOTE — ED PROVIDER NOTES
Encounter Date: 3/19/2022       History     Chief Complaint   Patient presents with    Arm Injury     Was running & tripped & fell injuring her right arm & right side. Scattered road rash. Doesn't have full ROM to right elbow. No obvious deformity     Patient is a 36-year-old female with medical history of anxiety presenting to the ED after a trip and fall earlier this morning.  Patient states she was running and she tripped and fell landing on her right side.  Patient states she has scattered abrasions to her right side but has increased pain in her right elbow.  Unable to flex or extend elbow.  Neurovascularly intact.        Review of patient's allergies indicates:  No Known Allergies  Past Medical History:   Diagnosis Date    Allergy     Anxiety     No meds    Fever blister     Oral cold sores only; no hx genital outbreak    Postpartum hypertension     Requiring magnesium after delivery     Past Surgical History:   Procedure Laterality Date    ANUS SURGERY      FISSURE    COSMETIC SURGERY      DILATION AND CURETTAGE OF UTERUS USING SUCTION N/A 9/13/2018    Procedure: DILATION AND CURETTAGE, UTERUS, USING SUCTION;  Surgeon: Joselo Landry Jr., MD;  Location: [facility];  Service: OB/GYN;  Laterality: N/A;    NASAL SEPTUM SURGERY      TONSILLECTOMY, ADENOIDECTOMY      WISDOM TOOTH EXTRACTION       Family History   Problem Relation Age of Onset    Glaucoma Father     Hypertension Father     Rheum arthritis Father     Arthritis Father     Melanoma Paternal Grandfather     Kyphosis Brother     Bipolar disorder Brother     Anxiety disorder Brother     Diabetes Mellitus Maternal Grandfather     No Known Problems Paternal Grandmother     Breast cancer Neg Hx     Cancer Neg Hx     Ovarian cancer Neg Hx     Colon cancer Neg Hx     Macular degeneration Neg Hx     Retinal detachment Neg Hx     Amblyopia Neg Hx     Blindness Neg Hx     Cataracts Neg Hx     Strabismus Neg Hx      Social History      Tobacco Use    Smoking status: Never Smoker    Smokeless tobacco: Never Used   Substance Use Topics    Alcohol use: Yes     Alcohol/week: 1.0 standard drink     Types: 1 Glasses of wine per week     Comment: Social    Drug use: No     Review of Systems   Constitutional: Positive for activity change ( due to pain ). Negative for fever.   HENT: Negative for sore throat.    Respiratory: Negative for shortness of breath.    Cardiovascular: Negative for chest pain.   Gastrointestinal: Negative for nausea.   Genitourinary: Negative for dysuria.   Musculoskeletal: Positive for arthralgias ( Right elbow), joint swelling ( Right elbow) and myalgias ( Right upper extremity). Negative for back pain.   Skin: Positive for wound ( Scattered abrasions to right side). Negative for rash.   Neurological: Negative for weakness and numbness.   Hematological: Does not bruise/bleed easily.   All other systems reviewed and are negative.      Physical Exam     Initial Vitals [03/19/22 1159]   BP Pulse Resp Temp SpO2   138/86 82 18 98.5 °F (36.9 °C) 100 %      MAP       --         Physical Exam    Nursing note and vitals reviewed.  Constitutional: Vital signs are normal. She appears well-developed and well-nourished. She is cooperative. No distress.   HENT:   Head: Normocephalic and atraumatic.   Mouth/Throat: Uvula is midline, oropharynx is clear and moist and mucous membranes are normal.   Eyes: Conjunctivae, EOM and lids are normal. Pupils are equal, round, and reactive to light.   Neck: Trachea normal and phonation normal. Neck supple.   Normal range of motion.  Cardiovascular: Normal rate, regular rhythm and intact distal pulses.   Pulses:       Radial pulses are 2+ on the right side and 2+ on the left side.   Pulmonary/Chest: Effort normal and breath sounds normal.   Musculoskeletal:      Right shoulder: Normal.      Right upper arm: Tenderness present. No swelling, edema or bony tenderness.      Right elbow: Swelling present.  Decreased range of motion. Tenderness present.      Right forearm: Normal.      Right wrist: Normal.      Cervical back: Normal range of motion and neck supple.      Comments: Good extension flexion of right wrist.  Decreased range of motion of right elbow due to pain and swelling.     Neurological: She is alert and oriented to person, place, and time. She has normal strength. No sensory deficit. GCS eye subscore is 4. GCS verbal subscore is 5. GCS motor subscore is 6.   Skin: Skin is warm and dry. Capillary refill takes 2 to 3 seconds. Abrasion ( Scattered) noted. No pallor.         ED Course   Splint Application    Date/Time: 3/19/2022 2:00 PM  Performed by: Dee Crystal NP  Authorized by: Vaishnavi Mina MD   Consent Done: Emergent Situation  Location details: right arm  Splint type: long arm  Supplies used: cotton padding and Ortho-Glass  Post-procedure: The splinted body part was neurovascularly unchanged following the procedure.  Patient tolerance: Patient tolerated the procedure well with no immediate complications        Labs Reviewed   POCT URINE PREGNANCY          Imaging Results          X-Ray Elbow Complete 3 views Right (Final result)  Result time 03/19/22 12:40:06    Final result by Osman Crump MD (03/19/22 12:40:06)                 Impression:      1. Radial head fracture as above.      Electronically signed by: Osman Crump MD  Date:    03/19/2022  Time:    12:40             Narrative:    EXAMINATION:  XR ELBOW COMPLETE 3 VIEW RIGHT    CLINICAL HISTORY:  right elbow injury;.    TECHNIQUE:  AP, lateral, and oblique views of the right elbow were performed.    COMPARISON:  None    FINDINGS:  Three views right elbow.    There is mild displacement of the anterior elbow fat pad, no significant posterior fat pad displacement.  The anterior humeral line and radiocapitellar line are in appropriate orientation.  There is depressed fracture involving the lateral aspect of the radial head with mild  displacement.  No radiopaque foreign body.                               X-Ray Forearm Right (Final result)  Result time 03/19/22 12:41:13    Final result by Osman Crump MD (03/19/22 12:41:13)                 Impression:      1. Please see separately dictated report for details of the radial head fracture, no findings to suggest proximal radial or ulnar fracture.      Electronically signed by: Osman Crump MD  Date:    03/19/2022  Time:    12:41             Narrative:    EXAMINATION:  XR FOREARM RIGHT    CLINICAL HISTORY:  right elbow injury;    TECHNIQUE:  AP and lateral views of the right forearm were performed.    COMPARISON:  None    FINDINGS:  Two views right forearm.    Please see separately dictated report for details of the radial head fracture.  The remaining aspects of the radius and ulna are intact.  The wrist appears intact.                               X-Ray Humerus 2 View Right (Final result)  Result time 03/19/22 12:39:09    Final result by Osman Crump MD (03/19/22 12:39:09)                 Impression:      1. No acute displaced fracture or dislocation of the humerus.  2. Please see separately dictated report for details of the elbow.      Electronically signed by: Osman Crump MD  Date:    03/19/2022  Time:    12:39             Narrative:    EXAMINATION:  XR HUMERUS 2 VIEW RIGHT    CLINICAL HISTORY:  right elbow injury;    COMPARISON:  None    FINDINGS:  Two views right humerus.    The right humeral head maintains appropriate relationship with the glenoid.  The acromioclavicular joint is intact.  No acute displaced right rib fracture.  The right lung zones are grossly clear.  Please see separately dictated report for details of the elbow.                              X-Rays:   Independently Interpreted Readings:   Other Readings:  Reviewed by me, read by Radiology    Medications   methocarbamoL tablet 750 mg (750 mg Oral Given 3/19/22 1258)   ketorolac tablet 10 mg (10 mg Oral  Given 3/19/22 1258)   oxyCODONE-acetaminophen 5-325 mg per tablet 1 tablet (1 tablet Oral Given 3/19/22 1339)     Medical Decision Making:   Initial Assessment:   Emergent evaluation of a 37 yo female presenting for right elbow pain and scattered abrasions after a trip and fall while running this morning.  Tetanus up-to-date.  Patient states she has been unable to extend or flex her elbow since that time.  Swelling noted to right elbow.  Tenderness to palpation noted.  No forearm pain to palpation.  Good extension flexion of wrist.  Strength 5/5 in right hand.  Mild tenderness palpation over right humerus.  No pain to shoulder.  Pt speaking in full sentences.  Steady gait appreciated.  Plus two radial pulse. Cap refill < 3 seconds.      Differential Diagnosis:   Differential diagnoses include but are not limited to sprain, strain, fracture, dislocation, contusion, abrasion, others.      Independently Interpreted Test(s):   I have ordered and independently interpreted X-rays - see prior notes.  Clinical Tests:   Radiological Study: Ordered and Reviewed  ED Management:  I will get imaging, medicate and reassess.             ED Course as of 03/19/22 1429   Sat Mar 19, 2022   1335 X-ray shows radial head fracture.  Will place in posterior long-arm splint.  Will place urgent outpatient referral for orthopedic follow-up. [RZ]   1415 Patient neurovascularly intact after splint application.  Patient and family updated on plan and ortho follow-up.  Strict return to ED precautions discussed.  Patient verbalized understanding of this plan of care.  All questions and concerns addressed. [RZ]   1420 Patient is hemodynamically stable, vital signs are normal. Discharge instructions given. Return to ED precautions discussed. Follow up as directed. Pt verbalized understanding of this plan.  Pt is stable for discharge.  [RZ]      ED Course User Index  [RZ] Dee Crystal NP             Clinical Impression:   Final  diagnoses:  [S52.124A] Closed nondisplaced fracture of head of right radius, initial encounter (Primary)  [W19.XXXA] Fall, initial encounter          ED Disposition Condition    Discharge Stable        ED Prescriptions     Medication Sig Dispense Start Date End Date Auth. Provider    methocarbamoL (ROBAXIN) 750 MG Tab Take 1 tablet (750 mg total) by mouth 3 (three) times daily as needed (muscle strain). 15 tablet 3/19/2022 3/24/2022 Dee Crystal NP    oxyCODONE-acetaminophen (PERCOCET) 5-325 mg per tablet Take 1 tablet by mouth every 4 (four) hours as needed for Pain. 18 tablet 3/19/2022 3/22/2022 Dee Crystal NP    naproxen (NAPROSYN) 500 MG tablet Take 1 tablet (500 mg total) by mouth 2 (two) times daily with meals. for 5 days 10 tablet 3/19/2022 3/24/2022 Dee Crystal NP        Follow-up Information     Follow up With Specialties Details Why Contact Info    Roseanna Conway MD Hand Surgery, Orthopedic Surgery Call  Monday morning to schedule an ED follow up [address] [facility] [address]  [facility] [address]  [phone]             Dee Crystal NP  03/19/22 0275

## 2022-03-19 NOTE — ED NOTES
HPI:  Patient was jogging around the neighbor for exercise when she tripped over an imperfection on the roadway causing her to fall landing on her right elbow and left hand. Patient has limited range of motion of the right arm due to pain in the right elbow and superficial abrasion to the left palm and bilateral legs. Patient denies striking her head or any LOC.

## 2022-03-19 NOTE — DISCHARGE INSTRUCTIONS
We have placed you in a splint in the ED.  We are going to discharge to home with pain medications.  We have also placed a referral for follow-up with Dr. Conway.  Keep arm elevated.  Ice may help with swelling.  Return to the ED for any pain out of proportion to injury, signs of infection, or inability to follow-up with specialist.    Our goal in the emergency department is to always give you outstanding care and exceptional service. You may receive a survey by mail or e-mail in the next week regarding your experience in our ED. We would greatly appreciate your completing and returning the survey. Your feedback provides us with a way to recognize our staff who give very good care and it helps us learn how to improve when your experience was below our aspiration of excellence.

## 2022-03-21 ENCOUNTER — OFFICE VISIT (OUTPATIENT)
Dept: ORTHOPEDICS | Facility: CLINIC | Age: 37
End: 2022-03-21
Payer: COMMERCIAL

## 2022-03-21 ENCOUNTER — TELEPHONE (OUTPATIENT)
Dept: ORTHOPEDICS | Facility: CLINIC | Age: 37
End: 2022-03-21
Payer: COMMERCIAL

## 2022-03-21 VITALS — WEIGHT: 154 LBS | HEIGHT: 69 IN | BODY MASS INDEX: 22.81 KG/M2

## 2022-03-21 DIAGNOSIS — S52.124A CLOSED NONDISPLACED FRACTURE OF HEAD OF RIGHT RADIUS, INITIAL ENCOUNTER: Primary | ICD-10-CM

## 2022-03-21 PROCEDURE — 99204 OFFICE O/P NEW MOD 45 MIN: CPT | Mod: S$GLB,,, | Performed by: PHYSICIAN ASSISTANT

## 2022-03-21 PROCEDURE — 1159F MED LIST DOCD IN RCRD: CPT | Mod: CPTII,S$GLB,, | Performed by: PHYSICIAN ASSISTANT

## 2022-03-21 PROCEDURE — 99999 PR PBB SHADOW E&M-EST. PATIENT-LVL III: ICD-10-PCS | Mod: PBBFAC,,, | Performed by: PHYSICIAN ASSISTANT

## 2022-03-21 PROCEDURE — 1159F PR MEDICATION LIST DOCUMENTED IN MEDICAL RECORD: ICD-10-PCS | Mod: CPTII,S$GLB,, | Performed by: PHYSICIAN ASSISTANT

## 2022-03-21 PROCEDURE — 3008F BODY MASS INDEX DOCD: CPT | Mod: CPTII,S$GLB,, | Performed by: PHYSICIAN ASSISTANT

## 2022-03-21 PROCEDURE — 3008F PR BODY MASS INDEX (BMI) DOCUMENTED: ICD-10-PCS | Mod: CPTII,S$GLB,, | Performed by: PHYSICIAN ASSISTANT

## 2022-03-21 PROCEDURE — 99204 PR OFFICE/OUTPT VISIT, NEW, LEVL IV, 45-59 MIN: ICD-10-PCS | Mod: S$GLB,,, | Performed by: PHYSICIAN ASSISTANT

## 2022-03-21 PROCEDURE — 99999 PR PBB SHADOW E&M-EST. PATIENT-LVL III: CPT | Mod: PBBFAC,,, | Performed by: PHYSICIAN ASSISTANT

## 2022-03-21 RX ORDER — IBUPROFEN 800 MG/1
800 TABLET ORAL 3 TIMES DAILY
Qty: 48 TABLET | Refills: 0 | Status: SHIPPED | OUTPATIENT
Start: 2022-03-21 | End: 2022-11-01

## 2022-03-21 NOTE — TELEPHONE ENCOUNTER
Spoke c pt. Confirmed appt location & time c Aniyah 03/21/232 at 11:30. Pt expressed understanding & was thankful.

## 2022-03-21 NOTE — PROGRESS NOTES
Subjective:      Patient ID: Catie Monge is a 36 y.o. female.    Chief Complaint: Pain and Injury of the Right Forearm, Pain and Swelling of the Right Upper Arm, and Swelling of the Right Hand      HPI  Catie Monge is a 36 y.o. female presenting today for ED follow up right radial head fracture sustained 3/19/22. Pt had a fall while running she landed on to the right upper extremity. She reports immediate pain at the elbow. Pt presented to the ED where xray revealed right radial head fracture. She was placed into a long arm splint. She reports decreased full ROM of the elbow though reports she had full motion immediately following injury. She notes intermittent pain she is currently taking Percocet as needed. Denies numbness.       Review of patient's allergies indicates:  No Known Allergies      Current Outpatient Medications   Medication Sig Dispense Refill    cetirizine (ZYRTEC) 10 MG tablet Take 10 mg by mouth every evening.      methocarbamoL (ROBAXIN) 750 MG Tab Take 1 tablet (750 mg total) by mouth 3 (three) times daily as needed (muscle strain). 15 tablet 0    naproxen (NAPROSYN) 500 MG tablet Take 1 tablet (500 mg total) by mouth 2 (two) times daily with meals. for 5 days 10 tablet 0    oxyCODONE-acetaminophen (PERCOCET) 5-325 mg per tablet Take 1 tablet by mouth every 4 (four) hours as needed for Pain. 18 tablet 0    PNV95/FERROUS FUMARATE/FA (PRENATAL ORAL) Take by mouth.      sertraline (ZOLOFT) 25 MG tablet Take 1 tablet (25 mg total) by mouth once daily. 90 tablet 3    ibuprofen (ADVIL,MOTRIN) 800 MG tablet Take 1 tablet (800 mg total) by mouth 3 (three) times daily. 48 tablet 0     No current facility-administered medications for this visit.       Past Medical History:   Diagnosis Date    Allergy     Anxiety     No meds    Fever blister     Oral cold sores only; no hx genital outbreak    Postpartum hypertension     Requiring magnesium after delivery       Past Surgical  "History:   Procedure Laterality Date    ANUS SURGERY      FISSURE    COSMETIC SURGERY      DILATION AND CURETTAGE OF UTERUS USING SUCTION N/A 9/13/2018    Procedure: DILATION AND CURETTAGE, UTERUS, USING SUCTION;  Surgeon: Joselo Landry Jr., MD;  Location: [facility];  Service: OB/GYN;  Laterality: N/A;    NASAL SEPTUM SURGERY      TONSILLECTOMY, ADENOIDECTOMY      WISDOM TOOTH EXTRACTION         Review of Systems:  Constitutional: Negative for chills and fever.   Respiratory: Negative for cough and shortness of breath.    Gastrointestinal: Negative for nausea and vomiting.   Skin: Negative for rash.   Neurological: Negative for dizziness and headaches.   Psychiatric/Behavioral: Negative for depression.   MSK as in HPI       OBJECTIVE:     PHYSICAL EXAM:  Ht 5' 9" (1.753 m)   Wt 69.9 kg (154 lb)   BMI 22.74 kg/m²     GEN:  NAD, well-developed, well-groomed.  NEURO: Awake, alert, and oriented. Normal attention and concentration.    PSYCH: Normal mood and affect. Behavior is normal.  HEENT: No cervical lymphadenopathy noted.  CARDIOVASCULAR: Radial pulses 2+ bilaterally. No LE edema noted.  PULMONARY: Breath sounds normal. No respiratory distress.  SKIN: Intact, no rashes.      MSK:   RUE:  There is edema and ecchymosis over the elbow. She has decreased full ROM of the elbow with flexion and extension. She is ttp over the radial head. Good active ROM of the wrist and fingers. AIN/PIN/Radial/Median/Ulnar Nerves assessed in isolation without deficit. Radial & Ulnar arteries palpated 2+. Capillary Refill <3s.      RADIOGRAPHS:  Xray right elbow 3/19/22   FINDINGS:  Three views right elbow.   There is mild displacement of the anterior elbow fat pad, no significant posterior fat pad displacement.  The anterior humeral line and radiocapitellar line are in appropriate orientation.  There is depressed fracture involving the lateral aspect of the radial head with mild displacement.  No radiopaque foreign " body.     Impression:   1. Radial head fracture as above.     Xray right forearm 3/19/22   Impression:   1. Please see separately dictated report for details of the radial head fracture, no findings to suggest proximal radial or ulnar fracture    Comments: I have personally reviewed the imaging and I agree with the above radiologist's report.    ASSESSMENT/PLAN:       ICD-10-CM ICD-9-CM   1. Closed nondisplaced fracture of head of right radius, initial encounter  S52.124A 813.05       Orders Placed This Encounter    X-Ray Elbow Complete Right    CT Arm Elbow Without Contrast Right    ibuprofen (ADVIL,MOTRIN) 800 MG tablet     Plan:   Continue sling full time. Pt also provided long arm orthoglass splint.   CT right elbow   Discussed pain mediations, ibuprofen prescribed, continue percocet prn, tylenol as needed   RTC following CT with new xray         The patient indicates understanding of these issues and agrees to the plan.    Aniyah Juarez PA-C  [facility]   [facility] [place]

## 2022-03-22 ENCOUNTER — HOSPITAL ENCOUNTER (OUTPATIENT)
Dept: RADIOLOGY | Facility: HOSPITAL | Age: 37
Discharge: HOME | End: 2022-03-22
Attending: PHYSICIAN ASSISTANT
Payer: COMMERCIAL

## 2022-03-22 DIAGNOSIS — S52.124A CLOSED NONDISPLACED FRACTURE OF HEAD OF RIGHT RADIUS, INITIAL ENCOUNTER: ICD-10-CM

## 2022-03-22 PROCEDURE — 73200 CT UPPER EXTREMITY W/O DYE: CPT | Mod: 26,RT,, | Performed by: INTERNAL MEDICINE

## 2022-03-22 PROCEDURE — 73200 CT ARM ELBOW WITHOUT CONTRAST RIGHT: ICD-10-PCS | Mod: 26,RT,, | Performed by: INTERNAL MEDICINE

## 2022-03-22 PROCEDURE — 73200 CT UPPER EXTREMITY W/O DYE: CPT | Mod: TC,RT

## 2022-03-24 ENCOUNTER — HOSPITAL ENCOUNTER (OUTPATIENT)
Dept: RADIOLOGY | Facility: OTHER | Age: 37
Discharge: HOME | End: 2022-03-24
Attending: PHYSICIAN ASSISTANT
Payer: COMMERCIAL

## 2022-03-24 ENCOUNTER — OFFICE VISIT (OUTPATIENT)
Dept: ORTHOPEDICS | Facility: CLINIC | Age: 37
End: 2022-03-24
Payer: COMMERCIAL

## 2022-03-24 VITALS — BODY MASS INDEX: 22.81 KG/M2 | HEIGHT: 69 IN | WEIGHT: 154 LBS

## 2022-03-24 DIAGNOSIS — S52.124A CLOSED NONDISPLACED FRACTURE OF HEAD OF RIGHT RADIUS, INITIAL ENCOUNTER: ICD-10-CM

## 2022-03-24 DIAGNOSIS — R52 PAIN: Primary | ICD-10-CM

## 2022-03-24 DIAGNOSIS — S52.124A CLOSED NONDISPLACED FRACTURE OF HEAD OF RIGHT RADIUS, INITIAL ENCOUNTER: Primary | ICD-10-CM

## 2022-03-24 PROCEDURE — 3008F BODY MASS INDEX DOCD: CPT | Mod: CPTII,S$GLB,, | Performed by: ORTHOPAEDIC SURGERY

## 2022-03-24 PROCEDURE — 99213 PR OFFICE/OUTPT VISIT, EST, LEVL III, 20-29 MIN: ICD-10-PCS | Mod: S$GLB,,, | Performed by: ORTHOPAEDIC SURGERY

## 2022-03-24 PROCEDURE — 73080 X-RAY EXAM OF ELBOW: CPT | Mod: TC,FY,RT

## 2022-03-24 PROCEDURE — 99999 PR PBB SHADOW E&M-EST. PATIENT-LVL III: CPT | Mod: PBBFAC,,, | Performed by: ORTHOPAEDIC SURGERY

## 2022-03-24 PROCEDURE — 3008F PR BODY MASS INDEX (BMI) DOCUMENTED: ICD-10-PCS | Mod: CPTII,S$GLB,, | Performed by: ORTHOPAEDIC SURGERY

## 2022-03-24 PROCEDURE — 73080 X-RAY EXAM OF ELBOW: CPT | Mod: 26,RT,, | Performed by: RADIOLOGY

## 2022-03-24 PROCEDURE — 73080 XR ELBOW COMPLETE 3 VIEW RIGHT: ICD-10-PCS | Mod: 26,RT,, | Performed by: RADIOLOGY

## 2022-03-24 PROCEDURE — 99999 PR PBB SHADOW E&M-EST. PATIENT-LVL III: ICD-10-PCS | Mod: PBBFAC,,, | Performed by: ORTHOPAEDIC SURGERY

## 2022-03-24 PROCEDURE — 99213 OFFICE O/P EST LOW 20 MIN: CPT | Mod: S$GLB,,, | Performed by: ORTHOPAEDIC SURGERY

## 2022-03-24 NOTE — PROGRESS NOTES
Subjective:      Patient ID: Catie Monge is a 36 y.o. female.    Chief Complaint: Pain of the Right Elbow      HPI  Catie Monge is a 36 y.o. female presenting today for ED follow up right radial head fracture sustained 3/19/22. Pt had a fall while running she landed on to the right upper extremity. She reports immediate pain at the elbow. Pt presented to the ED where xray revealed right radial head fracture. She was placed into a long arm splint. She reports decreased full ROM of the elbow though reports she had full motion immediately following injury. She notes intermittent pain she is currently taking Percocet as needed. Denies numbness.       Review of patient's allergies indicates:  No Known Allergies      Current Outpatient Medications   Medication Sig Dispense Refill    cetirizine (ZYRTEC) 10 MG tablet Take 10 mg by mouth every evening.      ibuprofen (ADVIL,MOTRIN) 800 MG tablet Take 1 tablet (800 mg total) by mouth 3 (three) times daily. 48 tablet 0    methocarbamoL (ROBAXIN) 750 MG Tab Take 1 tablet (750 mg total) by mouth 3 (three) times daily as needed (muscle strain). 15 tablet 0    naproxen (NAPROSYN) 500 MG tablet Take 1 tablet (500 mg total) by mouth 2 (two) times daily with meals. for 5 days 10 tablet 0    PNV95/FERROUS FUMARATE/FA (PRENATAL ORAL) Take by mouth.      sertraline (ZOLOFT) 25 MG tablet Take 1 tablet (25 mg total) by mouth once daily. 90 tablet 3     No current facility-administered medications for this visit.       Past Medical History:   Diagnosis Date    Allergy     Anxiety     No meds    Fever blister     Oral cold sores only; no hx genital outbreak    Postpartum hypertension     Requiring magnesium after delivery       Past Surgical History:   Procedure Laterality Date    ANUS SURGERY      FISSURE    COSMETIC SURGERY      DILATION AND CURETTAGE OF UTERUS USING SUCTION N/A 9/13/2018    Procedure: DILATION AND CURETTAGE, UTERUS, USING SUCTION;  Surgeon:  "Joselo Landry Jr., MD;  Location: [facility];  Service: OB/GYN;  Laterality: N/A;    NASAL SEPTUM SURGERY      TONSILLECTOMY, ADENOIDECTOMY      WISDOM TOOTH EXTRACTION         Review of Systems:  Constitutional: Negative for chills and fever.   Respiratory: Negative for cough and shortness of breath.    Gastrointestinal: Negative for nausea and vomiting.   Skin: Negative for rash.   Neurological: Negative for dizziness and headaches.   Psychiatric/Behavioral: Negative for depression.   MSK as in HPI       OBJECTIVE:     PHYSICAL EXAM:  Ht 5' 9" (1.753 m)   Wt 69.9 kg (154 lb)   BMI 22.74 kg/m²     GEN:  NAD, well-developed, well-groomed.  NEURO: Awake, alert, and oriented. Normal attention and concentration.    PSYCH: Normal mood and affect. Behavior is normal.  HEENT: No cervical lymphadenopathy noted.  CARDIOVASCULAR: Radial pulses 2+ bilaterally. No LE edema noted.  PULMONARY: Breath sounds normal. No respiratory distress.  SKIN: Intact, no rashes.      MSK:   RUE:  There is edema and ecchymosis over the elbow. She has decreased full ROM of the elbow with flexion and extension. She is ttp over the radial head. Good active ROM of the wrist and fingers. AIN/PIN/Radial/Median/Ulnar Nerves assessed in isolation without deficit. Radial & Ulnar arteries palpated 2+. Capillary Refill <3s.      RADIOGRAPHS:  Xray right elbow 3/19/22   FINDINGS:  Three views right elbow.   There is mild displacement of the anterior elbow fat pad, no significant posterior fat pad displacement.  The anterior humeral line and radiocapitellar line are in appropriate orientation.  There is depressed fracture involving the lateral aspect of the radial head with mild displacement.  No radiopaque foreign body.     Impression:   1. Radial head fracture as above.     Xray right forearm 3/19/22   Impression:   1. Please see separately dictated report for details of the radial head fracture, no findings to suggest proximal radial or ulnar " fracture    Comments: I have personally reviewed the imaging and I agree with the above radiologist's report.    CT    ASSESSMENT/PLAN:     No diagnosis found.       Plan:   Pt for ROM, NWB, RTC in 1 week with new xrays  The patient indicates understanding of these issues and agrees to the plan.

## 2022-03-30 ENCOUNTER — TELEPHONE (OUTPATIENT)
Dept: ORTHOPEDICS | Facility: CLINIC | Age: 37
End: 2022-03-30
Payer: COMMERCIAL

## 2022-03-31 ENCOUNTER — OFFICE VISIT (OUTPATIENT)
Dept: ORTHOPEDICS | Facility: CLINIC | Age: 37
End: 2022-03-31
Payer: COMMERCIAL

## 2022-03-31 ENCOUNTER — HOSPITAL ENCOUNTER (OUTPATIENT)
Dept: RADIOLOGY | Facility: OTHER | Age: 37
Discharge: HOME | End: 2022-03-31
Attending: PHYSICIAN ASSISTANT
Payer: COMMERCIAL

## 2022-03-31 VITALS — BODY MASS INDEX: 22.81 KG/M2 | HEIGHT: 69 IN | WEIGHT: 154 LBS

## 2022-03-31 DIAGNOSIS — S52.124A CLOSED NONDISPLACED FRACTURE OF HEAD OF RIGHT RADIUS, INITIAL ENCOUNTER: Primary | ICD-10-CM

## 2022-03-31 DIAGNOSIS — R52 PAIN: ICD-10-CM

## 2022-03-31 PROCEDURE — 1160F RVW MEDS BY RX/DR IN RCRD: CPT | Mod: CPTII,S$GLB,, | Performed by: PHYSICIAN ASSISTANT

## 2022-03-31 PROCEDURE — 3008F BODY MASS INDEX DOCD: CPT | Mod: CPTII,S$GLB,, | Performed by: PHYSICIAN ASSISTANT

## 2022-03-31 PROCEDURE — 1160F PR REVIEW ALL MEDS BY PRESCRIBER/CLIN PHARMACIST DOCUMENTED: ICD-10-PCS | Mod: CPTII,S$GLB,, | Performed by: PHYSICIAN ASSISTANT

## 2022-03-31 PROCEDURE — 73080 X-RAY EXAM OF ELBOW: CPT | Mod: TC,FY,RT

## 2022-03-31 PROCEDURE — 73080 XR ELBOW COMPLETE 3 VIEW RIGHT: ICD-10-PCS | Mod: 26,RT,, | Performed by: RADIOLOGY

## 2022-03-31 PROCEDURE — 1159F MED LIST DOCD IN RCRD: CPT | Mod: CPTII,S$GLB,, | Performed by: PHYSICIAN ASSISTANT

## 2022-03-31 PROCEDURE — 1159F PR MEDICATION LIST DOCUMENTED IN MEDICAL RECORD: ICD-10-PCS | Mod: CPTII,S$GLB,, | Performed by: PHYSICIAN ASSISTANT

## 2022-03-31 PROCEDURE — 3008F PR BODY MASS INDEX (BMI) DOCUMENTED: ICD-10-PCS | Mod: CPTII,S$GLB,, | Performed by: PHYSICIAN ASSISTANT

## 2022-03-31 PROCEDURE — 99213 PR OFFICE/OUTPT VISIT, EST, LEVL III, 20-29 MIN: ICD-10-PCS | Mod: S$GLB,,, | Performed by: PHYSICIAN ASSISTANT

## 2022-03-31 PROCEDURE — 99213 OFFICE O/P EST LOW 20 MIN: CPT | Mod: S$GLB,,, | Performed by: PHYSICIAN ASSISTANT

## 2022-03-31 PROCEDURE — 99999 PR PBB SHADOW E&M-EST. PATIENT-LVL III: CPT | Mod: PBBFAC,,, | Performed by: PHYSICIAN ASSISTANT

## 2022-03-31 PROCEDURE — 73080 X-RAY EXAM OF ELBOW: CPT | Mod: 26,RT,, | Performed by: RADIOLOGY

## 2022-03-31 PROCEDURE — 99999 PR PBB SHADOW E&M-EST. PATIENT-LVL III: ICD-10-PCS | Mod: PBBFAC,,, | Performed by: PHYSICIAN ASSISTANT

## 2022-03-31 NOTE — PROGRESS NOTES
"Ms. Monge is here today for a follow up visit.  She is status post right radial head fracture sustained 3/19/22.  She was evaluated with CT scan and has been in a sling nearly full time.  Her pain has improved, she has occasional tingling in the thenar area the right hand.  She had a fall while running she landed on to the right upper extremity. She reports immediate pain at the elbow. She was seen in the ED where xray revealed right radial head fracture.  She reports decreased full ROM of the elbow though reports she had full motion immediately following injury.     ROS:  Constitutional: no fever or chills  Skin: no rash or suspicious lesions  Musculoskeletal: See HPI.   Neurological: no headaches, lightheadedness, or dizziness.   Psychological/behavioral: no anxiety or depression    Physical exam:    Vitals:    03/31/22 1436   Weight: 69.9 kg (154 lb)   Height: 5' 9" (1.753 m)   PainSc:   3     Vital signs are stable, patient is afebrile.  Patient is well dressed and well groomed, no acute distress.  Alert and oriented to person, place, and time.  Regular heart rate  Regular respirations, no labored breathing  Musculoskeletal:  Healing abrasion and left palm.  No lacerations or abrasions right upper extremity.  Very mild edema at the right elbow.  No significant ecchymosis.  She is tender to palpation over the medial epicondyle of the right elbow.  Nontender over the radial head today.  Decreased supination, elbow flexion, elbow extension.  Good finger range of motion and wrist range of motion.  Good pronation.  Neurovascularly intact-good sensation and motor function, good capillary refill, 2+ radial pulses.  Negative Tinel's over the right carpal tunnel, negative Durkan's on the right.       RADIOLOGY:  Right Elbow XRay, 3/31/2022  FINDINGS:  Unchanged alignment of the intra-articular radial head fracture.  No new fracture.  Small persistent elbow joint effusion.  Impression:  Unchanged alignment of the " intra-articular radial head fracture.    Comments: I have personally reviewed the imaging and I agree with the above radiologist's report.    Right elbow CT, 3/22/2022  FINDINGS:  Redemonstrated is a minimally displaced fracture of the radial head, with intra-articular extension (201:65).  There is 1 mm step-off at the articular surface.  No significant callus formation or bony bridging.  No additional fractures are identified.  No osteonecrosis or focal lesion.  Large elbow joint effusion containing hyperdense fluid suggesting hemarthrosis.  Normal muscle bulk.  There is soft tissue edema adjacent to the fracture.  No bursal collection.     Impression:  Minimally displaced intra-articular fracture of the radial head, with large volume hemarthrosis in the elbow joint.    Assessment: Right radial head fracture    Plan:  Catie was seen today for pain.    Diagnoses and all orders for this visit:    Closed nondisplaced fracture of head of right radius, initial encounter  -     X-Ray Elbow Complete Right; Future        - OT as scheduled  - continue full-time use of sling  - no lifting or weight-bearing  - gentle finger and wrist range of motion as tolerated  - follow-up in 1 week with repeat x-ray  - call with questions or concerns

## 2022-04-04 ENCOUNTER — CLINICAL SUPPORT (OUTPATIENT)
Dept: REHABILITATION | Facility: HOSPITAL | Age: 37
End: 2022-04-04
Payer: COMMERCIAL

## 2022-04-04 DIAGNOSIS — S52.124A CLOSED NONDISPLACED FRACTURE OF HEAD OF RIGHT RADIUS, INITIAL ENCOUNTER: ICD-10-CM

## 2022-04-04 DIAGNOSIS — M25.521 RIGHT ELBOW PAIN: ICD-10-CM

## 2022-04-04 PROCEDURE — 97530 THERAPEUTIC ACTIVITIES: CPT | Mod: PO

## 2022-04-04 PROCEDURE — 97165 OT EVAL LOW COMPLEX 30 MIN: CPT | Mod: PO

## 2022-04-04 NOTE — PATIENT INSTRUCTIONS
Use of heat 10-15 minutes before performing exercises is recommended when possible.    AROM: Forearm Pronation / Supination        With arm in handshake position, slowly rotate palm down until stretch is felt. Relax. Then rotate palm up until stretch is felt.  Repeat 20 times. Do 5 sessions per day.    AROM: Elbow Flexion / Extension        With left hand palm up, gently bend elbow as far as possible. Then straighten arm as far as possible.  Repeat 20 times. Do 5 sessions per day.     Extension (Active With Finger Extension)        With forearm on table and wrist over edge, lift hand with fingers straight.   Repeat 20 times. Do 5 sessions per day.    Radial / Ulnar Deviation (Assistive)        Move hand side to side like a windPJD Groupield wiper. Do not move elbow.  Repeat 20 times. Do 5 sessions per day.    Circumduction (Active)        With fingers curled, move slowly at wrist in clock- wise circles 20 times. Repeat counterclockwise. Do not move elbow or shoulder.  Do 5 sessions per day.    Tendon Glides         Start at position A and move through each position slowly attempting to achieve full glide.  A-E is ONE repetition.     Complete 10 reps 3 times per day.

## 2022-04-04 NOTE — PLAN OF CARE
[facility] Occupational Therapy  Initial Evaluation     Date: 4/4/2022  Patient: Catie Monge  Chart Number: [id number]  Referring Physician: Aniyah Juarez PA-C  Therapy Diagnosis:   1. Closed nondisplaced fracture of head of right radius, initial encounter  Ambulatory referral/consult to Physical/Occupational Therapy   2. Right elbow pain         Medical Diagnosis: S52.124A (ICD-10-CM) - Closed nondisplaced fracture of head of right radius, initial encounter  Physician Orders: right radial head fracture sustained 3/19/22  2x/wk x 8-12 wks   Begin gentle ROM. Continue sling full time. No weight bearing.  Evaluation Date: 4/4/2022  Plan of Care Certification Date: 7/4/2022  Authorization Period: 4/20/2022  Surgery Date and Procedure: NA  Date of Return to MD: 4/7/2022    Visit #: 1 of 1  Time In: 10:30  Time Out: 11:00  Total Billable Time: 30 minutes    Precautions: Standard     Subjective     Involved Side: Right  Dominant Side: Left  Date of Onset: 3/19/2022  History of Current Condition: Ms. Monge is status post right radial head fracture sustained 3/19/22.  She was evaluated with CT scan and has been in a sling nearly full time. Her pain has improved, she has occasional tingling in the thenar area the right hand.  She had a fall while running she landed on to the right upper extremity. She reports immediate pain at the elbow. She was seen in the ED where xray revealed right radial head fracture.  She reports decreased full ROM of the elbow though reports she had full motion immediately following injury  Imaging: Intra-articular fracture at the radial head shows slight displacement of fracture fragments, similar compared to prior.  No radiographic evidence of healing.  Previous Therapy: None    Patient's Goals for Therapy: Regain ROM in R arm    Pain:  Functional Pain Scale Rating 0-10:   3/10 on average  1/10 at best  4/10 at worst  Location: Around R elbow   Description: dull  aching  Aggravating Factors: excessive movement- supination and extension  Easing Factors: ibuprofen    Previous Level of function Independent with ADLs    Current Level of Function Difficulty dressing son, supinating and extending forearm    Occupation:    Working presently: home-maker  Duties: Lifting heavy objects, grabbing heavy containers, preparing meals    Past Medical History/Physical Systems Review:   Catie Monge  has a past medical history of Allergy, Anxiety, Fever blister, and Postpartum hypertension.    Catie Monge  has a past surgical history that includes TONSILLECTOMY, ADENOIDECTOMY; [place] tooth extraction; Nasal septum surgery; Anus surgery; Dilation and curettage of uterus using suction (N/A, 9/13/2018); and Cosmetic surgery.    Catie has a current medication list which includes the following prescription(s): cetirizine, ibuprofen, pnv no.95/ferrous fum/folic ac, and sertraline.    Review of patient's allergies indicates:  No Known Allergies       Objective     Mental status: alert    Observation:   Presented with arm sling    Sensation: On and off numbness and tingling in thenar eminence.    Edema: Circumferential measurements: In centimters     Left Right   3 Inches distal to elbow crease 30.0 cm 28.3 cm   Elbow crease 26.2 cm 25.0 cm   3 Inches proximal to elbow crease 24.7 cm 21.0 cm       Range of Motion:   Right     Forearm AROM  PRO  SUP    WNL 58     Elbow AROM  EE EF   17 121      Strength: (ULISSES Dynamometer in psi.)      4/4/2022 4/4/2022    Left Right   Rung II 82 22     Treatment     Treatment Time In: 10:50 AM  Treatment Time Out: 11:00 AM  Total Treatment time separate from Evaluation time:10 minutes    Catie participated in dynamic functional therapeutic activities to improve functional performance for 10  minutes, including:  -HEP training    Home Exercise Program/Education:  Issued HEP (see patient instructions in EMR) and educated on modality use for pain  management . Exercises were reviewed and Catie was able to demonstrate them prior to the end of the session.   Pt received a written copy of exercises to perform at home. Catie demonstrated good  understanding of the education provided.  Pt was advised to perform these exercises free of pain, and to stop performing them if pain occurs.    Patient/Family Education: role of OT, goals for OT, scheduling/cancellations - pt verbalized understanding. Discussed insurance limitations with patient.    Additional Education provided: Use of heat      Assessment     Catie Monge is a 36 y.o. female presents with limitations as described in problem list. Patient can benefit from Occupational Therapy services for Iontophoresis, ultrasound, moist heat, therapeutic exercises, home exercise program provied with written instructions, ice and strengthening and orthotics, if deemed necessary . The following goals were discussed with the patient and she is in agreement with them as to be addressed in the treatment plan.    The patient's rehab potential is Good.     Anticipated barriers to occupational therapy: None  Pt has no cultural, educational or language barriers to learning provided.    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      Comorbidities:    has a past medical history of Allergy, Anxiety, Fever blister, and Postpartum hypertension.    Medical and Therapy History Review:   Brief               Performance Deficits    Physical:  Joint Mobility  Joint Stability  Muscle Power/Strength   Strength  Pain    Cognitive:  No Deficits    Psychosocial:    No Deficits     Clinical Decision Making:  low    Assessment Process:  Problem-Focused  Assessments    Modification/Need for Assistance:  Not Necessary    Intervention Selection:  Multiple Treatment Options       low  Based on PMHX, co morbidities , data from assessments and functional level of assistance required with task and clinical presentation directly impacting function.         Goals:    LTG's (8 weeks):  1)   Increase ROM by 30 degrees in EE/EF to increase functional hand use for donning clothes.  1)   Increase ROM to 80 degrees in forearm supination to increase functional hand use for grasping items.  2)   Increase  strength to 70 lbs. to grasp pot handle.  4)   Decrease complaints of pain to 0 out of 10 at worst to increase functional hand use for ADL/work/leisure activities.  6)   Pt will return to near to prior level of function for ADLs and household management reporting I or Mod I with ADLs (dressing, feeding, grooming, toileting).     STG's (4 weeks)  1)   Patient to be IND with HEP and modalities for pain/edema managment.  2)   Increase ROM by 15 degrees in EE/EF to increase functional hand use for ADLs/work/leisure activities.  2)   Increase ROM to 70 degrees in supination to increase functional hand use for ADLs/work/leisure activities.  3)   Increase  strength to to 50 lbs. to improve functional grasp for ADLs/work/leisure activities.    6)   Decrease complaints of pain to  2 out of 10 at worst to increase functional hand use for ADL/work/leisure activities.          Plan     Pt to be treated by Occupational Therapy 1 times per week for 8 weeks during the certification period from 4/4/2022 to 7/4/2022 to achieve the established goals.     Treatment to include: Manual therapy/joint mobilizations, Modalities for pain management, US 3 mhz, Therapeutic exercises/activities. and Strengthening, as well as any other treatments deemed necessary based on the patient's needs or progress.     MARSHA Peers  OTR/L, CHT  Occupational therapist, Certified Hand Therapist

## 2022-04-06 ENCOUNTER — TELEPHONE (OUTPATIENT)
Dept: ORTHOPEDICS | Facility: CLINIC | Age: 37
End: 2022-04-06
Payer: COMMERCIAL

## 2022-04-07 ENCOUNTER — HOSPITAL ENCOUNTER (OUTPATIENT)
Dept: RADIOLOGY | Facility: OTHER | Age: 37
Discharge: HOME | End: 2022-04-07
Attending: PHYSICIAN ASSISTANT
Payer: COMMERCIAL

## 2022-04-07 ENCOUNTER — OFFICE VISIT (OUTPATIENT)
Dept: ORTHOPEDICS | Facility: CLINIC | Age: 37
End: 2022-04-07
Payer: COMMERCIAL

## 2022-04-07 VITALS — BODY MASS INDEX: 22.81 KG/M2 | WEIGHT: 154 LBS | HEIGHT: 69 IN

## 2022-04-07 DIAGNOSIS — S52.124A CLOSED NONDISPLACED FRACTURE OF HEAD OF RIGHT RADIUS, INITIAL ENCOUNTER: Primary | ICD-10-CM

## 2022-04-07 DIAGNOSIS — S52.124A CLOSED NONDISPLACED FRACTURE OF HEAD OF RIGHT RADIUS, INITIAL ENCOUNTER: ICD-10-CM

## 2022-04-07 PROBLEM — M25.521 RIGHT ELBOW PAIN: Status: RESOLVED | Noted: 2022-04-04 | Resolved: 2022-04-07

## 2022-04-07 PROCEDURE — 3008F PR BODY MASS INDEX (BMI) DOCUMENTED: ICD-10-PCS | Mod: CPTII,S$GLB,, | Performed by: PHYSICIAN ASSISTANT

## 2022-04-07 PROCEDURE — 99999 PR PBB SHADOW E&M-EST. PATIENT-LVL III: CPT | Mod: PBBFAC,,, | Performed by: PHYSICIAN ASSISTANT

## 2022-04-07 PROCEDURE — 99999 PR PBB SHADOW E&M-EST. PATIENT-LVL III: ICD-10-PCS | Mod: PBBFAC,,, | Performed by: PHYSICIAN ASSISTANT

## 2022-04-07 PROCEDURE — 3008F BODY MASS INDEX DOCD: CPT | Mod: CPTII,S$GLB,, | Performed by: PHYSICIAN ASSISTANT

## 2022-04-07 PROCEDURE — 99213 OFFICE O/P EST LOW 20 MIN: CPT | Mod: S$GLB,,, | Performed by: PHYSICIAN ASSISTANT

## 2022-04-07 PROCEDURE — 73080 X-RAY EXAM OF ELBOW: CPT | Mod: 26,RT,, | Performed by: RADIOLOGY

## 2022-04-07 PROCEDURE — 1159F PR MEDICATION LIST DOCUMENTED IN MEDICAL RECORD: ICD-10-PCS | Mod: CPTII,S$GLB,, | Performed by: PHYSICIAN ASSISTANT

## 2022-04-07 PROCEDURE — 1160F RVW MEDS BY RX/DR IN RCRD: CPT | Mod: CPTII,S$GLB,, | Performed by: PHYSICIAN ASSISTANT

## 2022-04-07 PROCEDURE — 1159F MED LIST DOCD IN RCRD: CPT | Mod: CPTII,S$GLB,, | Performed by: PHYSICIAN ASSISTANT

## 2022-04-07 PROCEDURE — 99213 PR OFFICE/OUTPT VISIT, EST, LEVL III, 20-29 MIN: ICD-10-PCS | Mod: S$GLB,,, | Performed by: PHYSICIAN ASSISTANT

## 2022-04-07 PROCEDURE — 73080 X-RAY EXAM OF ELBOW: CPT | Mod: TC,FY,RT

## 2022-04-07 PROCEDURE — 73080 XR ELBOW COMPLETE 3 VIEW RIGHT: ICD-10-PCS | Mod: 26,RT,, | Performed by: RADIOLOGY

## 2022-04-07 PROCEDURE — 1160F PR REVIEW ALL MEDS BY PRESCRIBER/CLIN PHARMACIST DOCUMENTED: ICD-10-PCS | Mod: CPTII,S$GLB,, | Performed by: PHYSICIAN ASSISTANT

## 2022-04-07 NOTE — PROGRESS NOTES
"Ms. Monge is here today for a follow up visit.  She is status post right radial head fracture sustained 3/19/22.  She was evaluated with CT scan and has been in a sling nearly full time. She has started OT, reports already noticing improvement in her pain and motion.  She has not had any residual tingling or numbness in the thenar area of the right hand.  She had a fall while running she landed on to the right upper extremity. She reports immediate pain at the elbow. She was seen in the ED where xray revealed right radial head fracture.  She reports decreased full ROM of the elbow though reports she had full motion immediately following injury.     ROS:  Constitutional: no fever or chills  Skin: no rash or suspicious lesions  Musculoskeletal: See HPI.   Neurological: no headaches, lightheadedness, or dizziness.   Psychological/behavioral: no anxiety or depression    Physical exam:    Vitals:    04/07/22 1313   Weight: 69.9 kg (154 lb)   Height: 5' 9" (1.753 m)   PainSc:   3     Vital signs are stable, patient is afebrile.  Patient is well dressed and well groomed, no acute distress.  Alert and oriented to person, place, and time.  Regular heart rate  Regular respirations, no labored breathing  Musculoskeletal:  Healing abrasion and left palm.  No lacerations or abrasions right upper extremity.  Very mild edema at the right elbow.  No significant ecchymosis.  She is mildly tender to palpation over the medial and lateral epicondyle of the right elbow today.  Decreased supination, elbow flexion, elbow extension.  Good finger range of motion and wrist range of motion.  Good pronation.  Neurovascularly intact-good sensation and motor function, good capillary refill, 2+ radial pulses.      RADIOLOGY:  Right Elbow XRay, 4/7/2022   FINDINGS:  The alignment is within normal limits.  Radial head fracture identified with lucency best identified lateral radiograph.  There is joint effusion.  No evidence of lytic or blastic " lesions.Joint spaces are unremarkable.Soft tissues are unremarkable.     Impression:  Unchanged appearance of intra-articular radial head fracture with residual lucency still visualized.      Right elbow CT, 3/22/2022  FINDINGS:  Redemonstrated is a minimally displaced fracture of the radial head, with intra-articular extension (201:65).  There is 1 mm step-off at the articular surface.  No significant callus formation or bony bridging.  No additional fractures are identified.  No osteonecrosis or focal lesion.  Large elbow joint effusion containing hyperdense fluid suggesting hemarthrosis.  Normal muscle bulk.  There is soft tissue edema adjacent to the fracture.  No bursal collection.  Impression:  Minimally displaced intra-articular fracture of the radial head, with large volume hemarthrosis in the elbow joint.    Assessment: Right radial head fracture    Plan:  Catie was seen today for pain.    Diagnoses and all orders for this visit:    Closed nondisplaced fracture of head of right radius, initial encounter  -     X-Ray Elbow Complete Right; Future        - OT as scheduled  - continue regular use of sling, will wean out in OT  - no lifting or weight-bearing  - Continue HEP  - follow-up in 3 week with repeat x-ray  - call with questions or concerns

## 2022-04-18 ENCOUNTER — OFFICE VISIT (OUTPATIENT)
Dept: OPTOMETRY | Facility: CLINIC | Age: 37
End: 2022-04-18
Payer: COMMERCIAL

## 2022-04-18 DIAGNOSIS — H01.112: Primary | ICD-10-CM

## 2022-04-18 DIAGNOSIS — H01.115: Primary | ICD-10-CM

## 2022-04-18 PROCEDURE — 99999 PR PBB SHADOW E&M-EST. PATIENT-LVL II: ICD-10-PCS | Mod: PBBFAC,,, | Performed by: OPTOMETRIST

## 2022-04-18 PROCEDURE — 92012 INTRM OPH EXAM EST PATIENT: CPT | Mod: S$GLB,,, | Performed by: OPTOMETRIST

## 2022-04-18 PROCEDURE — 1159F PR MEDICATION LIST DOCUMENTED IN MEDICAL RECORD: ICD-10-PCS | Mod: CPTII,S$GLB,, | Performed by: OPTOMETRIST

## 2022-04-18 PROCEDURE — 1159F MED LIST DOCD IN RCRD: CPT | Mod: CPTII,S$GLB,, | Performed by: OPTOMETRIST

## 2022-04-18 PROCEDURE — 92012 PR EYE EXAM, EST PATIENT,INTERMED: ICD-10-PCS | Mod: S$GLB,,, | Performed by: OPTOMETRIST

## 2022-04-18 PROCEDURE — 99999 PR PBB SHADOW E&M-EST. PATIENT-LVL II: CPT | Mod: PBBFAC,,, | Performed by: OPTOMETRIST

## 2022-04-18 RX ORDER — NEOMYCIN SULFATE, POLYMYXIN B SULFATE, AND DEXAMETHASONE 3.5; 10000; 1 MG/G; [USP'U]/G; MG/G
OINTMENT OPHTHALMIC 3 TIMES DAILY
Qty: 3.5 G | Refills: 0 | Status: SHIPPED | OUTPATIENT
Start: 2022-04-18 | End: 2022-04-25

## 2022-04-18 NOTE — PROGRESS NOTES
HPI     Swollen Lid phoebe ANDERSON-03/26/2021 Dr. Caputo     Thursday pt was on vacation (on the beach) and started noticing LL swollen   OS with discomfort and stye. Feeling. Then on Friday she noticed both LL's   swollen and discomfort/irritation had gotten worse on that day. Since then   it has been fluctuating, some days one eye some days both and alternating   as well. Discomfort and irritation has been consistent since the beginning   and feels worse when blinking. She believes she has a swollen lymph node   on left side.     Wears Daily disposable cls rarely but was wearing them at the beach that   week maybe for 10hrs daily that week.  No change in vision. Current glasses are 1 year old.    (+)Itch & tender around the lid, (-)tear, (-)burn, (-)Dryness.  (-) OTC   Drops  (-)Photophobia  (-)Glare    4/10 pain irritation of her lids   Hx of chalazion removal 2018 due to no improvement with meds     FamOhx Father- Glaucoma       Last edited by Aline Conte, OD on 4/18/2022  3:29 PM. (History)            Assessment /Plan     For exam results, see Encounter Report.    Contact dermatitis of both lower eyelids    Other orders  -     neomycin-polymyxin-dexamethasone (DEXACINE) 3.5 mg/g-10,000 unit/g-0.1 % Oint; Place into both eyes 3 (three) times daily. for 7 days  Dispense: 3.5 g; Refill: 0      Educated pt on findings. Appears as contact derm. Lower lid edema OU. Papillae OU. No corneal findings noted OU.   Recommend cold compresses prn. Will Rx maxitrol anton to be used 3x a day along lower eyelid margin for 1 week then d/c. No CL wear for at least 1 week/symptoms resolve. Preservative free ATs prn.   If symptoms worsen or dont improve, RTC. If swelling worsens, fever develops, and/or pain on eye movements occurs, report to ED.   Monitor.       RTC prn.

## 2022-04-20 NOTE — PROGRESS NOTES
Occupational Therapy Daily Treatment Note     Date: 4/21/2022  Name: Catie Monge  Clinic Number: [id number]    Therapy Diagnosis:   Encounter Diagnosis   Name Primary?    Right elbow pain      Physician: Aniyah Juarez PA-C    Medical Diagnosis: S52.124A (ICD-10-CM) - Closed nondisplaced fracture of head of right radius, initial encounter  Physician Orders: right radial head fracture sustained 3/19/22  2x/wk x 8-12 wks   Begin gentle ROM. Continue sling full time. No weight bearing.  Evaluation Date: 4/4/2022  Plan of Care Certification Date: 7/4/2022  Authorization Period: 4/20/2022  Surgery Date and Procedure: NA  Date of Return to MD: 4/7/2022     Visit #: 1 of 25  Time In: 8:00 AM  Time Out: 8:45 AM  Total Billable Time: 30 minutes     Precautions: Standard     Subjective     Pt reports: Symptoms improving  Response to previous treatment:Tatiana well    Pain: 0/10    Objective     Catie received the following supervised modalities after being cleared for contraindications for 10 minutes in order to promote increased blood flow and tissue elasticity:   -MH right elbow    Catie received the following manual therapy techniques for 15 minutes in order to maximize tissue function and/or decrease pain:   -Gentle PROM sup/EE  -Soft tissue mobs antebrachial area to increase EE/sup    Catie participated in dynamic functional therapeutic activities to improve functional performance for 15  minutes, including:  -A/AROM EE and sup 10  -AROM EE/sup 20  -Juxacisor 3'    Home Exercises and Education Provided     Education provided:   - Upgraded HEP  - Progress towards goals     Written Home Exercises Provided: yes.  Exercises were reviewed and Catie was able to demonstrate them prior to the end of the session.  Catie demonstrated good  understanding of the HEP provided.   .   See EMR under Patient Instructions for exercises provided 4/21/2022.        Assessment     Pt would continue to benefit from skilled OT to  maximize RUE function.     Catie is progressing well towards her goals and there are no updates to goals at this time. Pt prognosis is Good.     Pt will continue to benefit from skilled outpatient occupational therapy to address the deficits listed in the problem list on initial evaluation provide pt/family education and to maximize pt's level of independence in the home and community environment.     Anticipated barriers to therapy: None      Pt's spiritual, cultural and educational needs considered and pt agreeable to plan of care and goals.    Goals:   LTG's (8 weeks):  1)   Increase ROM by 30 degrees in EE/EF to increase functional hand use for donning clothes.  1)   Increase ROM to 80 degrees in forearm supination to increase functional hand use for grasping items.  2)   Increase  strength to 70 lbs. to grasp pot handle.  4)   Decrease complaints of pain to 0 out of 10 at worst to increase functional hand use for ADL/work/leisure activities.  6)   Pt will return to near to prior level of function for ADLs and household management reporting I or Mod I with ADLs (dressing, feeding, grooming, toileting).      STG's (4 weeks)  1)   Patient to be IND with HEP and modalities for pain/edema managment.  2)   Increase ROM by 15 degrees in EE/EF to increase functional hand use for ADLs/work/leisure activities.  2)   Increase ROM to 70 degrees in supination to increase functional hand use for ADLs/work/leisure activities.  3)   Increase  strength to to 50 lbs. to improve functional grasp for ADLs/work/leisure activities.    6)   Decrease complaints of pain to  2 out of 10 at worst to increase functional hand use for ADL/work/leisure activities.       Plan   Cont OT to address above goals.        MARSHA Peres OTR/L, CHT

## 2022-04-21 ENCOUNTER — CLINICAL SUPPORT (OUTPATIENT)
Dept: REHABILITATION | Facility: HOSPITAL | Age: 37
End: 2022-04-21
Payer: COMMERCIAL

## 2022-04-21 DIAGNOSIS — M25.521 RIGHT ELBOW PAIN: ICD-10-CM

## 2022-04-21 PROCEDURE — 97140 MANUAL THERAPY 1/> REGIONS: CPT | Mod: PO

## 2022-04-21 PROCEDURE — 97010 HOT OR COLD PACKS THERAPY: CPT | Mod: PO

## 2022-04-21 PROCEDURE — 97530 THERAPEUTIC ACTIVITIES: CPT | Mod: PO

## 2022-04-25 NOTE — PROGRESS NOTES
Occupational Therapy Daily Treatment Note     Date: 4/26/2022  Name: Catie Monge  Clinic Number: [id number]    Therapy Diagnosis:   Encounter Diagnosis   Name Primary?    Right elbow pain Yes     Physician: Aniyah Juarez PA-C    Medical Diagnosis: S52.124A (ICD-10-CM) - Closed nondisplaced fracture of head of right radius, initial encounter  Physician Orders: right radial head fracture sustained 3/19/22  2x/wk x 8-12 wks   Begin gentle ROM. Continue sling full time. No weight bearing.  Evaluation Date: 4/4/2022  Plan of Care Certification Date: 7/4/2022  Authorization Period: 4/20/2022  Surgery Date and Procedure: NA  Date of Return to MD: 4/7/2022     Visit #: 2 of 25  Time In: 9:15 AM  Time Out: 10:00 AM  Total Billable Time: 40 minutes     Precautions: Standard     Subjective     Pt reports: Significantly improved RUE function   Response to previous treatment:Tatiana well    Pain: 0/10    Objective     Catie received the following supervised modalities after being cleared for contraindications for 10 minutes in order to promote increased blood flow and tissue elasticity:   -MH right elbow    Catie received the following manual therapy techniques for 10 minutes in order to maximize tissue function and/or decrease pain:   - PROM sup/EE  -Soft tissue mobs antebrachial area to increase EE/sup    Catie participated in dynamic functional therapeutic activities to improve functional performance for 20  minutes, including:  -UBE 3'F3'B  -A/AROM EE and sup 10  -AROM EE/sup 20  -Juxacisor 3'  -Red flexbar frowns/smiles WE/WF/UD/RD 3x10    Home Exercises and Education Provided     Education provided:   - Upgraded HEP  - Progress towards goals     Written Home Exercises Provided: yes.  Exercises were reviewed and Catie was able to demonstrate them prior to the end of the session.  Catie demonstrated good  understanding of the HEP provided.   .   See EMR under Patient Instructions for exercises provided prior visit.         Assessment     Pt would continue to benefit from skilled OT to maximize RUE function.     Catie is progressing well towards her goals and there are no updates to goals at this time. Pt prognosis is Good.     Pt will continue to benefit from skilled outpatient occupational therapy to address the deficits listed in the problem list on initial evaluation provide pt/family education and to maximize pt's level of independence in the home and community environment.     Anticipated barriers to therapy: None      Pt's spiritual, cultural and educational needs considered and pt agreeable to plan of care and goals.    Goals:   LTG's (8 weeks):  1)   Increase ROM by 30 degrees in EE/EF to increase functional hand use for donning clothes.  1)   Increase ROM to 80 degrees in forearm supination to increase functional hand use for grasping items.  2)   Increase  strength to 70 lbs. to grasp pot handle.  4)   Decrease complaints of pain to 0 out of 10 at worst to increase functional hand use for ADL/work/leisure activities.  6)   Pt will return to near to prior level of function for ADLs and household management reporting I or Mod I with ADLs (dressing, feeding, grooming, toileting).      STG's (4 weeks)  1)   Patient to be IND with HEP and modalities for pain/edema managment.  2)   Increase ROM by 15 degrees in EE/EF to increase functional hand use for ADLs/work/leisure activities.  2)   Increase ROM to 70 degrees in supination to increase functional hand use for ADLs/work/leisure activities.  3)   Increase  strength to to 50 lbs. to improve functional grasp for ADLs/work/leisure activities.    6)   Decrease complaints of pain to  2 out of 10 at worst to increase functional hand use for ADL/work/leisure activities.       Plan   Cont OT to address above goals.        MARSHA Peres OTR/L, CHT

## 2022-04-26 ENCOUNTER — CLINICAL SUPPORT (OUTPATIENT)
Dept: REHABILITATION | Facility: HOSPITAL | Age: 37
End: 2022-04-26
Payer: COMMERCIAL

## 2022-04-26 DIAGNOSIS — M25.521 RIGHT ELBOW PAIN: Primary | ICD-10-CM

## 2022-04-26 PROCEDURE — 97140 MANUAL THERAPY 1/> REGIONS: CPT | Mod: PO

## 2022-04-26 PROCEDURE — 97010 HOT OR COLD PACKS THERAPY: CPT | Mod: PO

## 2022-04-26 PROCEDURE — 97530 THERAPEUTIC ACTIVITIES: CPT | Mod: PO

## 2022-04-27 ENCOUNTER — TELEPHONE (OUTPATIENT)
Dept: ORTHOPEDICS | Facility: CLINIC | Age: 37
End: 2022-04-27
Payer: COMMERCIAL

## 2022-04-29 ENCOUNTER — HOSPITAL ENCOUNTER (OUTPATIENT)
Dept: RADIOLOGY | Facility: OTHER | Age: 37
Discharge: HOME | End: 2022-04-29
Attending: PHYSICIAN ASSISTANT
Payer: COMMERCIAL

## 2022-04-29 ENCOUNTER — OFFICE VISIT (OUTPATIENT)
Dept: ORTHOPEDICS | Facility: CLINIC | Age: 37
End: 2022-04-29
Payer: COMMERCIAL

## 2022-04-29 VITALS
SYSTOLIC BLOOD PRESSURE: 114 MMHG | HEART RATE: 73 BPM | BODY MASS INDEX: 22.81 KG/M2 | HEIGHT: 69 IN | WEIGHT: 154 LBS | DIASTOLIC BLOOD PRESSURE: 77 MMHG

## 2022-04-29 DIAGNOSIS — S52.124A CLOSED NONDISPLACED FRACTURE OF HEAD OF RIGHT RADIUS, INITIAL ENCOUNTER: ICD-10-CM

## 2022-04-29 DIAGNOSIS — S52.124A CLOSED NONDISPLACED FRACTURE OF HEAD OF RIGHT RADIUS, INITIAL ENCOUNTER: Primary | ICD-10-CM

## 2022-04-29 PROCEDURE — 3074F PR MOST RECENT SYSTOLIC BLOOD PRESSURE < 130 MM HG: ICD-10-PCS | Mod: CPTII,S$GLB,, | Performed by: PHYSICIAN ASSISTANT

## 2022-04-29 PROCEDURE — 1159F PR MEDICATION LIST DOCUMENTED IN MEDICAL RECORD: ICD-10-PCS | Mod: CPTII,S$GLB,, | Performed by: PHYSICIAN ASSISTANT

## 2022-04-29 PROCEDURE — 1160F RVW MEDS BY RX/DR IN RCRD: CPT | Mod: CPTII,S$GLB,, | Performed by: PHYSICIAN ASSISTANT

## 2022-04-29 PROCEDURE — 73080 X-RAY EXAM OF ELBOW: CPT | Mod: 26,RT,, | Performed by: RADIOLOGY

## 2022-04-29 PROCEDURE — 99213 PR OFFICE/OUTPT VISIT, EST, LEVL III, 20-29 MIN: ICD-10-PCS | Mod: S$GLB,,, | Performed by: PHYSICIAN ASSISTANT

## 2022-04-29 PROCEDURE — 3008F BODY MASS INDEX DOCD: CPT | Mod: CPTII,S$GLB,, | Performed by: PHYSICIAN ASSISTANT

## 2022-04-29 PROCEDURE — 1160F PR REVIEW ALL MEDS BY PRESCRIBER/CLIN PHARMACIST DOCUMENTED: ICD-10-PCS | Mod: CPTII,S$GLB,, | Performed by: PHYSICIAN ASSISTANT

## 2022-04-29 PROCEDURE — 99999 PR PBB SHADOW E&M-EST. PATIENT-LVL III: CPT | Mod: PBBFAC,,, | Performed by: PHYSICIAN ASSISTANT

## 2022-04-29 PROCEDURE — 99213 OFFICE O/P EST LOW 20 MIN: CPT | Mod: S$GLB,,, | Performed by: PHYSICIAN ASSISTANT

## 2022-04-29 PROCEDURE — 3078F DIAST BP <80 MM HG: CPT | Mod: CPTII,S$GLB,, | Performed by: PHYSICIAN ASSISTANT

## 2022-04-29 PROCEDURE — 3078F PR MOST RECENT DIASTOLIC BLOOD PRESSURE < 80 MM HG: ICD-10-PCS | Mod: CPTII,S$GLB,, | Performed by: PHYSICIAN ASSISTANT

## 2022-04-29 PROCEDURE — 3074F SYST BP LT 130 MM HG: CPT | Mod: CPTII,S$GLB,, | Performed by: PHYSICIAN ASSISTANT

## 2022-04-29 PROCEDURE — 3008F PR BODY MASS INDEX (BMI) DOCUMENTED: ICD-10-PCS | Mod: CPTII,S$GLB,, | Performed by: PHYSICIAN ASSISTANT

## 2022-04-29 PROCEDURE — 1159F MED LIST DOCD IN RCRD: CPT | Mod: CPTII,S$GLB,, | Performed by: PHYSICIAN ASSISTANT

## 2022-04-29 PROCEDURE — 73080 X-RAY EXAM OF ELBOW: CPT | Mod: TC,FY,RT

## 2022-04-29 PROCEDURE — 99999 PR PBB SHADOW E&M-EST. PATIENT-LVL III: ICD-10-PCS | Mod: PBBFAC,,, | Performed by: PHYSICIAN ASSISTANT

## 2022-04-29 PROCEDURE — 73080 XR ELBOW COMPLETE 3 VIEW RIGHT: ICD-10-PCS | Mod: 26,RT,, | Performed by: RADIOLOGY

## 2022-04-29 NOTE — PROGRESS NOTES
"Ms. Monge is here today for a follow up visit.  She is status post right radial head fracture sustained 3/19/22.  She has been attending OT and performing HEP. She reports improvement in pain and motion. She has weaned out of the sling. She was evaluated with CT scan and is being treated non-operatively. She had a fall while running she landed on to the right upper extremity. She was seen in the ED where xray revealed right radial head fracture.   She has not had any residual tingling or numbness in the thenar area of the right hand.      ROS:  Constitutional: no fever or chills  Skin: no rash or suspicious lesions  Musculoskeletal: See HPI.   Neurological: no headaches, lightheadedness, or dizziness.   Psychological/behavioral: no anxiety or depression    Physical exam:    Vitals:    04/29/22 1034   BP: 114/77   Pulse: 73   Weight: 69.9 kg (154 lb)   Height: 5' 9" (1.753 m)   PainSc: 0-No pain     Vital signs are stable, patient is afebrile.  Patient is well dressed and well groomed, no acute distress.  Alert and oriented to person, place, and time.  Regular heart rate  Regular respirations, no labored breathing  Musculoskeletal: No lacerations or abrasions. No significant edema at the right elbow.  No ecchymosis.  She is mildly tender to palpation over the medial epicondyle of the right elbow today.  Improving elbow range of motion, nearly full elbow flexion, elbow extension, and supination.  Good pronation.  Good finger range of motion and wrist range of motion.  Neurovascularly intact-good sensation and motor function, good capillary refill, 2+ radial pulses.      RADIOLOGY:  Right Elbow XRay, 4/29/2022   FINDINGS:  The intra-articular, minimally displaced radial head fracture is again identified.  Residual lucency is still present.  No joint space effusion.  No additional osseous abnormalities.  No dislocation.     Impression:  No significant interval change in the appearance of the radial head intra-articular " fracture.      Right elbow CT, 3/22/2022  FINDINGS:  Redemonstrated is a minimally displaced fracture of the radial head, with intra-articular extension (201:65).  There is 1 mm step-off at the articular surface.  No significant callus formation or bony bridging.  No additional fractures are identified.  No osteonecrosis or focal lesion.  Large elbow joint effusion containing hyperdense fluid suggesting hemarthrosis.  Normal muscle bulk.  There is soft tissue edema adjacent to the fracture.  No bursal collection.  Impression:  Minimally displaced intra-articular fracture of the radial head, with large volume hemarthrosis in the elbow joint.    Assessment: Right radial head fracture    Plan:  Catie was seen today for pain.    Diagnoses and all orders for this visit:    Closed nondisplaced fracture of head of right radius, initial encounter  -     X-Ray Elbow Complete Right; Future        - OT as scheduled  - Regular HEP  - continue lifting/weight-bearing restrictions until follow up XRay  - follow-up in 3 weeks with repeat x-ray  - call with questions or concerns

## 2022-05-03 ENCOUNTER — DOCUMENTATION ONLY (OUTPATIENT)
Dept: REHABILITATION | Facility: HOSPITAL | Age: 37
End: 2022-05-03
Payer: COMMERCIAL

## 2022-05-03 NOTE — PROGRESS NOTES
Pt arrived for scheduled tx. At last MD visit she was told not to work on any strengthening until the next MD visit. She presents with WNL ROM in all joint planes. Therapy will be put on hold until she has been cleared to begin strengthening.

## 2022-05-19 ENCOUNTER — TELEPHONE (OUTPATIENT)
Dept: ORTHOPEDICS | Facility: CLINIC | Age: 37
End: 2022-05-19
Payer: COMMERCIAL

## 2022-05-19 NOTE — PROGRESS NOTES
"Ms. oMnge is here today for a follow up visit.  She is status post right radial head fracture sustained 3/19/22.  OT was placed on hold until she could begin strengthening, she is performing HEP.  He reports improvement in her motion, no pain.  She was evaluated with CT scan and is being treated non-operatively. She had a fall while running she landed on to the right upper extremity. She was seen in the ED where xray revealed right radial head fracture.   She has not had any residual tingling or numbness in the thenar area of the right hand.      ROS:  Constitutional: no fever or chills  Skin: no rash or suspicious lesions  Musculoskeletal: See HPI.   Neurological: no headaches, lightheadedness, or dizziness.   Psychological/behavioral: no anxiety or depression    Physical exam:    Vitals:    05/20/22 0838   BP: 136/85   Pulse: 77   Weight: 69.9 kg (154 lb)   Height: 5' 9" (1.753 m)   PainSc: 0-No pain     Vital signs are stable, patient is afebrile.  Patient is well dressed and well groomed, no acute distress.  Alert and oriented to person, place, and time.  Regular heart rate  Regular respirations, no labored breathing  Musculoskeletal: No lacerations or abrasions. No significant edema at the right elbow.  No ecchymosis.  She is nontender to palpation over the right elbow today.  Good elbow flexion, elbow extension, pronation and supination. Good finger range of motion and wrist range of motion.  Neurovascularly intact-good sensation and motor function, good capillary refill, 2+ radial pulses.      RADIOLOGY:  Right Elbow XRay, 5/20/2022   FINDINGS:  Once again, deformity of the right radial head is identified consistent with patient's history of intra-articular radial head fracture with persistent mild step-off along the articular surface.  Fracture line is barely discernible consistent with progressive healing.  No new fractures are identified.  There is no evidence for dislocation.  Soft tissues are " unremarkable.     Impression:  Progressive healing of intra-articular radial head fracture with persistent mild step-off along the articular surface.      Right elbow CT, 3/22/2022  FINDINGS:  Redemonstrated is a minimally displaced fracture of the radial head, with intra-articular extension (201:65).  There is 1 mm step-off at the articular surface.  No significant callus formation or bony bridging.  No additional fractures are identified.  No osteonecrosis or focal lesion.  Large elbow joint effusion containing hyperdense fluid suggesting hemarthrosis.  Normal muscle bulk.  There is soft tissue edema adjacent to the fracture.  No bursal collection.  Impression:  Minimally displaced intra-articular fracture of the radial head, with large volume hemarthrosis in the elbow joint.    Assessment: Right radial head fracture    Plan:  Catie was seen today for pain.    Diagnoses and all orders for this visit:    Closed nondisplaced fracture of head of right radius, initial encounter        - OT as scheduled  - Regular HEP, yellow theraputty and HEP provided to patient and discussed with OT due to 3 week wait to restart OT  - Will gradually strengthen in OT  - follow-up in 4 weeks with XRay  - call with questions or concerns

## 2022-05-20 ENCOUNTER — HOSPITAL ENCOUNTER (OUTPATIENT)
Dept: RADIOLOGY | Facility: OTHER | Age: 37
Discharge: HOME | End: 2022-05-20
Attending: PHYSICIAN ASSISTANT
Payer: COMMERCIAL

## 2022-05-20 ENCOUNTER — OFFICE VISIT (OUTPATIENT)
Dept: ORTHOPEDICS | Facility: CLINIC | Age: 37
End: 2022-05-20
Payer: COMMERCIAL

## 2022-05-20 VITALS
HEART RATE: 77 BPM | BODY MASS INDEX: 22.81 KG/M2 | SYSTOLIC BLOOD PRESSURE: 136 MMHG | WEIGHT: 154 LBS | HEIGHT: 69 IN | DIASTOLIC BLOOD PRESSURE: 85 MMHG

## 2022-05-20 DIAGNOSIS — S52.124A CLOSED NONDISPLACED FRACTURE OF HEAD OF RIGHT RADIUS, INITIAL ENCOUNTER: ICD-10-CM

## 2022-05-20 DIAGNOSIS — R52 PAIN: Primary | ICD-10-CM

## 2022-05-20 DIAGNOSIS — S52.124A CLOSED NONDISPLACED FRACTURE OF HEAD OF RIGHT RADIUS, INITIAL ENCOUNTER: Primary | ICD-10-CM

## 2022-05-20 PROCEDURE — 3008F BODY MASS INDEX DOCD: CPT | Mod: CPTII,S$GLB,, | Performed by: PHYSICIAN ASSISTANT

## 2022-05-20 PROCEDURE — 73080 X-RAY EXAM OF ELBOW: CPT | Mod: 26,RT,, | Performed by: RADIOLOGY

## 2022-05-20 PROCEDURE — 1160F RVW MEDS BY RX/DR IN RCRD: CPT | Mod: CPTII,S$GLB,, | Performed by: PHYSICIAN ASSISTANT

## 2022-05-20 PROCEDURE — 99213 PR OFFICE/OUTPT VISIT, EST, LEVL III, 20-29 MIN: ICD-10-PCS | Mod: S$GLB,,, | Performed by: PHYSICIAN ASSISTANT

## 2022-05-20 PROCEDURE — 3008F PR BODY MASS INDEX (BMI) DOCUMENTED: ICD-10-PCS | Mod: CPTII,S$GLB,, | Performed by: PHYSICIAN ASSISTANT

## 2022-05-20 PROCEDURE — 99213 OFFICE O/P EST LOW 20 MIN: CPT | Mod: S$GLB,,, | Performed by: PHYSICIAN ASSISTANT

## 2022-05-20 PROCEDURE — 1159F MED LIST DOCD IN RCRD: CPT | Mod: CPTII,S$GLB,, | Performed by: PHYSICIAN ASSISTANT

## 2022-05-20 PROCEDURE — 99999 PR PBB SHADOW E&M-EST. PATIENT-LVL III: ICD-10-PCS | Mod: PBBFAC,,, | Performed by: PHYSICIAN ASSISTANT

## 2022-05-20 PROCEDURE — 3079F DIAST BP 80-89 MM HG: CPT | Mod: CPTII,S$GLB,, | Performed by: PHYSICIAN ASSISTANT

## 2022-05-20 PROCEDURE — 99999 PR PBB SHADOW E&M-EST. PATIENT-LVL III: CPT | Mod: PBBFAC,,, | Performed by: PHYSICIAN ASSISTANT

## 2022-05-20 PROCEDURE — 1160F PR REVIEW ALL MEDS BY PRESCRIBER/CLIN PHARMACIST DOCUMENTED: ICD-10-PCS | Mod: CPTII,S$GLB,, | Performed by: PHYSICIAN ASSISTANT

## 2022-05-20 PROCEDURE — 3079F PR MOST RECENT DIASTOLIC BLOOD PRESSURE 80-89 MM HG: ICD-10-PCS | Mod: CPTII,S$GLB,, | Performed by: PHYSICIAN ASSISTANT

## 2022-05-20 PROCEDURE — 3075F SYST BP GE 130 - 139MM HG: CPT | Mod: CPTII,S$GLB,, | Performed by: PHYSICIAN ASSISTANT

## 2022-05-20 PROCEDURE — 73080 X-RAY EXAM OF ELBOW: CPT | Mod: TC,FY,RT

## 2022-05-20 PROCEDURE — 73080 XR ELBOW COMPLETE 3 VIEW RIGHT: ICD-10-PCS | Mod: 26,RT,, | Performed by: RADIOLOGY

## 2022-05-20 PROCEDURE — 1159F PR MEDICATION LIST DOCUMENTED IN MEDICAL RECORD: ICD-10-PCS | Mod: CPTII,S$GLB,, | Performed by: PHYSICIAN ASSISTANT

## 2022-05-20 PROCEDURE — 3075F PR MOST RECENT SYSTOLIC BLOOD PRESS GE 130-139MM HG: ICD-10-PCS | Mod: CPTII,S$GLB,, | Performed by: PHYSICIAN ASSISTANT

## 2022-06-13 ENCOUNTER — CLINICAL SUPPORT (OUTPATIENT)
Dept: REHABILITATION | Facility: HOSPITAL | Age: 37
End: 2022-06-13
Payer: COMMERCIAL

## 2022-06-13 DIAGNOSIS — M25.521 RIGHT ELBOW PAIN: Primary | ICD-10-CM

## 2022-06-13 PROCEDURE — 97010 HOT OR COLD PACKS THERAPY: CPT | Mod: PO

## 2022-06-13 PROCEDURE — 97530 THERAPEUTIC ACTIVITIES: CPT | Mod: PO

## 2022-06-13 NOTE — PROGRESS NOTES
Occupational Therapy Daily Treatment Note/Discharge Summary      Date: 6/13/2022  Name: Catie Monge  Clinic Number: [id number]    Therapy Diagnosis:   Encounter Diagnosis   Name Primary?    Right elbow pain Yes     Physician: Aniyah Juarez PA-C    Medical Diagnosis: S52.124A (ICD-10-CM) - Closed nondisplaced fracture of head of right radius, initial encounter  Physician Orders: right radial head fracture sustained 3/19/22  2x/wk x 8-12 wks   Begin gentle ROM. Continue sling full time. No weight bearing.  Evaluation Date: 4/4/2022  Plan of Care Certification Date: 7/4/2022  Authorization Period: 4/20/2022  Surgery Date and Procedure: NA  Date of Return to MD: 6/17/2022     Visit #: 3 of 25  Time In: 9:45 AM  Time Out: 10:30 AM  Total Billable Time: 40 minutes     Precautions: Standard     Subjective     Pt reports: Significantly improved RUE function, slight pain when lifting something too heavy but can completed daily tasks pain free. Wants to be able to do push ups and planks.   Response to previous treatment:Tatiana well    Pain: 0/10    Objective     Catie received the following supervised modalities after being cleared for contraindications for 10 minutes in order to promote increased blood flow and tissue elasticity:   - right elbow    Catie participated in dynamic functional therapeutic activities to improve functional performance for 30  minutes, including:  -UBE 3'F3'B  -green flexbar frowns/smiles WE/WF/UD/RD 3 x 10  tricep extensions with blue theraband 3 x 10   -bicep curls with 4# dowel 3x 10   -wall push ups 3x10    Range of Motion:   Right      Forearm AROM  PRO  SUP    WNL WNL      Elbow AROM  EE EF   10 (+7) 145 (+24)       Strength: (ULISSES Dynamometer in psi.)        4/4/2022 6/13/2022     Left Right   Rung II 82 70 (+48)       Home Exercises and Education Provided     Education provided:   - Upgraded HEP  - Progress towards goals     Written Home Exercises Provided: yes.  Exercises  were reviewed and Catie was able to demonstrate them prior to the end of the session.  Catie demonstrated good  understanding of the HEP provided.   .   See EMR under Patient Instructions for exercises provided prior visit.    6/13/2022- given instructed to slowly upgrade strengthen exercises/ weight as tolerated to increase strength     Assessment     Pt would not continue to benefit from skilled OT to maximize RUE function. PT feels like they have reached their goals and has full functional use of arm and can continue with an updated HEP.    Catie is progressing well towards her goals and there are no updates to goals at this time. Pt prognosis is Good.     Pt will continue to benefit from skilled outpatient occupational therapy to address the deficits listed in the problem list on initial evaluation provide pt/family education and to maximize pt's level of independence in the home and community environment.     Anticipated barriers to therapy: None    Pt's spiritual, cultural and educational needs considered and pt agreeable to plan of care and goals.    Goals:   LTG's (8 weeks):  1)   Increase ROM by 30 degrees in EE/EF to increase functional hand use for donning clothes. (not MET)  1)   Increase ROM to 80 degrees in forearm supination to increase functional hand use for grasping items.(Met)  2)   Increase  strength to 70 lbs. to grasp pot handle. (Met)  4)   Decrease complaints of pain to 0 out of 10 at worst to increase functional hand use for ADL/work/leisure activities.(Met)  6)   Pt will return to near to prior level of function for ADLs and household management reporting I or Mod I with ADLs (dressing, feeding, grooming, toileting). (Met)     STG's (4 weeks)  1)   Patient to be IND with HEP and modalities for pain/edema managment. (Met)  2)   Increase ROM by 15 degrees in EE/EF to increase functional hand use for ADLs/work/leisure activities. (Met)  2)   Increase ROM to 70 degrees in supination to  increase functional hand use for ADLs/work/leisure activities. (Met)  3)   Increase  strength to to 50 lbs. to improve functional grasp for ADLs/work/leisure activities. (Met)  6)   Decrease complaints of pain to  2 out of 10 at worst to increase functional hand use for ADL/work/leisure activities. (met)    Plan   Patient discharged form skilled occupational therapy services due achievement of functional use of hand and no pain during daily tasks.     FERMIN Coronel    I certify that I was present in the room directing the student in service delivery and guiding them using my skilled judgment. As the co-signing therapist I have reviewed the students documentation and am responsible for the treatment, assessment, and plan.     MARSHA Peres OTR/L, CHT

## 2022-06-15 ENCOUNTER — TELEPHONE (OUTPATIENT)
Dept: ORTHOPEDICS | Facility: CLINIC | Age: 37
End: 2022-06-15
Payer: COMMERCIAL

## 2022-06-17 ENCOUNTER — DOCUMENTATION ONLY (OUTPATIENT)
Dept: ORTHOPEDICS | Facility: CLINIC | Age: 37
End: 2022-06-17
Payer: COMMERCIAL

## 2022-06-17 ENCOUNTER — TELEPHONE (OUTPATIENT)
Dept: ORTHOPEDICS | Facility: CLINIC | Age: 37
End: 2022-06-17
Payer: COMMERCIAL

## 2022-06-17 NOTE — TELEPHONE ENCOUNTER
Patient cancelled appointment reached out to assist with rescheduling she advised she forgot about the appointment and cancelled because she didn't wan to no show and she stated she did not want to schedule at this time and will call back when she is ready.

## 2022-07-11 ENCOUNTER — OFFICE VISIT (OUTPATIENT)
Dept: OPTOMETRY | Facility: CLINIC | Age: 37
End: 2022-07-11
Payer: COMMERCIAL

## 2022-07-11 DIAGNOSIS — H52.13 MYOPIA, BILATERAL: Primary | ICD-10-CM

## 2022-07-11 DIAGNOSIS — Z13.5 SCREENING FOR EYE CONDITION: ICD-10-CM

## 2022-07-11 DIAGNOSIS — Z46.0 FITTING AND ADJUSTMENT OF SPECTACLES AND CONTACT LENSES: Primary | ICD-10-CM

## 2022-07-11 DIAGNOSIS — Z97.3 WEARS CONTACT LENSES: ICD-10-CM

## 2022-07-11 PROCEDURE — 92015 DETERMINE REFRACTIVE STATE: CPT | Mod: S$GLB,,, | Performed by: OPTOMETRIST

## 2022-07-11 PROCEDURE — 92014 COMPRE OPH EXAM EST PT 1/>: CPT | Mod: S$GLB,,, | Performed by: OPTOMETRIST

## 2022-07-11 PROCEDURE — 92310 CONTACT LENS FITTING OU: CPT | Mod: S$GLB,,, | Performed by: OPTOMETRIST

## 2022-07-11 PROCEDURE — 99999 PR PBB SHADOW E&M-EST. PATIENT-LVL II: CPT | Mod: PBBFAC,,, | Performed by: OPTOMETRIST

## 2022-07-11 PROCEDURE — 92015 PR REFRACTION: ICD-10-PCS | Mod: S$GLB,,, | Performed by: OPTOMETRIST

## 2022-07-11 PROCEDURE — 99999 PR PBB SHADOW E&M-EST. PATIENT-LVL II: ICD-10-PCS | Mod: PBBFAC,,, | Performed by: OPTOMETRIST

## 2022-07-11 PROCEDURE — 92310 PR CONTACT LENS FITTING (NO CHANGE): ICD-10-PCS | Mod: S$GLB,,, | Performed by: OPTOMETRIST

## 2022-07-11 PROCEDURE — 92014 PR EYE EXAM, EST PATIENT,COMPREHESV: ICD-10-PCS | Mod: S$GLB,,, | Performed by: OPTOMETRIST

## 2022-07-11 NOTE — PROGRESS NOTES
HPI     DLS: 4/18/2022/Dr. Conte    Presents today for routine/CLFU -  AO 1 day for Astig  No vision complaints.  No f/f  Refresh gtts  Fam hx Glaucoma--dad    Last edited by Patrica Pearce MA on 7/11/2022  9:36 AM. (History)        ROS     Negative for: Constitutional, Gastrointestinal, Neurological, Skin,   Genitourinary, Musculoskeletal, HENT, Endocrine, Cardiovascular, Eyes,   Respiratory, Psychiatric, Allergic/Imm, Heme/Lymph    Last edited by Skyler Parnell, OD on 7/11/2022  9:45 AM. (History)        Assessment /Plan     For exam results, see Encounter Report.    Myopia, bilateral    Wears contact lenses      1. Good fit w [facility], but may need inc cyl OS  2. Discussed inc risk of Rd in high myopia/S+S    PLAN:    1. Wrote spex Rx  2. DISP trial CLs in new cheng--5 pairs  3. Continue Daily Wear schedule.  NO SLEEPING IN CONTACT LENSES. Exchange daily  4. If no problems will call for CLRx, rtc 1 yr             8/22/22 addendum--pt called--wishes to go back to [facility] as fit by Dr Caputo, BUT with new power I found.  I entered new Rx in patients chart to reflect her preference

## 2022-07-21 ENCOUNTER — IMMUNIZATION (OUTPATIENT)
Dept: INTERNAL MEDICINE | Facility: CLINIC | Age: 37
End: 2022-07-21
Payer: COMMERCIAL

## 2022-07-21 DIAGNOSIS — Z23 NEED FOR VACCINATION: Primary | ICD-10-CM

## 2022-07-21 PROCEDURE — 0054A COVID-19, MRNA, LNP-S, PF, 30 MCG/0.3 ML DOSE VACCINE (PFIZER): CPT | Mod: PBBFAC | Performed by: INTERNAL MEDICINE

## 2022-07-30 ENCOUNTER — PATIENT MESSAGE (OUTPATIENT)
Dept: OPTOMETRY | Facility: CLINIC | Age: 37
End: 2022-07-30
Payer: COMMERCIAL

## 2022-08-22 ENCOUNTER — PATIENT MESSAGE (OUTPATIENT)
Dept: OPTOMETRY | Facility: CLINIC | Age: 37
End: 2022-08-22
Payer: COMMERCIAL

## 2022-09-06 NOTE — PROGRESS NOTES
"Patient was given vaccine information sheet for the Flu Vaccine. The area of injection was palpated using the acromion process as a landmark. This area was cleaned with alcohol. Using a 25g 1" safety needle, 0.5mL of the vaccine was placed into the right deltoid muscle. The injection site was dressed with a bandage. Patient experienced no complications and was discharged in stable condition. Fluzone vaccine Lot: [id number] Exp: [id number]    " 149.9

## 2022-09-21 ENCOUNTER — PATIENT MESSAGE (OUTPATIENT)
Dept: INTERNAL MEDICINE | Facility: CLINIC | Age: 37
End: 2022-09-21
Payer: COMMERCIAL

## 2022-09-21 DIAGNOSIS — Z00.00 ANNUAL PHYSICAL EXAM: Primary | ICD-10-CM

## 2022-09-21 NOTE — TELEPHONE ENCOUNTER
I sent in arthritis gel and she can also use tylenol, 650mg q 4 hours around the clock  Thanks! Pt would like to know what blood work might be needed prior to her November appointment.

## 2022-09-27 ENCOUNTER — LAB VISIT (OUTPATIENT)
Dept: LAB | Facility: OTHER | Age: 37
End: 2022-09-27
Attending: INTERNAL MEDICINE
Payer: COMMERCIAL

## 2022-09-27 ENCOUNTER — OFFICE VISIT (OUTPATIENT)
Dept: OBSTETRICS AND GYNECOLOGY | Facility: CLINIC | Age: 37
End: 2022-09-27
Attending: OBSTETRICS & GYNECOLOGY
Payer: COMMERCIAL

## 2022-09-27 VITALS
WEIGHT: 155.63 LBS | BODY MASS INDEX: 23.05 KG/M2 | HEIGHT: 69 IN | DIASTOLIC BLOOD PRESSURE: 74 MMHG | SYSTOLIC BLOOD PRESSURE: 114 MMHG

## 2022-09-27 DIAGNOSIS — R10.2 PELVIC PAIN: Primary | ICD-10-CM

## 2022-09-27 DIAGNOSIS — F41.8 DEPRESSION WITH ANXIETY: ICD-10-CM

## 2022-09-27 DIAGNOSIS — N95.1 PERIMENOPAUSAL SYMPTOMS: ICD-10-CM

## 2022-09-27 DIAGNOSIS — Z00.00 ANNUAL PHYSICAL EXAM: ICD-10-CM

## 2022-09-27 LAB
ALBUMIN SERPL BCP-MCNC: 3.9 G/DL (ref 3.5–5.2)
ALP SERPL-CCNC: 43 U/L (ref 55–135)
ALT SERPL W/O P-5'-P-CCNC: 19 U/L (ref 10–44)
ANION GAP SERPL CALC-SCNC: 6 MMOL/L (ref 8–16)
AST SERPL-CCNC: 26 U/L (ref 10–40)
BASOPHILS # BLD AUTO: 0.01 K/UL (ref 0–0.2)
BASOPHILS NFR BLD: 0.3 % (ref 0–1.9)
BILIRUB SERPL-MCNC: 0.6 MG/DL (ref 0.1–1)
BUN SERPL-MCNC: 7 MG/DL (ref 6–20)
C TRACH DNA SPEC QL NAA+PROBE: NOT DETECTED
CALCIUM SERPL-MCNC: 8.8 MG/DL (ref 8.7–10.5)
CHLORIDE SERPL-SCNC: 108 MMOL/L (ref 95–110)
CHOLEST SERPL-MCNC: 132 MG/DL (ref 120–199)
CHOLEST/HDLC SERPL: 2.5 {RATIO} (ref 2–5)
CO2 SERPL-SCNC: 25 MMOL/L (ref 23–29)
CREAT SERPL-MCNC: 0.7 MG/DL (ref 0.5–1.4)
DIFFERENTIAL METHOD: ABNORMAL
EOSINOPHIL # BLD AUTO: 0.2 K/UL (ref 0–0.5)
EOSINOPHIL NFR BLD: 5.2 % (ref 0–8)
ERYTHROCYTE [DISTWIDTH] IN BLOOD BY AUTOMATED COUNT: 12.4 % (ref 11.5–14.5)
EST. GFR  (NO RACE VARIABLE): >60 ML/MIN/1.73 M^2
ESTIMATED AVG GLUCOSE: 100 MG/DL (ref 68–131)
GLUCOSE SERPL-MCNC: 91 MG/DL (ref 70–110)
HBA1C MFR BLD: 5.1 % (ref 4–5.6)
HCT VFR BLD AUTO: 42.8 % (ref 37–48.5)
HDLC SERPL-MCNC: 52 MG/DL (ref 40–75)
HDLC SERPL: 39.4 % (ref 20–50)
HGB BLD-MCNC: 14.1 G/DL (ref 12–16)
IMM GRANULOCYTES # BLD AUTO: 0.01 K/UL (ref 0–0.04)
IMM GRANULOCYTES NFR BLD AUTO: 0.3 % (ref 0–0.5)
LDLC SERPL CALC-MCNC: 66.8 MG/DL (ref 63–159)
LYMPHOCYTES # BLD AUTO: 0.8 K/UL (ref 1–4.8)
LYMPHOCYTES NFR BLD: 28.5 % (ref 18–48)
MCH RBC QN AUTO: 30.3 PG (ref 27–31)
MCHC RBC AUTO-ENTMCNC: 32.9 G/DL (ref 32–36)
MCV RBC AUTO: 92 FL (ref 82–98)
MONOCYTES # BLD AUTO: 0.5 K/UL (ref 0.3–1)
MONOCYTES NFR BLD: 18.4 % (ref 4–15)
N GONORRHOEA DNA SPEC QL NAA+PROBE: NOT DETECTED
NEUTROPHILS # BLD AUTO: 1.4 K/UL (ref 1.8–7.7)
NEUTROPHILS NFR BLD: 47.3 % (ref 38–73)
NONHDLC SERPL-MCNC: 80 MG/DL
NRBC BLD-RTO: 0 /100 WBC
PLATELET # BLD AUTO: 301 K/UL (ref 150–450)
PMV BLD AUTO: 8.8 FL (ref 9.2–12.9)
POTASSIUM SERPL-SCNC: 4.1 MMOL/L (ref 3.5–5.1)
PROT SERPL-MCNC: 7.2 G/DL (ref 6–8.4)
RBC # BLD AUTO: 4.65 M/UL (ref 4–5.4)
SODIUM SERPL-SCNC: 139 MMOL/L (ref 136–145)
T4 FREE SERPL-MCNC: 0.95 NG/DL (ref 0.71–1.51)
TRIGL SERPL-MCNC: 66 MG/DL (ref 30–150)
TSH SERPL DL<=0.005 MIU/L-ACNC: 1.15 UIU/ML (ref 0.4–4)
WBC # BLD AUTO: 2.88 K/UL (ref 3.9–12.7)

## 2022-09-27 PROCEDURE — 99214 OFFICE O/P EST MOD 30 MIN: CPT | Mod: S$GLB,,, | Performed by: OBSTETRICS & GYNECOLOGY

## 2022-09-27 PROCEDURE — 83036 HEMOGLOBIN GLYCOSYLATED A1C: CPT | Performed by: INTERNAL MEDICINE

## 2022-09-27 PROCEDURE — 99214 PR OFFICE/OUTPT VISIT, EST, LEVL IV, 30-39 MIN: ICD-10-PCS | Mod: S$GLB,,, | Performed by: OBSTETRICS & GYNECOLOGY

## 2022-09-27 PROCEDURE — 84443 ASSAY THYROID STIM HORMONE: CPT | Performed by: INTERNAL MEDICINE

## 2022-09-27 PROCEDURE — 80053 COMPREHEN METABOLIC PANEL: CPT | Performed by: INTERNAL MEDICINE

## 2022-09-27 PROCEDURE — 3008F BODY MASS INDEX DOCD: CPT | Mod: CPTII,S$GLB,, | Performed by: OBSTETRICS & GYNECOLOGY

## 2022-09-27 PROCEDURE — 3078F DIAST BP <80 MM HG: CPT | Mod: CPTII,S$GLB,, | Performed by: OBSTETRICS & GYNECOLOGY

## 2022-09-27 PROCEDURE — 85025 COMPLETE CBC W/AUTO DIFF WBC: CPT | Performed by: INTERNAL MEDICINE

## 2022-09-27 PROCEDURE — 3008F PR BODY MASS INDEX (BMI) DOCUMENTED: ICD-10-PCS | Mod: CPTII,S$GLB,, | Performed by: OBSTETRICS & GYNECOLOGY

## 2022-09-27 PROCEDURE — 3078F PR MOST RECENT DIASTOLIC BLOOD PRESSURE < 80 MM HG: ICD-10-PCS | Mod: CPTII,S$GLB,, | Performed by: OBSTETRICS & GYNECOLOGY

## 2022-09-27 PROCEDURE — 36415 COLL VENOUS BLD VENIPUNCTURE: CPT | Performed by: INTERNAL MEDICINE

## 2022-09-27 PROCEDURE — 1159F PR MEDICATION LIST DOCUMENTED IN MEDICAL RECORD: ICD-10-PCS | Mod: CPTII,S$GLB,, | Performed by: OBSTETRICS & GYNECOLOGY

## 2022-09-27 PROCEDURE — 1160F RVW MEDS BY RX/DR IN RCRD: CPT | Mod: CPTII,S$GLB,, | Performed by: OBSTETRICS & GYNECOLOGY

## 2022-09-27 PROCEDURE — 3074F SYST BP LT 130 MM HG: CPT | Mod: CPTII,S$GLB,, | Performed by: OBSTETRICS & GYNECOLOGY

## 2022-09-27 PROCEDURE — 99999 PR PBB SHADOW E&M-EST. PATIENT-LVL III: CPT | Mod: PBBFAC,,, | Performed by: OBSTETRICS & GYNECOLOGY

## 2022-09-27 PROCEDURE — 1159F MED LIST DOCD IN RCRD: CPT | Mod: CPTII,S$GLB,, | Performed by: OBSTETRICS & GYNECOLOGY

## 2022-09-27 PROCEDURE — 84439 ASSAY OF FREE THYROXINE: CPT | Performed by: INTERNAL MEDICINE

## 2022-09-27 PROCEDURE — 1160F PR REVIEW ALL MEDS BY PRESCRIBER/CLIN PHARMACIST DOCUMENTED: ICD-10-PCS | Mod: CPTII,S$GLB,, | Performed by: OBSTETRICS & GYNECOLOGY

## 2022-09-27 PROCEDURE — 87491 CHLMYD TRACH DNA AMP PROBE: CPT | Performed by: OBSTETRICS & GYNECOLOGY

## 2022-09-27 PROCEDURE — 80061 LIPID PANEL: CPT | Performed by: INTERNAL MEDICINE

## 2022-09-27 PROCEDURE — 99999 PR PBB SHADOW E&M-EST. PATIENT-LVL III: ICD-10-PCS | Mod: PBBFAC,,, | Performed by: OBSTETRICS & GYNECOLOGY

## 2022-09-27 PROCEDURE — 3074F PR MOST RECENT SYSTOLIC BLOOD PRESSURE < 130 MM HG: ICD-10-PCS | Mod: CPTII,S$GLB,, | Performed by: OBSTETRICS & GYNECOLOGY

## 2022-09-27 PROCEDURE — 87086 URINE CULTURE/COLONY COUNT: CPT | Performed by: OBSTETRICS & GYNECOLOGY

## 2022-09-27 PROCEDURE — 87591 N.GONORRHOEAE DNA AMP PROB: CPT | Performed by: OBSTETRICS & GYNECOLOGY

## 2022-09-27 RX ORDER — SERTRALINE HYDROCHLORIDE 25 MG/1
25 TABLET, FILM COATED ORAL DAILY
Qty: 90 TABLET | Refills: 1 | Status: SHIPPED | OUTPATIENT
Start: 2022-09-27 | End: 2022-11-01 | Stop reason: SDUPTHER

## 2022-09-27 NOTE — TELEPHONE ENCOUNTER
No new care gaps identified.  [facility] Embedded Care Gaps. Reference number: [id number]. 9/27/2022   9:25:13 AM TUYETT

## 2022-09-28 ENCOUNTER — IMMUNIZATION (OUTPATIENT)
Dept: PHARMACY | Facility: CLINIC | Age: 37
End: 2022-09-28
Payer: COMMERCIAL

## 2022-09-28 ENCOUNTER — TELEPHONE (OUTPATIENT)
Dept: MATERNAL FETAL MEDICINE | Facility: OTHER | Age: 37
End: 2022-09-28
Payer: COMMERCIAL

## 2022-09-28 ENCOUNTER — PATIENT MESSAGE (OUTPATIENT)
Dept: MATERNAL FETAL MEDICINE | Facility: OTHER | Age: 37
End: 2022-09-28
Payer: COMMERCIAL

## 2022-09-28 LAB — BACTERIA UR CULT: NO GROWTH

## 2022-09-28 NOTE — TELEPHONE ENCOUNTER
Called patient:    Discussed results of labs-    LMP 9/10/22    E2 74,  FSH 11.21,  LH 18.1  - consistent with mid-cycle levels    Scheduled for pelvic sono next week

## 2022-09-29 ENCOUNTER — PATIENT MESSAGE (OUTPATIENT)
Dept: OBSTETRICS AND GYNECOLOGY | Facility: CLINIC | Age: 37
End: 2022-09-29
Payer: COMMERCIAL

## 2022-10-07 ENCOUNTER — HOSPITAL ENCOUNTER (OUTPATIENT)
Dept: RADIOLOGY | Facility: OTHER | Age: 37
Discharge: HOME | End: 2022-10-07
Attending: OBSTETRICS & GYNECOLOGY
Payer: COMMERCIAL

## 2022-10-07 ENCOUNTER — TELEPHONE (OUTPATIENT)
Dept: OBSTETRICS AND GYNECOLOGY | Facility: CLINIC | Age: 37
End: 2022-10-07
Payer: COMMERCIAL

## 2022-10-07 DIAGNOSIS — R10.2 PELVIC PAIN: ICD-10-CM

## 2022-10-07 PROCEDURE — 76856 US EXAM PELVIC COMPLETE: CPT | Mod: 26,,, | Performed by: RADIOLOGY

## 2022-10-07 PROCEDURE — 76830 TRANSVAGINAL US NON-OB: CPT | Mod: 26,,, | Performed by: RADIOLOGY

## 2022-10-07 PROCEDURE — 76856 US PELVIS COMP WITH TRANSVAG NON-OB (XPD): ICD-10-PCS | Mod: 26,,, | Performed by: RADIOLOGY

## 2022-10-07 PROCEDURE — 76830 US PELVIS COMP WITH TRANSVAG NON-OB (XPD): ICD-10-PCS | Mod: 26,,, | Performed by: RADIOLOGY

## 2022-10-07 PROCEDURE — 76830 TRANSVAGINAL US NON-OB: CPT | Mod: TC

## 2022-10-10 ENCOUNTER — TELEPHONE (OUTPATIENT)
Dept: OBSTETRICS AND GYNECOLOGY | Facility: CLINIC | Age: 37
End: 2022-10-10
Payer: COMMERCIAL

## 2022-10-10 NOTE — TELEPHONE ENCOUNTER
Called patient:    Discussed results of pelvic sono:    FINDINGS:  Uterus:  Size: 8.5 x 4.2 x 4.5 cm  Masses: None  Endometrium: Normal in this pre menopausal patient, measuring 5 mm.  Right ovary:  Size: 2.5 x 1.4 x 2.2 cm  Appearance: Normal with small follicles  Vascular flow: Normal.  Left ovary:  Size: 3.8 x 2.0 x 4.1 cm  Appearance: Anechoic region LEFT adnexa 1.4 x 1.7 x 1.2 cm likely dominant follicle  Vascular Flow: Normal.  Free Fluid:  None.  Impression:  Pelvic ultrasound within physiologic limits of normal as above.    We discussed the small left ovarian cyst as a possible cause for dyspareunia.    Based upon appearance, this dominant follicle / cyst should resolve with time.  She will monitor and let us know if discomfort does not resolve.

## 2022-10-24 ENCOUNTER — PATIENT MESSAGE (OUTPATIENT)
Dept: INTERNAL MEDICINE | Facility: CLINIC | Age: 37
End: 2022-10-24
Payer: COMMERCIAL

## 2022-10-24 DIAGNOSIS — R79.9 ABNORMAL BLOOD CELL COUNT: Primary | ICD-10-CM

## 2022-10-26 ENCOUNTER — PATIENT MESSAGE (OUTPATIENT)
Dept: INTERNAL MEDICINE | Facility: CLINIC | Age: 37
End: 2022-10-26
Payer: COMMERCIAL

## 2022-10-27 ENCOUNTER — LAB VISIT (OUTPATIENT)
Dept: LAB | Facility: OTHER | Age: 37
End: 2022-10-27
Attending: INTERNAL MEDICINE
Payer: COMMERCIAL

## 2022-10-27 DIAGNOSIS — R79.9 ABNORMAL BLOOD CELL COUNT: ICD-10-CM

## 2022-10-27 LAB
BASOPHILS # BLD AUTO: 0.04 K/UL (ref 0–0.2)
BASOPHILS NFR BLD: 0.5 % (ref 0–1.9)
DIFFERENTIAL METHOD: ABNORMAL
EOSINOPHIL # BLD AUTO: 0.1 K/UL (ref 0–0.5)
EOSINOPHIL NFR BLD: 1.3 % (ref 0–8)
ERYTHROCYTE [DISTWIDTH] IN BLOOD BY AUTOMATED COUNT: 12.7 % (ref 11.5–14.5)
HCT VFR BLD AUTO: 41.1 % (ref 37–48.5)
HGB BLD-MCNC: 13.1 G/DL (ref 12–16)
IMM GRANULOCYTES # BLD AUTO: 0.07 K/UL (ref 0–0.04)
IMM GRANULOCYTES NFR BLD AUTO: 0.8 % (ref 0–0.5)
LYMPHOCYTES # BLD AUTO: 1.6 K/UL (ref 1–4.8)
LYMPHOCYTES NFR BLD: 18.2 % (ref 18–48)
MCH RBC QN AUTO: 30 PG (ref 27–31)
MCHC RBC AUTO-ENTMCNC: 31.9 G/DL (ref 32–36)
MCV RBC AUTO: 94 FL (ref 82–98)
MONOCYTES # BLD AUTO: 0.7 K/UL (ref 0.3–1)
MONOCYTES NFR BLD: 7.6 % (ref 4–15)
NEUTROPHILS # BLD AUTO: 6.3 K/UL (ref 1.8–7.7)
NEUTROPHILS NFR BLD: 71.6 % (ref 38–73)
NRBC BLD-RTO: 0 /100 WBC
PLATELET # BLD AUTO: 340 K/UL (ref 150–450)
PMV BLD AUTO: 9.7 FL (ref 9.2–12.9)
RBC # BLD AUTO: 4.36 M/UL (ref 4–5.4)
WBC # BLD AUTO: 8.72 K/UL (ref 3.9–12.7)

## 2022-10-27 PROCEDURE — 85025 COMPLETE CBC W/AUTO DIFF WBC: CPT | Performed by: INTERNAL MEDICINE

## 2022-10-27 PROCEDURE — 36415 COLL VENOUS BLD VENIPUNCTURE: CPT | Performed by: INTERNAL MEDICINE

## 2022-11-01 ENCOUNTER — OFFICE VISIT (OUTPATIENT)
Dept: INTERNAL MEDICINE | Facility: CLINIC | Age: 37
End: 2022-11-01
Attending: INTERNAL MEDICINE
Payer: COMMERCIAL

## 2022-11-01 VITALS
DIASTOLIC BLOOD PRESSURE: 64 MMHG | OXYGEN SATURATION: 100 % | SYSTOLIC BLOOD PRESSURE: 112 MMHG | WEIGHT: 160.94 LBS | HEART RATE: 80 BPM | HEIGHT: 69 IN | BODY MASS INDEX: 23.84 KG/M2

## 2022-11-01 DIAGNOSIS — F41.8 DEPRESSION WITH ANXIETY: ICD-10-CM

## 2022-11-01 DIAGNOSIS — Z00.00 ANNUAL PHYSICAL EXAM: Primary | ICD-10-CM

## 2022-11-01 PROBLEM — M25.521 RIGHT ELBOW PAIN: Status: RESOLVED | Noted: 2022-04-21 | Resolved: 2022-11-01

## 2022-11-01 PROBLEM — S52.124A CLOSED NONDISPLACED FRACTURE OF HEAD OF RIGHT RADIUS: Status: RESOLVED | Noted: 2022-04-07 | Resolved: 2022-11-01

## 2022-11-01 PROCEDURE — 3074F PR MOST RECENT SYSTOLIC BLOOD PRESSURE < 130 MM HG: ICD-10-PCS | Mod: CPTII,S$GLB,, | Performed by: INTERNAL MEDICINE

## 2022-11-01 PROCEDURE — 3044F HG A1C LEVEL LT 7.0%: CPT | Mod: CPTII,S$GLB,, | Performed by: INTERNAL MEDICINE

## 2022-11-01 PROCEDURE — 1159F MED LIST DOCD IN RCRD: CPT | Mod: CPTII,S$GLB,, | Performed by: INTERNAL MEDICINE

## 2022-11-01 PROCEDURE — 3078F PR MOST RECENT DIASTOLIC BLOOD PRESSURE < 80 MM HG: ICD-10-PCS | Mod: CPTII,S$GLB,, | Performed by: INTERNAL MEDICINE

## 2022-11-01 PROCEDURE — 99999 PR PBB SHADOW E&M-EST. PATIENT-LVL III: CPT | Mod: PBBFAC,,, | Performed by: INTERNAL MEDICINE

## 2022-11-01 PROCEDURE — 1159F PR MEDICATION LIST DOCUMENTED IN MEDICAL RECORD: ICD-10-PCS | Mod: CPTII,S$GLB,, | Performed by: INTERNAL MEDICINE

## 2022-11-01 PROCEDURE — 3074F SYST BP LT 130 MM HG: CPT | Mod: CPTII,S$GLB,, | Performed by: INTERNAL MEDICINE

## 2022-11-01 PROCEDURE — 99999 PR PBB SHADOW E&M-EST. PATIENT-LVL III: ICD-10-PCS | Mod: PBBFAC,,, | Performed by: INTERNAL MEDICINE

## 2022-11-01 PROCEDURE — 99395 PR PREVENTIVE VISIT,EST,18-39: ICD-10-PCS | Mod: S$GLB,,, | Performed by: INTERNAL MEDICINE

## 2022-11-01 PROCEDURE — 99395 PREV VISIT EST AGE 18-39: CPT | Mod: S$GLB,,, | Performed by: INTERNAL MEDICINE

## 2022-11-01 PROCEDURE — 3044F PR MOST RECENT HEMOGLOBIN A1C LEVEL <7.0%: ICD-10-PCS | Mod: CPTII,S$GLB,, | Performed by: INTERNAL MEDICINE

## 2022-11-01 PROCEDURE — 3078F DIAST BP <80 MM HG: CPT | Mod: CPTII,S$GLB,, | Performed by: INTERNAL MEDICINE

## 2022-11-01 RX ORDER — SERTRALINE HYDROCHLORIDE 25 MG/1
25 TABLET, FILM COATED ORAL DAILY
Qty: 90 TABLET | Refills: 3 | Status: SHIPPED | OUTPATIENT
Start: 2022-11-01 | End: 2023-12-29 | Stop reason: SDUPTHER

## 2022-11-01 NOTE — PROGRESS NOTES
Subjective:       Patient ID: Catie Monge is a 37 y.o. female.    Chief Complaint: Annual Exam     Catie Monge is a 37 y.o.  female who presents for Annual Exam  .  HPI  Patient Active Problem List   Diagnosis    Depression with anxiety - zoloft daily    Wears contact lenses           Past Medical History:   Diagnosis Date    Allergy     Anxiety     No meds    Closed nondisplaced fracture of head of right radius 4/7/2022    Fever blister     Oral cold sores only; no hx genital outbreak    Postpartum hypertension     Requiring magnesium after delivery       Past Surgical History:   Procedure Laterality Date    ANUS SURGERY      FISSURE    COSMETIC SURGERY      DILATION AND CURETTAGE OF UTERUS USING SUCTION N/A 9/13/2018    Procedure: DILATION AND CURETTAGE, UTERUS, USING SUCTION;  Surgeon: Joselo Landry Jr., MD;  Location: [facility];  Service: OB/GYN;  Laterality: N/A;    NASAL SEPTUM SURGERY      TONSILLECTOMY, ADENOIDECTOMY      WISDOM TOOTH EXTRACTION         Family History   Problem Relation Age of Onset    Glaucoma Father     Hypertension Father     Rheum arthritis Father     Arthritis Father     Melanoma Paternal Grandfather     Kyphosis Brother     Bipolar disorder Brother     Anxiety disorder Brother     Diabetes Mellitus Maternal Grandfather     No Known Problems Paternal Grandmother     Breast cancer Neg Hx     Cancer Neg Hx     Ovarian cancer Neg Hx     Colon cancer Neg Hx     Macular degeneration Neg Hx     Retinal detachment Neg Hx     Amblyopia Neg Hx     Blindness Neg Hx     Cataracts Neg Hx     Strabismus Neg Hx        Social History     Tobacco Use    Smoking status: Never    Smokeless tobacco: Never   Substance Use Topics    Alcohol use: Yes     Alcohol/week: 1.0 standard drink     Types: 1 Glasses of wine per week     Comment: Social    Drug use: No         Review of Systems   Constitutional:  Negative for chills, fever and unexpected weight change.   HENT:  Negative for ear pain  "and sore throat.    Respiratory:  Negative for cough and shortness of breath.    Cardiovascular:  Negative for chest pain and palpitations.   Gastrointestinal:  Negative for abdominal pain, nausea and vomiting.   Musculoskeletal:  Negative for arthralgias and myalgias.   Psychiatric/Behavioral:  Negative for dysphoric mood and suicidal ideas.        Objective:   Blood pressure 112/64, pulse 80, height 5' 9" (1.753 m), weight 73 kg (160 lb 15 oz), SpO2 100 %.     Physical Exam  Constitutional:       General: She is not in acute distress.     Appearance: She is well-developed.   HENT:      Head: Normocephalic and atraumatic.      Right Ear: Tympanic membrane and external ear normal.      Left Ear: Tympanic membrane and external ear normal.      Mouth/Throat:      Mouth: Mucous membranes are moist.      Pharynx: Oropharynx is clear.   Eyes:      General:         Right eye: No discharge.         Left eye: No discharge.      Conjunctiva/sclera: Conjunctivae normal.   Neck:      Thyroid: No thyromegaly.      Vascular: No carotid bruit.   Cardiovascular:      Heart sounds: Normal heart sounds. No murmur heard.    No friction rub. No gallop.   Pulmonary:      Effort: Pulmonary effort is normal.      Breath sounds: Normal breath sounds. No wheezing or rales.   Abdominal:      General: Bowel sounds are normal. There is no distension.      Palpations: Abdomen is soft.      Tenderness: There is no abdominal tenderness.   Musculoskeletal:         General: No tenderness.      Right lower leg: No edema.      Left lower leg: No edema.   Lymphadenopathy:      Cervical: No cervical adenopathy.   Skin:     General: Skin is warm and dry.   Neurological:      Mental Status: She is alert and oriented to person, place, and time. Mental status is at baseline.   Psychiatric:         Thought Content: Thought content normal.         Judgment: Judgment normal.       Prior labs reviewed    Health Maintenance         Date Due Completion Date    " COVID-19 Vaccine (3 - Mixed Product risk series) 08/18/2022 7/21/2022    Cervical Cancer Screening 06/21/2026 6/21/2021    TETANUS VACCINE 06/17/2029 6/17/2019            Assessment/Plan:       1. Annual physical exam  Recommend daily sunscreen, cardiovascular exercise min 30 min 5 days per week. Seatbelts routinely.    Recommend monthly self breast exams and annual mammogram OVER AGE 40 or as recommended.  Also screening  pap with reflex HPV q 3 years or as recommended by gyn provider.    2. Depression with anxiety - zoloft daily  Overview:  Mood stable, consistent with her medication. Sees counselor regularly.  Eating in a healthy way.     Assessment & Plan:  Cont current medication and plan.     Orders:  -     sertraline (ZOLOFT) 25 MG tablet; Take 1 tablet (25 mg total) by mouth once daily.  Dispense: 90 tablet; Refill: 3        Medication List with Changes/Refills   Current Medications    CETIRIZINE (ZYRTEC) 10 MG TABLET    Take 10 mg by mouth every evening.   Changed and/or Refilled Medications    Modified Medication Previous Medication    SERTRALINE (ZOLOFT) 25 MG TABLET sertraline (ZOLOFT) 25 MG tablet       Take 1 tablet (25 mg total) by mouth once daily.    Take 1 tablet (25 mg total) by mouth once daily.   Discontinued Medications    IBUPROFEN (ADVIL,MOTRIN) 800 MG TABLET    Take 1 tablet (800 mg total) by mouth 3 (three) times daily.    PNV95/FERROUS FUMARATE/FA (PRENATAL ORAL)    Take by mouth.       Recommend patient complete vaccinations as listed in the overdue health maintenance report. Pt may obtain vaccinations at their local pharmacy, any [facility] pharmacy or request vaccination in our clinic.  Please note that some insurances will only cover vaccination cost at specific locations.Please check with your insurance provider regarding your coverage.  Vaccines that are not covered are still recommended.

## 2022-12-20 ENCOUNTER — IMMUNIZATION (OUTPATIENT)
Dept: PHARMACY | Facility: CLINIC | Age: 37
End: 2022-12-20
Payer: COMMERCIAL

## 2022-12-20 DIAGNOSIS — Z23 NEED FOR VACCINATION: Primary | ICD-10-CM

## 2023-04-12 ENCOUNTER — PATIENT MESSAGE (OUTPATIENT)
Dept: INTERNAL MEDICINE | Facility: CLINIC | Age: 38
End: 2023-04-12
Payer: COMMERCIAL

## 2023-04-18 ENCOUNTER — PATIENT MESSAGE (OUTPATIENT)
Dept: INTERNAL MEDICINE | Facility: CLINIC | Age: 38
End: 2023-04-18
Payer: COMMERCIAL

## 2023-04-20 ENCOUNTER — OFFICE VISIT (OUTPATIENT)
Dept: INTERNAL MEDICINE | Facility: CLINIC | Age: 38
End: 2023-04-20
Payer: COMMERCIAL

## 2023-04-20 VITALS
BODY MASS INDEX: 25.23 KG/M2 | OXYGEN SATURATION: 99 % | HEART RATE: 82 BPM | SYSTOLIC BLOOD PRESSURE: 117 MMHG | WEIGHT: 170.88 LBS | DIASTOLIC BLOOD PRESSURE: 71 MMHG

## 2023-04-20 DIAGNOSIS — M54.9 BACK PAIN, UNSPECIFIED BACK LOCATION, UNSPECIFIED BACK PAIN LATERALITY, UNSPECIFIED CHRONICITY: Primary | ICD-10-CM

## 2023-04-20 PROCEDURE — 99214 PR OFFICE/OUTPT VISIT, EST, LEVL IV, 30-39 MIN: ICD-10-PCS | Mod: S$GLB,,, | Performed by: PHYSICIAN ASSISTANT

## 2023-04-20 PROCEDURE — 99999 PR PBB SHADOW E&M-EST. PATIENT-LVL III: ICD-10-PCS | Mod: PBBFAC,,, | Performed by: PHYSICIAN ASSISTANT

## 2023-04-20 PROCEDURE — 99999 PR PBB SHADOW E&M-EST. PATIENT-LVL III: CPT | Mod: PBBFAC,,, | Performed by: PHYSICIAN ASSISTANT

## 2023-04-20 PROCEDURE — 1159F PR MEDICATION LIST DOCUMENTED IN MEDICAL RECORD: ICD-10-PCS | Mod: CPTII,S$GLB,, | Performed by: PHYSICIAN ASSISTANT

## 2023-04-20 PROCEDURE — 3078F PR MOST RECENT DIASTOLIC BLOOD PRESSURE < 80 MM HG: ICD-10-PCS | Mod: CPTII,S$GLB,, | Performed by: PHYSICIAN ASSISTANT

## 2023-04-20 PROCEDURE — 3078F DIAST BP <80 MM HG: CPT | Mod: CPTII,S$GLB,, | Performed by: PHYSICIAN ASSISTANT

## 2023-04-20 PROCEDURE — 3074F SYST BP LT 130 MM HG: CPT | Mod: CPTII,S$GLB,, | Performed by: PHYSICIAN ASSISTANT

## 2023-04-20 PROCEDURE — 99214 OFFICE O/P EST MOD 30 MIN: CPT | Mod: S$GLB,,, | Performed by: PHYSICIAN ASSISTANT

## 2023-04-20 PROCEDURE — 1159F MED LIST DOCD IN RCRD: CPT | Mod: CPTII,S$GLB,, | Performed by: PHYSICIAN ASSISTANT

## 2023-04-20 PROCEDURE — 3008F BODY MASS INDEX DOCD: CPT | Mod: CPTII,S$GLB,, | Performed by: PHYSICIAN ASSISTANT

## 2023-04-20 PROCEDURE — 3074F PR MOST RECENT SYSTOLIC BLOOD PRESSURE < 130 MM HG: ICD-10-PCS | Mod: CPTII,S$GLB,, | Performed by: PHYSICIAN ASSISTANT

## 2023-04-20 PROCEDURE — 3008F PR BODY MASS INDEX (BMI) DOCUMENTED: ICD-10-PCS | Mod: CPTII,S$GLB,, | Performed by: PHYSICIAN ASSISTANT

## 2023-04-20 RX ORDER — MELOXICAM 15 MG/1
15 TABLET ORAL DAILY
Qty: 14 TABLET | Refills: 0 | Status: SHIPPED | OUTPATIENT
Start: 2023-04-20 | End: 2023-05-05

## 2023-04-20 RX ORDER — METHYLPREDNISOLONE 4 MG/1
TABLET ORAL
Qty: 21 EACH | Refills: 0 | Status: SHIPPED | OUTPATIENT
Start: 2023-04-20 | End: 2023-05-11

## 2023-04-20 RX ORDER — METHOCARBAMOL 500 MG/1
500 TABLET, FILM COATED ORAL 4 TIMES DAILY
Qty: 40 TABLET | Refills: 0 | Status: SHIPPED | OUTPATIENT
Start: 2023-04-20 | End: 2023-05-01

## 2023-04-20 NOTE — PROGRESS NOTES
INTERNAL MEDICINE URGENT VISIT NOTE    CHIEF COMPLAINT     Chief Complaint   Patient presents with    Back Pain       HPI     Catie Monge is a 37 y.o. female who presents for an urgent visit today.    PCP is June Christianson MD, patient is new to me.     Patient presents with complaints of lower back pain for the past two weeks. No improvement with NSAIDs. Pain is worse with movement. Some radiation down the right leg. No bladder or bowel incontinence. No trauma or injury. No fever or chills. No nausea or vomiting. No history of back pain.     Past Medical History:  Past Medical History:   Diagnosis Date    Allergy     Anxiety     No meds    Closed nondisplaced fracture of head of right radius 4/7/2022    Fever blister     Oral cold sores only; no hx genital outbreak    Postpartum hypertension     Requiring magnesium after delivery       Home Medications:  Prior to Admission medications    Medication Sig Start Date End Date Taking? Authorizing Provider   cetirizine (ZYRTEC) 10 MG tablet Take 10 mg by mouth every evening.    Historical Provider   meloxicam (MOBIC) 15 MG tablet Take 1 tablet (15 mg total) by mouth once daily. for 14 days 4/20/23 5/4/23  Colleen Blackwell PA-C   methocarbamoL (ROBAXIN) 500 MG Tab Take 1 tablet (500 mg total) by mouth 4 (four) times daily. for 10 days 4/20/23 4/30/23  Colleen Blackwell PA-C   methylPREDNISolone (MEDROL DOSEPACK) 4 mg tablet use as directed 4/20/23 5/11/23  Colleen Blackwell PA-C   sertraline (ZOLOFT) 25 MG tablet Take 1 tablet (25 mg total) by mouth once daily. 11/1/22 7/12/23  June Christianson MD       Review of Systems:  Review of Systems   Constitutional:  Negative for chills and fever.   HENT:  Negative for sore throat and trouble swallowing.    Eyes:  Negative for visual disturbance.   Respiratory:  Negative for cough and shortness of breath.    Cardiovascular:  Negative for chest pain.   Gastrointestinal:  Negative for abdominal pain, constipation,  diarrhea, nausea and vomiting.   Genitourinary:  Negative for dysuria and flank pain.   Musculoskeletal:  Positive for back pain. Negative for neck pain and neck stiffness.   Skin:  Negative for rash.   Neurological:  Negative for dizziness, syncope, weakness and headaches.   Psychiatric/Behavioral:  Negative for confusion.      Health Maintainence:   Immunizations:  Health Maintenance         Date Due Completion Date    Hemoglobin A1c (Diabetic Prevention Screening) Never done ---    COVID-19 Vaccine (3 - Mixed Product risk series) 12/20/2022 12/20/2022    Cervical Cancer Screening 06/21/2026 6/21/2021    TETANUS VACCINE 06/17/2029 6/17/2019             PHYSICAL EXAM     /71   Pulse 82   Wt 77.5 kg (170 lb 13.7 oz)   LMP 03/28/2023   SpO2 99%   BMI 25.23 kg/m²     Physical Exam  Vitals and nursing note reviewed.   Constitutional:       Appearance: Normal appearance.      Comments: Healthy appearing female in NAD or apparent pain. She makes good eye contact, speaks in clear full sentences and ambulates with ease.        HENT:      Head: Normocephalic and atraumatic.      Nose: Nose normal.      Mouth/Throat:      Pharynx: Oropharynx is clear.   Eyes:      Conjunctiva/sclera: Conjunctivae normal.   Cardiovascular:      Rate and Rhythm: Normal rate and regular rhythm.      Pulses: Normal pulses.   Pulmonary:      Effort: No respiratory distress.   Abdominal:      Tenderness: There is no abdominal tenderness.   Musculoskeletal:         General: Normal range of motion.      Cervical back: No rigidity.      Comments: No C T or L midline bony TTP crepitus or step-offs   Left sided lumbar paraspinal musculature TTP      Skin:     General: Skin is warm and dry.      Capillary Refill: Capillary refill takes less than 2 seconds.      Findings: No rash.   Neurological:      General: No focal deficit present.      Mental Status: She is alert.      Gait: Gait normal.   Psychiatric:         Mood and Affect: Mood  normal.       LABS     Lab Results   Component Value Date    HGBA1C 5.1 09/27/2022     CMP  Sodium   Date Value Ref Range Status   09/27/2022 139 136 - 145 mmol/L Final     Potassium   Date Value Ref Range Status   09/27/2022 4.1 3.5 - 5.1 mmol/L Final     Chloride   Date Value Ref Range Status   09/27/2022 108 95 - 110 mmol/L Final     CO2   Date Value Ref Range Status   09/27/2022 25 23 - 29 mmol/L Final     Glucose   Date Value Ref Range Status   09/27/2022 91 70 - 110 mg/dL Final     BUN   Date Value Ref Range Status   09/27/2022 7 6 - 20 mg/dL Final     Creatinine   Date Value Ref Range Status   09/27/2022 0.7 0.5 - 1.4 mg/dL Final     Calcium   Date Value Ref Range Status   09/27/2022 8.8 8.7 - 10.5 mg/dL Final     Total Protein   Date Value Ref Range Status   09/27/2022 7.2 6.0 - 8.4 g/dL Final     Albumin   Date Value Ref Range Status   09/27/2022 3.9 3.5 - 5.2 g/dL Final     Total Bilirubin   Date Value Ref Range Status   09/27/2022 0.6 0.1 - 1.0 mg/dL Final     Comment:     For infants and newborns, interpretation of results should be based  on gestational age, weight and in agreement with clinical  observations.    Premature Infant recommended reference ranges:  Up to 24 hours.............<8.0 mg/dL  Up to 48 hours............<12.0 mg/dL  3-5 days..................<15.0 mg/dL  6-29 days.................<15.0 mg/dL       Alkaline Phosphatase   Date Value Ref Range Status   09/27/2022 43 (L) 55 - 135 U/L Final     AST   Date Value Ref Range Status   09/27/2022 26 10 - 40 U/L Final     ALT   Date Value Ref Range Status   09/27/2022 19 10 - 44 U/L Final     Anion Gap   Date Value Ref Range Status   09/27/2022 6 (L) 8 - 16 mmol/L Final     eGFR if    Date Value Ref Range Status   08/24/2021 >60 >60 mL/min/1.73 m^2 Final     eGFR if non    Date Value Ref Range Status   08/24/2021 >60 >60 mL/min/1.73 m^2 Final     Comment:     Calculation used to obtain the estimated glomerular  filtration  rate (eGFR) is the CKD-EPI equation.        Lab Results   Component Value Date    WBC 8.72 10/27/2022    HGB 13.1 10/27/2022    HCT 41.1 10/27/2022    MCV 94 10/27/2022     10/27/2022     Lab Results   Component Value Date    CHOL 132 09/27/2022    CHOL 152 11/17/2017     Lab Results   Component Value Date    HDL 52 09/27/2022    HDL 56 11/17/2017     Lab Results   Component Value Date    LDLCALC 66.8 09/27/2022    LDLCALC 85.8 11/17/2017     Lab Results   Component Value Date    TRIG 66 09/27/2022    TRIG 51 11/17/2017     Lab Results   Component Value Date    CHOLHDL 39.4 09/27/2022    CHOLHDL 36.8 11/17/2017     Lab Results   Component Value Date    TSH 1.151 09/27/2022       ASSESSMENT/PLAN     Catie Monge is a 37 y.o. female     Catie was seen today for back pain.    Diagnoses and all orders for this visit:    Back pain, unspecified back location, unspecified back pain laterality, unspecified chronicity  -     Ambulatory referral/consult to Physical/Occupational Therapy; Future    Other orders  -     meloxicam (MOBIC) 15 MG tablet; Take 1 tablet (15 mg total) by mouth once daily. for 14 days  -     methocarbamoL (ROBAXIN) 500 MG Tab; Take 1 tablet (500 mg total) by mouth 4 (four) times daily. for 10 days  -     methylPREDNISolone (MEDROL DOSEPACK) 4 mg tablet; use as directed        Patient was counseled on when and how to seek emergent care.       Colleen Blackwell PA-C   Department of Internal Medicine - [facility]   4:00 PM

## 2023-05-10 ENCOUNTER — PATIENT MESSAGE (OUTPATIENT)
Dept: INTERNAL MEDICINE | Facility: CLINIC | Age: 38
End: 2023-05-10
Payer: COMMERCIAL

## 2023-05-16 ENCOUNTER — LAB VISIT (OUTPATIENT)
Dept: LAB | Facility: OTHER | Age: 38
End: 2023-05-16
Attending: INTERNAL MEDICINE
Payer: COMMERCIAL

## 2023-05-16 ENCOUNTER — OFFICE VISIT (OUTPATIENT)
Dept: INTERNAL MEDICINE | Facility: CLINIC | Age: 38
End: 2023-05-16
Payer: COMMERCIAL

## 2023-05-16 DIAGNOSIS — R19.7 DIARRHEA, UNSPECIFIED TYPE: ICD-10-CM

## 2023-05-16 DIAGNOSIS — R19.7 DIARRHEA, UNSPECIFIED TYPE: Primary | ICD-10-CM

## 2023-05-16 LAB
C DIFF GDH STL QL: NEGATIVE
C DIFF TOX A+B STL QL IA: NEGATIVE

## 2023-05-16 PROCEDURE — 1160F RVW MEDS BY RX/DR IN RCRD: CPT | Mod: CPTII,95,, | Performed by: INTERNAL MEDICINE

## 2023-05-16 PROCEDURE — 87427 SHIGA-LIKE TOXIN AG IA: CPT | Performed by: INTERNAL MEDICINE

## 2023-05-16 PROCEDURE — 1160F PR REVIEW ALL MEDS BY PRESCRIBER/CLIN PHARMACIST DOCUMENTED: ICD-10-PCS | Mod: CPTII,95,, | Performed by: INTERNAL MEDICINE

## 2023-05-16 PROCEDURE — 99214 PR OFFICE/OUTPT VISIT, EST, LEVL IV, 30-39 MIN: ICD-10-PCS | Mod: 95,,, | Performed by: INTERNAL MEDICINE

## 2023-05-16 PROCEDURE — 89055 LEUKOCYTE ASSESSMENT FECAL: CPT | Performed by: INTERNAL MEDICINE

## 2023-05-16 PROCEDURE — 87046 STOOL CULTR AEROBIC BACT EA: CPT | Performed by: INTERNAL MEDICINE

## 2023-05-16 PROCEDURE — 1159F PR MEDICATION LIST DOCUMENTED IN MEDICAL RECORD: ICD-10-PCS | Mod: CPTII,95,, | Performed by: INTERNAL MEDICINE

## 2023-05-16 PROCEDURE — 87449 NOS EACH ORGANISM AG IA: CPT | Performed by: INTERNAL MEDICINE

## 2023-05-16 PROCEDURE — 87449 NOS EACH ORGANISM AG IA: CPT | Mod: 91 | Performed by: INTERNAL MEDICINE

## 2023-05-16 PROCEDURE — 1159F MED LIST DOCD IN RCRD: CPT | Mod: CPTII,95,, | Performed by: INTERNAL MEDICINE

## 2023-05-16 PROCEDURE — 87209 SMEAR COMPLEX STAIN: CPT | Performed by: INTERNAL MEDICINE

## 2023-05-16 PROCEDURE — 99214 OFFICE O/P EST MOD 30 MIN: CPT | Mod: 95,,, | Performed by: INTERNAL MEDICINE

## 2023-05-16 PROCEDURE — 87045 FECES CULTURE AEROBIC BACT: CPT | Performed by: INTERNAL MEDICINE

## 2023-05-16 RX ORDER — DIPHENOXYLATE HYDROCHLORIDE AND ATROPINE SULFATE 2.5; .025 MG/1; MG/1
1 TABLET ORAL 4 TIMES DAILY PRN
Qty: 20 TABLET | Refills: 0 | Status: SHIPPED | OUTPATIENT
Start: 2023-05-16 | End: 2023-05-25 | Stop reason: SDUPTHER

## 2023-05-16 NOTE — PROGRESS NOTES
Subjective:       Patient ID: Catie Monge is a 37 y.o. female.    Chief Complaint: Diarrhea    HPI    The patient location is:  [place]   The chief complaint leading to consultation is:  Diarrhea  Total time spent with patient: 10 minutes  Visit type: Virtual visit with synchronous audio and video  Each patient to whom he or she provides medical services by telemedicine is: (1) informed of the relationship between the physician and patient and the respective role of any other health care provider with respect to management of the patient; and (2) notified that he or she may decline to receive medical services by telemedicine and may withdraw from such care at any time.    37-year-old female here for evaluation of diarrhea for the last 5 days.  She reports that she has had diarrhea the last 6 days.  This started last Monday with intestinal discomfort.  She felt bloated. Thursday night, she started with diarrhea.  She had awful diarrhea.  She has tried to stay hydrated.  She cannot get it to stop.  She had nothing unusual.  Her  and children are not sick.  Her son had a short course of diarrhea, which preceded her diarrhea. She has been taking imodium and peptobismol since Sunday.  She took it easy yesterday.  She has tried to eat bland.  She had 14 BMs in 4 hours yesterday.  Everything is going right through her.  She has been trying the BRAT diet.  She does not have an appetite.  She has not swabbed for COVID.  She has not had a fever or cough.  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  She has been taking 4 imodium in 24 hours.    Review of Systems   Constitutional:  Positive for activity change. Negative for unexpected weight change.   HENT:  Negative for hearing loss, rhinorrhea and trouble swallowing.    Eyes:  Negative for discharge and visual disturbance.   Respiratory:  Negative for chest tightness and wheezing.    Cardiovascular:  Positive for  palpitations. Negative for chest pain.   Gastrointestinal:  Positive for blood in stool, diarrhea and vomiting. Negative for constipation.   Endocrine: Negative for polydipsia and polyuria.   Genitourinary:  Negative for difficulty urinating, dysuria, hematuria and menstrual problem.   Musculoskeletal:  Negative for arthralgias, joint swelling and neck pain.   Neurological:  Negative for weakness and headaches.   Psychiatric/Behavioral:  Negative for confusion and dysphoric mood.        Objective:      Physical Exam  Constitutional:       General: She is not in acute distress.     Appearance: She is well-developed. She is not diaphoretic.   HENT:      Head: Normocephalic and atraumatic.      Right Ear: External ear normal.      Left Ear: External ear normal.   Musculoskeletal:      Cervical back: Normal range of motion.       Assessment:       1. Diarrhea, unspecified type  - CULTURE, STOOL; Future  - WBC, Stool; Future  - Stool Exam-Ova,Cysts,Parasites; Future  - CLOSTRIDIUM DIFFICILE; Future      Plan:       1. Check stool culture, white blood cell, 0 versus, parasite, C diff. Lomotil prescribed.

## 2023-05-17 ENCOUNTER — PATIENT MESSAGE (OUTPATIENT)
Dept: INTERNAL MEDICINE | Facility: CLINIC | Age: 38
End: 2023-05-17
Payer: COMMERCIAL

## 2023-05-17 LAB — WBC #/AREA STL HPF: NORMAL /[HPF]

## 2023-05-18 ENCOUNTER — PATIENT MESSAGE (OUTPATIENT)
Dept: INTERNAL MEDICINE | Facility: CLINIC | Age: 38
End: 2023-05-18
Payer: COMMERCIAL

## 2023-05-18 LAB
BACTERIA STL CULT: NORMAL
BACTERIA STL CULT: NORMAL

## 2023-05-22 ENCOUNTER — OFFICE VISIT (OUTPATIENT)
Dept: GASTROENTEROLOGY | Facility: CLINIC | Age: 38
End: 2023-05-22
Payer: COMMERCIAL

## 2023-05-22 ENCOUNTER — LAB VISIT (OUTPATIENT)
Dept: LAB | Facility: HOSPITAL | Age: 38
End: 2023-05-22
Attending: INTERNAL MEDICINE
Payer: COMMERCIAL

## 2023-05-22 VITALS
DIASTOLIC BLOOD PRESSURE: 82 MMHG | HEIGHT: 69 IN | BODY MASS INDEX: 24 KG/M2 | WEIGHT: 162.06 LBS | HEART RATE: 78 BPM | SYSTOLIC BLOOD PRESSURE: 121 MMHG

## 2023-05-22 DIAGNOSIS — R19.7 DIARRHEA, UNSPECIFIED TYPE: ICD-10-CM

## 2023-05-22 DIAGNOSIS — R19.7 DIARRHEA, UNSPECIFIED TYPE: Primary | ICD-10-CM

## 2023-05-22 LAB
ALBUMIN SERPL BCP-MCNC: 3.9 G/DL (ref 3.5–5.2)
ALP SERPL-CCNC: 56 U/L (ref 55–135)
ALT SERPL W/O P-5'-P-CCNC: 21 U/L (ref 10–44)
ANION GAP SERPL CALC-SCNC: 7 MMOL/L (ref 8–16)
AST SERPL-CCNC: 21 U/L (ref 10–40)
BASOPHILS # BLD AUTO: 0.04 K/UL (ref 0–0.2)
BASOPHILS NFR BLD: 0.8 % (ref 0–1.9)
BILIRUB SERPL-MCNC: 1.4 MG/DL (ref 0.1–1)
BUN SERPL-MCNC: 9 MG/DL (ref 6–20)
CALCIUM SERPL-MCNC: 9.3 MG/DL (ref 8.7–10.5)
CHLORIDE SERPL-SCNC: 106 MMOL/L (ref 95–110)
CO2 SERPL-SCNC: 27 MMOL/L (ref 23–29)
CREAT SERPL-MCNC: 0.7 MG/DL (ref 0.5–1.4)
DIFFERENTIAL METHOD: ABNORMAL
EOSINOPHIL # BLD AUTO: 0.6 K/UL (ref 0–0.5)
EOSINOPHIL NFR BLD: 12.3 % (ref 0–8)
ERYTHROCYTE [DISTWIDTH] IN BLOOD BY AUTOMATED COUNT: 11.5 % (ref 11.5–14.5)
EST. GFR  (NO RACE VARIABLE): >60 ML/MIN/1.73 M^2
GLUCOSE SERPL-MCNC: 92 MG/DL (ref 70–110)
HCT VFR BLD AUTO: 44.7 % (ref 37–48.5)
HGB BLD-MCNC: 14.4 G/DL (ref 12–16)
IGA SERPL-MCNC: 107 MG/DL (ref 40–350)
IGG SERPL-MCNC: 1264 MG/DL (ref 650–1600)
IGM SERPL-MCNC: 161 MG/DL (ref 50–300)
IMM GRANULOCYTES # BLD AUTO: 0.02 K/UL (ref 0–0.04)
IMM GRANULOCYTES NFR BLD AUTO: 0.4 % (ref 0–0.5)
LYMPHOCYTES # BLD AUTO: 1.3 K/UL (ref 1–4.8)
LYMPHOCYTES NFR BLD: 27.4 % (ref 18–48)
MCH RBC QN AUTO: 29.9 PG (ref 27–31)
MCHC RBC AUTO-ENTMCNC: 32.2 G/DL (ref 32–36)
MCV RBC AUTO: 93 FL (ref 82–98)
MONOCYTES # BLD AUTO: 0.5 K/UL (ref 0.3–1)
MONOCYTES NFR BLD: 9.3 % (ref 4–15)
NEUTROPHILS # BLD AUTO: 2.4 K/UL (ref 1.8–7.7)
NEUTROPHILS NFR BLD: 49.8 % (ref 38–73)
NRBC BLD-RTO: 0 /100 WBC
PLATELET # BLD AUTO: 297 K/UL (ref 150–450)
PMV BLD AUTO: 9.8 FL (ref 9.2–12.9)
POTASSIUM SERPL-SCNC: 4 MMOL/L (ref 3.5–5.1)
PROT SERPL-MCNC: 7.2 G/DL (ref 6–8.4)
RBC # BLD AUTO: 4.81 M/UL (ref 4–5.4)
SODIUM SERPL-SCNC: 140 MMOL/L (ref 136–145)
WBC # BLD AUTO: 4.86 K/UL (ref 3.9–12.7)

## 2023-05-22 PROCEDURE — 85025 COMPLETE CBC W/AUTO DIFF WBC: CPT | Performed by: INTERNAL MEDICINE

## 2023-05-22 PROCEDURE — 1159F MED LIST DOCD IN RCRD: CPT | Mod: CPTII,S$GLB,, | Performed by: INTERNAL MEDICINE

## 2023-05-22 PROCEDURE — 83516 IMMUNOASSAY NONANTIBODY: CPT | Performed by: INTERNAL MEDICINE

## 2023-05-22 PROCEDURE — 1159F PR MEDICATION LIST DOCUMENTED IN MEDICAL RECORD: ICD-10-PCS | Mod: CPTII,S$GLB,, | Performed by: INTERNAL MEDICINE

## 2023-05-22 PROCEDURE — 99204 OFFICE O/P NEW MOD 45 MIN: CPT | Mod: S$GLB,,, | Performed by: INTERNAL MEDICINE

## 2023-05-22 PROCEDURE — 99204 PR OFFICE/OUTPT VISIT, NEW, LEVL IV, 45-59 MIN: ICD-10-PCS | Mod: S$GLB,,, | Performed by: INTERNAL MEDICINE

## 2023-05-22 PROCEDURE — 3079F PR MOST RECENT DIASTOLIC BLOOD PRESSURE 80-89 MM HG: ICD-10-PCS | Mod: CPTII,S$GLB,, | Performed by: INTERNAL MEDICINE

## 2023-05-22 PROCEDURE — 99999 PR PBB SHADOW E&M-EST. PATIENT-LVL III: CPT | Mod: PBBFAC,,, | Performed by: INTERNAL MEDICINE

## 2023-05-22 PROCEDURE — 3079F DIAST BP 80-89 MM HG: CPT | Mod: CPTII,S$GLB,, | Performed by: INTERNAL MEDICINE

## 2023-05-22 PROCEDURE — 36415 COLL VENOUS BLD VENIPUNCTURE: CPT | Performed by: INTERNAL MEDICINE

## 2023-05-22 PROCEDURE — 80053 COMPREHEN METABOLIC PANEL: CPT | Performed by: INTERNAL MEDICINE

## 2023-05-22 PROCEDURE — 3008F BODY MASS INDEX DOCD: CPT | Mod: CPTII,S$GLB,, | Performed by: INTERNAL MEDICINE

## 2023-05-22 PROCEDURE — 3074F SYST BP LT 130 MM HG: CPT | Mod: CPTII,S$GLB,, | Performed by: INTERNAL MEDICINE

## 2023-05-22 PROCEDURE — 82784 ASSAY IGA/IGD/IGG/IGM EACH: CPT | Performed by: INTERNAL MEDICINE

## 2023-05-22 PROCEDURE — 3074F PR MOST RECENT SYSTOLIC BLOOD PRESSURE < 130 MM HG: ICD-10-PCS | Mod: CPTII,S$GLB,, | Performed by: INTERNAL MEDICINE

## 2023-05-22 PROCEDURE — 86364 TISS TRNSGLTMNASE EA IG CLAS: CPT | Performed by: INTERNAL MEDICINE

## 2023-05-22 PROCEDURE — 3008F PR BODY MASS INDEX (BMI) DOCUMENTED: ICD-10-PCS | Mod: CPTII,S$GLB,, | Performed by: INTERNAL MEDICINE

## 2023-05-22 PROCEDURE — 99999 PR PBB SHADOW E&M-EST. PATIENT-LVL III: ICD-10-PCS | Mod: PBBFAC,,, | Performed by: INTERNAL MEDICINE

## 2023-05-22 RX ORDER — METRONIDAZOLE 250 MG/1
250 TABLET ORAL EVERY 8 HOURS
Qty: 30 TABLET | Refills: 0 | Status: SHIPPED | OUTPATIENT
Start: 2023-05-22 | End: 2023-06-01

## 2023-05-22 RX ORDER — DICYCLOMINE HYDROCHLORIDE 20 MG/1
20 TABLET ORAL 3 TIMES DAILY PRN
Qty: 90 TABLET | Refills: 3 | Status: SHIPPED | OUTPATIENT
Start: 2023-05-22

## 2023-05-22 NOTE — PROGRESS NOTES
"    [facility] Consultation Note    Reason for Consult:  The encounter diagnosis was Diarrhea, unspecified type.    PCP:   June Christianson       Referring MD:  No referring provider defined for this encounter.    Initial History of Present Illness (HPI):  This is a 37 y.o. female here for evaluation of per PCP:  "37-year-old female here for evaluation of diarrhea for the last 5 days.  She reports that she has had diarrhea the last 6 days.  This started last Monday with intestinal discomfort.  She felt bloated. Thursday night, she started with diarrhea.  She had awful diarrhea.  She has tried to stay hydrated.  She cannot get it to stop.  She had nothing unusual.  Her  and children are not sick.  Her son had a short course of diarrhea, which preceded her diarrhea. She has been taking imodium and peptobismol since Sunday.  She took it easy yesterday.  She has tried to eat bland.  She had 14 BMs in 4 hours yesterday.  Everything is going right through her.  She has been trying the BRAT diet.  She does not have an appetite.  She has not swabbed for COVID.  She has not had a fever or cough.  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  She has been taking 4 imodium in 24 hours."    Since 5/11, cramping started even before then  Imodium/pepto hasn't helped. Lomotil does stop diarrhea temporarily      Abdominal pain - crampy  Reflux - no  Dysphagia - no   Bowel habits - 5-15  GI bleeding - none  NSAID usage - none        ROS:  Constitutional: No fevers, chills, No weight loss  ENT: No allergies  CV: No chest pain  Pulm: No cough, No shortness of breath  Ophtho: No vision changes  GI: see HPI  Derm: No rash  Heme: No lymphadenopathy, No bruising  MSK: No arthritis  : No dysuria, No hematuria  Endo: No hot or cold intolerance  Neuro: No syncope, No seizure  Psych: No anxiety, No depression    Medical History:  has a past medical history of Allergy, " "Anxiety, Closed nondisplaced fracture of head of right radius (4/7/2022), Fever blister, and Postpartum hypertension.    Surgical History:  has a past surgical history that includes TONSILLECTOMY, ADENOIDECTOMY; [place] tooth extraction; Nasal septum surgery; Anus surgery; Dilation and curettage of uterus using suction (N/A, 9/13/2018); and Cosmetic surgery.    Family History: family history includes Anxiety disorder in her brother; Arthritis in her father; Bipolar disorder in her brother; Diabetes Mellitus in her maternal grandfather; Glaucoma in her father; Hypertension in her father; Kyphosis in her brother; Melanoma in her paternal grandfather; No Known Problems in her paternal grandmother; Rheum arthritis in her father..     Social History:  reports that she has never smoked. She has never used smokeless tobacco. She reports current alcohol use of about 1.0 standard drink per week. She reports that she does not use drugs.    Review of patient's allergies indicates:  No Known Allergies    Medication List with Changes/Refills   New Medications    DICYCLOMINE (BENTYL) 20 MG TABLET    Take 1 tablet (20 mg total) by mouth 3 (three) times daily as needed (abdominal pain).    METRONIDAZOLE (FLAGYL) 250 MG TABLET    Take 1 tablet (250 mg total) by mouth every 8 (eight) hours. for 10 days   Current Medications    CETIRIZINE (ZYRTEC) 10 MG TABLET    Take 10 mg by mouth every evening.    DIPHENOXYLATE-ATROPINE 2.5-0.025 MG (LOMOTIL) 2.5-0.025 MG PER TABLET    Take 1 tablet by mouth 4 (four) times daily as needed for Diarrhea.    SERTRALINE (ZOLOFT) 25 MG TABLET    Take 1 tablet (25 mg total) by mouth once daily.         Objective Findings:    Vital Signs:  /82   Pulse 78   Ht 5' 9" (1.753 m)   Wt 73.5 kg (162 lb 0.6 oz)   LMP 05/19/2023   BMI 23.93 kg/m²   Body mass index is 23.93 kg/m².    Physical Exam:  General Appearance: Well appearing in no acute distress  Head:   Normocephalic, without obvious " abnormality  Eyes:    No scleral icterus, EOMI  ENT: Neck supple, Lips, mucosa, and tongue normal; teeth and gums normal  Lungs: CTA bilaterally in anterior and posterior fields, no wheezes, no crackles.  Heart:  Regular rate and rhythm, S1, S2 normal, no murmurs heard  Abdomen: Soft, non tender, non distended with positive bowel sounds in all four quadrants. No hepatosplenomegaly, ascites, or mass  Extremities: 2+ pulses, no clubbing, cyanosis or edema  Skin: No rash  Neurologic: CN II-XII intact      Labs:  Lab Results   Component Value Date    WBC 8.72 10/27/2022    HGB 13.1 10/27/2022    HCT 41.1 10/27/2022     10/27/2022    CHOL 132 09/27/2022    TRIG 66 09/27/2022    HDL 52 09/27/2022    ALT 19 09/27/2022    AST 26 09/27/2022     09/27/2022    K 4.1 09/27/2022     09/27/2022    CREATININE 0.7 09/27/2022    BUN 7 09/27/2022    CO2 25 09/27/2022    TSH 1.151 09/27/2022    HGBA1C 5.1 09/27/2022       No results found for: HPYLORINTERP  No results found for: HPYLORIANTIG        Imaging:    Endoscopy:          Assessment:  1. Diarrhea, unspecified type           Recommendations:  1. Giardia sample  2. Rx for flagyl, bentyl  3. Baseline labs, Ig's and celiac panel    No follow-ups on file.      Order summary:  Orders Placed This Encounter    CBC Auto Differential    Comprehensive Metabolic Panel    IMMUNOGLOBULINS (IGG, IGA, IGM) QUANTITATIVE    Giardia / Cryptosporidum, EIA    Celiac Disease Panel    metroNIDAZOLE (FLAGYL) 250 MG tablet    dicyclomine (BENTYL) 20 mg tablet         Thank you so much for allowing me to participate in the care of Catie Gautam MD

## 2023-05-23 ENCOUNTER — LAB VISIT (OUTPATIENT)
Dept: LAB | Facility: OTHER | Age: 38
End: 2023-05-23
Attending: INTERNAL MEDICINE
Payer: COMMERCIAL

## 2023-05-23 DIAGNOSIS — R19.7 DIARRHEA, UNSPECIFIED TYPE: ICD-10-CM

## 2023-05-23 PROCEDURE — 87329 GIARDIA AG IA: CPT | Performed by: INTERNAL MEDICINE

## 2023-05-24 ENCOUNTER — PATIENT MESSAGE (OUTPATIENT)
Dept: GASTROENTEROLOGY | Facility: CLINIC | Age: 38
End: 2023-05-24
Payer: COMMERCIAL

## 2023-05-24 LAB
CRYPTOSP AG STL QL IA: NEGATIVE
G LAMBLIA AG STL QL IA: NEGATIVE
GLIADIN PEPTIDE IGA SER-ACNC: 1.3 U/ML
GLIADIN PEPTIDE IGG SER-ACNC: 1.7 U/ML
IGA SERPL-MCNC: 109 MG/DL (ref 70–400)
TTG IGA SER-ACNC: 3.1 U/ML
TTG IGG SER-ACNC: 1 U/ML

## 2023-05-25 ENCOUNTER — PATIENT MESSAGE (OUTPATIENT)
Dept: GASTROENTEROLOGY | Facility: CLINIC | Age: 38
End: 2023-05-25
Payer: COMMERCIAL

## 2023-05-25 RX ORDER — DIPHENOXYLATE HYDROCHLORIDE AND ATROPINE SULFATE 2.5; .025 MG/1; MG/1
1 TABLET ORAL 4 TIMES DAILY PRN
Qty: 60 TABLET | Refills: 1 | Status: SHIPPED | OUTPATIENT
Start: 2023-05-25

## 2023-05-25 NOTE — TELEPHONE ENCOUNTER
----- Message from Yazmin Calvert sent at 5/25/2023 12:39 PM CDT -----  Regarding: Refill  Type: RX Refill Request    Who Called:PT    RX Name and Strength:diphenoxylate-atropine 2.5-0.025 mg (LOMOTIL) 2.5-0.025 mg per tablet    Preferred Pharmacy with phone number:[facility]    Would the patient rather a call back or a response via [facility]?call back    Best Call Back Number:[phone]    Additional Information:

## 2023-05-30 LAB — O+P STL MICRO: NORMAL

## 2023-06-21 NOTE — PROGRESS NOTES
"OBSTETRICS AND GYNECOLOGY    Chief Complaint:  Well Woman Exam     HPI:      Catie Monge is a 37 y.o.  who presents today for well woman exam.    LMP: Patient's last menstrual period was 06/15/2023 (exact date). Specifically, patient denies abnormal vaginal bleeding, abnormal discharge/odor, pelvic pain, or dysuria/hematuria. Ms. Monge is currently sexually active with a single male partner. She is currently using partner's vasectomy for contraception. She declines STD screening today. Wondering if perimenopausal. Increased weight in past year. Breast size has also increased. Healthy lifestyle, exercises daily. Various forms of exercise as well. Reports moods labile as well, worse at time of menses. Compliant with Zoloft. Follows with Dr. Christianson for depression. She denies additional issues, problems, or complaints.     Gardasil:Completed   Ms. Monge confirms that she wears her seatbelt when riding in the car.    OB History          3    Para   2    Term   2       0    AB   1    Living   2         SAB   1    IAB   0    Ectopic   0    Multiple   0    Live Births   2               ROS:     GENERAL: Feeling well overall.   SKIN: Denies rash.   CARDIOVASCULAR: Denies chest pain.   BREASTS: Denies lumps or nipple discharge.   URINARY: Denies dysuria, hematuria.  NEUROLOGIC: Denies syncope, falls.     Physical Exam:      PHYSICAL EXAM:  /84   Ht 5' 9" (1.753 m)   Wt 75.1 kg (165 lb 9.1 oz)   LMP 06/15/2023 (Exact Date)   BMI 24.45 kg/m²   Body mass index is 24.45 kg/m².     APPEARANCE:  Well nourished, well developed, in no acute distress.  Able to smile appropriately during our encounter. Makes eye contact. Pleasant.  PSYCH: Appropriate mood and affect.  SKIN:   No acne or hirsutism.  CARDIOVASCULAR:  No edema of peripheral extremities. Well perfused throughout.  RESP:  No accessory muscle use to breathe. Speaking comfortably in complete sentences.   BREASTS:  Symmetrical, with no " visible skin lesions or scars, no palpable masses. No nipple discharge or peau d'orange bilaterally. No palpable axillary LAD.  ABDOMEN:  Soft. Nonacute.    PELVIC:  Normal external genitalia without lesions. Normal hair distribution. Adequate perineal body, normal urethral meatus. Vagina moist and well rugated. Without lesions, without discharge. Cervix 3x4cm, parous. Cervix pink, without ectocervical lesions, discharge or tenderness.  No ectropion. No significant cystocele or rectocele.  Bimanual exam shows uterus to be normal size, regular, mobile and nontender.  Adnexa without masses or tenderness.      Assessment/Plan:     Well Woman Exam  -- Counseled patient regarding healthy diet and regular exercise, daily seat belt use.   Nutritionist consult   -- She denies abuse and feels safe at home.  -- Pap smear:  NILM, HPV(-) 6/2021  -- Pelvic US, reviewed today, 10/2022 UT 8x4cm, ROV 2x2cm, LOV 3x4cm with likely dominant follicle  -- Contraception:  see below  Been on OCPs in the past  SO had vasectomy  -- STD screening:  declines   -- Reviewed condoms for STD prevention  -- Pt of Dr. Frausto/Gladys    Depression  -- Follows with Dr. Christianson  -- Feeling better as of late, felt worse earlier this year  -- Good insight   -- Exercise helps to feel better  -- Is sleeping, eating, energy level maintained overall, perhaps lower in last year or so, able to concentrate on tasks  -- Denies SI/HI  -- Discussed to seek care/present to ER immediately if any bad/persistent/self harm thoughts, pt verbalized understanding   -- Follows with talk therapy when available - established, encouraged to re-set up apts  -- Trial Calcium supplement, continue aerobic exercise, SSRI   -- Trial drospirenone OCP, UPT negative, rx sent  ---- Reviewed efficacy, how to take, when to start.  ---- Denies HERNESTO contraindications  ---- I specifically told her to seek medical attention should she develop shortness of breath, chest pain, severe headache,  painful leg swelling as Estrogen administration can increases risk of VTE, patient verbalized understanding.  -- Med check 3 months, to let me know sooner if problems      Follow up in about 3 months (around 9/27/2023).    Counseling:     Patient was counseled today on current ASCCP pap guidelines, the recommendation for yearly physical exams, safe driving habits, and breast self awareness. She is to see her PCP for other health maintenance.     Use of the [employer] Patient Portal discussed and encouraged during today's visit.                 As of April 1, 2021, the Cures Act has been passed nationally. This new law requires that all doctors progress notes, lab results, pathology reports and radiology reports be released IMMEDIATELY to the patient in the patient portal. That means that the results are released to you at the EXACT same time they are released to me. Therefore, with all of the patients that I have I am not able to reply to each patient exactly when the results come in. So there will be a delay from when you see the results to when I see them and have time to come up with a response to send you. Also I only see these results when I am on the computer at work. So if the results come in over the weekend or after 5 pm of a work day, I will not see them until the next business day. As you can tell, this is a challenge as a physician to give every patient the quick response they hope for and deserve. So please be patient!   Thanks for your understanding and patience.

## 2023-06-26 ENCOUNTER — PATIENT MESSAGE (OUTPATIENT)
Dept: GASTROENTEROLOGY | Facility: CLINIC | Age: 38
End: 2023-06-26
Payer: COMMERCIAL

## 2023-06-27 ENCOUNTER — OFFICE VISIT (OUTPATIENT)
Dept: OBSTETRICS AND GYNECOLOGY | Facility: CLINIC | Age: 38
End: 2023-06-27
Payer: COMMERCIAL

## 2023-06-27 VITALS
DIASTOLIC BLOOD PRESSURE: 84 MMHG | HEIGHT: 69 IN | BODY MASS INDEX: 24.52 KG/M2 | SYSTOLIC BLOOD PRESSURE: 130 MMHG | WEIGHT: 165.56 LBS

## 2023-06-27 DIAGNOSIS — Z12.31 ENCOUNTER FOR MAMMOGRAM TO ESTABLISH BASELINE MAMMOGRAM: ICD-10-CM

## 2023-06-27 DIAGNOSIS — R63.5 WEIGHT GAIN: ICD-10-CM

## 2023-06-27 DIAGNOSIS — Z01.419 ENCOUNTER FOR WELL WOMAN EXAM: Primary | ICD-10-CM

## 2023-06-27 LAB
B-HCG UR QL: NEGATIVE
CTP QC/QA: YES

## 2023-06-27 PROCEDURE — 1159F MED LIST DOCD IN RCRD: CPT | Mod: CPTII,S$GLB,, | Performed by: STUDENT IN AN ORGANIZED HEALTH CARE EDUCATION/TRAINING PROGRAM

## 2023-06-27 PROCEDURE — 3079F PR MOST RECENT DIASTOLIC BLOOD PRESSURE 80-89 MM HG: ICD-10-PCS | Mod: CPTII,S$GLB,, | Performed by: STUDENT IN AN ORGANIZED HEALTH CARE EDUCATION/TRAINING PROGRAM

## 2023-06-27 PROCEDURE — 3008F PR BODY MASS INDEX (BMI) DOCUMENTED: ICD-10-PCS | Mod: CPTII,S$GLB,, | Performed by: STUDENT IN AN ORGANIZED HEALTH CARE EDUCATION/TRAINING PROGRAM

## 2023-06-27 PROCEDURE — 99999 PR PBB SHADOW E&M-EST. PATIENT-LVL III: CPT | Mod: PBBFAC,,, | Performed by: STUDENT IN AN ORGANIZED HEALTH CARE EDUCATION/TRAINING PROGRAM

## 2023-06-27 PROCEDURE — 3075F SYST BP GE 130 - 139MM HG: CPT | Mod: CPTII,S$GLB,, | Performed by: STUDENT IN AN ORGANIZED HEALTH CARE EDUCATION/TRAINING PROGRAM

## 2023-06-27 PROCEDURE — 81025 URINE PREGNANCY TEST: CPT | Mod: S$GLB,,, | Performed by: STUDENT IN AN ORGANIZED HEALTH CARE EDUCATION/TRAINING PROGRAM

## 2023-06-27 PROCEDURE — 1159F PR MEDICATION LIST DOCUMENTED IN MEDICAL RECORD: ICD-10-PCS | Mod: CPTII,S$GLB,, | Performed by: STUDENT IN AN ORGANIZED HEALTH CARE EDUCATION/TRAINING PROGRAM

## 2023-06-27 PROCEDURE — 3008F BODY MASS INDEX DOCD: CPT | Mod: CPTII,S$GLB,, | Performed by: STUDENT IN AN ORGANIZED HEALTH CARE EDUCATION/TRAINING PROGRAM

## 2023-06-27 PROCEDURE — 99395 PR PREVENTIVE VISIT,EST,18-39: ICD-10-PCS | Mod: S$GLB,,, | Performed by: STUDENT IN AN ORGANIZED HEALTH CARE EDUCATION/TRAINING PROGRAM

## 2023-06-27 PROCEDURE — 3079F DIAST BP 80-89 MM HG: CPT | Mod: CPTII,S$GLB,, | Performed by: STUDENT IN AN ORGANIZED HEALTH CARE EDUCATION/TRAINING PROGRAM

## 2023-06-27 PROCEDURE — 99999 PR PBB SHADOW E&M-EST. PATIENT-LVL III: ICD-10-PCS | Mod: PBBFAC,,, | Performed by: STUDENT IN AN ORGANIZED HEALTH CARE EDUCATION/TRAINING PROGRAM

## 2023-06-27 PROCEDURE — 3075F PR MOST RECENT SYSTOLIC BLOOD PRESS GE 130-139MM HG: ICD-10-PCS | Mod: CPTII,S$GLB,, | Performed by: STUDENT IN AN ORGANIZED HEALTH CARE EDUCATION/TRAINING PROGRAM

## 2023-06-27 PROCEDURE — 99395 PREV VISIT EST AGE 18-39: CPT | Mod: S$GLB,,, | Performed by: STUDENT IN AN ORGANIZED HEALTH CARE EDUCATION/TRAINING PROGRAM

## 2023-06-27 PROCEDURE — 81025 POCT URINE PREGNANCY: ICD-10-PCS | Mod: S$GLB,,, | Performed by: STUDENT IN AN ORGANIZED HEALTH CARE EDUCATION/TRAINING PROGRAM

## 2023-06-27 RX ORDER — DROSPIRENONE, ETHINYL ESTRADIOL AND LEVOMEFOLATE CALCIUM AND LEVOMEFOLATE CALCIUM 3-0.02(24)
1 KIT ORAL DAILY
Qty: 28 TABLET | Refills: 3 | Status: SHIPPED | OUTPATIENT
Start: 2023-06-27 | End: 2023-10-02 | Stop reason: SDUPTHER

## 2023-06-27 RX ORDER — POLYMYXIN B SULFATE AND TRIMETHOPRIM 1; 10000 MG/ML; [USP'U]/ML
1 SOLUTION OPHTHALMIC EVERY 4 HOURS
COMMUNITY
Start: 2023-06-22 | End: 2023-09-13

## 2023-06-28 ENCOUNTER — TELEPHONE (OUTPATIENT)
Dept: ADMINISTRATIVE | Facility: OTHER | Age: 38
End: 2023-06-28
Payer: COMMERCIAL

## 2023-06-28 NOTE — TELEPHONE ENCOUNTER
GEORGIE with scheduled Nutrition appointment of 8-25-23.Contact number provided for any questions. Portal message to be sent.

## 2023-06-30 ENCOUNTER — HOSPITAL ENCOUNTER (OUTPATIENT)
Dept: RADIOLOGY | Facility: OTHER | Age: 38
Discharge: HOME | End: 2023-06-30
Attending: STUDENT IN AN ORGANIZED HEALTH CARE EDUCATION/TRAINING PROGRAM
Payer: COMMERCIAL

## 2023-06-30 DIAGNOSIS — Z12.31 ENCOUNTER FOR MAMMOGRAM TO ESTABLISH BASELINE MAMMOGRAM: ICD-10-CM

## 2023-06-30 PROCEDURE — 77067 MAMMO DIGITAL SCREENING BILAT WITH TOMO: ICD-10-PCS | Mod: 26,,, | Performed by: RADIOLOGY

## 2023-06-30 PROCEDURE — 77067 SCR MAMMO BI INCL CAD: CPT | Mod: TC

## 2023-06-30 PROCEDURE — 77067 SCR MAMMO BI INCL CAD: CPT | Mod: 26,,, | Performed by: RADIOLOGY

## 2023-06-30 PROCEDURE — 77063 BREAST TOMOSYNTHESIS BI: CPT | Mod: 26,,, | Performed by: RADIOLOGY

## 2023-06-30 PROCEDURE — 77063 MAMMO DIGITAL SCREENING BILAT WITH TOMO: ICD-10-PCS | Mod: 26,,, | Performed by: RADIOLOGY

## 2023-07-03 ENCOUNTER — TELEPHONE (OUTPATIENT)
Dept: ENDOSCOPY | Facility: HOSPITAL | Age: 38
End: 2023-07-03
Payer: COMMERCIAL

## 2023-07-03 VITALS — WEIGHT: 165 LBS | HEIGHT: 69 IN | BODY MASS INDEX: 24.44 KG/M2

## 2023-07-03 DIAGNOSIS — R10.9 ABDOMINAL PAIN, UNSPECIFIED ABDOMINAL LOCATION: ICD-10-CM

## 2023-07-03 DIAGNOSIS — Z12.11 SPECIAL SCREENING FOR MALIGNANT NEOPLASMS, COLON: Primary | ICD-10-CM

## 2023-07-03 DIAGNOSIS — R19.7 DIARRHEA, UNSPECIFIED TYPE: Primary | ICD-10-CM

## 2023-07-03 NOTE — TELEPHONE ENCOUNTER
"----- Message from Maria Elena Valenzuela sent at 2023  2:45 PM CDT -----    ----- Message -----  From: Pramod Gautam MD  Sent: 2023  12:56 PM CDT  To: [facility] Patients    Procedure: EGD/Colon    Diagnosis: Diarrhea, abdominal pain    Procedure Timin-4 weeks    #If within 4 weeks selected, please darryl as high priority#    #If greater than 12 weeks, please select "4-12 weeks" and delay sending until 2 months prior to requested date#     Provider: Myself    Location: No Preference    Additional Scheduling Information: No scheduling concerns    Prep Specifications:N/A    Have you attached a patient to this message: yes        "

## 2023-08-15 ENCOUNTER — PATIENT MESSAGE (OUTPATIENT)
Dept: ENDOSCOPY | Facility: HOSPITAL | Age: 38
End: 2023-08-15
Payer: COMMERCIAL

## 2023-08-22 ENCOUNTER — PATIENT MESSAGE (OUTPATIENT)
Dept: ENDOSCOPY | Facility: HOSPITAL | Age: 38
End: 2023-08-22
Payer: COMMERCIAL

## 2023-08-25 ENCOUNTER — IMMUNIZATION (OUTPATIENT)
Dept: INTERNAL MEDICINE | Facility: CLINIC | Age: 38
End: 2023-08-25
Payer: COMMERCIAL

## 2023-08-25 DIAGNOSIS — Z23 NEED FOR VACCINATION: Primary | ICD-10-CM

## 2023-08-25 PROCEDURE — 0124A COVID-19, MRNA, LNP-S, BIVALENT BOOSTER, PF, 30 MCG/0.3 ML DOSE: CPT | Mod: S$GLB,,, | Performed by: INTERNAL MEDICINE

## 2023-08-25 PROCEDURE — 91312 COVID-19, MRNA, LNP-S, BIVALENT BOOSTER, PF, 30 MCG/0.3 ML DOSE: CPT | Mod: S$GLB,,, | Performed by: INTERNAL MEDICINE

## 2023-08-25 PROCEDURE — 91312 COVID-19, MRNA, LNP-S, BIVALENT BOOSTER, PF, 30 MCG/0.3 ML DOSE: ICD-10-PCS | Mod: S$GLB,,, | Performed by: INTERNAL MEDICINE

## 2023-08-25 PROCEDURE — 0124A COVID-19, MRNA, LNP-S, BIVALENT BOOSTER, PF, 30 MCG/0.3 ML DOSE: ICD-10-PCS | Mod: S$GLB,,, | Performed by: INTERNAL MEDICINE

## 2023-09-13 ENCOUNTER — LAB VISIT (OUTPATIENT)
Dept: LAB | Facility: OTHER | Age: 38
End: 2023-09-13
Attending: INTERNAL MEDICINE
Payer: COMMERCIAL

## 2023-09-13 ENCOUNTER — OFFICE VISIT (OUTPATIENT)
Dept: INTERNAL MEDICINE | Facility: CLINIC | Age: 38
End: 2023-09-13
Attending: INTERNAL MEDICINE
Payer: COMMERCIAL

## 2023-09-13 VITALS
BODY MASS INDEX: 24.74 KG/M2 | OXYGEN SATURATION: 98 % | HEART RATE: 72 BPM | DIASTOLIC BLOOD PRESSURE: 78 MMHG | WEIGHT: 167.56 LBS | SYSTOLIC BLOOD PRESSURE: 118 MMHG

## 2023-09-13 DIAGNOSIS — F41.8 DEPRESSION WITH ANXIETY: ICD-10-CM

## 2023-09-13 DIAGNOSIS — Z00.00 ANNUAL PHYSICAL EXAM: ICD-10-CM

## 2023-09-13 DIAGNOSIS — Z00.00 ANNUAL PHYSICAL EXAM: Primary | ICD-10-CM

## 2023-09-13 LAB
ALBUMIN SERPL BCP-MCNC: 3.9 G/DL (ref 3.5–5.2)
ALP SERPL-CCNC: 30 U/L (ref 55–135)
ALT SERPL W/O P-5'-P-CCNC: 13 U/L (ref 10–44)
ANION GAP SERPL CALC-SCNC: 8 MMOL/L (ref 8–16)
AST SERPL-CCNC: 23 U/L (ref 10–40)
BASOPHILS # BLD AUTO: 0.03 K/UL (ref 0–0.2)
BASOPHILS NFR BLD: 0.6 % (ref 0–1.9)
BILIRUB SERPL-MCNC: 0.9 MG/DL (ref 0.1–1)
BUN SERPL-MCNC: 10 MG/DL (ref 6–20)
CALCIUM SERPL-MCNC: 9.1 MG/DL (ref 8.7–10.5)
CHLORIDE SERPL-SCNC: 107 MMOL/L (ref 95–110)
CO2 SERPL-SCNC: 24 MMOL/L (ref 23–29)
CREAT SERPL-MCNC: 0.9 MG/DL (ref 0.5–1.4)
DIFFERENTIAL METHOD: ABNORMAL
EOSINOPHIL # BLD AUTO: 0.1 K/UL (ref 0–0.5)
EOSINOPHIL NFR BLD: 2.6 % (ref 0–8)
ERYTHROCYTE [DISTWIDTH] IN BLOOD BY AUTOMATED COUNT: 11.7 % (ref 11.5–14.5)
EST. GFR  (NO RACE VARIABLE): >60 ML/MIN/1.73 M^2
GLUCOSE SERPL-MCNC: 94 MG/DL (ref 70–110)
HCT VFR BLD AUTO: 42.5 % (ref 37–48.5)
HGB BLD-MCNC: 13.8 G/DL (ref 12–16)
IMM GRANULOCYTES # BLD AUTO: 0.02 K/UL (ref 0–0.04)
IMM GRANULOCYTES NFR BLD AUTO: 0.4 % (ref 0–0.5)
LYMPHOCYTES # BLD AUTO: 1.5 K/UL (ref 1–4.8)
LYMPHOCYTES NFR BLD: 29.6 % (ref 18–48)
MCH RBC QN AUTO: 30.3 PG (ref 27–31)
MCHC RBC AUTO-ENTMCNC: 32.5 G/DL (ref 32–36)
MCV RBC AUTO: 93 FL (ref 82–98)
MONOCYTES # BLD AUTO: 0.6 K/UL (ref 0.3–1)
MONOCYTES NFR BLD: 13 % (ref 4–15)
NEUTROPHILS # BLD AUTO: 2.7 K/UL (ref 1.8–7.7)
NEUTROPHILS NFR BLD: 53.8 % (ref 38–73)
NRBC BLD-RTO: 0 /100 WBC
PLATELET # BLD AUTO: 305 K/UL (ref 150–450)
PMV BLD AUTO: 9.1 FL (ref 9.2–12.9)
POTASSIUM SERPL-SCNC: 4.4 MMOL/L (ref 3.5–5.1)
PROT SERPL-MCNC: 7.4 G/DL (ref 6–8.4)
RBC # BLD AUTO: 4.55 M/UL (ref 4–5.4)
SODIUM SERPL-SCNC: 139 MMOL/L (ref 136–145)
T4 FREE SERPL-MCNC: 1.04 NG/DL (ref 0.71–1.51)
TSH SERPL DL<=0.005 MIU/L-ACNC: 0.85 UIU/ML (ref 0.4–4)
WBC # BLD AUTO: 4.94 K/UL (ref 3.9–12.7)

## 2023-09-13 PROCEDURE — 36415 COLL VENOUS BLD VENIPUNCTURE: CPT | Performed by: INTERNAL MEDICINE

## 2023-09-13 PROCEDURE — 99999 PR PBB SHADOW E&M-EST. PATIENT-LVL III: ICD-10-PCS | Mod: PBBFAC,,, | Performed by: INTERNAL MEDICINE

## 2023-09-13 PROCEDURE — 80053 COMPREHEN METABOLIC PANEL: CPT | Performed by: INTERNAL MEDICINE

## 2023-09-13 PROCEDURE — 3008F BODY MASS INDEX DOCD: CPT | Mod: CPTII,S$GLB,, | Performed by: INTERNAL MEDICINE

## 2023-09-13 PROCEDURE — 3078F PR MOST RECENT DIASTOLIC BLOOD PRESSURE < 80 MM HG: ICD-10-PCS | Mod: CPTII,S$GLB,, | Performed by: INTERNAL MEDICINE

## 2023-09-13 PROCEDURE — 99999 PR PBB SHADOW E&M-EST. PATIENT-LVL III: CPT | Mod: PBBFAC,,, | Performed by: INTERNAL MEDICINE

## 2023-09-13 PROCEDURE — 1160F PR REVIEW ALL MEDS BY PRESCRIBER/CLIN PHARMACIST DOCUMENTED: ICD-10-PCS | Mod: CPTII,S$GLB,, | Performed by: INTERNAL MEDICINE

## 2023-09-13 PROCEDURE — 84443 ASSAY THYROID STIM HORMONE: CPT | Performed by: INTERNAL MEDICINE

## 2023-09-13 PROCEDURE — 85025 COMPLETE CBC W/AUTO DIFF WBC: CPT | Performed by: INTERNAL MEDICINE

## 2023-09-13 PROCEDURE — 3074F PR MOST RECENT SYSTOLIC BLOOD PRESSURE < 130 MM HG: ICD-10-PCS | Mod: CPTII,S$GLB,, | Performed by: INTERNAL MEDICINE

## 2023-09-13 PROCEDURE — 1160F RVW MEDS BY RX/DR IN RCRD: CPT | Mod: CPTII,S$GLB,, | Performed by: INTERNAL MEDICINE

## 2023-09-13 PROCEDURE — 3078F DIAST BP <80 MM HG: CPT | Mod: CPTII,S$GLB,, | Performed by: INTERNAL MEDICINE

## 2023-09-13 PROCEDURE — 3008F PR BODY MASS INDEX (BMI) DOCUMENTED: ICD-10-PCS | Mod: CPTII,S$GLB,, | Performed by: INTERNAL MEDICINE

## 2023-09-13 PROCEDURE — 99395 PREV VISIT EST AGE 18-39: CPT | Mod: S$GLB,,, | Performed by: INTERNAL MEDICINE

## 2023-09-13 PROCEDURE — 1159F PR MEDICATION LIST DOCUMENTED IN MEDICAL RECORD: ICD-10-PCS | Mod: CPTII,S$GLB,, | Performed by: INTERNAL MEDICINE

## 2023-09-13 PROCEDURE — 1159F MED LIST DOCD IN RCRD: CPT | Mod: CPTII,S$GLB,, | Performed by: INTERNAL MEDICINE

## 2023-09-13 PROCEDURE — 99395 PR PREVENTIVE VISIT,EST,18-39: ICD-10-PCS | Mod: S$GLB,,, | Performed by: INTERNAL MEDICINE

## 2023-09-13 PROCEDURE — 84439 ASSAY OF FREE THYROXINE: CPT | Performed by: INTERNAL MEDICINE

## 2023-09-13 PROCEDURE — 3074F SYST BP LT 130 MM HG: CPT | Mod: CPTII,S$GLB,, | Performed by: INTERNAL MEDICINE

## 2023-09-13 RX ORDER — BUPROPION HYDROCHLORIDE 150 MG/1
150 TABLET ORAL DAILY
Qty: 30 TABLET | Refills: 11 | Status: SHIPPED | OUTPATIENT
Start: 2023-09-13 | End: 2024-09-12

## 2023-09-13 NOTE — PROGRESS NOTES
Subjective:       Patient ID: Catie Monge is a 38 y.o. female.    Chief Complaint: Annual Exam     Catie Monge is a 38 y.o.  female who presents for Annual Exam  .  Mom recently diagnosed with severe frontal temporal dementia.   Increased stress for Ms Monge.  Kids are 4 and 6.  Starting back with speaking with a counselor that she knows well.      Saw Gi for gastroenteritis early this year.    Has lomotil and bentyl for prn use, no recent issues.       Patient Active Problem List   Diagnosis    Depression with anxiety - zoloft daily    Wears contact lenses       Past Medical History:   Diagnosis Date    Allergy     Anxiety     No meds    Closed nondisplaced fracture of head of right radius 4/7/2022    Fever blister     Oral cold sores only; no hx genital outbreak    Postpartum hypertension     Requiring magnesium after delivery       Past Surgical History:   Procedure Laterality Date    ANUS SURGERY      FISSURE    COSMETIC SURGERY      DILATION AND CURETTAGE OF UTERUS USING SUCTION N/A 9/13/2018    Procedure: DILATION AND CURETTAGE, UTERUS, USING SUCTION;  Surgeon: Joselo Landry Jr., MD;  Location: [facility];  Service: OB/GYN;  Laterality: N/A;    NASAL SEPTUM SURGERY      TONSILLECTOMY, ADENOIDECTOMY      WISDOM TOOTH EXTRACTION         Family History   Problem Relation Age of Onset    Frontotemporal dementia Mother 72    Glaucoma Father     Hypertension Father     Rheum arthritis Father     Arthritis Father     Kyphosis Brother     Bipolar disorder Brother     Anxiety disorder Brother     Diabetes Mellitus Maternal Grandfather     No Known Problems Paternal Grandmother     Melanoma Paternal Grandfather     Breast cancer Neg Hx     Cancer Neg Hx     Ovarian cancer Neg Hx     Colon cancer Neg Hx     Macular degeneration Neg Hx     Retinal detachment Neg Hx     Amblyopia Neg Hx     Blindness Neg Hx     Cataracts Neg Hx     Strabismus Neg Hx     Esophageal cancer Neg Hx        Social History      Tobacco Use    Smoking status: Never    Smokeless tobacco: Never   Substance Use Topics    Alcohol use: Yes     Alcohol/week: 1.0 standard drink of alcohol     Types: 1 Glasses of wine per week     Comment: Social    Drug use: No         Review of Systems   Constitutional:  Negative for chills and fever.   Respiratory:  Negative for cough and shortness of breath.    Cardiovascular:  Negative for chest pain and palpitations.   Gastrointestinal:  Negative for nausea and vomiting.   Genitourinary:  Negative for dysuria and hematuria.   Psychiatric/Behavioral:  Positive for dysphoric mood and sleep disturbance.          Objective:   Blood pressure 118/78, pulse 72, weight 76 kg (167 lb 8.8 oz), SpO2 98 %.     Physical Exam  Constitutional:       Appearance: Normal appearance. She is well-developed. She is not ill-appearing.   HENT:      Head: Normocephalic and atraumatic.   Eyes:      General: No scleral icterus.     Conjunctiva/sclera: Conjunctivae normal.   Cardiovascular:      Rate and Rhythm: Normal rate.      Heart sounds: Normal heart sounds. No murmur heard.     No friction rub. No gallop.   Pulmonary:      Effort: Pulmonary effort is normal.      Breath sounds: Normal breath sounds. No wheezing or rales.   Chest:      Chest wall: No tenderness.   Abdominal:      General: Bowel sounds are normal.      Palpations: Abdomen is soft.   Musculoskeletal:         General: No tenderness or signs of injury.   Skin:     General: Skin is warm and dry.   Neurological:      Mental Status: She is alert and oriented to person, place, and time. Mental status is at baseline.   Psychiatric:         Behavior: Behavior normal.         Thought Content: Thought content normal.         Prior labs reviewed    Health Maintenance         Date Due Completion Date    Influenza Vaccine (1) 09/01/2023 9/27/2022    COVID-19 Vaccine (5 - Mixed Product risk series) 10/20/2023 8/25/2023    Cervical Cancer Screening 06/21/2026 6/21/2021     TETANUS VACCINE 06/17/2029 6/17/2019            Assessment/Plan:       1. Annual physical exam  -     TSH; Future; Expected date: 09/13/2023  -     T4, Free; Future; Expected date: 09/13/2023  -     CBC Auto Differential; Future; Expected date: 09/13/2023  -     Comprehensive Metabolic Panel; Future; Expected date: 09/13/2023  Recommend daily sunscreen, cardiovascular exercise min 30 min 5 days per week. Seatbelts routinely. Masks over mouth and nose when in contact with others outside of your immediate household. Wash hands and surfaces frequently to avoid contact with viruses.  Recommend monthly self breast exams and annual mammogram OVER AGE 40 or as recommended.  Also screening  pap with reflex HPV q 3 years or as recommended by gyn provider.     2. Depression with anxiety  Overview:  Mood stable, consistent with her medication. Sees counselor regularly.  Eating in a healthy way.     Orders:  -     buPROPion (WELLBUTRIN XL) 150 MG TB24 tablet; Take 1 tablet (150 mg total) by mouth once daily.  Dispense: 30 tablet; Refill: 11  -     Ambulatory referral/consult to Primary Care Behavioral Health (Non-Opioids); Future; Expected date: 09/20/2023          Medication List with Changes/Refills   New Medications    BUPROPION (WELLBUTRIN XL) 150 MG TB24 TABLET    Take 1 tablet (150 mg total) by mouth once daily.   Current Medications    CALCIUM CARBONATE 1250 MG CAPSULE    Take 1,250 mg by mouth 2 (two) times daily with meals.    CETIRIZINE (ZYRTEC) 10 MG TABLET    Take 10 mg by mouth every evening.    DICYCLOMINE (BENTYL) 20 MG TABLET    Take 1 tablet (20 mg total) by mouth 3 (three) times daily as needed (abdominal pain).    DIPHENOXYLATE-ATROPINE 2.5-0.025 MG (LOMOTIL) 2.5-0.025 MG PER TABLET    Take 1 tablet by mouth 4 (four) times daily as needed for Diarrhea.    DROSPIRENONE-E.ESTRADIOL-LM.FA (BEYAZ/CALEB) 3-0.02-0.451 MG (24) (4) TAB    Take 1 tablet by mouth once daily.    POLYMYXIN B SULF-TRIMETHOPRIM (POLYTRIM)  10,000 UNIT- 1 MG/ML DROP    Place 1 drop into both eyes every 4 (four) hours.    SERTRALINE (ZOLOFT) 25 MG TABLET    Take 1 tablet (25 mg total) by mouth once daily.       Recommend patient complete vaccinations as listed in the overdue health maintenance report. Pt may obtain vaccinations at their local pharmacy, any [facility] pharmacy or request vaccination in our clinic.  Please note that some insurances will only cover vaccination cost at specific locations.Please check with your insurance provider regarding your coverage.  Vaccines that are not covered are still recommended.

## 2023-09-13 NOTE — Clinical Note
Sara RN with increased stress with mom dx with dementia (in [place]), young kids. Added wellbutrin XL - sent to [facility] also Check she is ok on meds and getting counseling then follow up with me in a talita for annual

## 2023-09-14 ENCOUNTER — PATIENT MESSAGE (OUTPATIENT)
Dept: BEHAVIORAL HEALTH | Facility: CLINIC | Age: 38
End: 2023-09-14
Payer: COMMERCIAL

## 2023-09-22 ENCOUNTER — HOSPITAL ENCOUNTER (EMERGENCY)
Facility: OTHER | Age: 38
Discharge: HOME | End: 2023-09-22
Attending: EMERGENCY MEDICINE
Payer: COMMERCIAL

## 2023-09-22 VITALS
WEIGHT: 167 LBS | BODY MASS INDEX: 24.73 KG/M2 | SYSTOLIC BLOOD PRESSURE: 168 MMHG | HEART RATE: 97 BPM | RESPIRATION RATE: 18 BRPM | OXYGEN SATURATION: 100 % | HEIGHT: 69 IN | DIASTOLIC BLOOD PRESSURE: 89 MMHG | TEMPERATURE: 98 F

## 2023-09-22 DIAGNOSIS — L98.9 SKIN LESION: Primary | ICD-10-CM

## 2023-09-22 LAB
B-HCG UR QL: NEGATIVE
CTP QC/QA: YES

## 2023-09-22 PROCEDURE — 25000003 PHARM REV CODE 250: Performed by: EMERGENCY MEDICINE

## 2023-09-22 PROCEDURE — 63600175 PHARM REV CODE 636 W HCPCS: Performed by: EMERGENCY MEDICINE

## 2023-09-22 PROCEDURE — 99284 EMERGENCY DEPT VISIT MOD MDM: CPT

## 2023-09-22 PROCEDURE — 81025 URINE PREGNANCY TEST: CPT | Performed by: EMERGENCY MEDICINE

## 2023-09-22 RX ORDER — DIPHENHYDRAMINE HCL 25 MG
25 CAPSULE ORAL
Status: COMPLETED | OUTPATIENT
Start: 2023-09-22 | End: 2023-09-22

## 2023-09-22 RX ORDER — DEXAMETHASONE 4 MG/1
8 TABLET ORAL
Status: COMPLETED | OUTPATIENT
Start: 2023-09-22 | End: 2023-09-22

## 2023-09-22 RX ORDER — DIPHENHYDRAMINE HCL 25 MG
25 CAPSULE ORAL EVERY 6 HOURS PRN
Qty: 20 CAPSULE | Refills: 0 | Status: SHIPPED | OUTPATIENT
Start: 2023-09-22 | End: 2023-12-04

## 2023-09-22 RX ORDER — NAPROXEN 500 MG/1
500 TABLET ORAL
Status: COMPLETED | OUTPATIENT
Start: 2023-09-22 | End: 2023-09-22

## 2023-09-22 RX ORDER — CEPHALEXIN 500 MG/1
500 CAPSULE ORAL 4 TIMES DAILY
Qty: 20 CAPSULE | Refills: 0 | Status: SHIPPED | OUTPATIENT
Start: 2023-09-22 | End: 2023-09-27

## 2023-09-22 RX ORDER — CEPHALEXIN 500 MG/1
500 CAPSULE ORAL
Status: COMPLETED | OUTPATIENT
Start: 2023-09-22 | End: 2023-09-22

## 2023-09-22 RX ORDER — MUPIROCIN 20 MG/G
OINTMENT TOPICAL 3 TIMES DAILY
Qty: 22 G | Refills: 0 | Status: SHIPPED | OUTPATIENT
Start: 2023-09-22 | End: 2023-10-02

## 2023-09-22 RX ORDER — MUPIROCIN 20 MG/G
1 OINTMENT TOPICAL
Status: COMPLETED | OUTPATIENT
Start: 2023-09-22 | End: 2023-09-22

## 2023-09-22 RX ORDER — NAPROXEN 500 MG/1
500 TABLET ORAL 2 TIMES DAILY PRN
Qty: 28 TABLET | Refills: 0 | Status: SHIPPED | OUTPATIENT
Start: 2023-09-22 | End: 2023-10-06

## 2023-09-22 RX ADMIN — NAPROXEN 500 MG: 500 TABLET ORAL at 08:09

## 2023-09-22 RX ADMIN — CEPHALEXIN 500 MG: 500 CAPSULE ORAL at 09:09

## 2023-09-22 RX ADMIN — DEXAMETHASONE 8 MG: 4 TABLET ORAL at 08:09

## 2023-09-22 RX ADMIN — MUPIROCIN 22 G: 20 OINTMENT TOPICAL at 09:09

## 2023-09-22 RX ADMIN — DIPHENHYDRAMINE HYDROCHLORIDE 25 MG: 25 CAPSULE ORAL at 08:09

## 2023-09-22 NOTE — ED TRIAGE NOTES
Patient reports swelling to right arm that has worsened since yesterday. She states she was walking through the park when she noticed a small spot on her arm was bleeding. Since, the redness has increased. Patient reports itching to area. She reports she did get a tattoo on Monday, but tattoo itself isn't swollen, reddened, or raised.     Two patient identifiers have been checked and are correct.      Appearance: Pt awake, alert & oriented to person, place & time. Pt in no acute distress at present time. Pt is clean and well groomed with clothes appropriately fastened.   Skin: Skin warm, dry & intact. Color consistent with ethnicity. Mucous membranes moist. No breakdown or brusing noted.   Musculoskeletal: Patient moving all extremities well, no obvious swelling or deformities noted.   Respiratory: Respirations spontaneous, even, and non-labored. Visible chest rise noted. Airway is open and patent. No accessory muscle use noted.   Neurologic: Sensation is intact. Speech is clear and appropriate. Eyes open spontaneously, behavior appropriate to situation, follows commands, facial expression symmetrical, bilateral hand grasp equal and even, purposeful motor response noted.  Cardiac: All peripheral pulses present. No Bilateral lower extremity edema. Cap refill is <3 seconds.  Abdomen: Abdomen soft, non-tender to palpation.   : Pt reports no dysuria or hematuria.

## 2023-09-22 NOTE — ED PROVIDER NOTES
"  Source of History:  Medical record, patient.      Chief complaint:  Per triage note: "Rash (Pt reports getting new tattoo on Monday. Pt was walking in park yesterday when she noticed rash over tattoo on R bicep. Pt states "I'm pretty sure I was bit by something. I didn't see a bug but I saw blood and the rash just keeps getting bigger." Denies chest pain and SOB. Denies fever. )  "    HPI:    Patient presents with a rash on her right arm near her tricep that formed yesterday. She states that she was in [place] at noon when she noticed a spot of blood adjacent to a tattoo that she got 4 days ago she suspects was due to being bitten by an insect. She has noticed increasing swelling and redness in the area since that time. She reports a sensation of a mix between itching an pain on the affected area. She has not taken any medication for the pain and otherwise denies any other issues at this time.    ROS:   See HPI for pertinent Review of Systems      Review of patient's allergies indicates:  No Known Allergies    PMH:  As per HPI and below:  Past Medical History:   Diagnosis Date    Anxiety     No meds       Past Surgical History:   Procedure Laterality Date    ANUS SURGERY      FISSURE    COSMETIC SURGERY      DILATION AND CURETTAGE OF UTERUS USING SUCTION N/A 9/13/2018    Procedure: DILATION AND CURETTAGE, UTERUS, USING SUCTION;  Surgeon: Joselo Landry Jr., MD;  Location: [facility];  Service: OB/GYN;  Laterality: N/A;    NASAL SEPTUM SURGERY      TONSILLECTOMY, ADENOIDECTOMY      WISDOM TOOTH EXTRACTION         Social History     Tobacco Use    Smoking status: Never    Smokeless tobacco: Never   Substance Use Topics    Alcohol use: Yes     Alcohol/week: 1.0 standard drink of alcohol     Types: 1 Glasses of wine per week     Comment: Social    Drug use: No       Physical Exam:      Nursing note and vitals reviewed.  BP (!) 168/89 (BP Location: Left arm, Patient Position: Sitting)   Pulse 97   Temp 98.2 °F " "(36.8 °C) (Oral)   Resp 18   Ht 5' 9" (1.753 m)   Wt 75.8 kg (167 lb)   SpO2 100%   BMI 24.66 kg/m²     Constitutional: AAOx3. Well appearing.  Muscular build.  Eyes: EOMI. No discharge. Anicteric.  HENT:   Mouth/Throat:    Neck: Normal range of motion. Neck supple.    Cardiovascular: Normal rate  Pulmonary/Chest: No respiratory distress.   Abdominal: No distension   Musculoskeletal: Normal range of motion.   Neurological: GCS15. Alert and oriented to person, place, and time. No gross cranial nerve II-XII, focal strength, or light touch deficit. Steady gait.   Skin: Skin is warm and dry.  Well-circumscribed erythema, edema mostly medial to tattoo at right upper arm.  Central area has tiny vesicular changes.   Psychiatric: Behavior is normal. Judgment normal. Pleasant.         Medical Decision Making / Independent Interpretations / External Records Reviewed:      Patient is a 38-year-old female who presents with patch of erythema, edema at her right arm adjacent to new tattoo she received 4 days ago.  She noted the erythema and edema after noticing a spot of blood in this area while walking through the park.  She denies any other lesions, recent fevers, or any other complaints.    On exam patient has a well-circumscribed round area of erythema, edema mostly medial to her new tattoo at the right upper arm.    The initial differential included local allergic reaction, cellulitis.     ED Course as of 09/23/23 0724   Fri Sep 22, 2023   0932 Patient reports no improvement of her symptoms after dose of Benadryl and steroids.  This makes cellulitis more likely.   Given the extent of the affected region, I offered the patient admission.  I had a shared decision making conversation with the patient. The patient displays normal decision making capacity. I explained to the patient the nature of their illness, injury, or disease, and the risks and benefits of outpatient management versus placement into observation. After " a detailed discussion of these, pt states a preference for discharge. All questions answered. I feel this decision is a reasonable balance of risks and benefits.  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 The patient was encouraged to return at any point for continued, new, or concerning symptoms.  [RC]      ED Course User Index  [RC] Noah Kaufman MD       I decided to obtain the patient's medical records. I reviewed patient's prior external notes / results: PCP note.     Additional Medical Decision Making: Prescription drug management    Medications   diphenhydrAMINE capsule 25 mg (25 mg Oral Given 9/22/23 0848)   dexAMETHasone tablet 8 mg (8 mg Oral Given 9/22/23 0848)   naproxen tablet 500 mg (500 mg Oral Given 9/22/23 0848)   cephALEXin capsule 500 mg (500 mg Oral Given 9/22/23 0939)   mupirocin 2 % ointment 22 g (22 g Topical (Top) Given 9/22/23 0941)              Diagnostic Impression:    1. Skin lesion         ED Disposition Condition    Discharge Good          Future Appointments   Date Time Provider Department Center   10/2/2023  3:45 PM Barbara Dias MD [facility]   10/13/2023  2:00 PM Colleen Blackwell PA-C [facility]      ED Prescriptions       Medication Sig Dispense Start Date End Date Auth. Provider    mupirocin (BACTROBAN) 2 % ointment Apply topically 3 (three) times daily. To affected area for 10 days 22 g 9/22/2023 10/2/2023 Noah Kaufman MD    cephALEXin (KEFLEX) 500 MG capsule Take 1 capsule (500 mg total) by mouth 4 (four) times daily. for 5 days 20 capsule 9/22/2023 9/27/2023 Noah Kaufman MD    diphenhydrAMINE (BENADRYL) 25 mg capsule Take 1 capsule (25 mg total) by mouth every 6 (six) hours as needed for Itching or Allergies. 20 capsule 9/22/2023 -- Noah Kaufman MD    naproxen (NAPROSYN) 500 MG tablet Take 1 tablet (500 mg total) by mouth 2  (two) times daily as needed (pain). 28 tablet 9/22/2023 10/6/2023 Noah Kaufman MD          Follow-up Information       Follow up With Specialties Details Why Contact Info Additional Information    [facility] [address] [place] [address] Dermatology In 1 week For recheck with specialist if rash continues, For recheck with specialist [address]  [facility] [id number] [phone] Dermatology - Main [facility] 11th Floor Please park in [facility]. Use Clinic elevators 12 & 13 to get to the 11th floor    June Christianson MD Internal Medicine Schedule an appointment as soon as possible for a visit in 3 days For recheck with your primary care doctor [address] [facility] [address]  [facility] [address]  [phone]               ---  I, Jose Rafael Phillip, scribed for, and in the presence of, Dr. Kaufman. I performed the scribed service and the documentation accurately describes the services I performed. I attest to the accuracy of the note.     Physician Attestation for Scribe:   I, Noah Kaufman MD, reviewed documentation as scribed in my presence, which is both accurate and complete.      Noah Kaufman MD  09/23/23 2146

## 2023-09-22 NOTE — DISCHARGE INSTRUCTIONS
Thank you for letting us take care of you today! It was nice meeting you, and I hope you feel better soon.     Call your primary care doctor to make the first available appointment.     Keep all your medical appointments.     Take your regular medication as prescribed. Contact your primary care provider before running out of medication, or for any problems obtaining them.    Do not drive or operate heavy machinery while taking opioid or muscle relaxing medications. Examples include norco, percocet, xanax, valium, flexeril.     Overuse or long term use of pain and sedating medication may lead to addiction, dependence, organ failure, family and work problems, legal problems, accidental overdose, and death.    If you do not have health insurance, you probably can afford it:  Call [phone] ([facility] hotline) or go to [email or web address].gov    Your evaluation in the ER does not suggest any emergent or life threatening medical condition requiring admission or immediate intervention beyond that provided in the ER.   However, the signs of a serious problem sometimes take more time to appear.     Do not hesitate to return to the ER if any of the following occur:    Weakness, dizziness, fainting, or loss of consciousness   Fever of 100.4ºF (38ºC) or higher  Any worse symptoms  Any new or concerning symptoms      Dermatitis is the general name used for any rash or inflammation of the skin. Different kinds of dermatitis cause different kinds of rashes. Common causes of a rash include new medicines, plants (such as poison oak or poison ivy), heat, and stress. Certain illnesses can also cause a rash.    An allergic reaction to something that touches your skin, such as latex, nickel, or poison ivy, is called contact dermatitis. Contact dermatitis may also be caused by something that irritates the skin, such as bleach, a chemical, or soap. These types of rashes cannot be spread from person to person.    How long your rash will last  depends on what caused it. Rashes may last a few days or months.    Follow-up care is a key part of your treatment and safety. Be sure to make and go to all appointments, and call your doctor if you are having problems. It's also a good idea to know your test results and keep a list of the medicines you take.    How can you care for yourself at home?  Try not to scratch the rash. Cut your nails short, and file them smooth. Or wear gloves if this helps keep you from scratching.  Wash the area with water only. Pat dry.  Put cold, wet cloths on the rash to reduce itching.  Keep cool, and stay out of the sun.  Leave the rash open to the air as much as possible.  If the rash itches, use hydrocortisone cream. Follow the directions on the label. Calamine lotion may help for plant rashes.  If itching affects your sleep, ask your doctor if you can take an antihistamine that might reduce itching and make you sleepy, such as diphenhydramine (Benadryl). Be safe with medicines. Read and follow all instructions on the label.  If your doctor prescribed a cream, use it as directed. If your doctor prescribed medicine, take it exactly as directed.  When should you call for help?     Call your doctor now or seek immediate medical care if:    You have symptoms of infection, such as:  Increased pain, swelling, warmth, or redness.  Red streaks leading from the area.  Pus draining from the area.  A fever.  You have joint pain along with the rash.    Watch closely for changes in your health, and be sure to contact your doctor if:    Your rash is changing or getting worse.  You are not getting better as expected.

## 2023-09-25 NOTE — PROGRESS NOTES
The chief complaint leading to consultation is: med/mood check    Visit type: audio only    Time with patient: 8  15 minutes of total time spent on the encounter, which includes face to face time and non-face to face time preparing to see the patient (eg, review of tests), Obtaining and/or reviewing separately obtained history, Documenting clinical information in the electronic or other health record, Independently interpreting results (not separately reported) and communicating results to the patient/family/caregiver, or Care coordination (not separately reported).     Each patient to whom he or she provides medical services by telemedicine is:  (1) informed of the relationship between the physician and patient and the respective role of any other health care provider with respect to management of the patient; and (2) notified that he or she may decline to receive medical services by telemedicine and may withdraw from such care at any time.    Notes:     OBSTETRICS AND GYNECOLOGY    Subjective:      Chief Complaint: Contraception Management    HPI:  Catie Monge is an 38 y.o.  presenting for scheduled appointment regarding contraception follow-up. Overall mood about the same. Perhaps somewhat lighter feeling but about 2 weeks before bleeding starts, notes the mood changes. Trying to get outdoors more during that time. Does state a lot of recent life changes/stressors. Taking the OCPs. Started about 3 months ago. Trying to take same time daily. Improvement in bleeding amount. Side effects:  denies. Wishes to continue this modality at this time.      OB History    Para Term  AB Living   3 2 2 0 1 2   SAB IAB Ectopic Multiple Live Births   1 0 0 0 2      # Outcome Date GA Lbr Roger/2nd Weight Sex Delivery Anes PTL Lv   3 Term 19 39w1d 03:40 / 00:25 3.572 kg (7 lb 14 oz) M Vag-Spont None N OSIRIS   2 SAB 2018              Birth Comments: missed ab, failed cytotec, D&C   1 Term 17  41w2d 14:20 / 01:09 3.38 kg (7 lb 7.2 oz) F Vag-Spont EPI N OSIRIS      Birth Comments: postpartum preE with postpartum magnesium     Past Medical History:   Diagnosis Date    Anxiety     No meds     Past Surgical History:   Procedure Laterality Date    ANUS SURGERY      FISSURE    COSMETIC SURGERY      DILATION AND CURETTAGE OF UTERUS USING SUCTION N/A 9/13/2018    Procedure: DILATION AND CURETTAGE, UTERUS, USING SUCTION;  Surgeon: Joselo Landry Jr., MD;  Location: [facility];  Service: OB/GYN;  Laterality: N/A;    NASAL SEPTUM SURGERY      TONSILLECTOMY, ADENOIDECTOMY      WISDOM TOOTH EXTRACTION       Family History   Problem Relation Age of Onset    Frontotemporal dementia Mother 72    Glaucoma Father     Hypertension Father     Rheum arthritis Father     Arthritis Father     Kyphosis Brother     Bipolar disorder Brother     Anxiety disorder Brother     Diabetes Mellitus Maternal Grandfather     No Known Problems Paternal Grandmother     Melanoma Paternal Grandfather     Breast cancer Neg Hx     Cancer Neg Hx     Ovarian cancer Neg Hx     Colon cancer Neg Hx     Macular degeneration Neg Hx     Retinal detachment Neg Hx     Amblyopia Neg Hx     Blindness Neg Hx     Cataracts Neg Hx     Strabismus Neg Hx     Esophageal cancer Neg Hx        Allergies: Review of patient's allergies indicates:  No Known Allergies    Medications:   Current Outpatient Medications   Medication Sig Dispense Refill    buPROPion (WELLBUTRIN XL) 150 MG TB24 tablet Take 1 tablet (150 mg total) by mouth once daily. 30 tablet 11    calcium carbonate 1250 MG capsule Take 1,250 mg by mouth 2 (two) times daily with meals.      cephALEXin (KEFLEX) 500 MG capsule Take 1 capsule (500 mg total) by mouth 4 (four) times daily. for 5 days 20 capsule 0    cetirizine (ZYRTEC) 10 MG tablet Take 10 mg by mouth every evening.      dicyclomine (BENTYL) 20 mg tablet Take 1 tablet (20 mg total) by mouth 3 (three) times daily as needed (abdominal pain). (Patient not  taking: Reported on 9/13/2023) 90 tablet 3    diphenhydrAMINE (BENADRYL) 25 mg capsule Take 1 capsule (25 mg total) by mouth every 6 (six) hours as needed for Itching or Allergies. 20 capsule 0    diphenoxylate-atropine 2.5-0.025 mg (LOMOTIL) 2.5-0.025 mg per tablet Take 1 tablet by mouth 4 (four) times daily as needed for Diarrhea. (Patient not taking: Reported on 9/13/2023) 60 tablet 1    drospirenone-e.estradioL-lm.FA (BEYAZ/CALEB) 3-0.02-0.451 mg (24) (4) Tab Take 1 tablet by mouth once daily. 28 tablet 3    mupirocin (BACTROBAN) 2 % ointment Apply topically 3 (three) times daily. To affected area for 10 days 22 g 0    naproxen (NAPROSYN) 500 MG tablet Take 1 tablet (500 mg total) by mouth 2 (two) times daily as needed (pain). 28 tablet 0    sertraline (ZOLOFT) 25 MG tablet Take 1 tablet (25 mg total) by mouth once daily. 90 tablet 3     No current facility-administered medications for this visit.       Social History     Tobacco Use    Smoking status: Never    Smokeless tobacco: Never   Substance Use Topics    Alcohol use: Yes     Alcohol/week: 1.0 standard drink of alcohol     Types: 1 Glasses of wine per week     Comment: Social       Objective:   There were no vitals taken for this visit.  Physical Exam    GENERAL:  Appropriate mood and affect. Pleasant.   PULMONARY: No respiratory distress appreciated.  Speaking comfortably in full sentences.     Assessment/Plan:     Contraception Management  - Refills provided  - Reviewed how to take, to let me know if any issues arise  - Re-started talk therapy  - Denies SI/HI  Explicitly reviewed to let me know/seek care if any persistent bad/self-harm thoughts, pt verbalized understanding  - Compliant with medications, calcium supplement, exercise  - Consider continuous use if insufficient improvement      Follow up for annual WWE or sooner PRN            As of April 1, 2021, the Cures Act has been passed nationally. This new law requires that all doctors progress  notes, lab results, pathology reports and radiology reports be released IMMEDIATELY to the patient in the patient portal. That means that the results are released to you at the EXACT same time they are released to me. Therefore, with all of the patients that I have I am not able to reply to each patient exactly when the results come in. So there will be a delay from when you see the results to when I see them and have time to come up with a response to send you. Also I only see these results when I am on the computer at work. So if the results come in over the weekend or after 5 pm of a work day, I will not see them until the next business day. As you can tell, this is a challenge as a physician to give every patient the quick response they hope for and deserve. So please be patient!   Thanks for your understanding and patience.

## 2023-10-02 ENCOUNTER — OFFICE VISIT (OUTPATIENT)
Dept: OBSTETRICS AND GYNECOLOGY | Facility: CLINIC | Age: 38
End: 2023-10-02
Payer: COMMERCIAL

## 2023-10-02 DIAGNOSIS — Z30.41 ENCOUNTER FOR SURVEILLANCE OF CONTRACEPTIVE PILLS: Primary | ICD-10-CM

## 2023-10-02 PROCEDURE — 99213 OFFICE O/P EST LOW 20 MIN: CPT | Mod: 95,,, | Performed by: STUDENT IN AN ORGANIZED HEALTH CARE EDUCATION/TRAINING PROGRAM

## 2023-10-02 PROCEDURE — 99213 PR OFFICE/OUTPT VISIT, EST, LEVL III, 20-29 MIN: ICD-10-PCS | Mod: 95,,, | Performed by: STUDENT IN AN ORGANIZED HEALTH CARE EDUCATION/TRAINING PROGRAM

## 2023-10-02 RX ORDER — DROSPIRENONE, ETHINYL ESTRADIOL AND LEVOMEFOLATE CALCIUM AND LEVOMEFOLATE CALCIUM 3-0.02(24)
1 KIT ORAL DAILY
Qty: 28 TABLET | Refills: 8 | Status: SHIPPED | OUTPATIENT
Start: 2023-10-02

## 2023-11-14 DIAGNOSIS — G89.29 CHRONIC BILATERAL LOW BACK PAIN WITH BILATERAL SCIATICA: ICD-10-CM

## 2023-11-14 DIAGNOSIS — M54.41 CHRONIC BILATERAL LOW BACK PAIN WITH BILATERAL SCIATICA: ICD-10-CM

## 2023-11-14 DIAGNOSIS — M54.9 DORSALGIA, UNSPECIFIED: Primary | ICD-10-CM

## 2023-11-14 DIAGNOSIS — M54.42 CHRONIC BILATERAL LOW BACK PAIN WITH BILATERAL SCIATICA: ICD-10-CM

## 2023-11-14 RX ORDER — METHYLPREDNISOLONE 4 MG/1
TABLET ORAL
Qty: 21 EACH | Refills: 0 | Status: SHIPPED | OUTPATIENT
Start: 2023-11-14 | End: 2023-12-05

## 2023-12-01 ENCOUNTER — HOSPITAL ENCOUNTER (OUTPATIENT)
Dept: RADIOLOGY | Facility: HOSPITAL | Age: 38
Discharge: HOME | End: 2023-12-01
Attending: NURSE PRACTITIONER
Payer: COMMERCIAL

## 2023-12-01 DIAGNOSIS — M54.42 CHRONIC BILATERAL LOW BACK PAIN WITH BILATERAL SCIATICA: ICD-10-CM

## 2023-12-01 DIAGNOSIS — M54.41 CHRONIC BILATERAL LOW BACK PAIN WITH BILATERAL SCIATICA: ICD-10-CM

## 2023-12-01 DIAGNOSIS — M54.9 DORSALGIA, UNSPECIFIED: ICD-10-CM

## 2023-12-01 DIAGNOSIS — G89.29 CHRONIC BILATERAL LOW BACK PAIN WITH BILATERAL SCIATICA: ICD-10-CM

## 2023-12-01 PROCEDURE — 25500020 PHARM REV CODE 255: Performed by: NURSE PRACTITIONER

## 2023-12-01 PROCEDURE — 72197 MRI PELVIS W/O & W/DYE: CPT | Mod: 26,,, | Performed by: INTERNAL MEDICINE

## 2023-12-01 PROCEDURE — 72158 MRI LUMBAR SPINE W/O & W/DYE: CPT | Mod: 26,,, | Performed by: INTERNAL MEDICINE

## 2023-12-01 PROCEDURE — 72197 MRI PELVIS W/O & W/DYE: CPT | Mod: TC

## 2023-12-01 PROCEDURE — 72158 MRI LUMBAR SPINE W/O & W/DYE: CPT | Mod: TC

## 2023-12-01 PROCEDURE — 72158 MRI LUMBAR SPINE W WO CONTRAST: ICD-10-PCS | Mod: 26,,, | Performed by: INTERNAL MEDICINE

## 2023-12-01 PROCEDURE — A9585 GADOBUTROL INJECTION: HCPCS | Performed by: NURSE PRACTITIONER

## 2023-12-01 PROCEDURE — 72197 MRI SACRUM/COCCYX (BONY) W WO CONTRAST: ICD-10-PCS | Mod: 26,,, | Performed by: INTERNAL MEDICINE

## 2023-12-01 RX ORDER — GADOBUTROL 604.72 MG/ML
8 INJECTION INTRAVENOUS
Status: COMPLETED | OUTPATIENT
Start: 2023-12-01 | End: 2023-12-01

## 2023-12-01 RX ADMIN — GADOBUTROL 8 ML: 604.72 INJECTION INTRAVENOUS at 12:12

## 2023-12-01 NOTE — PROGRESS NOTES
Subjective:     Patient ID: Catie Monge is a 38 y.o. female.    Chief Complaint: Low-back Pain    Ms Monge is a 39 yo female here for evaluation of low back pain.  The pain is the center of the back and to the right.  She has been going to PT and she has not gotten relief, she has been going to outside PT.  The pain has been 6 weeks.  The pain is with getting up from a chair, and rolling over in bed and coughing.  She is ok walking, standing.  She is ok sitting when she gets comfortable, but hard to get comfortable.  She is better standing and walking than sitting.  The pain will occasionally go to the right buttock.  She will get rare numbness and tingling down right leg, but not often.  The pain is a constant throbbing and more intense with a cough.  The pain is 7/10 now, worst 9/10 rolling over and coughing, best 6/10 reclined with heating pad, standing and walking.  She feels like hard to put pants on standing has to sit.  She took steroid rishi with some relief.  She only takes ibuprofen 3 at night.  She also takes two tylenol a day.  She has not had other med.  She has not been to chiropractor.  She has been to PT.    MRI lumbar and sacrum  Lumbar spine:     ALIGNMENT: Normal.     BONE: No compression fractures.  No marrow replacing lesions.     JOINT: Intervertebral discs are well hydrated without height loss.  Facet joints are unremarkable.  Mild edema in the posterior aspect of the S1 superior endplate.     SPINAL CANAL: The conus medullaris has a normal appearance and terminates at the L1-2 level.  Cauda equina nerve roots are unremarkable.  No mass or collection. No abnormal enhancement.     PARASPINAL SOFT TISSUES: Unremarkable.     SIGNIFICANT FINDINGS BY LEVEL:     T12-L1 through L4-5: Unremarkable.     L5-S1: Small central protrusion, encroaching upon the right descending S1 nerve root (13:42).  No foraminal stenosis.     Sacrum/coccyx:     SACROILIAC JOINTS: There is subchondral bone marrow  edema and enhancement involving the iliac side of the right sacroiliac joint (04:11).  No joint effusion, synovitis, or capsulitis. No erosions or ankylosis.     OTHER JOINTS: Visualized hip joints are unremarkable.     BONE: No fracture, osteonecrosis, or marrow replacing lesion.     TENDONS: Regional tendons are intact.  No bursal collection.     SOFT TISSUES: Normal muscle bulk and signal intensity. The sciatic nerves are unremarkable.     MISCELLANEOUS: No visceral pelvic soft tissue abnormality.     Impression:     Small central disc protrusion at L5-S1, encroaching upon the right descending S1 nerve root.     Mild active sacroiliitis on the right, which could be degenerative or inflammatory.  No chronic structural lesions.            Review of Systems   Constitutional: Negative for weight gain and weight loss.   Cardiovascular:  Negative for chest pain.   Respiratory:  Negative for shortness of breath.    Musculoskeletal:  Positive for back pain. Negative for joint pain and joint swelling.   Gastrointestinal:  Negative for abdominal pain, bowel incontinence, nausea and vomiting.   Genitourinary:  Negative for bladder incontinence.   Neurological:  Positive for paresthesias (rare tingling in right leg). Negative for numbness.        Objective:     General: Catie is well-developed, well-nourished, appears stated age, in no acute distress, alert and oriented to time, place and person.     General    Vitals reviewed.  Constitutional: She is oriented to person, place, and time. She appears well-developed and well-nourished.   HENT:   Head: Normocephalic and atraumatic.   Pulmonary/Chest: Effort normal.   Neurological: She is alert and oriented to person, place, and time.   Psychiatric: She has a normal mood and affect. Her behavior is normal. Judgment and thought content normal.     General Musculoskeletal Exam   Gait: normal     Right Ankle/Foot Exam     Tests   Heel Walk: able to perform  Tiptoe Walk: able to  perform    Left Ankle/Foot Exam     Tests   Heel Walk: able to perform  Tiptoe Walk: able to perform  Back (L-Spine & T-Spine) / Neck (C-Spine) Exam     Tenderness Right paramedian tenderness of the Sacrum.     Back (L-Spine & T-Spine) Range of Motion   Extension:  20   Flexion:  60 (with increased pain)   Lateral bend right:  20   Lateral bend left:  20   Rotation right:  40   Rotation left:  40     Spinal Sensation   Right Side Sensation  C-Spine Level: normal   L-Spine Level: normal  S-Spine Level: normal  Left Side Sensation  C-Spine Level: normal  L-Spine Level: normal  S-Spine Level: normal    Back (L-Spine & T-Spine) Tests   Right Side Tests  Straight leg raise:        Sitting SLR: > 70 degrees    Left Side Tests  Straight leg raise:       Sitting SLR: > 70 degrees      Other   She has no scoliosis .  Spinal Kyphosis:  Absent    Comments:  Pain on the right with right BENY  Pain with IR of right hip      Muscle Strength   Right Upper Extremity   Biceps: 5/5   Deltoid:  5/5  Triceps:  5/5  Wrist extension: 5/5   Finger Flexors:  5/5  Left Upper Extremity  Biceps: 5/5   Deltoid:  5/5  Triceps:  5/5  Wrist extension: 5/5   Finger Flexors:  5/5  Right Lower Extremity   Hip Flexion: 5/5   Quadriceps:  5/5   Anterior tibial:  5/5   EHL:  5/5  Left Lower Extremity   Hip Flexion: 5/5   Quadriceps:  5/5   Anterior tibial:  5/5   EHL:  5/5    Reflexes     Left Side  Biceps:  2+  Triceps:  2+  Brachioradialis:  2+  Achilles:  2+  Left Juarez's Sign:  Absent  Babinski Sign:  absent  Quadriceps:  2+    Right Side   Biceps:  2+  Triceps:  2+  Brachioradialis:  2+  Achilles:  2+  Right Juarez's Sign:  absent  Babinski Sign:  absent  Quadriceps:  2+    Vascular Exam     Right Pulses        Carotid:                  2+    Left Pulses        Carotid:                  2+          Assessment:     1. Sacroiliitis    2. SI (sacroiliac) joint dysfunction         Plan:     Orders Placed This Encounter    Ambulatory  referral/consult to Rheumatology    diclofenac (VOLTAREN) 75 MG EC tablet    methocarbamoL (ROBAXIN) 500 MG Tab    Procedure Order to Pain Management     We discussed back pain and the nature of back pain.  We discussed that it  is not one thing that causes the pain but an accumulation of multiple things that we do.  She has been going to PT for the lower back for the past 6 weeks with no relief  MRI of the lumbar spine and sacrum reviewed.  She does have small protrusion, but no radicular pain but also has some right sacroiliitis   Pain management for right Si joint injections  We discussed the benefits of therapy and exercise and continuing to move.  She is going to continue therapy.  We did discuss  healthy back in future.  She is worried about back problems later in life  Diclofenac 75mg po BID  Robaxin 500mg po QID (we discussed can take a half if full pill is too strong or 2 at night to help her sleep)  Rheumatology for eval of sacroiliitis  RTC 8 weeks    More than 50% of the total time  of 45 minutes was spent face to face in counseling on diagnosis and treatment options. I also counseled patient  on common and most usual side effect of prescribed medications.  I reviewed Primary care , and other specialty's notes to better coordinate patient's care. All questions were answered, and patient voiced understanding.     Follow-up: Follow up in about 8 weeks (around 1/29/2024). If there are any questions prior to this, the patient was instructed to contact the office.

## 2023-12-04 ENCOUNTER — OFFICE VISIT (OUTPATIENT)
Dept: SPINE | Facility: CLINIC | Age: 38
End: 2023-12-04
Attending: PHYSICAL MEDICINE & REHABILITATION
Payer: COMMERCIAL

## 2023-12-04 VITALS
RESPIRATION RATE: 18 BRPM | HEIGHT: 69 IN | SYSTOLIC BLOOD PRESSURE: 130 MMHG | BODY MASS INDEX: 24.75 KG/M2 | HEART RATE: 72 BPM | OXYGEN SATURATION: 100 % | WEIGHT: 167.13 LBS | DIASTOLIC BLOOD PRESSURE: 74 MMHG

## 2023-12-04 DIAGNOSIS — M53.3 SI (SACROILIAC) JOINT DYSFUNCTION: ICD-10-CM

## 2023-12-04 DIAGNOSIS — M46.1 SACROILIITIS: Primary | ICD-10-CM

## 2023-12-04 PROCEDURE — 99204 PR OFFICE/OUTPT VISIT, NEW, LEVL IV, 45-59 MIN: ICD-10-PCS | Mod: S$GLB,,, | Performed by: PHYSICAL MEDICINE & REHABILITATION

## 2023-12-04 PROCEDURE — 99999 PR PBB SHADOW E&M-EST. PATIENT-LVL IV: CPT | Mod: PBBFAC,,, | Performed by: PHYSICAL MEDICINE & REHABILITATION

## 2023-12-04 PROCEDURE — 3075F PR MOST RECENT SYSTOLIC BLOOD PRESS GE 130-139MM HG: ICD-10-PCS | Mod: CPTII,S$GLB,, | Performed by: PHYSICAL MEDICINE & REHABILITATION

## 2023-12-04 PROCEDURE — 99204 OFFICE O/P NEW MOD 45 MIN: CPT | Mod: S$GLB,,, | Performed by: PHYSICAL MEDICINE & REHABILITATION

## 2023-12-04 PROCEDURE — 3078F DIAST BP <80 MM HG: CPT | Mod: CPTII,S$GLB,, | Performed by: PHYSICAL MEDICINE & REHABILITATION

## 2023-12-04 PROCEDURE — 1159F PR MEDICATION LIST DOCUMENTED IN MEDICAL RECORD: ICD-10-PCS | Mod: CPTII,S$GLB,, | Performed by: PHYSICAL MEDICINE & REHABILITATION

## 2023-12-04 PROCEDURE — 1160F PR REVIEW ALL MEDS BY PRESCRIBER/CLIN PHARMACIST DOCUMENTED: ICD-10-PCS | Mod: CPTII,S$GLB,, | Performed by: PHYSICAL MEDICINE & REHABILITATION

## 2023-12-04 PROCEDURE — 3008F BODY MASS INDEX DOCD: CPT | Mod: CPTII,S$GLB,, | Performed by: PHYSICAL MEDICINE & REHABILITATION

## 2023-12-04 PROCEDURE — 99999 PR PBB SHADOW E&M-EST. PATIENT-LVL IV: ICD-10-PCS | Mod: PBBFAC,,, | Performed by: PHYSICAL MEDICINE & REHABILITATION

## 2023-12-04 PROCEDURE — 1159F MED LIST DOCD IN RCRD: CPT | Mod: CPTII,S$GLB,, | Performed by: PHYSICAL MEDICINE & REHABILITATION

## 2023-12-04 PROCEDURE — 3008F PR BODY MASS INDEX (BMI) DOCUMENTED: ICD-10-PCS | Mod: CPTII,S$GLB,, | Performed by: PHYSICAL MEDICINE & REHABILITATION

## 2023-12-04 PROCEDURE — 3078F PR MOST RECENT DIASTOLIC BLOOD PRESSURE < 80 MM HG: ICD-10-PCS | Mod: CPTII,S$GLB,, | Performed by: PHYSICAL MEDICINE & REHABILITATION

## 2023-12-04 PROCEDURE — 1160F RVW MEDS BY RX/DR IN RCRD: CPT | Mod: CPTII,S$GLB,, | Performed by: PHYSICAL MEDICINE & REHABILITATION

## 2023-12-04 PROCEDURE — 3075F SYST BP GE 130 - 139MM HG: CPT | Mod: CPTII,S$GLB,, | Performed by: PHYSICAL MEDICINE & REHABILITATION

## 2023-12-04 RX ORDER — DICLOFENAC SODIUM 75 MG/1
75 TABLET, DELAYED RELEASE ORAL 2 TIMES DAILY
Qty: 60 TABLET | Refills: 2 | Status: SHIPPED | OUTPATIENT
Start: 2023-12-04 | End: 2024-01-29 | Stop reason: SDUPTHER

## 2023-12-04 RX ORDER — METHOCARBAMOL 500 MG/1
500 TABLET, FILM COATED ORAL 4 TIMES DAILY
Qty: 120 TABLET | Refills: 2 | Status: SHIPPED | OUTPATIENT
Start: 2023-12-04

## 2023-12-05 ENCOUNTER — PATIENT MESSAGE (OUTPATIENT)
Dept: PAIN MEDICINE | Facility: OTHER | Age: 38
End: 2023-12-05
Payer: COMMERCIAL

## 2023-12-05 NOTE — DISCHARGE INSTRUCTIONS
Breastfeeding Discharge Instructions       Feed the baby at the earliest sign of hunger or comfort  o Hands to mouth, sucking motions  o Rooting or searching for something to suck on  o Dont wait for crying - it is a sign of distress     The feedings may be 8-12 times per 24hrs and will not follow a schedule   Avoid pacifiers and bottles for the first 4 weeks   Alternate the breast you start the feeding with, or start with the breast that feels the fullest   Switch breasts when the baby takes himself off the breast or falls asleep   Keep offering breasts until the baby looks full, no longer gives hunger signs, and stays asleep when placed on his back in the crib   If the baby is sleepy and wont wake for a feeding, put the baby skin-to-skin dressed in a diaper against the mothers bare chest   Sleep near your baby   The baby should be positioned and latched on to the breast correctly  o Chest-to-chest, chin in the breast  o Babys lips are flipped outward  o Babys mouth is stretched open wide like a shout  o Babys sucking should feel like tugging to the mother  - The baby should be drinking at the breast:  o You should hear swallowing or gulping throughout the feeding  o You should see milk on the babys lips when he comes off the breast  o Your breasts should be softer when the baby is finished feeding  o The baby should look relaxed at the end of feedings  o After the 4th day and your milk is in:  o The babys poop should turn bright yellow and be loose, watery, and seedy  o The baby should have at least 3-4 poops the size of the palm of your hand per day  o The baby should have at least 5-6 wet diapers per day  o The urine should be light yellow in color  You should drink when you are thirsty and eat a healthy diet when you are    hungry.     Take naps to get the rest you need.   Take medications and/or drink alcohol only with permission of your obstetrician    or the babys pediatrician.  You can  also call the [facility],   ([phone]), Monday-Friday, 8am-5pm Central time, to get the most   up-to-date evidence-based information on the use of medications during   pregnancy and breastfeeding.      The baby should be examined by a pediatrician at 3-5 days of age.  Once your   milk comes in, the baby should be gaining at least ½ - 1oz each day and should be back to birthweight no later than 10-14 days of age.          Community Resources    [facility] Breastfeeding Warmline: [phone]   Local [facility] clinics: provide incentives and breastpumps to eligible mothers  [employer] ([employer]):  mother-to-mother support group website        www.Miroil.Rebtel  Local [employer] mother-to-mother support groups:        www.HomeTouch        [employer] [facility]   Dr. Maninder Reid website for latch videos and general information:        www.breastfeedinginc.ca  [facility] is a call center that provides information about the safety of taking medications while breastfeeding.  Call [phone], M-F, 8am-5pm, CT.  International Lactation [employer]ant Association provides resources for assistance:        www.ilca.org  [place] Breastfeeding [employer] provides informationand resources for parents  and the community    www.LaBreastfeedingSupport.org     Christal Clark is a mom-to-mom support group:                             www.[employer].com//breastfeedng-support/  [employer] for Healthy Babies:  0-221-589-BABY(8234)  [employer]: a breastfeeding support group for women of color, [phone]             Post-Care Instructions: I reviewed with the patient in detail post-care instructions. Patient is to wear sunprotection, and avoid picking at any of the treated lesions. Pt may apply Vaseline to crusted or scabbing areas. Consent: The patient's consent was obtained including but not limited to risks of crusting, scabbing, blistering, scarring, darker or lighter pigmentary change, recurrence, incomplete removal and infection. Show Aperture Variable?: Yes Detail Level: Detailed Render Note In Bullet Format When Appropriate: No Duration Of Freeze Thaw-Cycle (Seconds): 0

## 2023-12-11 ENCOUNTER — PATIENT MESSAGE (OUTPATIENT)
Dept: RHEUMATOLOGY | Facility: CLINIC | Age: 38
End: 2023-12-11

## 2023-12-11 ENCOUNTER — LAB VISIT (OUTPATIENT)
Dept: LAB | Facility: HOSPITAL | Age: 38
End: 2023-12-11
Attending: INTERNAL MEDICINE
Payer: COMMERCIAL

## 2023-12-11 ENCOUNTER — OFFICE VISIT (OUTPATIENT)
Dept: RHEUMATOLOGY | Facility: CLINIC | Age: 38
End: 2023-12-11
Payer: COMMERCIAL

## 2023-12-11 VITALS
DIASTOLIC BLOOD PRESSURE: 84 MMHG | HEART RATE: 88 BPM | WEIGHT: 177 LBS | SYSTOLIC BLOOD PRESSURE: 129 MMHG | BODY MASS INDEX: 26.22 KG/M2 | HEIGHT: 69 IN

## 2023-12-11 DIAGNOSIS — M53.3 SI (SACROILIAC) JOINT DYSFUNCTION: ICD-10-CM

## 2023-12-11 DIAGNOSIS — R05.3 CHRONIC COUGH: Primary | ICD-10-CM

## 2023-12-11 DIAGNOSIS — M46.1 SACROILIITIS: ICD-10-CM

## 2023-12-11 LAB
CRP SERPL-MCNC: 1.3 MG/L (ref 0–8.2)
ERYTHROCYTE [SEDIMENTATION RATE] IN BLOOD BY PHOTOMETRIC METHOD: 3 MM/HR (ref 0–36)

## 2023-12-11 PROCEDURE — 36415 COLL VENOUS BLD VENIPUNCTURE: CPT | Performed by: INTERNAL MEDICINE

## 2023-12-11 PROCEDURE — 99999 PR PBB SHADOW E&M-EST. PATIENT-LVL III: CPT | Mod: PBBFAC,,, | Performed by: INTERNAL MEDICINE

## 2023-12-11 PROCEDURE — 3008F PR BODY MASS INDEX (BMI) DOCUMENTED: ICD-10-PCS | Mod: CPTII,S$GLB,, | Performed by: INTERNAL MEDICINE

## 2023-12-11 PROCEDURE — 1159F MED LIST DOCD IN RCRD: CPT | Mod: CPTII,S$GLB,, | Performed by: INTERNAL MEDICINE

## 2023-12-11 PROCEDURE — 99205 OFFICE O/P NEW HI 60 MIN: CPT | Mod: S$GLB,,, | Performed by: INTERNAL MEDICINE

## 2023-12-11 PROCEDURE — 3008F BODY MASS INDEX DOCD: CPT | Mod: CPTII,S$GLB,, | Performed by: INTERNAL MEDICINE

## 2023-12-11 PROCEDURE — 3074F SYST BP LT 130 MM HG: CPT | Mod: CPTII,S$GLB,, | Performed by: INTERNAL MEDICINE

## 2023-12-11 PROCEDURE — 1160F PR REVIEW ALL MEDS BY PRESCRIBER/CLIN PHARMACIST DOCUMENTED: ICD-10-PCS | Mod: CPTII,S$GLB,, | Performed by: INTERNAL MEDICINE

## 2023-12-11 PROCEDURE — 85652 RBC SED RATE AUTOMATED: CPT | Performed by: INTERNAL MEDICINE

## 2023-12-11 PROCEDURE — 99205 PR OFFICE/OUTPT VISIT, NEW, LEVL V, 60-74 MIN: ICD-10-PCS | Mod: S$GLB,,, | Performed by: INTERNAL MEDICINE

## 2023-12-11 PROCEDURE — 1159F PR MEDICATION LIST DOCUMENTED IN MEDICAL RECORD: ICD-10-PCS | Mod: CPTII,S$GLB,, | Performed by: INTERNAL MEDICINE

## 2023-12-11 PROCEDURE — 1160F RVW MEDS BY RX/DR IN RCRD: CPT | Mod: CPTII,S$GLB,, | Performed by: INTERNAL MEDICINE

## 2023-12-11 PROCEDURE — 3079F PR MOST RECENT DIASTOLIC BLOOD PRESSURE 80-89 MM HG: ICD-10-PCS | Mod: CPTII,S$GLB,, | Performed by: INTERNAL MEDICINE

## 2023-12-11 PROCEDURE — 99999 PR PBB SHADOW E&M-EST. PATIENT-LVL III: ICD-10-PCS | Mod: PBBFAC,,, | Performed by: INTERNAL MEDICINE

## 2023-12-11 PROCEDURE — 3074F PR MOST RECENT SYSTOLIC BLOOD PRESSURE < 130 MM HG: ICD-10-PCS | Mod: CPTII,S$GLB,, | Performed by: INTERNAL MEDICINE

## 2023-12-11 PROCEDURE — 86140 C-REACTIVE PROTEIN: CPT | Performed by: INTERNAL MEDICINE

## 2023-12-11 PROCEDURE — 3079F DIAST BP 80-89 MM HG: CPT | Mod: CPTII,S$GLB,, | Performed by: INTERNAL MEDICINE

## 2023-12-11 PROCEDURE — 81374 HLA I TYPING 1 ANTIGEN LR: CPT | Mod: PO | Performed by: INTERNAL MEDICINE

## 2023-12-11 NOTE — PROGRESS NOTES
Chief Complaint   Patient presents with    Disease Management       Patient was referred by     History of presenting illness      38 year old female comes with low back pain-right lower back- for 9 weeks   Had an episode in may 2023- sudden in onset- subsided with physical therapy- lasted for 4 weeks    Pain :  Constant throb-right lower back  Cough and moving makes it worse  Rest makes it better,heat pack makes it better,sitting on a heat pack makes it better  EMS-eased by diclofenac-1 and half hour stiffness  Sitting at a desk- stiffness gets worse,moving around just casually eases  Rolling over -she has pain  When she wakes up when her child cries,she finds herself stiff and she doesn't feel comfortable    Diclofenac really helps  7 dropped down to 5  Takes robaxin 1/2 at night     Right elbow hurts  She fell in 2022- broke a bone   Pain since then comes and goes  Could be weather related-not sure  No swelling     Left elbow-no pain  Small joints no pain  No dactylitis      Has raynaud's  Small bumps-painful on the fingers- come and go- not related to anything  Red raised  Lasts for a week or two  Biopsy in the past-high school years- normal    Dry eyes+    GERD  Diarrhea and stomach cramping -recurrent episodes last for 5 days  May 2023- it lasted for 18 days  No blood and mucus     1 miscarriage in 2018-first   Post partum pre -eclampsia with the first child  Post partum bleeding after the second child     No skin rashes,malar rash,photosensitivity   No telangiectasias   No calcinosis   No psoriasis   No patchy alopecia   No oral and nasal ulcers   No dry mouth   No pleurisy or any cardiopulmonary complaints   No dysphagia,diplopia and dysphonia and muscle weakness   No n/v/d/c   No acid reflux+   No cytopenias   No renal issues   No blood clots   No fever,chills,night sweats,weight loss and loss of appetite   No pregnancy complications   No new onset headaches   No recurrent conjunctivitis or  uveitis or scleritis or episcleritis   No chronic or bloody diarrhea with no u colitis or crohn's /inflammatory bowel disease   No vaginal or urethral  d/c/STDs/no ulcers   No unexplained lymphadenopathy,parotitis   No seizures,strokes,psychosis  No sclerodactyly  No puffy hands  No perioral tightness     Labs     CBC,CMP nml    MRI LS spine and Sacrum and coccyx      Past history  Anxiety and depression-on zoloft and wellbutrin    Family history  Rheumatoid arthritis- dad at age 65    Social history  No smoking,alcohol  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   HENT:  Negative for mouth sores and trouble swallowing.    Eyes:  Negative for redness.   Respiratory:  Negative for cough and shortness of breath.    Cardiovascular:  Negative for chest pain.   Gastrointestinal:  Negative for constipation and diarrhea.   Genitourinary:  Negative for dysuria and genital sores.   Skin:  Negative for rash.   Neurological:  Negative for headaches.   Hematological:  Does not bruise/bleed easily.             Severe tenderness lumbar spine   Tenderness -severe right SI joint  Forward flexion- low back/right SI joint pain    Physical Exam   Constitutional: She is oriented to person, place, and time. No distress.   HENT:   Head: Normocephalic.   Mouth/Throat: Oropharynx is clear and moist.   Eyes: Pupils are equal, round, and reactive to light. Conjunctivae are normal. Right eye exhibits no discharge. Left eye exhibits no discharge. No scleral icterus.   Neck: No thyromegaly present.   Cardiovascular: Normal rate, regular rhythm and normal heart sounds.   Pulmonary/Chest: Effort normal and breath sounds normal. No stridor.   Abdominal: Soft. Bowel sounds are normal.   Musculoskeletal:         General: Normal range of motion.      Cervical back: Normal range of motion.   Lymphadenopathy:     She has no cervical adenopathy.   Neurological: She is alert and  oriented to person, place, and time.   Skin: Skin is warm. No rash noted. She is not diaphoretic.   Psychiatric: Affect and judgment normal.           Assessment       Patient is a very pleasant 38-year-old female who comes with a 9 week history of right low back pain, she describes the pain as a constant throbbing in the right lower back.  The pain seems to get worse both at rest and with extreme exertion.  She does not report waking up early morning with severe low back pain but does wake up multiple times at night to care for her child when she finds herself stiff and uncomfortable.  The pain is worse with coughing and moving.  There is significant morning stiffness that lasts for up to 1-1/2 hour.  She does report significant improvement with anti-inflammatories.  Rheumatologic review of systems is significant for Raynaud's as well as dry eyes.  She mentions having bumps in the axillary region and her  who is a physician thinks she might have had hidradenitis.  She has also had gastrointestinal symptoms periodically, the most recent episode lasted for at least 20-30 days with a significant amount of weight loss due to diarrhea and stomach cramping, with no blood and mucus in stools.    Her past medical history is significant for anxiety and depression it seems pretty well controlled.  Her pregnancies have been significant in that she has had 1 1st trimester miscarriage and 1 preeclampsia and 1 postpartum bleeding, she denies any large for birth weight babies.    Physical exam is significant for severe tenderness in the lumbar spine and pain in the right sacroiliac joint with provocative maneuvers.    She does not have any peripheral arthritis, enthesitis, dactylitis.  No psoriasis      1. Sacroiliitis    2. SI (sacroiliac) joint dysfunction        Reviewed labs/xrays  Reviewed medications    Ordered labs /xrays    I have reviewed the MRIs with 2 radiologists independently, and have discussed the clinical  findings, to correlate with the radiologic findings.    She does have sacroiliitis-because of the subchondral edema in the mark inferior aspect of the right sacroiliac joint, she does not have any erosions or synovitis in the site.  On the posterior inferior aspect of the right sacroiliac joint, there are somewhat similar but milder changes.  She does not have any other joint inflammation in the pelvis or the lumbar spine.  She does meet the MRI-ASAS classification criteria.    If we apply the AS AS classification criteria      I use the ASAS criteria on MRI to evaluate for active sacroiliitis. She has subchondral edema and enhancement on the right, which meets criteria for active sacroiliitis. It's a small region and there were no other signs of joint inflammation in the pelvis or L spine, so by nonspecific I mean I don't know the etiology     After discussing the case with 2 radiologists, her  who is a physician and the patient who is a registered nurse a shared decision making has been made to proceed with treating this as spondyloarthritis.    New problem     Plan      HLAB27  ESR  CRP today    I requested that she speaks to her gastroenterologist and asks for a colonoscopy and evaluation for inflammatory bowel disease.  I have messaged him as well.    Then proceed with DMARD labs and CBC,CMP    Sent Humira to the [facility].    Vaccinations discussed.    More than 60 minutes spent taking care of the patient.      Return to clinic in 3 months.    Catie was seen today for disease management.    Diagnoses and all orders for this visit:    Sacroiliitis  -     Ambulatory referral/consult to Rheumatology  -     HLA B27 Antigen; Future  -     Sedimentation rate; Future  -     C-Reactive Protein; Future    SI (sacroiliac) joint dysfunction  -     Ambulatory referral/consult to Rheumatology        Medication changes made  Refilled medications  Toxicity issues discussed    Old records reviewed  and summarised  Records are from [facility]      Follow up in 3 months    Notes will be sent to PCP    Preventive medicine

## 2023-12-13 ENCOUNTER — HOSPITAL ENCOUNTER (OUTPATIENT)
Dept: RADIOLOGY | Facility: OTHER | Age: 38
Discharge: HOME | End: 2023-12-13
Attending: INTERNAL MEDICINE
Payer: COMMERCIAL

## 2023-12-13 ENCOUNTER — PATIENT MESSAGE (OUTPATIENT)
Dept: ADMINISTRATIVE | Facility: OTHER | Age: 38
End: 2023-12-13
Payer: COMMERCIAL

## 2023-12-13 DIAGNOSIS — R05.3 CHRONIC COUGH: ICD-10-CM

## 2023-12-13 PROCEDURE — 71046 X-RAY EXAM CHEST 2 VIEWS: CPT | Mod: TC,FY

## 2023-12-13 PROCEDURE — 71046 X-RAY EXAM CHEST 2 VIEWS: CPT | Mod: 26,,, | Performed by: RADIOLOGY

## 2023-12-13 PROCEDURE — 71046 XR CHEST PA AND LATERAL: ICD-10-PCS | Mod: 26,,, | Performed by: RADIOLOGY

## 2023-12-14 ENCOUNTER — PATIENT MESSAGE (OUTPATIENT)
Dept: RHEUMATOLOGY | Facility: CLINIC | Age: 38
End: 2023-12-14
Payer: COMMERCIAL

## 2023-12-14 ENCOUNTER — PATIENT MESSAGE (OUTPATIENT)
Dept: ADMINISTRATIVE | Facility: OTHER | Age: 38
End: 2023-12-14
Payer: COMMERCIAL

## 2023-12-15 ENCOUNTER — PATIENT MESSAGE (OUTPATIENT)
Dept: GASTROENTEROLOGY | Facility: CLINIC | Age: 38
End: 2023-12-15
Payer: COMMERCIAL

## 2023-12-15 ENCOUNTER — PATIENT MESSAGE (OUTPATIENT)
Dept: SPINE | Facility: CLINIC | Age: 38
End: 2023-12-15
Payer: COMMERCIAL

## 2023-12-15 ENCOUNTER — PATIENT MESSAGE (OUTPATIENT)
Dept: RHEUMATOLOGY | Facility: CLINIC | Age: 38
End: 2023-12-15
Payer: COMMERCIAL

## 2023-12-15 DIAGNOSIS — M46.1 SACROILIITIS: Primary | ICD-10-CM

## 2023-12-18 ENCOUNTER — LAB VISIT (OUTPATIENT)
Dept: LAB | Facility: OTHER | Age: 38
End: 2023-12-18
Attending: INTERNAL MEDICINE
Payer: COMMERCIAL

## 2023-12-18 ENCOUNTER — IMMUNIZATION (OUTPATIENT)
Dept: INTERNAL MEDICINE | Facility: CLINIC | Age: 38
End: 2023-12-18
Payer: COMMERCIAL

## 2023-12-18 ENCOUNTER — PATIENT MESSAGE (OUTPATIENT)
Dept: PULMONOLOGY | Facility: CLINIC | Age: 38
End: 2023-12-18
Payer: COMMERCIAL

## 2023-12-18 DIAGNOSIS — M46.1 SACROILIITIS: ICD-10-CM

## 2023-12-18 DIAGNOSIS — Z12.11 SCREENING FOR COLON CANCER: Primary | ICD-10-CM

## 2023-12-18 DIAGNOSIS — Z23 NEED FOR VACCINATION: Primary | ICD-10-CM

## 2023-12-18 LAB
ALBUMIN SERPL BCP-MCNC: 3.8 G/DL (ref 3.5–5.2)
ALP SERPL-CCNC: 21 U/L (ref 55–135)
ALT SERPL W/O P-5'-P-CCNC: 18 U/L (ref 10–44)
ANION GAP SERPL CALC-SCNC: 7 MMOL/L (ref 8–16)
AST SERPL-CCNC: 20 U/L (ref 10–40)
BASOPHILS # BLD AUTO: 0.03 K/UL (ref 0–0.2)
BASOPHILS NFR BLD: 0.5 % (ref 0–1.9)
BILIRUB SERPL-MCNC: 0.9 MG/DL (ref 0.1–1)
BUN SERPL-MCNC: 11 MG/DL (ref 6–20)
CALCIUM SERPL-MCNC: 8.8 MG/DL (ref 8.7–10.5)
CHLORIDE SERPL-SCNC: 109 MMOL/L (ref 95–110)
CO2 SERPL-SCNC: 23 MMOL/L (ref 23–29)
CREAT SERPL-MCNC: 0.9 MG/DL (ref 0.5–1.4)
CRP SERPL-MCNC: 0.7 MG/L (ref 0–8.2)
DIFFERENTIAL METHOD: ABNORMAL
EOSINOPHIL # BLD AUTO: 0.1 K/UL (ref 0–0.5)
EOSINOPHIL NFR BLD: 2.1 % (ref 0–8)
ERYTHROCYTE [DISTWIDTH] IN BLOOD BY AUTOMATED COUNT: 11.8 % (ref 11.5–14.5)
ERYTHROCYTE [SEDIMENTATION RATE] IN BLOOD: 2 MM/HR (ref 0–20)
EST. GFR  (NO RACE VARIABLE): >60 ML/MIN/1.73 M^2
GLUCOSE SERPL-MCNC: 92 MG/DL (ref 70–110)
HAV IGG SER QL IA: REACTIVE
HBV CORE AB SERPL QL IA: NORMAL
HBV SURFACE AB SER-ACNC: 31.67 MIU/ML
HBV SURFACE AB SER-ACNC: REACTIVE M[IU]/ML
HBV SURFACE AG SERPL QL IA: NORMAL
HCT VFR BLD AUTO: 39.4 % (ref 37–48.5)
HCV AB SERPL QL IA: NORMAL
HGB BLD-MCNC: 12.8 G/DL (ref 12–16)
HIV 1+2 AB+HIV1 P24 AG SERPL QL IA: NORMAL
IMM GRANULOCYTES # BLD AUTO: 0.01 K/UL (ref 0–0.04)
IMM GRANULOCYTES NFR BLD AUTO: 0.2 % (ref 0–0.5)
LYMPHOCYTES # BLD AUTO: 1.3 K/UL (ref 1–4.8)
LYMPHOCYTES NFR BLD: 22.9 % (ref 18–48)
MCH RBC QN AUTO: 29.8 PG (ref 27–31)
MCHC RBC AUTO-ENTMCNC: 32.5 G/DL (ref 32–36)
MCV RBC AUTO: 92 FL (ref 82–98)
MONOCYTES # BLD AUTO: 0.6 K/UL (ref 0.3–1)
MONOCYTES NFR BLD: 9.7 % (ref 4–15)
NEUTROPHILS # BLD AUTO: 3.8 K/UL (ref 1.8–7.7)
NEUTROPHILS NFR BLD: 64.6 % (ref 38–73)
NRBC BLD-RTO: 0 /100 WBC
PLATELET # BLD AUTO: 328 K/UL (ref 150–450)
PMV BLD AUTO: 8.8 FL (ref 9.2–12.9)
POTASSIUM SERPL-SCNC: 4.2 MMOL/L (ref 3.5–5.1)
PROT SERPL-MCNC: 7.2 G/DL (ref 6–8.4)
RBC # BLD AUTO: 4.29 M/UL (ref 4–5.4)
RPR SER QL: NORMAL
SODIUM SERPL-SCNC: 139 MMOL/L (ref 136–145)
WBC # BLD AUTO: 5.85 K/UL (ref 3.9–12.7)

## 2023-12-18 PROCEDURE — 90480 COVID-19 VAC, MRNA 2023 (MODERNA)(PF) 50 MCG/0.5 ML IM SUSR (12+): ICD-10-PCS | Mod: S$GLB,,, | Performed by: INTERNAL MEDICINE

## 2023-12-18 PROCEDURE — 86592 SYPHILIS TEST NON-TREP QUAL: CPT | Performed by: INTERNAL MEDICINE

## 2023-12-18 PROCEDURE — 86803 HEPATITIS C AB TEST: CPT | Performed by: INTERNAL MEDICINE

## 2023-12-18 PROCEDURE — 85651 RBC SED RATE NONAUTOMATED: CPT | Performed by: INTERNAL MEDICINE

## 2023-12-18 PROCEDURE — 85025 COMPLETE CBC W/AUTO DIFF WBC: CPT | Performed by: INTERNAL MEDICINE

## 2023-12-18 PROCEDURE — 86682 HELMINTH ANTIBODY: CPT | Performed by: INTERNAL MEDICINE

## 2023-12-18 PROCEDURE — 90480 ADMN SARSCOV2 VAC 1/ONLY CMP: CPT | Mod: S$GLB,,, | Performed by: INTERNAL MEDICINE

## 2023-12-18 PROCEDURE — 86480 TB TEST CELL IMMUN MEASURE: CPT | Performed by: INTERNAL MEDICINE

## 2023-12-18 PROCEDURE — 91322 COVID-19 VAC, MRNA 2023 (MODERNA)(PF) 50 MCG/0.5 ML IM SUSR (12+): ICD-10-PCS | Mod: S$GLB,,, | Performed by: INTERNAL MEDICINE

## 2023-12-18 PROCEDURE — 36415 COLL VENOUS BLD VENIPUNCTURE: CPT | Performed by: INTERNAL MEDICINE

## 2023-12-18 PROCEDURE — 91322 SARSCOV2 VAC 50 MCG/0.5ML IM: CPT | Mod: S$GLB,,, | Performed by: INTERNAL MEDICINE

## 2023-12-18 PROCEDURE — 86704 HEP B CORE ANTIBODY TOTAL: CPT | Performed by: INTERNAL MEDICINE

## 2023-12-18 PROCEDURE — 86706 HEP B SURFACE ANTIBODY: CPT | Performed by: INTERNAL MEDICINE

## 2023-12-18 PROCEDURE — 86790 VIRUS ANTIBODY NOS: CPT | Performed by: INTERNAL MEDICINE

## 2023-12-18 PROCEDURE — 80053 COMPREHEN METABOLIC PANEL: CPT | Performed by: INTERNAL MEDICINE

## 2023-12-18 PROCEDURE — 86787 VARICELLA-ZOSTER ANTIBODY: CPT | Performed by: INTERNAL MEDICINE

## 2023-12-18 PROCEDURE — 87340 HEPATITIS B SURFACE AG IA: CPT | Performed by: INTERNAL MEDICINE

## 2023-12-18 PROCEDURE — 87389 HIV-1 AG W/HIV-1&-2 AB AG IA: CPT | Performed by: INTERNAL MEDICINE

## 2023-12-18 PROCEDURE — 86140 C-REACTIVE PROTEIN: CPT | Performed by: INTERNAL MEDICINE

## 2023-12-18 RX ORDER — SODIUM CHLORIDE 0.9 % (FLUSH) 0.9 %
3 SYRINGE (ML) INJECTION
Status: CANCELLED | OUTPATIENT
Start: 2023-12-18

## 2023-12-18 RX ORDER — SOD SULF/POT CHLORIDE/MAG SULF 1.479 G
12 TABLET ORAL DAILY
Qty: 24 TABLET | Refills: 0 | Status: ON HOLD | OUTPATIENT
Start: 2023-12-18 | End: 2023-12-22

## 2023-12-19 LAB
GAMMA INTERFERON BACKGROUND BLD IA-ACNC: 0.03 IU/ML
M TB IFN-G CD4+ BCKGRND COR BLD-ACNC: 0 IU/ML
M TB IFN-G CD4+ BCKGRND COR BLD-ACNC: 0 IU/ML
MITOGEN IGNF BCKGRD COR BLD-ACNC: 9.97 IU/ML
TB GOLD PLUS: NEGATIVE
VARICELLA INTERPRETATION: POSITIVE
VARICELLA ZOSTER IGG: >4000 AU/ML

## 2023-12-20 ENCOUNTER — PATIENT MESSAGE (OUTPATIENT)
Dept: RHEUMATOLOGY | Facility: CLINIC | Age: 38
End: 2023-12-20
Payer: COMMERCIAL

## 2023-12-20 LAB — STRONGYLOIDES ANTIBODY IGG: NEGATIVE

## 2023-12-21 ENCOUNTER — PATIENT MESSAGE (OUTPATIENT)
Dept: INTERNAL MEDICINE | Facility: CLINIC | Age: 38
End: 2023-12-21
Payer: COMMERCIAL

## 2023-12-21 ENCOUNTER — PATIENT MESSAGE (OUTPATIENT)
Dept: RHEUMATOLOGY | Facility: CLINIC | Age: 38
End: 2023-12-21
Payer: COMMERCIAL

## 2023-12-22 PROBLEM — M45.9 ANKYLOSING SPONDYLITIS: Status: ACTIVE | Noted: 2023-12-22

## 2023-12-26 ENCOUNTER — OFFICE VISIT (OUTPATIENT)
Dept: URGENT CARE | Facility: CLINIC | Age: 38
End: 2023-12-26
Payer: COMMERCIAL

## 2023-12-26 VITALS
HEART RATE: 94 BPM | OXYGEN SATURATION: 97 % | TEMPERATURE: 99 F | DIASTOLIC BLOOD PRESSURE: 86 MMHG | HEIGHT: 69 IN | RESPIRATION RATE: 18 BRPM | WEIGHT: 163 LBS | BODY MASS INDEX: 24.14 KG/M2 | SYSTOLIC BLOOD PRESSURE: 130 MMHG

## 2023-12-26 DIAGNOSIS — H61.22 IMPACTED CERUMEN OF LEFT EAR: ICD-10-CM

## 2023-12-26 DIAGNOSIS — J02.9 SORE THROAT: ICD-10-CM

## 2023-12-26 DIAGNOSIS — J02.0 STREP PHARYNGITIS: Primary | ICD-10-CM

## 2023-12-26 DIAGNOSIS — H92.02 OTALGIA OF LEFT EAR: ICD-10-CM

## 2023-12-26 LAB
CTP QC/QA: YES
MOLECULAR STREP A: POSITIVE

## 2023-12-26 PROCEDURE — 69210 REMOVE IMPACTED EAR WAX UNI: CPT | Mod: S$GLB,,, | Performed by: NURSE PRACTITIONER

## 2023-12-26 PROCEDURE — 99213 PR OFFICE/OUTPT VISIT, EST, LEVL III, 20-29 MIN: ICD-10-PCS | Mod: 25,S$GLB,, | Performed by: NURSE PRACTITIONER

## 2023-12-26 PROCEDURE — 69210 EAR CERUMEN REMOVAL: ICD-10-PCS | Mod: S$GLB,,, | Performed by: NURSE PRACTITIONER

## 2023-12-26 PROCEDURE — 99213 OFFICE O/P EST LOW 20 MIN: CPT | Mod: 25,S$GLB,, | Performed by: NURSE PRACTITIONER

## 2023-12-26 PROCEDURE — 87651 STREP A DNA AMP PROBE: CPT | Mod: QW,S$GLB,, | Performed by: NURSE PRACTITIONER

## 2023-12-26 PROCEDURE — 87651 POCT STREP A MOLECULAR: ICD-10-PCS | Mod: QW,S$GLB,, | Performed by: NURSE PRACTITIONER

## 2023-12-26 RX ORDER — FLUTICASONE PROPIONATE 100 UG/1
POWDER, METERED RESPIRATORY (INHALATION)
COMMUNITY
Start: 2023-12-16 | End: 2023-12-26 | Stop reason: ALTCHOICE

## 2023-12-26 RX ORDER — AMOXICILLIN 500 MG/1
500 CAPSULE ORAL EVERY 12 HOURS
Qty: 20 CAPSULE | Refills: 0 | Status: SHIPPED | OUTPATIENT
Start: 2023-12-26 | End: 2024-01-05

## 2023-12-26 NOTE — PROGRESS NOTES
"Subjective:      Patient ID: Catie Monge is a 38 y.o. female.    Vitals:  height is 5' 9" (1.753 m) and weight is 73.9 kg (163 lb). Her temperature is 98.7 °F (37.1 °C). Her blood pressure is 130/86 and her pulse is 94. Her respiration is 18 and oxygen saturation is 97%.     Chief Complaint: Sore Throat    Pt presents complaints of a sore throat and left ear pain. Symptoms started 2 days ago. Was coming and going until last night now constant pain. Entire throat. Pain with swallowing. Pain level 7. OTC tylenol taken this morning  Provider note begins below     and daughter had strep.  Reports left ear pain.     Sore Throat   This is a new problem. Episode onset: 2 days. The problem has been gradually worsening. Sore throat worse side: entire. There has been no fever. The pain is at a severity of 7/10. The pain is mild. Associated symptoms include coughing, ear pain, swollen glands and trouble swallowing. Pertinent negatives include no abdominal pain, congestion, diarrhea, drooling, ear discharge, headaches, hoarse voice, plugged ear sensation, neck pain, shortness of breath, stridor or vomiting. She has had no exposure to strep or mono. She has tried acetaminophen for the symptoms. The treatment provided mild relief.   Otalgia   There is pain in the left ear. This is a new problem. Episode onset: 2 days. The problem occurs constantly. The problem has been gradually worsening. There has been no fever. The pain is at a severity of 7/10. The pain is mild. Associated symptoms include coughing and a sore throat. Pertinent negatives include no abdominal pain, diarrhea, ear discharge, headaches, hearing loss, neck pain, rash, rhinorrhea or vomiting. She has tried acetaminophen for the symptoms. The treatment provided no relief.     HENT:  Positive for ear pain, sore throat and trouble swallowing. Negative for ear discharge, hearing loss, drooling and congestion.    Neck: Negative for neck pain. "   Respiratory:  Positive for cough. Negative for shortness of breath and stridor.    Gastrointestinal:  Negative for abdominal pain, vomiting and diarrhea.   Skin:  Negative for rash.   Neurological:  Negative for headaches.      Objective:     Physical Exam   Constitutional: She is oriented to person, place, and time.   HENT:   Head: Normocephalic and atraumatic.   Ears:   Right Ear: No foreign bodies. Tympanic membrane is not scarred and not perforated. No middle ear effusion.   Left Ear: There is cerumen present.   Mouth/Throat: Uvula is midline. Posterior oropharyngeal erythema present. No oropharyngeal exudate.   Cardiovascular: Normal rate.   Pulmonary/Chest: Effort normal. No respiratory distress.   Abdominal: Normal appearance.   Neurological: She is alert and oriented to person, place, and time.   Skin: Skin is warm and dry.   Psychiatric: Her behavior is normal. Mood normal.       Results for orders placed or performed in visit on 12/26/23   POCT Strep A, Molecular   Result Value Ref Range    Molecular Strep A, POC Positive Negative     Acceptable Yes         Assessment:     1. Strep pharyngitis    2. Sore throat    3. Otalgia of left ear    4. Impacted cerumen of left ear        Plan:   If symptoms do not improve within 2-3 days of antibiotic therapy return to clinic. If you begin to have difficulty swallowing, pain more so on one side of throat, muffled voice or drooling go to ER immediately. Complete full course of antibiotic therapy.        Replace toothbrush in 2 days     Cerumen impaction  May take Sudafed 30 mg from behind the pharmacy counter 30 minutes prior to flight      Strep pharyngitis  -     (Magic mouthwash) 1:1:1 diphenhydrAMINE(Benadryl) 12.5mg/5ml liq, aluminum & magnesium hydroxide-simethicone (Maalox), LIDOcaine viscous 2%; Swish and spit 10 mLs every 4 (four) hours as needed (sore throat).  Dispense: 200 mL; Refill: 0    Sore throat  -     POCT Strep A, Molecular  -      (Magic mouthwash) 1:1:1 diphenhydrAMINE(Benadryl) 12.5mg/5ml liq, aluminum & magnesium hydroxide-simethicone (Maalox), LIDOcaine viscous 2%; Swish and spit 10 mLs every 4 (four) hours as needed (sore throat).  Dispense: 200 mL; Refill: 0    Otalgia of left ear  -     Ear wax removal  -     Ear Cerumen Removal    Impacted cerumen of left ear  -     Ear Cerumen Removal    Other orders  -     amoxicillin (AMOXIL) 500 MG capsule; Take 1 capsule (500 mg total) by mouth every 12 (twelve) hours. for 10 days  Dispense: 20 capsule; Refill: 0

## 2023-12-26 NOTE — PATIENT INSTRUCTIONS
If symptoms do not improve within 2-3 days of antibiotic therapy return to clinic. If you begin to have difficulty swallowing, pain more so on one side of throat, muffled voice or drooling go to ER immediately. Complete full course of antibiotic therapy.    Replace toothbrush in 2 days     May use debrox once per month

## 2023-12-27 ENCOUNTER — OFFICE VISIT (OUTPATIENT)
Dept: INTERNAL MEDICINE | Facility: CLINIC | Age: 38
End: 2023-12-27
Attending: INTERNAL MEDICINE
Payer: COMMERCIAL

## 2023-12-27 DIAGNOSIS — J02.0 STREP PHARYNGITIS: Primary | ICD-10-CM

## 2023-12-27 DIAGNOSIS — M45.9 ANKYLOSING SPONDYLITIS, UNSPECIFIED SITE OF SPINE: ICD-10-CM

## 2023-12-27 PROCEDURE — 99213 PR OFFICE/OUTPT VISIT, EST, LEVL III, 20-29 MIN: ICD-10-PCS | Mod: 95,,, | Performed by: INTERNAL MEDICINE

## 2023-12-27 PROCEDURE — 99213 OFFICE O/P EST LOW 20 MIN: CPT | Mod: 95,,, | Performed by: INTERNAL MEDICINE

## 2023-12-27 NOTE — PROGRESS NOTES
"The patient location is:  Patient Home   The chief complaint leading to consultation is:  Sore Throat    Subjective:         Pt updated me on her current strep pharyngitis and recent dx of ank spondylitis. Has not started humara yet.    Taking antibiotics for strep, fever "broke last night".      Catie Monge is a 38 y.o.  female who presents for Sore Throat  .   Review of Systems   Constitutional:  Positive for activity change and unexpected weight change.   HENT:  Positive for rhinorrhea and trouble swallowing. Negative for hearing loss.    Eyes:  Negative for discharge and visual disturbance.   Respiratory:  Negative for chest tightness and wheezing.    Cardiovascular:  Negative for chest pain and palpitations.   Gastrointestinal:  Negative for blood in stool, constipation, diarrhea and vomiting.   Endocrine: Negative for polydipsia and polyuria.   Genitourinary:  Negative for difficulty urinating, dysuria, hematuria and menstrual problem.   Musculoskeletal:  Positive for arthralgias and neck pain. Negative for joint swelling.   Neurological:  Positive for headaches. Negative for weakness.   Psychiatric/Behavioral:  Positive for dysphoric mood. Negative for confusion.        Patient Active Problem List   Diagnosis    Depression with anxiety - zoloft daily    Wears contact lenses    Ankylosing spondylitis       Past Medical History:   Diagnosis Date    Ankylosing spondylitis of unspecified sites in spine     Anxiety     No meds    Depression     Loose bowel movements        Past Surgical History:   Procedure Laterality Date    ANUS SURGERY      FISSURE    COLONOSCOPY N/A 12/22/2023    Procedure: COLONOSCOPY;  Surgeon: Brandy Chaney MD;  Location: [facility];  Service: Gastroenterology;  Laterality: N/A;    COLONOSCOPY W/ BIOPSIES AND POLYPECTOMY  12/22/2023    COSMETIC SURGERY      DILATION AND CURETTAGE OF UTERUS USING SUCTION N/A 09/13/2018    Procedure: DILATION AND CURETTAGE, UTERUS, USING SUCTION;  " Surgeon: Joselo Landry Jr., MD;  Location: [facility];  Service: OB/GYN;  Laterality: N/A;    NASAL SEPTUM SURGERY      TONSILLECTOMY, ADENOIDECTOMY      WISDOM TOOTH EXTRACTION         Family History   Problem Relation Age of Onset    Frontotemporal dementia Mother 72    Glaucoma Father     Hypertension Father     Rheum arthritis Father     Arthritis Father     Kyphosis Brother     Bipolar disorder Brother     Anxiety disorder Brother     Diabetes Mellitus Maternal Grandfather     No Known Problems Paternal Grandmother     Melanoma Paternal Grandfather     Breast cancer Neg Hx     Cancer Neg Hx     Ovarian cancer Neg Hx     Colon cancer Neg Hx     Macular degeneration Neg Hx     Retinal detachment Neg Hx     Amblyopia Neg Hx     Blindness Neg Hx     Cataracts Neg Hx     Strabismus Neg Hx     Esophageal cancer Neg Hx        Social History     Tobacco Use    Smoking status: Never    Smokeless tobacco: Never   Substance Use Topics    Alcohol use: Yes     Alcohol/week: 1.0 standard drink of alcohol     Types: 1 Glasses of wine per week     Comment: Social    Drug use: No       Objective:   Last menstrual period 12/01/2023.     Physical Exam  Constitutional:       General: She is not in acute distress.     Appearance: She is well-developed. She is not diaphoretic.   HENT:      Head: Normocephalic and atraumatic.   Eyes:      General: No scleral icterus.        Right eye: No discharge.         Left eye: No discharge.   Pulmonary:      Effort: Pulmonary effort is normal. No respiratory distress.   Skin:     Coloration: Skin is not pale.      Findings: No erythema.   Neurological:      Mental Status: She is alert and oriented to person, place, and time.   Psychiatric:         Behavior: Behavior normal.         Thought Content: Thought content normal.           Prior labs reviewed  Assessment/Plan:       1. Strep pharyngitis  Improving gradually, cont current plan    2. Ankylosing spondylitis, unspecified site of  spine    Cont current plan        Visit type: Virtual visit with synchronous audio and video    Total time spent with patient: 15 minutes    Each patient to whom he or she provides medical services by telemedicine is:  (1) informed of the relationship between the physician and patient and the respective role of any other health care provider with respect to management of the patient; and (2) notified that he or she may decline to receive medical services by telemedicine and may withdraw from such care at any time.  Medication List with Changes/Refills   Current Medications    ADALIMUMAB (HUMIRA PEN) PNKT INJECTION    Inject 1 pen  (40 mg total) into the skin every 14 (fourteen) days.    AMOXICILLIN (AMOXIL) 500 MG CAPSULE    Take 1 capsule (500 mg total) by mouth every 12 (twelve) hours. for 10 days    BUPROPION (WELLBUTRIN XL) 150 MG TB24 TABLET    Take 1 tablet (150 mg total) by mouth once daily.    CALCIUM CARBONATE 1250 MG CAPSULE    Take 1,250 mg by mouth 2 (two) times daily with meals.    CETIRIZINE (ZYRTEC) 10 MG TABLET    Take 10 mg by mouth every evening.    DICLOFENAC (VOLTAREN) 75 MG EC TABLET    Take 1 tablet (75 mg total) by mouth 2 (two) times daily.    DICYCLOMINE (BENTYL) 20 MG TABLET    Take 1 tablet (20 mg total) by mouth 3 (three) times daily as needed (abdominal pain).    DIPHENOXYLATE-ATROPINE 2.5-0.025 MG (LOMOTIL) 2.5-0.025 MG PER TABLET    Take 1 tablet by mouth 4 (four) times daily as needed for Diarrhea.    DROSPIRENONE-E.ESTRADIOL-LM.FA (BEYAZ/CALEB) 3-0.02-0.451 MG (24) (4) TAB    Take 1 tablet by mouth once daily.    MAGIC MOUTHWASH DIPHEN/ANTAC/LIDOC    Swish and spit 10 mLs every 4 (four) hours as needed (sore throat).    METHOCARBAMOL (ROBAXIN) 500 MG TAB    Take 1 tablet (500 mg total) by mouth 4 (four) times daily.    SERTRALINE (ZOLOFT) 25 MG TABLET    Take 1 tablet (25 mg total) by mouth once daily.

## 2023-12-29 DIAGNOSIS — F41.8 DEPRESSION WITH ANXIETY: ICD-10-CM

## 2023-12-29 RX ORDER — SERTRALINE HYDROCHLORIDE 25 MG/1
25 TABLET, FILM COATED ORAL DAILY
Qty: 90 TABLET | Refills: 3 | Status: SHIPPED | OUTPATIENT
Start: 2023-12-29

## 2023-12-29 NOTE — TELEPHONE ENCOUNTER
No care due was identified.  [facility] Embedded Care Due Messages. Reference number: [id number].   12/29/2023 11:54:37 AM CST

## 2023-12-29 NOTE — TELEPHONE ENCOUNTER
Refill Decision Note   Catie Saleem  is requesting a refill authorization.  Brief Assessment and Rationale for Refill:  Approve     Medication Therapy Plan:         Comments:     Note composed:1:32 PM 12/29/2023

## 2024-01-05 ENCOUNTER — OFFICE VISIT (OUTPATIENT)
Dept: OPTOMETRY | Facility: CLINIC | Age: 39
End: 2024-01-05
Payer: COMMERCIAL

## 2024-01-05 DIAGNOSIS — Z46.0 FITTING AND ADJUSTMENT OF SPECTACLES AND CONTACT LENSES: Primary | ICD-10-CM

## 2024-01-05 DIAGNOSIS — H52.13 MYOPIA, BILATERAL: Primary | ICD-10-CM

## 2024-01-05 DIAGNOSIS — M45.9 ANKYLOSING SPONDYLITIS, UNSPECIFIED SITE OF SPINE: ICD-10-CM

## 2024-01-05 DIAGNOSIS — Z46.0 FITTING AND ADJUSTMENT OF SPECTACLES AND CONTACT LENSES: ICD-10-CM

## 2024-01-05 DIAGNOSIS — Z97.3 WEARS CONTACT LENSES: ICD-10-CM

## 2024-01-05 DIAGNOSIS — H04.123 DRY EYE SYNDROME, BILATERAL: ICD-10-CM

## 2024-01-05 PROCEDURE — 99999 PR PBB SHADOW E&M-EST. PATIENT-LVL III: CPT | Mod: PBBFAC,,, | Performed by: OPTOMETRIST

## 2024-01-05 PROCEDURE — 92014 COMPRE OPH EXAM EST PT 1/>: CPT | Mod: S$GLB,,, | Performed by: OPTOMETRIST

## 2024-01-05 PROCEDURE — 99999 PR PBB SHADOW E&M-EST. PATIENT-LVL II: CPT | Mod: PBBFAC,,, | Performed by: OPTOMETRIST

## 2024-01-05 PROCEDURE — 92015 DETERMINE REFRACTIVE STATE: CPT | Mod: S$GLB,,, | Performed by: OPTOMETRIST

## 2024-01-05 PROCEDURE — 92310 CONTACT LENS FITTING OU: CPT | Mod: CSM,S$GLB,, | Performed by: OPTOMETRIST

## 2024-01-05 RX ORDER — CYCLOSPORINE 0.5 MG/ML
1 EMULSION OPHTHALMIC 2 TIMES DAILY
Qty: 180 EACH | Refills: 3 | Status: SHIPPED | OUTPATIENT
Start: 2024-01-05 | End: 2025-01-04

## 2024-01-05 NOTE — PROGRESS NOTES
HPI    Routine eye exam & Update CLS   DLS- 07/11/2022 Dr. Parnell     Pt sts vision seems stable with current glasses 2 year old. Vision with   cls seem to be stable.   Night vision seems to be a bit of a struggle due to glare.   Dry eyes has gotten worse since last visit use to have only with cl use   now feels it has evolved into chronic dry eyes.   Within the last 2-3 months dry eye has increased/become intolerable dx   within the last month auto immune disease. Will start Humira new med   today. Currently Preserv Free Systane tears at least BID OU.   Unable to wear daily disp CLS anymore over 4-5 hours daily   Denies any eye pain     (-)Flashes (+)Floaters OU unsure if they are not normal   (-)Itch, (-)tear, (-)burn, (+)Dryness.  (+) OTC Drops systane PF gtts   (-)Photophobia  (+)Glare driving at night     POHX: Chalazion removal 2018 OS Dr. Dominguez   No past eye sx   Myopic -  Myday toric CLS   Dry eyes     FAMOHX:   Father- Glaucoma & Uveitis   Last edited by Marie Ramon on 1/5/2024  8:59 AM.            Assessment /Plan     For exam results, see Encounter Report.    Myopia, bilateral  Fitting and adjustment of spectacles and contact lenses  Wears contact lenses   Change power OU   Gave trials of new Rx in my day toric   Remove nightly, replace daily   Ok to order if happy with vision and comfort OU    White without pressure of peripheral retina of both eyes  Disease ruled out after examination   Good overall ocular health, monitor yearly with DFE     Ankylosing spondylitis, unspecified site of spine  Dry eye syndrome, bilateral  -     cycloSPORINE (RESTASIS) 0.05 % ophthalmic emulsion; Place 1 drop into both eyes 2 (two) times daily.  Dispense: 180 each; Refill: 3  -  OR     lifitegrast (XIIDRA) 5 % Dpet; Apply 1 drop to eye every 12 (twelve) hours.  Dispense: 180 each; Refill: 4      RTC 1 year

## 2024-01-09 ENCOUNTER — PATIENT MESSAGE (OUTPATIENT)
Dept: OPTOMETRY | Facility: CLINIC | Age: 39
End: 2024-01-09
Payer: COMMERCIAL

## 2024-01-12 LAB
HLA B27 INTERPRETATION: NORMAL
HLA-B27 RELATED AG QL: NEGATIVE
HLA-B27 RELATED AG QL: NORMAL

## 2024-01-15 DIAGNOSIS — G89.29 CHRONIC MIDLINE LOW BACK PAIN WITH SCIATICA, SCIATICA LATERALITY UNSPECIFIED: Primary | ICD-10-CM

## 2024-01-15 DIAGNOSIS — M54.40 CHRONIC MIDLINE LOW BACK PAIN WITH SCIATICA, SCIATICA LATERALITY UNSPECIFIED: Primary | ICD-10-CM

## 2024-01-16 ENCOUNTER — PATIENT OUTREACH (OUTPATIENT)
Dept: OTHER | Facility: OTHER | Age: 39
End: 2024-01-16
Payer: COMMERCIAL

## 2024-01-16 NOTE — PROGRESS NOTES
[facility]cted [facility]: Patient Outreach    Yellow Flag - Patient in ineligible at this time for CB program. Interested in participating in Healthy Back, due to current diagnosis.

## 2024-01-26 ENCOUNTER — PATIENT MESSAGE (OUTPATIENT)
Dept: ADMINISTRATIVE | Facility: OTHER | Age: 39
End: 2024-01-26
Payer: COMMERCIAL

## 2024-01-26 NOTE — PROGRESS NOTES
Subjective:     Patient ID: Catie Monge is a 38 y.o. female.    Chief Complaint: Low-back Pain    Ms Monge is a 39 yo female here for follow up of low back pain.  The pain is the center of the back and to the right.  She was last seen by me on 12/4/2023 and had been going to PT and she has not gotten relief, she has been going to outside PT.  The pain has been 6 weeks.  She was sent to rheumatology and we discussed SI joint injection.  She was going to wait.  She feels like her back pain is much better.  She did see rheum and was told she does have AS, HLA b27 is not always positive. .  She has had humira and does feel like that has helped.  She has not been to healthy back.  She feels like diclofenac has been helpful.  She has been taking half a muscle relaxer at night as needed. The back constantly feels tight.  She is also stiff in the morning.  She feels like needs a couple of hours too loosen up,.  Diclofenac helps.  She feels like she does not need to pop her back as much which helps    MRI lumbar and sacrum  Lumbar spine:     ALIGNMENT: Normal.     BONE: No compression fractures.  No marrow replacing lesions.     JOINT: Intervertebral discs are well hydrated without height loss.  Facet joints are unremarkable.  Mild edema in the posterior aspect of the S1 superior endplate.     SPINAL CANAL: The conus medullaris has a normal appearance and terminates at the L1-2 level.  Cauda equina nerve roots are unremarkable.  No mass or collection. No abnormal enhancement.     PARASPINAL SOFT TISSUES: Unremarkable.     SIGNIFICANT FINDINGS BY LEVEL:     T12-L1 through L4-5: Unremarkable.     L5-S1: Small central protrusion, encroaching upon the right descending S1 nerve root (13:42).  No foraminal stenosis.     Sacrum/coccyx:     SACROILIAC JOINTS: There is subchondral bone marrow edema and enhancement involving the iliac side of the right sacroiliac joint (04:11).  No joint effusion, synovitis, or capsulitis. No  erosions or ankylosis.     OTHER JOINTS: Visualized hip joints are unremarkable.     BONE: No fracture, osteonecrosis, or marrow replacing lesion.     TENDONS: Regional tendons are intact.  No bursal collection.     SOFT TISSUES: Normal muscle bulk and signal intensity. The sciatic nerves are unremarkable.     MISCELLANEOUS: No visceral pelvic soft tissue abnormality.     Impression:     Small central disc protrusion at L5-S1, encroaching upon the right descending S1 nerve root.     Mild active sacroiliitis on the right, which could be degenerative or inflammatory.  No chronic structural lesions.            Review of Systems   Constitutional: Negative for weight gain and weight loss.   Cardiovascular:  Negative for chest pain.   Respiratory:  Negative for shortness of breath.    Musculoskeletal:  Positive for back pain. Negative for joint pain and joint swelling.   Gastrointestinal:  Negative for abdominal pain, bowel incontinence, nausea and vomiting.   Genitourinary:  Negative for bladder incontinence.   Neurological:  Negative for numbness and paresthesias.        Objective:     General: Catie is well-developed, well-nourished, appears stated age, in no acute distress, alert and oriented to time, place and person.     General    Vitals reviewed.  Constitutional: She is oriented to person, place, and time. She appears well-developed and well-nourished.   HENT:   Head: Normocephalic and atraumatic.   Pulmonary/Chest: Effort normal.   Neurological: She is alert and oriented to person, place, and time.   Psychiatric: She has a normal mood and affect. Her behavior is normal. Judgment and thought content normal.     General Musculoskeletal Exam   Gait: normal     Right Ankle/Foot Exam     Tests   Heel Walk: able to perform  Tiptoe Walk: able to perform    Left Ankle/Foot Exam     Tests   Heel Walk: able to perform  Tiptoe Walk: able to perform  Back (L-Spine & T-Spine) / Neck (C-Spine) Exam     Tenderness Right  paramedian tenderness of the Sacrum.     Back (L-Spine & T-Spine) Range of Motion   Extension:  20   Flexion:  90   Lateral bend right:  20   Lateral bend left:  20   Rotation right:  40   Rotation left:  40     Spinal Sensation   Right Side Sensation  C-Spine Level: normal   L-Spine Level: normal  S-Spine Level: normal  Left Side Sensation  C-Spine Level: normal  L-Spine Level: normal  S-Spine Level: normal    Back (L-Spine & T-Spine) Tests   Right Side Tests  Straight leg raise:        Sitting SLR: > 70 degrees    Left Side Tests  Straight leg raise:       Sitting SLR: > 70 degrees      Other   She has no scoliosis .  Spinal Kyphosis:  Absent      Muscle Strength   Right Upper Extremity   Biceps: 5/5   Deltoid:  5/5  Triceps:  5/5  Wrist extension: 5/5   Finger Flexors:  5/5  Left Upper Extremity  Biceps: 5/5   Deltoid:  5/5  Triceps:  5/5  Wrist extension: 5/5   Finger Flexors:  5/5  Right Lower Extremity   Hip Flexion: 5/5   Quadriceps:  5/5   Anterior tibial:  5/5   EHL:  5/5  Left Lower Extremity   Hip Flexion: 5/5   Quadriceps:  5/5   Anterior tibial:  5/5   EHL:  5/5    Reflexes     Left Side  Biceps:  2+  Triceps:  2+  Brachioradialis:  2+  Achilles:  2+  Left Juarez's Sign:  Absent  Babinski Sign:  absent  Quadriceps:  2+    Right Side   Biceps:  2+  Triceps:  2+  Brachioradialis:  2+  Achilles:  2+  Right Juarez's Sign:  absent  Babinski Sign:  absent  Quadriceps:  2+    Vascular Exam     Right Pulses        Carotid:                  2+    Left Pulses        Carotid:                  2+          Assessment:     1. Sacroiliitis    2. SI (sacroiliac) joint dysfunction    3. Ankylosing spondylitis, unspecified site of spine         Plan:     Orders Placed This Encounter    [facility]    diclofenac (VOLTAREN) 75 MG EC tablet     We discussed back pain and the nature of back pain. She was diagnosed with AS and on humira is feeling better.  She also feels like  diclofenac has helped.  She is no longer having paresthesias  MRI of the lumbar spine and sacrum reviewed.  She does have small protrusion, but no radicular pain but also has some right sacroiliitis   Si joint injection an option when she decides  Healthy back pattern 1 lumbar  Diclofenac 75mg po BID as needed  Robaxin 500mg po QID (we discussed can take a half if full pill is too strong or 2 at night to help her sleep) as needed  Rheumatology continue to follow  RTC 1 year or sooner if needed    Follow-up: No follow-ups on file. If there are any questions prior to this, the patient was instructed to contact the office.

## 2024-01-29 ENCOUNTER — OFFICE VISIT (OUTPATIENT)
Dept: SPINE | Facility: CLINIC | Age: 39
End: 2024-01-29
Attending: PHYSICAL MEDICINE & REHABILITATION
Payer: COMMERCIAL

## 2024-01-29 VITALS
HEART RATE: 74 BPM | WEIGHT: 167 LBS | RESPIRATION RATE: 12 BRPM | HEIGHT: 69 IN | SYSTOLIC BLOOD PRESSURE: 123 MMHG | BODY MASS INDEX: 24.73 KG/M2 | DIASTOLIC BLOOD PRESSURE: 78 MMHG

## 2024-01-29 DIAGNOSIS — M53.3 SI (SACROILIAC) JOINT DYSFUNCTION: ICD-10-CM

## 2024-01-29 DIAGNOSIS — M45.9 ANKYLOSING SPONDYLITIS, UNSPECIFIED SITE OF SPINE: ICD-10-CM

## 2024-01-29 DIAGNOSIS — M46.1 SACROILIITIS: Primary | ICD-10-CM

## 2024-01-29 PROCEDURE — 99999 PR PBB SHADOW E&M-EST. PATIENT-LVL IV: CPT | Mod: PBBFAC,,, | Performed by: PHYSICAL MEDICINE & REHABILITATION

## 2024-01-29 PROCEDURE — 1160F RVW MEDS BY RX/DR IN RCRD: CPT | Mod: CPTII,S$GLB,, | Performed by: PHYSICAL MEDICINE & REHABILITATION

## 2024-01-29 PROCEDURE — 3008F BODY MASS INDEX DOCD: CPT | Mod: CPTII,S$GLB,, | Performed by: PHYSICAL MEDICINE & REHABILITATION

## 2024-01-29 PROCEDURE — 99214 OFFICE O/P EST MOD 30 MIN: CPT | Mod: S$GLB,,, | Performed by: PHYSICAL MEDICINE & REHABILITATION

## 2024-01-29 PROCEDURE — 1159F MED LIST DOCD IN RCRD: CPT | Mod: CPTII,S$GLB,, | Performed by: PHYSICAL MEDICINE & REHABILITATION

## 2024-01-29 PROCEDURE — 3078F DIAST BP <80 MM HG: CPT | Mod: CPTII,S$GLB,, | Performed by: PHYSICAL MEDICINE & REHABILITATION

## 2024-01-29 PROCEDURE — 3074F SYST BP LT 130 MM HG: CPT | Mod: CPTII,S$GLB,, | Performed by: PHYSICAL MEDICINE & REHABILITATION

## 2024-01-29 RX ORDER — DICLOFENAC SODIUM 75 MG/1
75 TABLET, DELAYED RELEASE ORAL 2 TIMES DAILY PRN
Qty: 60 TABLET | Refills: 11 | Status: SHIPPED | OUTPATIENT
Start: 2024-01-29

## 2024-01-31 ENCOUNTER — OFFICE VISIT (OUTPATIENT)
Dept: DERMATOLOGY | Facility: CLINIC | Age: 39
End: 2024-01-31
Payer: COMMERCIAL

## 2024-01-31 DIAGNOSIS — L73.9 FOLLICULITIS OF AXILLA: ICD-10-CM

## 2024-01-31 DIAGNOSIS — L74.510 HYPERHIDROSIS OF AXILLA: Primary | ICD-10-CM

## 2024-01-31 PROCEDURE — 99204 OFFICE O/P NEW MOD 45 MIN: CPT | Mod: S$GLB,,, | Performed by: DERMATOLOGY

## 2024-01-31 PROCEDURE — 1159F MED LIST DOCD IN RCRD: CPT | Mod: CPTII,S$GLB,, | Performed by: DERMATOLOGY

## 2024-01-31 PROCEDURE — 99999 PR PBB SHADOW E&M-EST. PATIENT-LVL III: CPT | Mod: PBBFAC,,, | Performed by: DERMATOLOGY

## 2024-01-31 RX ORDER — CLINDAMYCIN PHOSPHATE 11.9 MG/ML
SOLUTION TOPICAL 2 TIMES DAILY
Qty: 60 ML | Refills: 5 | Status: SHIPPED | OUTPATIENT
Start: 2024-01-31 | End: 2025-01-30

## 2024-01-31 NOTE — PROGRESS NOTES
Subjective:      Patient ID:  Catie Monge is a 38 y.o. female who presents for   Chief Complaint   Patient presents with    Rash     underarms     Mrs Monge is a 37 yo female with history of ankylosing spondylitis recently started on Humira who presents for evaluation of a rash under her arms. She reports for the last few years, she has flares of red bumps under her arms. These are sometimes painful. They do not usually form pustules. She does not have this issue anywhere else. She shaves her armpits with a sensitive skin razor. She uses Ignacio babe tressa Deoderant for sensitive skin. She washes with Dove wash. She denies other skin complaints.    Rash        Review of Systems   Skin:  Positive for rash. Negative for itching.     Objective:   Physical Exam   Constitutional: She appears well-developed and well-nourished. No distress.   Neurological: She is alert and oriented to person, place, and time. She is not disoriented.   Psychiatric: She has a normal mood and affect.   Skin:              Diagram Legend     Erythematous scaling macule/papule c/w actinic keratosis       Vascular papule c/w angioma      Pigmented verrucoid papule/plaque c/w seborrheic keratosis      Yellow umbilicated papule c/w sebaceous hyperplasia      Irregularly shaped tan macule c/w lentigo     1-2 mm smooth white papules consistent with Milia      Movable subcutaneous cyst with punctum c/w epidermal inclusion cyst      Subcutaneous movable cyst c/w pilar cyst      Firm pink to brown papule c/w dermatofibroma      Pedunculated fleshy papule(s) c/w skin tag(s)      Evenly pigmented macule c/w junctional nevus     Mildly variegated pigmented, slightly irregular-bordered macule c/w mildly atypical nevus      Flesh colored to evenly pigmented papule c/w intradermal nevus       Pink pearly papule/plaque c/w basal cell carcinoma      Erythematous hyperkeratotic cursted plaque c/w SCC      Surgical scar with no sign of skin cancer  recurrence      Open and closed comedones      Inflammatory papules and pustules      Verrucoid papule consistent consistent with wart     Erythematous eczematous patches and plaques     Dystrophic onycholytic nail with subungual debris c/w onychomycosis     Umbilicated papule    Erythematous-base heme-crusted tan verrucoid plaque consistent with inflamed seborrheic keratosis     Erythematous Silvery Scaling Plaque c/w Psoriasis     See annotation      Assessment / Plan:        Hyperhidrosis of axilla  Pt has significant hyperhidrosis that affects daily life. Previously tried treatments include prescription strength Deoderant and drysol solution, which caused considerable pain and burning. The hyperhidrosis is likely a contributing factor to the folliculitis flares. Recommend starting Qbrexza wipes once nightly. Sent to [facility].   -     glycopyrronium tosylate 2.4 % Towl; Apply 1 each topically every evening.  Dispense: 30 each; Refill: 5    Folliculitis of axilla  - Avoiding friction from tight-fitting clothing and shaving in the direction of hair growth may help to prevent folliculitis.   - Start BPO 2.5-5% wash in shower or bath daily in the evening.   - Start clindamycin 1% solution daily in the morning         Rtc prn    Melquiades Juárez MD  [employer] [facility] PGY-II

## 2024-01-31 NOTE — PROGRESS NOTES
Subjective:      Patient ID:  Catie Monge is a 38 y.o. female who presents for   Chief Complaint   Patient presents with    Rash     underarms     HPI    Review of Systems    Objective:   Physical Exam     Diagram Legend     Erythematous scaling macule/papule c/w actinic keratosis       Vascular papule c/w angioma      Pigmented verrucoid papule/plaque c/w seborrheic keratosis      Yellow umbilicated papule c/w sebaceous hyperplasia      Irregularly shaped tan macule c/w lentigo     1-2 mm smooth white papules consistent with Milia      Movable subcutaneous cyst with punctum c/w epidermal inclusion cyst      Subcutaneous movable cyst c/w pilar cyst      Firm pink to brown papule c/w dermatofibroma      Pedunculated fleshy papule(s) c/w skin tag(s)      Evenly pigmented macule c/w junctional nevus     Mildly variegated pigmented, slightly irregular-bordered macule c/w mildly atypical nevus      Flesh colored to evenly pigmented papule c/w intradermal nevus       Pink pearly papule/plaque c/w basal cell carcinoma      Erythematous hyperkeratotic cursted plaque c/w SCC      Surgical scar with no sign of skin cancer recurrence      Open and closed comedones      Inflammatory papules and pustules      Verrucoid papule consistent consistent with wart     Erythematous eczematous patches and plaques     Dystrophic onycholytic nail with subungual debris c/w onychomycosis     Umbilicated papule    Erythematous-base heme-crusted tan verrucoid plaque consistent with inflamed seborrheic keratosis     Erythematous Silvery Scaling Plaque c/w Psoriasis     See annotation      Assessment / Plan:        There are no diagnoses linked to this encounter.         No follow-ups on file.

## 2024-02-02 NOTE — PROGRESS NOTES
I have seen the patient, reviewed the Resident's progress note. I have personally interviewed and examined the patient at bedside and agree with the findings.     Manju Aragon MD  [facility]

## 2024-02-20 ENCOUNTER — PATIENT MESSAGE (OUTPATIENT)
Dept: ADMINISTRATIVE | Facility: OTHER | Age: 39
End: 2024-02-20
Payer: COMMERCIAL

## 2024-02-21 ENCOUNTER — CLINICAL SUPPORT (OUTPATIENT)
Dept: REHABILITATION | Facility: OTHER | Age: 39
End: 2024-02-21
Payer: COMMERCIAL

## 2024-02-21 DIAGNOSIS — R29.898 DECREASED STRENGTH OF TRUNK AND BACK: Primary | ICD-10-CM

## 2024-02-21 DIAGNOSIS — M45.9 ANKYLOSING SPONDYLITIS, UNSPECIFIED SITE OF SPINE: ICD-10-CM

## 2024-02-21 DIAGNOSIS — M46.1 SACROILIITIS: ICD-10-CM

## 2024-02-21 DIAGNOSIS — M53.3 SI (SACROILIAC) JOINT DYSFUNCTION: ICD-10-CM

## 2024-02-21 PROCEDURE — 97750 PHYSICAL PERFORMANCE TEST: CPT | Mod: 32

## 2024-02-22 PROBLEM — R29.898 DECREASED STRENGTH OF TRUNK AND BACK: Status: ACTIVE | Noted: 2024-02-22

## 2024-02-22 NOTE — PLAN OF CARE
[facility]  Physical Therapy Lumbar Evaluation      Name: Catie Monge  Clinic Number: [id number]    Therapy Diagnosis:   Encounter Diagnoses   Name Primary?    Sacroiliitis     SI (sacroiliac) joint dysfunction     Ankylosing spondylitis, unspecified site of spine     Decreased strength of trunk and back Yes     Physician: Day Chapa, *    Physician Orders: PT Eval and Treat  Medical Diagnosis from Referral:   M46.1 (ICD-10-CM) - Sacroiliitis   M53.3 (ICD-10-CM) - SI (sacroiliac) joint dysfunction   M45.9 (ICD-10-CM) - Ankylosing spondylitis, unspecified site of spine     Evaluation Date: 2/21/2024  Authorization Period Expiration: 1/28/2024   Plan of Care Expiration: 5/2/2024  Reassessment Due: 3/22/2024  Visit # / Visits authorized: 1/1  MedX testing visit 2    Time In: 10:00 am  Time Out: 11:00 am  Total Billable Time: 60 minutes  INSURANCE and OUTCOMES: Fee for Service with FOTO Outcomes 1/3    Precautions: standardankylosing spondylitis     Pattern of pain determined: movement responder    Subjective     Date of onset: April 2023  History of current condition: Catie reports the pain originally presented a little over a year ago. The pain is in the bilateral low back , there was some R LE symptoms, but not for a couple months. She describes her pain as dull, but mostly her back is always stiff. She originally thought she injured her self dead lifting, but with exercises and PT she was able to decrease these symptoms until November of 2023. When this pain subsided she started to notice increased stiffness at baseline, and especially in the mornings. She was recently diagnosed with Ankylosing spondylitis in December and has received 4 Humira injections injections. She feels like the Humira is helping a lot and she has been taking less NSAID's and muscle relaxer's. She used to lift a lot of weight lifting, but has since stopped, but is looking to get back  into it. She has been doing the peloton and walking for the most part. She does some flexibility and stretching, but can tell she needs more. Aggravating factors include picking up her kids, bending, carrying laundry basket, sitting, and riding bike. Alleviated with heat, humira, NSAID's, muscle relaxers, stretching.       Medical History:   Past Medical History:   Diagnosis Date    Ankylosing spondylitis of unspecified sites in spine     Anxiety     No meds    Depression     Loose bowel movements        Surgical History:   Catie Monge  has a past surgical history that includes TONSILLECTOMY, ADENOIDECTOMY; [place] tooth extraction; Nasal septum surgery; Anus surgery; Dilation and curettage of uterus using suction (N/A, 09/13/2018); Cosmetic surgery; Colonoscopy w/ biopsies and polypectomy (12/22/2023); and Colonoscopy (N/A, 12/22/2023).    Medications:   Catie has a current medication list which includes the following prescription(s): adalimumab, bupropion, calcium carbonate, cetirizine, clindamycin, cyclosporine, diclofenac, dicyclomine, diphenoxylate-atropine 2.5-0.025 mg, drospirenone-e.estradiol-lm.fa, glycopyrronium tosylate, lifitegrast, methocarbamol, and sertraline.    Allergies:   Review of patient's allergies indicates:  No Known Allergies     Imaging: MRI LUMBAR SPINE W WO CONTRAST; MRI SACRUM/COCCYX (BONY) W WO CONTRAST     CLINICAL HISTORY:  Low back pain, symptoms persist with > 6wks conservative treatment;; suspect disk herniation L5/S1;  Dorsalgia, unspecified     TECHNIQUE:  MRI of the lumbar spine and sacrum/coccyx before and after intravenous administration of 8 mL Gadavist.     COMPARISON:  None     FINDINGS:  Lumbar spine:     ALIGNMENT: Normal.     BONE: No compression fractures.  No marrow replacing lesions.     JOINT: Intervertebral discs are well hydrated without height loss.  Facet joints are unremarkable.  Mild edema in the posterior aspect of the S1 superior endplate.     SPINAL  CANAL: The conus medullaris has a normal appearance and terminates at the L1-2 level.  Cauda equina nerve roots are unremarkable.  No mass or collection. No abnormal enhancement.     PARASPINAL SOFT TISSUES: Unremarkable.     SIGNIFICANT FINDINGS BY LEVEL:     T12-L1 through L4-5: Unremarkable.     L5-S1: Small central protrusion, encroaching upon the right descending S1 nerve root (13:42).  No foraminal stenosis.     Sacrum/coccyx:     SACROILIAC JOINTS: There is subchondral bone marrow edema and enhancement involving the iliac side of the right sacroiliac joint (04:11).  No joint effusion, synovitis, or capsulitis. No erosions or ankylosis.     OTHER JOINTS: Visualized hip joints are unremarkable.     BONE: No fracture, osteonecrosis, or marrow replacing lesion.     TENDONS: Regional tendons are intact.  No bursal collection.     SOFT TISSUES: Normal muscle bulk and signal intensity. The sciatic nerves are unremarkable.     MISCELLANEOUS: No visceral pelvic soft tissue abnormality.     Impression:     Small central disc protrusion at L5-S1, encroaching upon the right descending S1 nerve root.     Mild active sacroiliitis on the right, which could be degenerative or inflammatory.  No chronic structural lesions.    Prior Therapy: Yes This past year both back and pelvic floor PT  Prior Treatment: Humira injections  Social History: lives with their family  Occupation: Chemo infusion, currently stay at home   Leisure: read, long walks playing with kids, Peloton      Prior Level of Function: independent  Current Level of Function: independent   DME owned/used: no  Gym Membership: [employer], [employer], yoga mat at home    Pain:  Current 1/10, worst 8/10, best 0/10   Location: bilateral back   Description: Aching and Dull, stabbing and sharp when aggravated   Aggravating Factors: picking up her kids, bending, carrying laundry basket, sitting, and riding bike  Easing Factors: heat, humira, NSAID's, muscle relaxers,  "stretching  Disturbed Sleep: Not now, but when it was bad yes    Pattern of pain questions:  1.  Where is your pain the worst? Bilateral low back  2.  Is your pain constant or intermittent? intermittent  3.  Does bending forward make your typical pain worse? no  4.  Since the start of your back pain, has there been a change in your bowel or bladder? Yes, but I've had pelvic floor issues prior to all of this   5.  What can't you do now that you use to be able to do? No    Pts goals: "Increased flexibility, feel confident weight lifting, and exercising without fear of my back being re-injured"    Red Flag Screening:   Cough/Sneeze Strain: (--)  Bladder/Bowel: (--)  Falls: (--)  Night pain: (--)  Unexplained weight loss: (--)  General health: "Pretty good"    Objective      Postural examination/scapula alignment: Rounded shoulder, Head forward, Slouched posture, Increased kyphosis, and Decreased lordosis  Joint integrity: Hard end feel  Skin integrity:WNL   Edema: None  Correction of posture: better with lumbar roll  Sitting: poor  Standing: poor    MOVEMENT LOSS - Lumbar   Norms ROM Loss Initial   Flexion Fingers touch toes, sacral angle >/= 70 deg, uniform spinal curvature, posterior weight shift  minimal loss   Extension ASIS surpasses toes, spine of scapulae surpasses heels, uniform spinal curve moderate loss   Side glide Right  minimal loss   Side glide Left  minimal loss   Rotation Right PT observes contralateral shoulder minimal loss   Rotation Left PT observes contralateral shoulder minimal loss     Lower Extremity Strength  Right LE  Left LE    Hip flexion: 4/5 Hip flexion: 4/5   Hip extension:  4/5 Hip extension: 4/5   Hip abduction: 4/5 Hip abduction: 4/5   Hip adduction:  4/5 Hip adduction:  4/5   Hip External Rotation 4+/5 Hip External Rotation 4+/5   Hip Internal rotation   4+/5 Hip Internal rotation 4+/5   Knee Flexion 4+/5 Knee Flexion 4+/5   Knee Extension 5/5 Knee Extension 5/5   Ankle dorsiflexion: " "5/5 Ankle dorsiflexion: 5/5   Ankle plantarflexion: 5/5 Ankle plantarflexion: 5/5     GAIT:  Assistive Device used: none  Level of Assistance: independent  Patient displays the following gait deviations: no gait deviations observed.     Special Tests:   Test Name  Test Result   Prone Instability Test (+)   SI Joint Provocation Test (+)   Straight Leg Raise (--)   Neural Tension Test (--)   Crossed Straight Leg Raise (--)   Walking on toes Able   Walking on heels  Able     NEUROLOGICAL SCREENING:     Sensory deficits: intact to light touch     Reflexes:    Left Right   Patella Tendon 2+ 2+   Achilles Tendon 2+ 2+   Babinski  (--) (--)   Clonus (--) (--)     REPEATED TEST MOVEMENTS:    Baseline symptoms:  Repeated Flexion in Standing worse   Repeated Extension in Standing no effect   Repeated Flexion in lying no effect   Repeated Extension in lying  no effect       STATIC TESTS and other movements:   Prone lie no effect   Prone lie on elbows no effect   Sitting slouched  no effect   Sitting erect no effect   Standing slouched no effect   Standing erect  no effect   Lying prone in extension  no effect   Long sitting   no effect   Sustained flexion no effect   Sustained prone using mat no worse  produced     Lumbar testing Visit 2    OUTCOMES SELECTION:   Program Patient Outcome Measures    Oswestry Score:  9/50 = 18% disability     AQoL Score:  4/36 = 11% disability          Treatment     Total Treatment time separate from Evaluation: 25 minutes    Catie received therapeutic exercises to develop/improve posture, lumbar ROM, strength, and muscular endurance for 15 minutes including the following exercises:     LTR's x20  Open books 15x5" ea  EIL 2x10  Cat cow x10  Bridge 15x3"  PPT 15x5"    Written Home Exercises Provided: yes.    HEP AS FOLLOWS:  LTR's  Open books  EIL   Cat cow  Bridge   PPT    Exercises were reviewed and Catie was able to demonstrate them prior to the end of the session. Catie demonstrated good  " understanding of the education provided.     See EMR under Patient Instructions for exercises provided 2/21/2024.    MedX Testing:  MedX testing to be performed next visit      Therapeutic Education/Activity provided for 5 minutes:   - Patient was given an [facility] Visit 1 handout which discusses the following:  - what to expect in therapy  - an overview of the program, including health coaching and wellness  - importance of spinal hygiene, proper posture, lifting mechanics, sleep quality, and nutrition/hydration   - Alec roll trialed, recommended, and purchase information was provided.  - Patient received a handout regarding anticipated muscular soreness following the isometric test and strategies for management were reviewed with patient including stretching, using ice and scheduled rest.   - Patient received verbal education on the following:   - Healthy Back program   - purpose of the isometric test  - safe progression of lumbar strengthening, wellness approach, and systemic strengthening.   - safe usage of MedX machine and testing protocols.    Catie received cold pack for 5 minutes to low back in Z-lie.    Assessment     Catie is a 38 y.o. female referred to [facility] with a medical diagnosis of M46.1 (ICD-10-CM) - Sacroiliitis, M53.3 (ICD-10-CM) - SI (sacroiliac) joint dysfunction, and M45.9 (ICD-10-CM) - Ankylosing spondylitis, unspecified site of spine. Pt presents with signs and symptoms consistent with diagnosis including decreased strength of trunk and back, deecreased range of motion of trunk and back, poor posture, decreased joint mobility, decreased soft tissue flexibility and mobility and decreased functional mobility tolerance. These deficits are limiting patient in full participation in ADL's and leisure activities such as picking up her kids, bending, carrying laundry basket, sitting, and riding bike. Medx isometric strength testing to be completed next visit.     Pain  Pattern: movement responder       Pt prognosis is Good.     Pt will benefit from skilled outpatient Physical Therapy to address the deficits stated above and in the chart below, to provide pt/family education, and to maximize pt's level of independence. Based on the above history and physical examination an active physical therapy program is recommended.      Plan of care discussed with patient: Yes  Pt's spiritual, cultural and educational needs considered and patient is agreeable to the plan of care and goals as stated below:     Anticipated Barriers for therapy: ankylosing spondylitis     PT Evaluation Completed? Yes    Medical necessity is demonstrated by the following problem list:    History  Co-morbidities and personal factors that may impact the plan of care [] LOW: no personal factors / co-morbidities  [x] MODERATE: 1-2 personal factors / co-morbidities  [] HIGH: 3+ personal factors / co-morbidities    Moderate / High Support Documentation:   Co-morbidities affecting plan of care: anklyosing spondylitis, anxiety, depression    Personal Factors:   no deficits     Examination  Body Structures and Functions, activity limitations and participation restrictions that may impact the plan of care [x] LOW: addressing 1-2 elements  [] MODERATE: 3+ elements  [] HIGH: 4+ elements (please support below)    Moderate / High Support Documentation:     Clinical Presentation [x] LOW: stable  [] MODERATE: Evolving  [] HIGH: Unstable     Decision Making/ Complexity Score: low         GOALS: Pt is in agreement with the following goals.    Short term goals:  6 weeks or 10 visits   - Pt will demonstrate increased lumbar MedX ROM by at least 3 degrees from the initial ROM value with improvements noted in functional ROM and ability to perform ADLs. Appropriate and Ongoing  - Pt will demonstrate increased MedX average isometric strength value by 15% from initial test resulting in improved ability to perform bending, lifting, and  carrying activities safely, confidently. Appropriate and Ongoing  - Pt will report a reduction in worst pain score by 1-2 points for improved tolerance for childcare. Appropriate and Ongoing  - Pt able to perform HEP correctly with minimal cueing or supervision from therapist to encourage independent management of symptoms. Appropriate and Ongoing    Long term goals: 10 weeks or 20 visits   - Pt will demonstrate increased lumbar MedX ROM by at least 6 degrees from initial ROM value, resulting in improved ability to perform functional forward bending while standing and sitting. Appropriate and Ongoing  - Pt will demonstrate increased MedX average isometric strength value by 30% from initial test resulting in improved ability to perform bending, lifting, and carrying activities safely and confidently. Appropriate and Ongoing  - Pt to demonstrate ability to independently control and reduce their pain through posture positioning and mechanical movements throughout a typical day. Appropriate and Ongoing  - Pt will demonstrate reduced pain and improved functional outcomes as reported on the Oswestry Disability Index by reaching a score of 3 or less in order to demonstrate subjective improvement in pt's condition. . Appropriate and Ongoing  - Pt will demonstrate independence with the HEP at discharge. Appropriate and Ongoing  - Pt will be able to bend down and  her children without increased pain. Appropriate and Ongoing    Plan     Outpatient physical therapy 2x week for 10 weeks or 20 visits to include the following:   - Patient education  - Therapeutic exercise  - Manual therapy  - Performance testing   - Neuromuscular Re-education  - Therapeutic activity   - Modalities    Pt may be seen by PTA as part of the rehabilitation team.     Therapist: Mehrdad Barron, PT  2/22/2024

## 2024-02-23 ENCOUNTER — CLINICAL SUPPORT (OUTPATIENT)
Dept: REHABILITATION | Facility: OTHER | Age: 39
End: 2024-02-23
Payer: COMMERCIAL

## 2024-02-23 DIAGNOSIS — R29.898 DECREASED STRENGTH OF TRUNK AND BACK: Primary | ICD-10-CM

## 2024-02-23 PROCEDURE — 97750 PHYSICAL PERFORMANCE TEST: CPT | Mod: 32

## 2024-02-23 NOTE — PROGRESS NOTES
"[facility]  Physical Therapy Treatment Note     Name: Catie Monge  Clinic Number: [id number]    Therapy Diagnosis:   Encounter Diagnosis   Name Primary?    Decreased strength of trunk and back Yes     Physician: Day Chapa, *    Visit Date: 2/23/2024    Physician Orders: PT Eval and Treat  Medical Diagnosis from Referral:   M46.1 (ICD-10-CM) - Sacroiliitis   M53.3 (ICD-10-CM) - SI (sacroiliac) joint dysfunction   M45.9 (ICD-10-CM) - Ankylosing spondylitis, unspecified site of spine      Evaluation Date: 2/21/2024  Authorization Period Expiration: 1/28/2024   Plan of Care Expiration: 5/2/2024  Reassessment Due: 3/22/2024  Visit # / Visits authorized: 1/1  MedX testing visit 2    PTA Visit #: 0/5     Time In: 9:00 am  Time Out: 9:50 am  Total Billable Time: 45 minutes  INSURANCE and OUTCOMES: Fee for Service with FOTO Outcomes 1/3    Precautions: standard/ankylosing spondylitis     Pattern of pain determined: movement responder    Subjective     Catie Monge reports no pain today, exercises at home felt food.      Patient reports tolerating previous visit well  Patient reports their pain to be 0/10 on a 0-10 scale with 0 being no pain and 10 being the worst pain imaginable.  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  Leisure: read, long walks playing with kids, Pel[employer]       Pt goals: "Increased flexibility, feel confident weight lifting, and exercising without fear of my back being re-injured"     Objective      Lumbar  Isometric Testing on Med X equipment: Testing administered by PT    Test Initial Baseline Midpoint Final   Date 2/23/2024     ROM 0-48 deg     Max Peak Torque 106      Min Peak Torque 46      Flex/Ext Ratio 2.3:1     % below normative data 37     % gain from initial test Not available visit 1       Starting weight 53 ft/lbs    Outcomes:  Intake Score: 18%  Visit 6 Score:   Visit 10 " "Score:   Discharge Score:  Goal Score: 8%     Treatment     Catie received the treatments listed below:      Medical MedX Treatment as follows:  MedX testing performed day 2: Patient  received neuromuscular education to engage spinal musculature correctly for motor control and engagement of musculature for 15 minutes including the MedX exercise component and practice and standard testing. MedX dynamic exercise and baseline isometric test performed with instructions to guide the patient safely through the testing procedure. Patient instructed to perform isometric test correctly and safely while building to an optimal force with a pain-free effort. Patient also instructed that they should feel support/pressure from MedX restraints but no pain/discomfort, and encouraged to report any pain to therapist. Patient demonstrated appropriate understanding of information and tolerance of test.  Education regarding purpose of test, safety during test given, and reviewed possible more soreness and strategies.         Catie participated in neuromuscular re-education activities to improve balance, coordination, proprioception, motor control and/or posture for - minutes. The following activities were included:         Catie participated in therapeutic exercises to develop strength, endurance, ROM, flexibility, posture, and core stabilization for 30 minutes including:    LTR's x10  Open books 15x5" ea  EIL 2x10  Cat cow x10  Bridge 15x3"  PPT 15x5"    Progress next visit    Peripheral muscle strengthening which included one set of 15-20 repetitions at a slow and controlled 10-13 second per rep pace focused on strengthening supporting musculature in order to improve body mechanics and functional mobility. Patient and therapist focused on proper form during treatment to ensure optimal strengthening of each targeted muscle group.  Machines utilized included:Torso rotation, Leg Ext, Leg Curl, Chest Press, and Rowing  To be added next " visit:Triceps, Biceps, Hip Abd, Hip Add, and Leg Press      Catie participated in dynamic functional therapeutic activities to improve functional performance and simulate household and community activities for -  minutes. The following activities were included:        Catie received manual therapy techniques for -  minutes. The following activities were included:      Pt given cold pack for 5 minutes to low back in z-lie.    Patient Education and Home Exercises     Home exercises include:  LTR's  Open books   EIL  Cat cow  Bridge   PPT   Cardio program (V5): -  Lifting education (V11): -  Posture/Lumbar roll: acquired  Fridge Magnet Discharge handout (date given): -  Equipment at home/gym membership: yes    Education provided:   - PT role and POC  - HEP    Written Home Exercises Provided: Patient instructed to cont prior HEP.  Exercises were reviewed and Catie was able to demonstrate them prior to the end of the session.  Catie demonstrated good  understanding of the education provided.     See EMR under Patient Instructions for exercises provided prior visit.    Assessment     Catie presents to second healthy back visit reporting no pain, was able to demo HEP with Min VC for form. Pt was able to tolerate Medical MedX machine well as follows:  MedX testing performed and patient tolerated test well.Pt was also able to complete half of the peripheral strengthening exercises without increased discomfort and will complete the complete circuit next visit as tolerated.    Patient is making good progress towards established goals.  Pt will continue to benefit from skilled outpatient physical therapy to address the deficits stated in the impairment chart, provide pt/family education and to maximize pt's level of independence in the home and community environment.     Anticipated Barriers for therapy: ankylosing spondylitis   Pt's spiritual, cultural and educational needs considered and pt agreeable to plan of care and  goals as stated below:     GOALS: Pt is in agreement with the following goals.     Short term goals:  6 weeks or 10 visits   - Pt will demonstrate increased lumbar MedX ROM by at least 3 degrees from the initial ROM value with improvements noted in functional ROM and ability to perform ADLs. Appropriate and Ongoing  - Pt will demonstrate increased MedX average isometric strength value by 15% from initial test resulting in improved ability to perform bending, lifting, and carrying activities safely, confidently. Appropriate and Ongoing  - Pt will report a reduction in worst pain score by 1-2 points for improved tolerance for childcare. Appropriate and Ongoing  - Pt able to perform HEP correctly with minimal cueing or supervision from therapist to encourage independent management of symptoms. Appropriate and Ongoing     Long term goals: 10 weeks or 20 visits   - Pt will demonstrate increased lumbar MedX ROM by at least 6 degrees from initial ROM value, resulting in improved ability to perform functional forward bending while standing and sitting. Appropriate and Ongoing  - Pt will demonstrate increased MedX average isometric strength value by 30% from initial test resulting in improved ability to perform bending, lifting, and carrying activities safely and confidently. Appropriate and Ongoing  - Pt to demonstrate ability to independently control and reduce their pain through posture positioning and mechanical movements throughout a typical day. Appropriate and Ongoing  - Pt will demonstrate reduced pain and improved functional outcomes as reported on the Oswestry Disability Index by reaching a score of 3 or less in order to demonstrate subjective improvement in pt's condition. . Appropriate and Ongoing  - Pt will demonstrate independence with the HEP at discharge. Appropriate and Ongoing  - Pt will be able to bend down and  her children without increased pain. Appropriate and Ongoing    Plan     Continue with  established Plan of Care towards established PT goals.     Therapist: Mehrdad Barron, PT  2/23/2024

## 2024-02-29 ENCOUNTER — CLINICAL SUPPORT (OUTPATIENT)
Dept: REHABILITATION | Facility: OTHER | Age: 39
End: 2024-02-29
Payer: COMMERCIAL

## 2024-02-29 DIAGNOSIS — R29.898 DECREASED STRENGTH OF TRUNK AND BACK: Primary | ICD-10-CM

## 2024-02-29 PROCEDURE — 97750 PHYSICAL PERFORMANCE TEST: CPT | Mod: 32

## 2024-02-29 PROCEDURE — 97110 THERAPEUTIC EXERCISES: CPT

## 2024-02-29 PROCEDURE — 97112 NEUROMUSCULAR REEDUCATION: CPT

## 2024-02-29 NOTE — PROGRESS NOTES
"[facility]  Physical Therapy Treatment Note     Name: Catie Monge  Clinic Number: [id number]    Therapy Diagnosis:   Encounter Diagnosis   Name Primary?    Decreased strength of trunk and back Yes     Physician: Day Chapa, *    Visit Date: 2/29/2024    Physician Orders: PT Eval and Treat  Medical Diagnosis from Referral:   M46.1 (ICD-10-CM) - Sacroiliitis   M53.3 (ICD-10-CM) - SI (sacroiliac) joint dysfunction   M45.9 (ICD-10-CM) - Ankylosing spondylitis, unspecified site of spine      Evaluation Date: 2/21/2024  Authorization Period Expiration: 1/28/2024   Plan of Care Expiration: 5/2/2024  Reassessment Due: 3/22/2024  Visit # / Visits authorized: 3/11  MedX testing visit 2    PTA Visit #: 0/5     Time In: 1:25   Time Out: 2:20   Total Billable Time: 55 minutes  INSURANCE and OUTCOMES: Fee for Service with FOTO Outcomes 1/3    Precautions: standard/ankylosing spondylitis     Pattern of pain determined: movement responder    Subjective     Catie Monge reports no pain today, exercises at home felt good.      Patient reports tolerating previous visit well  Patient reports their pain to be 0/10 on a 0-10 scale with 0 being no pain and 10 being the worst pain imaginable.  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  Leisure: read, long walks playing with kids, [employer]       Pt goals: "Increased flexibility, feel confident weight lifting, and exercising without fear of my back being re-injured"     Objective      Lumbar  Isometric Testing on Med X equipment: Testing administered by PT    Test Initial Baseline Midpoint Final   Date 2/23/2024     ROM 0-48 deg     Max Peak Torque 106      Min Peak Torque 46      Flex/Ext Ratio 2.3:1     % below normative data 37     % gain from initial test Not available visit 1       Starting weight 53 ft/lbs    Outcomes:  Intake Score: 18%  Visit 6 Score:   Visit 10 Score: " "  Discharge Score:  Goal Score: 8%     Treatment     Catie received the treatments listed below:      Medical MedX Treatment as follows:  MedX testing performed day 2: Patient  received neuromuscular education to engage spinal musculature correctly for motor control and engagement of musculature for 8 minutes including the MedX exercise component and practice and standard testing. MedX dynamic exercise and baseline isometric test performed with instructions to guide the patient safely through the testing procedure. Patient instructed to perform isometric test correctly and safely while building to an optimal force with a pain-free effort. Patient also instructed that they should feel support/pressure from MedX restraints but no pain/discomfort, and encouraged to report any pain to therapist. Patient demonstrated appropriate understanding of information and tolerance of test.  Education regarding purpose of test, safety during test given, and reviewed possible more soreness and strategies.           2/29/2024     1:56 PM   [facility]   Visit Number 3   VAS Pain Rating 0   Treadmill Time (in min.) 5 min   Extension in Lying 10   Lumbar Weight 50 lbs   Repetitions 16   Rating of Perceived Exertion 3   Ice - Z Lie (in min.) 5       Catie participated in neuromuscular re-education activities to improve balance, coordination, proprioception, motor control and/or posture for - minutes. The following activities were included:         Catie participated in therapeutic exercises to develop strength, endurance, ROM, flexibility, posture, and core stabilization for 30 minutes including:    Treadmill 5'  LTR's x10  Open books 15x5" ea  EIL 2x10  Cat cow x10  +Quad hip extension  +Bird dog 5"x5  Bridge 15x3"  PPT 10x5"  +Posterior pelvic tilt 10x    Progress next visit    Peripheral muscle strengthening which included one set of 15-20 repetitions at a slow and controlled 10-13 second per rep pace focused on " strengthening supporting musculature in order to improve body mechanics and functional mobility. Patient and therapist focused on proper form during treatment to ensure optimal strengthening of each targeted muscle group.  Machines utilized included:Torso rotation, Leg Ext, Leg Curl, Chest Press, and Rowing  To be added next visit:Triceps, Biceps, Hip Abd, Hip Add, and Leg Press      Catie participated in dynamic functional therapeutic activities to improve functional performance and simulate household and community activities for -  minutes. The following activities were included:        Catie received manual therapy techniques for -  minutes. The following activities were included:      Pt given cold pack for 5 minutes to low back in z-lie.    Patient Education and Home Exercises     Home exercises include:  LTR's  Open books   EIL  Cat cow  Bridge   PPT   Cardio program (V5): -  Lifting education (V11): -  Posture/Lumbar roll: acquired  Fridge Magnet Discharge handout (date given): -  Equipment at home/gym membership: yes    Education provided:   - PT role and POC  - HEP    Written Home Exercises Provided: Patient instructed to cont prior HEP.  Exercises were reviewed and Catie was able to demonstrate them prior to the end of the session.  Catie demonstrated good  understanding of the education provided.     See EMR under Patient Instructions for exercises provided prior visit.    Assessment     Catie presents to healthy back visit reporting no pain at this time. Continued with HEP stretches and pt was able to complete with minimal cues indicating compliance with HEP. Pt was able to start strengthening and endurance training on the lumbar MedX at 50% of max peak torque according to the initial visit isometric test and flexion extension strength ratio. Pt was able to complete 16 reps, with 3/10 RPE. Pt was also able to complete all of the peripheral strengthening exercises without increased discomfort and  will continue to complete the full circuit as tolerated.    Patient is making good progress towards established goals.  Pt will continue to benefit from skilled outpatient physical therapy to address the deficits stated in the impairment chart, provide pt/family education and to maximize pt's level of independence in the home and community environment.     Anticipated Barriers for therapy: ankylosing spondylitis   Pt's spiritual, cultural and educational needs considered and pt agreeable to plan of care and goals as stated below:     GOALS: Pt is in agreement with the following goals.     Short term goals:  6 weeks or 10 visits   - Pt will demonstrate increased lumbar MedX ROM by at least 3 degrees from the initial ROM value with improvements noted in functional ROM and ability to perform ADLs. Appropriate and Ongoing  - Pt will demonstrate increased MedX average isometric strength value by 15% from initial test resulting in improved ability to perform bending, lifting, and carrying activities safely, confidently. Appropriate and Ongoing  - Pt will report a reduction in worst pain score by 1-2 points for improved tolerance for childcare. Appropriate and Ongoing  - Pt able to perform HEP correctly with minimal cueing or supervision from therapist to encourage independent management of symptoms. Appropriate and Ongoing     Long term goals: 10 weeks or 20 visits   - Pt will demonstrate increased lumbar MedX ROM by at least 6 degrees from initial ROM value, resulting in improved ability to perform functional forward bending while standing and sitting. Appropriate and Ongoing  - Pt will demonstrate increased MedX average isometric strength value by 30% from initial test resulting in improved ability to perform bending, lifting, and carrying activities safely and confidently. Appropriate and Ongoing  - Pt to demonstrate ability to independently control and reduce their pain through posture positioning and mechanical  movements throughout a typical day. Appropriate and Ongoing  - Pt will demonstrate reduced pain and improved functional outcomes as reported on the Oswestry Disability Index by reaching a score of 3 or less in order to demonstrate subjective improvement in pt's condition. . Appropriate and Ongoing  - Pt will demonstrate independence with the HEP at discharge. Appropriate and Ongoing  - Pt will be able to bend down and  her children without increased pain. Appropriate and Ongoing    Plan     Continue with established Plan of Care towards established PT goals.     Therapist: Bianca Torre, PT  2/29/2024

## 2024-03-04 ENCOUNTER — CLINICAL SUPPORT (OUTPATIENT)
Dept: REHABILITATION | Facility: OTHER | Age: 39
End: 2024-03-04
Payer: COMMERCIAL

## 2024-03-04 DIAGNOSIS — R29.898 DECREASED STRENGTH OF TRUNK AND BACK: Primary | ICD-10-CM

## 2024-03-04 PROCEDURE — 97112 NEUROMUSCULAR REEDUCATION: CPT

## 2024-03-04 PROCEDURE — 97750 PHYSICAL PERFORMANCE TEST: CPT | Mod: 32

## 2024-03-04 PROCEDURE — 97110 THERAPEUTIC EXERCISES: CPT

## 2024-03-04 NOTE — PROGRESS NOTES
"[facility]  Physical Therapy Treatment Note     Name: Catie Monge  Clinic Number: [id number]    Therapy Diagnosis:   Encounter Diagnosis   Name Primary?    Decreased strength of trunk and back Yes       Physician: Day Chapa, *    Visit Date: 3/4/2024    Physician Orders: PT Eval and Treat  Medical Diagnosis from Referral:   M46.1 (ICD-10-CM) - Sacroiliitis   M53.3 (ICD-10-CM) - SI (sacroiliac) joint dysfunction   M45.9 (ICD-10-CM) - Ankylosing spondylitis, unspecified site of spine      Evaluation Date: 2/21/2024  Authorization Period Expiration: 1/28/2024   Plan of Care Expiration: 5/2/2024  Reassessment Due: 3/22/2024  Visit # / Visits authorized: 4/11  MedX testing visit 2    PTA Visit #: 0/5     Time In: 1:35 pm   Time Out: 2:30 pm   Total Billable Time: 55 minutes  INSURANCE and OUTCOMES: Program Benefit Group with Lumbar Outcomes (Oswestry and AQoL) 1/3    Precautions: standard/ankylosing spondylitis     Pattern of pain determined: movement responder    Subjective     Catie Monge reports attending concert including standing > 3 hrs.  Patient note no inc LB discomfort at the time.  Patient note mild stiffness Sunday but able to control /c Humira rx.    Patient report no specific moves give discomfort but that primary limitation is her "confidence" for resistance training.    Patient report HEP compliance and requests inc intensity.  Patient report at least 3-4 x week 30 min bouts on [employer].      Patient reports tolerating previous visit well /c no c/o of excess discomfort   Patient reports their pain to be 0/10 on a 0-10 scale with 0 being no pain and 10 being the worst pain imaginable.  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  Leisure: read, long walks playing with kids, [employer]       Pt goals: "Increased flexibility, feel confident weight lifting, and exercising without fear of my back " "being re-injured"     Objective      Lumbar  Isometric Testing on Med X equipment: Testing administered by PT    Test Initial Baseline Midpoint Final   Date 2/23/2024     ROM 0-48 deg     Max Peak Torque 106      Min Peak Torque 46      Flex/Ext Ratio 2.3:1     % below normative data 37     % gain from initial test Not available visit 1       Starting weight 53 ft/lbs    Outcomes:  Intake Score: 18%  Visit 6 Score:   Visit 10 Score:   Discharge Score:  Goal Score: 8%     Treatment     Catie received the treatments listed below:      Medical MedX Treatment as follows:  Patient received neuromuscular education for 10 minutes via participation on the Medical MedX Machine. Therapist assisted patient in isolating and engaging spinal stabilization musculature in order to improve functional ability and postural control. Patient performed exercise with therapist guidance in order to accurately use pacer function, avoid valsalva, and optimally exert effort within a safe and effective range via the Peyman Exertion Rating Scale. Patient instructed to perform at a midrange of exertion and to complete 15-20 repetitions within appropriate split time, with proper technique, and while maintaining safety.            3/4/2024     2:04 PM   [facility]   Visit Number 4   VAS Pain Rating 0   Extension in Lying 10   Lumbar Flexion 51   Lumbar Extension 0   Lumbar Weight 50 lbs   Repetitions 20   Rating of Perceived Exertion 4   Ice - Z Lie (in min.) 5         Catie participated in neuromuscular re-education activities to improve balance, coordination, proprioception, motor control and/or posture for 00 minutes. The following activities were included:         Catie participated in therapeutic exercises to develop strength, endurance, ROM, flexibility, posture, and core stabilization for 50 minutes including:    Treadmill 5' - deferred     LTR's x10  Open books 15x5" ea  EIL x 15 cue for controlled breathing for end range "sag" " "    PPT x 5   Bridge x 3, x 5 /c arms crossed, x 5 alternating LE kick out   Quad  Cat cow x10  Quad hip extension x 3 each   Bird dog 5"x5 - NP   Thread needle x 8 B for mobility, x 5 /c 10# KB for stability challenge      Peripheral muscle strengthening which included one set of 15-20 repetitions at a slow and controlled 10-13 second per rep pace focused on strengthening supporting musculature in order to improve body mechanics and functional mobility. Patient and therapist focused on proper form during treatment to ensure optimal strengthening of each targeted muscle group.  Machines utilized included:Torso rotation, Leg Ext, Leg Curl, Chest Press, and Rowing:Triceps, Biceps, Hip Abd, Hip Add, and Leg Press      Catie participated in dynamic functional therapeutic activities to improve functional performance and simulate household and community activities for -  minutes. The following activities were included:    LIFTING    Catie received manual therapy techniques for -  minutes. The following activities were included:      Pt given cold pack for 5 minutes to low back in z-lie.    Patient Education and Home Exercises     Home exercises include:  LTR  Open books   EIL  Quad - Cat cow, Bird Dogs, Thread the needle   Bridge   PPT     Cardio program (V5): -  Lifting education (V11): -  Posture/Lumbar roll: acquired  Frie Magnet Discharge handout (date given): -  Equipment at home/gym membership: yes    Education provided:       Written Home Exercises Provided: Patient instructed to cont prior HEP.  Exercises were reviewed and Catie was able to demonstrate them prior to the end of the session.  Catie demonstrated good  understanding of the education provided.     See EMR under Patient Instructions for exercises provided prior visit.    Assessment     Patient subjective report indicate good initial activity tolerance.  Patient request advancement of HEP therefore offered modification during tx today including " offering stability challenges and modified techniques (breath) for her to progress at home.  Patient able to perform /s discomfort indicating appropriateness of continue progressions including lifting and standing challenges.  Lumbar MedX weight maintained per pt tolerance last visit.   Today pt perform 20 reps at 4 RPE indicating improved strength and mobility.  Progress per HB protocol.        Patient is making good progress towards established goals.  Pt will continue to benefit from skilled outpatient physical therapy to address the deficits stated in the impairment chart, provide pt/family education and to maximize pt's level of independence in the home and community environment.     Anticipated Barriers for therapy: ankylosing spondylitis   Pt's spiritual, cultural and educational needs considered and pt agreeable to plan of care and goals as stated below:     GOALS: Pt is in agreement with the following goals.     Short term goals:  6 weeks or 10 visits   - Pt will demonstrate increased lumbar MedX ROM by at least 3 degrees from the initial ROM value with improvements noted in functional ROM and ability to perform ADLs. Appropriate and Ongoing  - Pt will demonstrate increased MedX average isometric strength value by 15% from initial test resulting in improved ability to perform bending, lifting, and carrying activities safely, confidently. Appropriate and Ongoing  - Pt will report a reduction in worst pain score by 1-2 points for improved tolerance for childcare. Appropriate and Ongoing  - Pt able to perform HEP correctly with minimal cueing or supervision from therapist to encourage independent management of symptoms. Appropriate and Ongoing     Long term goals: 10 weeks or 20 visits   - Pt will demonstrate increased lumbar MedX ROM by at least 6 degrees from initial ROM value, resulting in improved ability to perform functional forward bending while standing and sitting. Appropriate and Ongoing  - Pt will  demonstrate increased MedX average isometric strength value by 30% from initial test resulting in improved ability to perform bending, lifting, and carrying activities safely and confidently. Appropriate and Ongoing  - Pt to demonstrate ability to independently control and reduce their pain through posture positioning and mechanical movements throughout a typical day. Appropriate and Ongoing  - Pt will demonstrate reduced pain and improved functional outcomes as reported on the Oswestry Disability Index by reaching a score of 3 or less in order to demonstrate subjective improvement in pt's condition. . Appropriate and Ongoing  - Pt will demonstrate independence with the HEP at discharge. Appropriate and Ongoing  - Pt will be able to bend down and  her children without increased pain. Appropriate and Ongoing    Plan     Continue with established Plan of Care towards established PT goals.     Therapist: Babatunde Carrillo, PT  3/4/2024

## 2024-03-05 NOTE — PROGRESS NOTES
"[facility]  Physical Therapy Treatment Note     Name: Catie Monge  Clinic Number: [id number]    Therapy Diagnosis:   Encounter Diagnosis   Name Primary?    Decreased strength of trunk and back Yes         Physician: Day Chapa, *    Visit Date: 3/6/2024    Physician Orders: PT Eval and Treat  Medical Diagnosis from Referral:   M46.1 (ICD-10-CM) - Sacroiliitis   M53.3 (ICD-10-CM) - SI (sacroiliac) joint dysfunction   M45.9 (ICD-10-CM) - Ankylosing spondylitis, unspecified site of spine      Evaluation Date: 2/21/2024  Authorization Period Expiration: 1/28/2024   Plan of Care Expiration: 5/2/2024  Reassessment Due: 3/22/2024  Visit # / Visits authorized: 5/11  MedX testing visit 2    PTA Visit #: 1/5     Time In: 900 AM   Time Out: 1000 AM   Total Billable Time: 60 minutes  INSURANCE and OUTCOMES: Program Benefit Group with Lumbar Outcomes (Oswestry and AQoL) 1/3    Precautions: standard/ankylosing spondylitis     Pattern of pain determined: movement responder    Subjective     Catie Monge reports she aggravated her R upper trap last visit. She is unsure what it was but maybe it was the rows. She didn't feel anything while she was doing it though.     Patient reports tolerating previous visit fair with UT soreness  Patient reports their pain to be 0/10 on a 0-10 scale with 0 being no pain and 10 being the worst pain imaginable.  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  Leisure: read, long walks playing with kids, [employer]       Pt goals: "Increased flexibility, feel confident weight lifting, and exercising without fear of my back being re-injured"     Objective      Lumbar  Isometric Testing on Med X equipment: Testing administered by PT    Test Initial Baseline Midpoint Final   Date 2/23/2024     ROM 0-48 deg     Max Peak Torque 106      Min Peak Torque 46      Flex/Ext Ratio 2.3:1     % below " "normative data 37     % gain from initial test Not available visit 1       Starting weight 53 ft/lbs    Outcomes:  Intake Score: 18%  Visit 6 Score:   Visit 10 Score:   Discharge Score:  Goal Score: 8%     Treatment     Catie received the treatments listed below:      Medical MedX Treatment as follows:  Patient received neuromuscular education for 10 minutes via participation on the Medical MedX Machine. Therapist assisted patient in isolating and engaging spinal stabilization musculature in order to improve functional ability and postural control. Patient performed exercise with therapist guidance in order to accurately use pacer function, avoid valsalva, and optimally exert effort within a safe and effective range via the Peyman Exertion Rating Scale. Patient instructed to perform at a midrange of exertion and to complete 15-20 repetitions within appropriate split time, with proper technique, and while maintaining safety.          3/6/2024     9:44 AM   [facility]   Visit Number 5   VAS Pain Rating 0   Treadmill Time (in min.) 5 min   Extension in Lying 10   Lumbar Weight 55 lbs   Repetitions 18   Rating of Perceived Exertion 3   Ice - Z Lie (in min.) 5           Catie participated in neuromuscular re-education activities to improve balance, coordination, proprioception, motor control and/or posture for 00 minutes. The following activities were included:         Catie participated in therapeutic exercises to develop strength, endurance, ROM, flexibility, posture, and core stabilization for 42 minutes including:    Treadmill 5' -     Self myofacial release to R UT    LTR's x10  Open books 15x5" ea  EIL x 15 cue for controlled breathing for end range "sag"     PPT x 10   Bridge x 10  /c arms crossed, x 5 alternating LE kick out   Quad  Cat cow x10  Quad hip extension x 3 each   Thread needle x 8 B for mobility,       Peripheral muscle strengthening which included one set of 15-20 repetitions at a slow and " controlled 10-13 second per rep pace focused on strengthening supporting musculature in order to improve body mechanics and functional mobility. Patient and therapist focused on proper form during treatment to ensure optimal strengthening of each targeted muscle group.  Machines utilized included:Torso rotation, Leg Ext, Leg Curl, Chest Press, and Rowing:Triceps, Biceps, Hip Abd, Hip Add, and Leg Press      Catie participated in dynamic functional therapeutic activities to improve functional performance and simulate household and community activities for 08 minutes. The following activities were included:    Intro to lifting to   Hip hinge x 10  Deadlift 10 kb 2 x 5       Catie received manual therapy techniques for -  minutes. The following activities were included:      Pt given cold pack for 5 minutes to low back in z-lie.    Patient Education and Home Exercises     Home exercises include:  LTR  Open books   EIL  Quad - Cat cow, Bird Dogs, Thread the needle   Bridge   PPT     Cardio program (V5): -  Lifting education (V11): -  Posture/Lumbar roll: acquired  Fridge Magnet Discharge handout (date given): -  Equipment at home/gym membership: yes    Education provided:       Written Home Exercises Provided: Patient instructed to cont prior HEP.  Exercises were reviewed and Catie was able to demonstrate them prior to the end of the session.  Catie demonstrated good  understanding of the education provided.     See EMR under Patient Instructions for exercises provided prior visit.    Assessment     Patient tolerated session well. Pt presents with c/o low back stiffness and right UT pain which she attributes to something she did on previous visit. Pt was shown self MF release with tennis ball which targeted area of discomfort. Introduced hip hinge and deadlift as pt would like to get back to lifting weights again. Some low back stiffness reported with deadlift though no pain. Will continue to work on this in the  future to build confidence to return to lifting. MedX was performed at 55ft lbs with 18 reps performed at an exertion rating of 3/10. No issues with peripherals.        Patient is making good progress towards established goals.  Pt will continue to benefit from skilled outpatient physical therapy to address the deficits stated in the impairment chart, provide pt/family education and to maximize pt's level of independence in the home and community environment.     Anticipated Barriers for therapy: ankylosing spondylitis   Pt's spiritual, cultural and educational needs considered and pt agreeable to plan of care and goals as stated below:     GOALS: Pt is in agreement with the following goals.     Short term goals:  6 weeks or 10 visits   - Pt will demonstrate increased lumbar MedX ROM by at least 3 degrees from the initial ROM value with improvements noted in functional ROM and ability to perform ADLs. Appropriate and Ongoing  - Pt will demonstrate increased MedX average isometric strength value by 15% from initial test resulting in improved ability to perform bending, lifting, and carrying activities safely, confidently. Appropriate and Ongoing  - Pt will report a reduction in worst pain score by 1-2 points for improved tolerance for childcare. Appropriate and Ongoing  - Pt able to perform HEP correctly with minimal cueing or supervision from therapist to encourage independent management of symptoms. Appropriate and Ongoing     Long term goals: 10 weeks or 20 visits   - Pt will demonstrate increased lumbar MedX ROM by at least 6 degrees from initial ROM value, resulting in improved ability to perform functional forward bending while standing and sitting. Appropriate and Ongoing  - Pt will demonstrate increased MedX average isometric strength value by 30% from initial test resulting in improved ability to perform bending, lifting, and carrying activities safely and confidently. Appropriate and Ongoing  - Pt to  demonstrate ability to independently control and reduce their pain through posture positioning and mechanical movements throughout a typical day. Appropriate and Ongoing  - Pt will demonstrate reduced pain and improved functional outcomes as reported on the Oswestry Disability Index by reaching a score of 3 or less in order to demonstrate subjective improvement in pt's condition. . Appropriate and Ongoing  - Pt will demonstrate independence with the HEP at discharge. Appropriate and Ongoing  - Pt will be able to bend down and  her children without increased pain. Appropriate and Ongoing    Plan     Continue with established Plan of Care towards established PT goals.     Therapist: Harry Rincon, GINA  3/6/2024

## 2024-03-06 ENCOUNTER — CLINICAL SUPPORT (OUTPATIENT)
Dept: REHABILITATION | Facility: OTHER | Age: 39
End: 2024-03-06
Payer: COMMERCIAL

## 2024-03-06 DIAGNOSIS — R29.898 DECREASED STRENGTH OF TRUNK AND BACK: Primary | ICD-10-CM

## 2024-03-06 PROCEDURE — 97112 NEUROMUSCULAR REEDUCATION: CPT | Mod: CQ

## 2024-03-06 PROCEDURE — 97110 THERAPEUTIC EXERCISES: CPT | Mod: CQ

## 2024-03-11 NOTE — PROGRESS NOTES
"[facility]  Physical Therapy Treatment Note     Name: Catie Monge  Clinic Number: [id number]    Therapy Diagnosis:   Encounter Diagnosis   Name Primary?    Decreased strength of trunk and back Yes           Physician: Day Chapa, *    Visit Date: 3/12/2024    Physician Orders: PT Eval and Treat  Medical Diagnosis from Referral:   M46.1 (ICD-10-CM) - Sacroiliitis   M53.3 (ICD-10-CM) - SI (sacroiliac) joint dysfunction   M45.9 (ICD-10-CM) - Ankylosing spondylitis, unspecified site of spine      Evaluation Date: 2/21/2024  Authorization Period Expiration: 1/28/2024   Plan of Care Expiration: 5/2/2024  Reassessment Due: 3/22/2024  Visit # / Visits authorized: 5/11  MedX testing visit 2    PTA Visit #: 1/5     Time In: 900 AM   Time Out: 950 AM  Total Billable Time: 50 minutes  INSURANCE and OUTCOMES: Program Benefit Group with Lumbar Outcomes (Oswestry and AQoL) 1/3    Precautions: standard/ankylosing spondylitis     Pattern of pain determined: movement responder    Subjective     Catie Monge reports she is very stiff today. She wasn't able to practice self care today because she was in a hospital room on the couch. She mainly has stiffness today. She cane feel her spine.     Patient reports tolerating previous visit well  Patient reports their pain to be 0/10 on a 0-10 scale with 0 being no pain and 10 being the worst pain imaginable.  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  Leisure: read, long walks playing with kids, [employer]       Pt goals: "Increased flexibility, feel confident weight lifting, and exercising without fear of my back being re-injured"     Objective      Lumbar  Isometric Testing on Med X equipment: Testing administered by PT    Test Initial Baseline Midpoint Final   Date 2/23/2024     ROM 0-48 deg     Max Peak Torque 106      Min Peak Torque 46      Flex/Ext Ratio 2.3:1     % below " "normative data 37     % gain from initial test Not available visit 1       Starting weight 53 ft/lbs    Outcomes:  Intake Score: 18%  Visit 6 Score:   Visit 10 Score:   Discharge Score:  Goal Score: 8%     Treatment     Catie received the treatments listed below:      Medical MedX Treatment as follows:  Patient received neuromuscular education for 10 minutes via participation on the Medical MedX Machine. Therapist assisted patient in isolating and engaging spinal stabilization musculature in order to improve functional ability and postural control. Patient performed exercise with therapist guidance in order to accurately use pacer function, avoid valsalva, and optimally exert effort within a safe and effective range via the Peyman Exertion Rating Scale. Patient instructed to perform at a midrange of exertion and to complete 15-20 repetitions within appropriate split time, with proper technique, and while maintaining safety.          3/12/2024     9:05 AM   [facility]   Visit Number 6   VAS Pain Rating 0   Treadmill Time (in min.) 5 min   Extension in Lying 10   Lumbar Weight 55 lbs   Repetitions 20   Rating of Perceived Exertion 3   Ice - Z Lie (in min.) 5         Catie participated in neuromuscular re-education activities to improve balance, coordination, proprioception, motor control and/or posture for 00 minutes. The following activities were included:         Catie participated in therapeutic exercises to develop strength, endurance, ROM, flexibility, posture, and core stabilization for 40 minutes including:    Treadmill 5' -     LTR's x10  Open books 15x5" ea  EIL x 15 cue for controlled breathing for end range "sag"     PPT x 10   Bridge x 10  /c arms crossed, x 5 alternating LE kick out   Quad  Cat cow x10  Quad hip extension x 3 each - np   Thread needle x 8 B for mobility,       Peripheral muscle strengthening which included one set of 15-20 repetitions at a slow and controlled 10-13 second per " rep pace focused on strengthening supporting musculature in order to improve body mechanics and functional mobility. Patient and therapist focused on proper form during treatment to ensure optimal strengthening of each targeted muscle group.  Machines utilized included:Torso rotation, Leg Ext, Leg Curl, Chest Press, and Rowing:Triceps, Biceps, Hip Abd, Hip Add, and Leg Press      Catie participated in dynamic functional therapeutic activities to improve functional performance and simulate household and community activities for 00 minutes. The following activities were included:    Intro to lifting to   Hip hinge x 10  Deadlift 10 kb 2 x 5       Catie received manual therapy techniques for -  minutes. The following activities were included:      Pt given cold pack for 5 minutes to low back in z-lie.    Patient Education and Home Exercises     Home exercises include:  LTR  Open books   EIL  Quad - Cat cow, Bird Dogs, Thread the needle   Bridge   PPT     Cardio program (V5): -  Lifting education (V11): -  Posture/Lumbar roll: acquired  Fridge Magnet Discharge handout (date given): -  Equipment at home/gym membership: yes    Education provided:       Written Home Exercises Provided: Patient instructed to cont prior HEP.  Exercises were reviewed and Catie was able to demonstrate them prior to the end of the session.  Catie demonstrated good  understanding of the education provided.     See EMR under Patient Instructions for exercises provided prior visit.    Assessment     Patient presents to session with increased stiffness in low back. Pt reports being unable to perform self care over the weekend as she was travelling. Session focused on mobility with pt reporting improved symptoms following. MedX was performed at 55ft lbs with 20 reps performed at an exertion rating of 3/10. No issues with peripherals.         Patient is making good progress towards established goals.  Pt will continue to benefit from skilled  outpatient physical therapy to address the deficits stated in the impairment chart, provide pt/family education and to maximize pt's level of independence in the home and community environment.     Anticipated Barriers for therapy: ankylosing spondylitis   Pt's spiritual, cultural and educational needs considered and pt agreeable to plan of care and goals as stated below:     GOALS: Pt is in agreement with the following goals.     Short term goals:  6 weeks or 10 visits   - Pt will demonstrate increased lumbar MedX ROM by at least 3 degrees from the initial ROM value with improvements noted in functional ROM and ability to perform ADLs. Appropriate and Ongoing  - Pt will demonstrate increased MedX average isometric strength value by 15% from initial test resulting in improved ability to perform bending, lifting, and carrying activities safely, confidently. Appropriate and Ongoing  - Pt will report a reduction in worst pain score by 1-2 points for improved tolerance for childcare. Appropriate and Ongoing  - Pt able to perform HEP correctly with minimal cueing or supervision from therapist to encourage independent management of symptoms. Appropriate and Ongoing     Long term goals: 10 weeks or 20 visits   - Pt will demonstrate increased lumbar MedX ROM by at least 6 degrees from initial ROM value, resulting in improved ability to perform functional forward bending while standing and sitting. Appropriate and Ongoing  - Pt will demonstrate increased MedX average isometric strength value by 30% from initial test resulting in improved ability to perform bending, lifting, and carrying activities safely and confidently. Appropriate and Ongoing  - Pt to demonstrate ability to independently control and reduce their pain through posture positioning and mechanical movements throughout a typical day. Appropriate and Ongoing  - Pt will demonstrate reduced pain and improved functional outcomes as reported on the Oswestry  Disability Index by reaching a score of 3 or less in order to demonstrate subjective improvement in pt's condition. . Appropriate and Ongoing  - Pt will demonstrate independence with the HEP at discharge. Appropriate and Ongoing  - Pt will be able to bend down and  her children without increased pain. Appropriate and Ongoing    Plan     Continue with established Plan of Care towards established PT goals.       Harry Rincon, PTA  03/12/2024

## 2024-03-12 ENCOUNTER — CLINICAL SUPPORT (OUTPATIENT)
Dept: REHABILITATION | Facility: OTHER | Age: 39
End: 2024-03-12
Payer: COMMERCIAL

## 2024-03-12 DIAGNOSIS — R29.898 DECREASED STRENGTH OF TRUNK AND BACK: Primary | ICD-10-CM

## 2024-03-12 PROCEDURE — 97750 PHYSICAL PERFORMANCE TEST: CPT | Mod: 32

## 2024-03-18 ENCOUNTER — CLINICAL SUPPORT (OUTPATIENT)
Dept: REHABILITATION | Facility: OTHER | Age: 39
End: 2024-03-18
Payer: COMMERCIAL

## 2024-03-18 DIAGNOSIS — R29.898 DECREASED STRENGTH OF TRUNK AND BACK: Primary | ICD-10-CM

## 2024-03-18 PROCEDURE — 97110 THERAPEUTIC EXERCISES: CPT | Mod: CQ

## 2024-03-18 NOTE — PROGRESS NOTES
"[facility]  Physical Therapy Treatment Note     Name: Catie Monge  Clinic Number: [id number]    Therapy Diagnosis:   Encounter Diagnosis   Name Primary?    Decreased strength of trunk and back Yes           Physician: Day Chapa, *    Visit Date: 3/18/2024    Physician Orders: PT Eval and Treat  Medical Diagnosis from Referral:   M46.1 (ICD-10-CM) - Sacroiliitis   M53.3 (ICD-10-CM) - SI (sacroiliac) joint dysfunction   M45.9 (ICD-10-CM) - Ankylosing spondylitis, unspecified site of spine      Evaluation Date: 2/21/2024  Authorization Period Expiration: 1/28/2024   Plan of Care Expiration: 5/2/2024  Reassessment Due: 3/22/2024  Visit # / Visits authorized: 7/11  MedX testing visit 2    PTA Visit #: 2/5     Time In: 10:57 AM   Time Out: 11:45 AM  Total Billable Time: 50 minutes  INSURANCE and OUTCOMES: Program Benefit Group with Lumbar Outcomes (Oswestry and AQoL) 1/3    Precautions: standard/ankylosing spondylitis     Pattern of pain determined: movement responder    Subjective     Catie Monge reports overall improvements with therapy, stating she has increase lumbar mobility.  Min lumbar discomfort reported upon entry but "nothing more than usual."    Patient reports tolerating previous visit well  Patient reports their pain to be 1 or 2 /10 on a 0-10 scale with 0 being no pain and 10 being the worst pain imaginable.  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  Leisure: read, long walks playing with kids, [employer]       Pt goals: "Increased flexibility, feel confident weight lifting, and exercising without fear of my back being re-injured"     Objective      Lumbar  Isometric Testing on Med X equipment: Testing administered by PT    Test Initial Baseline Midpoint Final   Date 2/23/2024     ROM 0-48 deg     Max Peak Torque 106      Min Peak Torque 46      Flex/Ext Ratio 2.3:1     % below normative data " "37     % gain from initial test Not available visit 1       Starting weight 53 ft/lbs    Outcomes:  Intake Score: 18%  Visit 6 Score:   Visit 10 Score:   Discharge Score:  Goal Score: 8%     Treatment     Catie received the treatments listed below:      Medical MedX Treatment as follows:  Patient received neuromuscular education for 10 minutes via participation on the Medical MedX Machine. Therapist assisted patient in isolating and engaging spinal stabilization musculature in order to improve functional ability and postural control. Patient performed exercise with therapist guidance in order to accurately use pacer function, avoid valsalva, and optimally exert effort within a safe and effective range via the Peyman Exertion Rating Scale. Patient instructed to perform at a midrange of exertion and to complete 15-20 repetitions within appropriate split time, with proper technique, and while maintaining safety.              3/18/2024    12:30 PM   [facility]   Visit Number 7   Treadmill Time (in min.) 5 min   Extension in Lying 10   Lumbar Flexion 51   Lumbar Extension 0   Lumbar Weight 57 lbs   Repetitions 20   Rating of Perceived Exertion 4   Ice - Z Lie (in min.) 5        Catie participated in neuromuscular re-education activities to improve balance, coordination, proprioception, motor control and/or posture for 00 minutes. The following activities were included:         Catie participated in therapeutic exercises to develop strength, endurance, ROM, flexibility, posture, and core stabilization for 40 minutes including:    Treadmill 5' -     LTR's x10  Open books 15x5" ea  EIL x 15 cue for controlled breathing for end range "sag"     PPT x 10   Bridge x 10  /c arms crossed, x 5 alternating LE kick out   Quad  Cat cow x10  Quad hip extension x 3 each -NP   Thread needle x 8 B for mobility,       Peripheral muscle strengthening which included one set of 15-20 repetitions at a slow and controlled 10-13 " second per rep pace focused on strengthening supporting musculature in order to improve body mechanics and functional mobility. Patient and therapist focused on proper form during treatment to ensure optimal strengthening of each targeted muscle group.  Machines utilized included:Torso rotation, Leg Ext, Leg Curl, Chest Press, and Rowing:Triceps, Biceps, Hip Abd, Hip Add, and Leg Press      Catie participated in dynamic functional therapeutic activities to improve functional performance and simulate household and community activities for 00 minutes. The following activities were included:    Intro to lifting to   Hip hinge x 10  Deadlift 10 kb 2 x 5       Catie received manual therapy techniques for -  minutes. The following activities were included:      Pt given cold pack for 5 minutes to low back in z-lie.    Patient Education and Home Exercises     Home exercises include:  LTR  Open books   EIL  Quad - Cat cow, Bird Dogs, Thread the needle   Bridge   PPT     Cardio program (V5): -  Lifting education (V11): -  Posture/Lumbar roll: acquired  Fridge Magnet Discharge handout (date given): -  Equipment at home/gym membership: yes    Education provided:       Written Home Exercises Provided: Patient instructed to cont prior HEP.  Exercises were reviewed and Catie was able to demonstrate them prior to the end of the session.  Catie demonstrated good  understanding of the education provided.     See EMR under Patient Instructions for exercises provided prior visit.    Assessment     Patient tolerated session well, demonstrating controlled movements/strength with skilled therex. She responded well to increase resistance on MedX to 55 lbs, completing 20 reps at an exertion rating of 4/10.  No issues with peripherals.         Patient is making good progress towards established goals.  Pt will continue to benefit from skilled outpatient physical therapy to address the deficits stated in the impairment chart, provide  pt/family education and to maximize pt's level of independence in the home and community environment.     Anticipated Barriers for therapy: ankylosing spondylitis   Pt's spiritual, cultural and educational needs considered and pt agreeable to plan of care and goals as stated below:     GOALS: Pt is in agreement with the following goals.     Short term goals:  6 weeks or 10 visits   - Pt will demonstrate increased lumbar MedX ROM by at least 3 degrees from the initial ROM value with improvements noted in functional ROM and ability to perform ADLs. Appropriate and Ongoing  - Pt will demonstrate increased MedX average isometric strength value by 15% from initial test resulting in improved ability to perform bending, lifting, and carrying activities safely, confidently. Appropriate and Ongoing  - Pt will report a reduction in worst pain score by 1-2 points for improved tolerance for childcare. Appropriate and Ongoing  - Pt able to perform HEP correctly with minimal cueing or supervision from therapist to encourage independent management of symptoms. Appropriate and Ongoing     Long term goals: 10 weeks or 20 visits   - Pt will demonstrate increased lumbar MedX ROM by at least 6 degrees from initial ROM value, resulting in improved ability to perform functional forward bending while standing and sitting. Appropriate and Ongoing  - Pt will demonstrate increased MedX average isometric strength value by 30% from initial test resulting in improved ability to perform bending, lifting, and carrying activities safely and confidently. Appropriate and Ongoing  - Pt to demonstrate ability to independently control and reduce their pain through posture positioning and mechanical movements throughout a typical day. Appropriate and Ongoing  - Pt will demonstrate reduced pain and improved functional outcomes as reported on the Oswestry Disability Index by reaching a score of 3 or less in order to demonstrate subjective improvement in  pt's condition. . Appropriate and Ongoing  - Pt will demonstrate independence with the HEP at discharge. Appropriate and Ongoing  - Pt will be able to bend down and  her children without increased pain. Appropriate and Ongoing    Plan     Continue with established Plan of Care towards established PT goals.       Lisa Knowles, PTA  03/18/2024

## 2024-03-19 ENCOUNTER — PATIENT MESSAGE (OUTPATIENT)
Dept: ADMINISTRATIVE | Facility: OTHER | Age: 39
End: 2024-03-19
Payer: COMMERCIAL

## 2024-03-20 ENCOUNTER — CLINICAL SUPPORT (OUTPATIENT)
Dept: REHABILITATION | Facility: OTHER | Age: 39
End: 2024-03-20
Payer: COMMERCIAL

## 2024-03-20 DIAGNOSIS — R29.898 DECREASED STRENGTH OF TRUNK AND BACK: Primary | ICD-10-CM

## 2024-03-20 PROCEDURE — 97750 PHYSICAL PERFORMANCE TEST: CPT | Mod: 32

## 2024-03-20 NOTE — PROGRESS NOTES
"[facility]  Physical Therapy Treatment Note     Name: Catie Monge  Clinic Number: [id number]    Therapy Diagnosis:   Encounter Diagnosis   Name Primary?    Decreased strength of trunk and back Yes       Physician: Day Chapa, *    Visit Date: 3/20/2024    Physician Orders: PT Eval and Treat  Medical Diagnosis from Referral:   M46.1 (ICD-10-CM) - Sacroiliitis   M53.3 (ICD-10-CM) - SI (sacroiliac) joint dysfunction   M45.9 (ICD-10-CM) - Ankylosing spondylitis, unspecified site of spine      Evaluation Date: 2/21/2024  Authorization Period Expiration: 1/28/2024   Plan of Care Expiration: 5/2/2024  Reassessment Due: 3/22/2024  Visit # / Visits authorized: 8/11  MedX testing visit 2    PTA Visit #: 4/5     Time In: 9:30 AM   Time Out: 10:30 AM  Total Billable Time: 55 minutes  INSURANCE and OUTCOMES: Program Benefit Group with Lumbar Outcomes (Oswestry and AQoL) 1/3    Precautions: standard/ankylosing spondylitis     Pattern of pain determined: movement responder    Subjective     Catie Monge reports having a "flare up" this morning but is in the upper back as apposed to LB. Pt under more than usual stress right now and knows that can have an impact on her back pain. She states thoracic tightness after bringing kids to work, restricting her movement. She took NSAID and laid on heat before coming to , no stretches.    Patient reports tolerating previous visit well  Patient reports their pain to be 5/10 on a 0-10 scale with 0 being no pain and 10 being the worst pain imaginable.  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  Leisure: read, long walks playing with kids, [employer]       Pt goals: "Increased flexibility, feel confident weight lifting, and exercising without fear of my back being re-injured"     Objective      Lumbar  Isometric Testing on Med X equipment: Testing administered by PT    Test " "Initial Baseline Midpoint Final   Date 2/23/2024     ROM 0-48 deg     Max Peak Torque 106      Min Peak Torque 46      Flex/Ext Ratio 2.3:1     % below normative data 37     % gain from initial test Not available visit 1       Starting weight 53 ft/lbs    Outcomes:  Intake Score: 18%  Visit 6 Score:   Visit 10 Score:   Discharge Score:  Goal Score: 8%     Treatment     Catie received the treatments listed below:      Medical MedX Treatment as follows:  Patient received neuromuscular education for 10 minutes via participation on the Medical MedX Machine. Therapist assisted patient in isolating and engaging spinal stabilization musculature in order to improve functional ability and postural control. Patient performed exercise with therapist guidance in order to accurately use pacer function, avoid valsalva, and optimally exert effort within a safe and effective range via the Peyman Exertion Rating Scale. Patient instructed to perform at a midrange of exertion and to complete 15-20 repetitions within appropriate split time, with proper technique, and while maintaining safety.              3/20/2024    10:05 AM   [facility]   Visit Number 8   VAS Pain Rating 5   Treadmill Time (in min.) 5 min   Extension in Lying 15   Lumbar Weight 62 lbs   Repetitions 15   Rating of Perceived Exertion 3   Ice - Z Lie (in min.) 5       Catie participated in neuromuscular re-education activities to improve balance, coordination, proprioception, motor control and/or posture for 00 minutes. The following activities were included:         Catie participated in therapeutic exercises to develop strength, endurance, ROM, flexibility, posture, and core stabilization for 40 minutes including:    Treadmill 5' -     LTR's x10  Open books 15x5" ea  EIL x 15 cue for controlled breathing for end range "sag"   +Seated shld blade/thoracic stretch hands behind head bend forward (chicken wing) x10  PPT x 10 NP  Bridge x 10  /c arms crossed, NP " x 5 alternating LE kick out   Quad  Cat cow x10  Quad hip extension x 3 each -NP   Thread needle x 10 B for mobility,       Peripheral muscle strengthening which included one set of 15-20 repetitions at a slow and controlled 10-13 second per rep pace focused on strengthening supporting musculature in order to improve body mechanics and functional mobility. Patient and therapist focused on proper form during treatment to ensure optimal strengthening of each targeted muscle group.  Machines utilized included:Torso rotation, Leg Ext, Leg Curl, Chest Press, and Rowing:Triceps, Biceps, Hip Abd, Hip Add, and Leg Press      Catie participated in dynamic functional therapeutic activities to improve functional performance and simulate household and community activities for 00 minutes. The following activities were included:    Intro to lifting to   Hip hinge x 10  Deadlift 10 kb 2 x 5       Catie received manual therapy techniques for -  minutes. The following activities were included:      Pt given cold pack for 5 minutes to low back in z-lie.    Patient Education and Home Exercises     Home exercises include:  LTR  Open books   EIL  Quad - Cat cow, Bird Dogs, Thread the needle   Bridge   PPT     Cardio program (V5): -  Lifting education (V11): -  Posture/Lumbar roll: acquired  Fridge Magnet Discharge handout (date given): -  Equipment at home/gym membership: yes    Education provided:       Written Home Exercises Provided: Patient instructed to cont prior HEP.  Exercises were reviewed and Catie was able to demonstrate them prior to the end of the session.  Catie demonstrated good  understanding of the education provided.     See EMR under Patient Instructions for exercises provided prior visit.    Assessment     Catie arrived to  slightly guarded in her gait and decreased trunk movement. Tx initiated with upper back stretches and pt able to tolerate move into lumbar ext stretch. Pt advised to stretch and ex thru a  pain flare within the moderate and sort of hard range (3,4). Educating pt on the difference on hurt vs harm and also how stress and trauma effect a person's perception of pain. Pt stayed in pain free zone and was able to deepen stretches slowly. Pt able to complete 15 reps on increased resistance Lumbar MedX 3/10 PRE. Peripheral  machines also performed within moderate range and pt completed all without issues. Progress per HB protocol and pt's tolerance.         Patient is making good progress towards established goals.  Pt will continue to benefit from skilled outpatient physical therapy to address the deficits stated in the impairment chart, provide pt/family education and to maximize pt's level of independence in the home and community environment.     Anticipated Barriers for therapy: ankylosing spondylitis   Pt's spiritual, cultural and educational needs considered and pt agreeable to plan of care and goals as stated below:     GOALS: Pt is in agreement with the following goals.     Short term goals:  6 weeks or 10 visits   - Pt will demonstrate increased lumbar MedX ROM by at least 3 degrees from the initial ROM value with improvements noted in functional ROM and ability to perform ADLs. Appropriate and Ongoing  - Pt will demonstrate increased MedX average isometric strength value by 15% from initial test resulting in improved ability to perform bending, lifting, and carrying activities safely, confidently. Appropriate and Ongoing  - Pt will report a reduction in worst pain score by 1-2 points for improved tolerance for childcare. Appropriate and Ongoing  - Pt able to perform HEP correctly with minimal cueing or supervision from therapist to encourage independent management of symptoms. Appropriate and Ongoing     Long term goals: 10 weeks or 20 visits   - Pt will demonstrate increased lumbar MedX ROM by at least 6 degrees from initial ROM value, resulting in improved ability to perform functional forward  bending while standing and sitting. Appropriate and Ongoing  - Pt will demonstrate increased MedX average isometric strength value by 30% from initial test resulting in improved ability to perform bending, lifting, and carrying activities safely and confidently. Appropriate and Ongoing  - Pt to demonstrate ability to independently control and reduce their pain through posture positioning and mechanical movements throughout a typical day. Appropriate and Ongoing  - Pt will demonstrate reduced pain and improved functional outcomes as reported on the Oswestry Disability Index by reaching a score of 3 or less in order to demonstrate subjective improvement in pt's condition. . Appropriate and Ongoing  - Pt will demonstrate independence with the HEP at discharge. Appropriate and Ongoing  - Pt will be able to bend down and  her children without increased pain. Appropriate and Ongoing    Plan     Continue with established Plan of Care towards established PT goals.       Nona Sargent, PTA  03/20/2024

## 2024-03-25 ENCOUNTER — CLINICAL SUPPORT (OUTPATIENT)
Dept: REHABILITATION | Facility: OTHER | Age: 39
End: 2024-03-25
Payer: COMMERCIAL

## 2024-03-25 DIAGNOSIS — R29.898 DECREASED STRENGTH OF TRUNK AND BACK: Primary | ICD-10-CM

## 2024-03-25 PROCEDURE — 97750 PHYSICAL PERFORMANCE TEST: CPT | Mod: 32

## 2024-03-25 NOTE — PROGRESS NOTES
"[facility]  Physical Therapy Treatment Note     Name: Catie Monge  Clinic Number: [id number]    Therapy Diagnosis:   Encounter Diagnosis   Name Primary?    Decreased strength of trunk and back Yes     Physician: Day Chapa, *    Visit Date: 3/25/2024    Physician Orders: PT Eval and Treat  Medical Diagnosis from Referral:   M46.1 (ICD-10-CM) - Sacroiliitis   M53.3 (ICD-10-CM) - SI (sacroiliac) joint dysfunction   M45.9 (ICD-10-CM) - Ankylosing spondylitis, unspecified site of spine      Evaluation Date: 2/21/2024  Authorization Period Expiration: 1/28/2024   Plan of Care Expiration: 5/2/2024  Reassessment Due: 4/25/2024  Visit # / Visits authorized: 8/11  MedX testing visit 2    PTA Visit #: 4/5     Time In: 9:02 AM   Time Out: 10:08 AM  Total Billable Time: 60 minutes  INSURANCE and OUTCOMES: Program Benefit Group with Lumbar Outcomes (Oswestry and AQoL) 1/3    Precautions: standard/ankylosing spondylitis     Pattern of pain determined: movement responder    Subjective     Catie Monge reports that she is mostly back to baseline after her first ankylosing spondylitis exacerbation last week. Bridges are hurting with the most recent progression.    Patient reports tolerating previous visit well  Patient reports their pain to be 3/10 on a 0-10 scale with 0 being no pain and 10 being the worst pain imaginable.  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  Leisure: read, long walks playing with kids, [employer]       Pt goals: "Increased flexibility, feel confident weight lifting, and exercising without fear of my back being re-injured"     Objective      Lumbar  Isometric Testing on Med X equipment: Testing administered by PT    Test Initial Baseline Midpoint Final   Date 2/23/2024     ROM 0-48 deg     Max Peak Torque 106      Min Peak Torque 46      Flex/Ext Ratio 2.3:1     % below normative data 37     % " "gain from initial test Not available visit 1       MOVEMENT LOSS - Lumbar- Updated 3/25/25    Norms ROM Loss Initial   Flexion Fingers touch toes, sacral angle >/= 70 deg, uniform spinal curvature, posterior weight shift  minimal loss   Extension ASIS surpasses toes, spine of scapulae surpasses heels, uniform spinal curve Moderate loss   Side glide Right   WFL   Side glide Left   WFL   Rotation Right PT observes contralateral shoulder minimal loss   Rotation Left PT observes contralateral shoulder WFL       Starting weight 53 ft/lbs    Outcomes:  Intake Score: 18%  Visit 6 Score:   Visit 10 Score:   Discharge Score:  Goal Score: 8%     Treatment     Catie received the treatments listed below:      Medical MedX Treatment as follows:  Patient received neuromuscular education for 0 minutes via participation on the Medical MedX Machine. Therapist assisted patient in isolating and engaging spinal stabilization musculature in order to improve functional ability and postural control. Patient performed exercise with therapist guidance in order to accurately use pacer function, avoid valsalva, and optimally exert effort within a safe and effective range via the Peyman Exertion Rating Scale. Patient instructed to perform at a midrange of exertion and to complete 15-20 repetitions within appropriate split time, with proper technique, and while maintaining safety.        Catie participated in neuromuscular re-education activities to improve balance, coordination, proprioception, motor control and/or posture for 00 minutes. The following activities were included:       Catie participated in therapeutic exercises to develop strength, endurance, ROM, flexibility, posture, and core stabilization for 60 minutes including:    Treadmill 5' -     LTR's x10  Open books 15x5" ea  EIL x 15 cue for controlled breathing for end range "sag"   Seated shld blade/thoracic stretch hands behind head bend forward (chicken wing) x10  PPT x 10 " NP  Bridge x 10  /c arms crossed/ alternating LE kick out > Changed to arms down w/hip abd. GTB  Quad  Cat cow x10  Quad hip extension x 3 each -NP   Thread needle x 10 B for mobility,   + Supine marching 90/90 x 10          3/25/2024     9:23 AM   [facility]   Visit Number 9   VAS Pain Rating 3   Treadmill Time (in min.) 5 min   Extension in Lying 15   Lumbar Weight 62 lbs   Repetitions 18   Rating of Perceived Exertion 3   Ice - Z Lie (in min.) 5       Peripheral muscle strengthening which included one set of 15-20 repetitions at a slow and controlled 10-13 second per rep pace focused on strengthening supporting musculature in order to improve body mechanics and functional mobility. Patient and therapist focused on proper form during treatment to ensure optimal strengthening of each targeted muscle group.  Machines utilized included:Torso rotation, Leg Ext, Leg Curl, Chest Press, and Rowing:Triceps, Biceps, Hip Abd, Hip Add, and Leg Press      Catie participated in dynamic functional therapeutic activities to improve functional performance and simulate household and community activities for 00 minutes. The following activities were included:    NP: Intro to lifting to   Hip hinge x 10  Deadlift 10 kb 2 x 5     Catie received manual therapy techniques for -  minutes. The following activities were included:      Pt given cold pack for 5 minutes to low back in z-lie.    Patient Education and Home Exercises     Home exercises include:  LTR  Open books   EIL  Quad - Cat cow, Bird Dogs, Thread the needle   Bridge   PPT >supine marching     Cardio program (V5): -  Lifting education (V11):  3/20/24  Posture/Lumbar roll: acquired  Fridge Magnet Discharge handout (date given): -  Equipment at home/gym membership: yes    Education provided:     Written Home Exercises Provided: Patient instructed to cont prior HEP.  Exercises were reviewed and Catie was able to demonstrate them prior to the end of the session.   Catie demonstrated good  understanding of the education provided.     See EMR under Patient Instructions for exercises provided prior visit.    Assessment     Patient presents with report of return to baseline after last week's exacerbation. Reviewed ankylosing spondylitis exacerbation symptom management techniques and sympathetic nervous system down regulation techniques. Re-assessment of patient's lumbar range of motion reveals increased bilateral side bending, however limitations in lumbar extension remain. Advanced patient's posterior pelvic tilt home exercise and modified bridge progression to reduce discomfort. Pt able to complete 18 reps on Lumbar MedX with resistance weight maintained to 62 ft. Lbs and an RPE of 3/10 PRE. Peripheral machines also performed within moderate range and pt completed all without issues. Progress per HB protocol and pt's tolerance.     Patient is making good progress towards established goals.  Pt will continue to benefit from skilled outpatient physical therapy to address the deficits stated in the impairment chart, provide pt/family education and to maximize pt's level of independence in the home and community environment.     Anticipated Barriers for therapy: ankylosing spondylitis   Pt's spiritual, cultural and educational needs considered and pt agreeable to plan of care and goals as stated below:     GOALS: Pt is in agreement with the following goals.     Short term goals:  6 weeks or 10 visits   - Pt will demonstrate increased lumbar MedX ROM by at least 3 degrees from the initial ROM value with improvements noted in functional ROM and ability to perform ADLs. Appropriate and Ongoing  - Pt will demonstrate increased MedX average isometric strength value by 15% from initial test resulting in improved ability to perform bending, lifting, and carrying activities safely, confidently. Appropriate and Ongoing  - Pt will report a reduction in worst pain score by 1-2 points for  improved tolerance for childcare. Appropriate and Ongoing  - Pt able to perform HEP correctly with minimal cueing or supervision from therapist to encourage independent management of symptoms. Appropriate and Ongoing     Long term goals: 10 weeks or 20 visits   - Pt will demonstrate increased lumbar MedX ROM by at least 6 degrees from initial ROM value, resulting in improved ability to perform functional forward bending while standing and sitting. Appropriate and Ongoing  - Pt will demonstrate increased MedX average isometric strength value by 30% from initial test resulting in improved ability to perform bending, lifting, and carrying activities safely and confidently. Appropriate and Ongoing  - Pt to demonstrate ability to independently control and reduce their pain through posture positioning and mechanical movements throughout a typical day. Appropriate and Ongoing  - Pt will demonstrate reduced pain and improved functional outcomes as reported on the Oswestry Disability Index by reaching a score of 3 or less in order to demonstrate subjective improvement in pt's condition. . Appropriate and Ongoing  - Pt will demonstrate independence with the HEP at discharge. Appropriate and Ongoing  - Pt will be able to bend down and  her children without increased pain. Appropriate and Ongoing    Plan     Continue with established Plan of Care towards established PT goals.       Nora Kruger, PT  03/25/2024

## 2024-03-27 ENCOUNTER — OFFICE VISIT (OUTPATIENT)
Dept: RHEUMATOLOGY | Facility: CLINIC | Age: 39
End: 2024-03-27
Payer: COMMERCIAL

## 2024-03-27 ENCOUNTER — LAB VISIT (OUTPATIENT)
Dept: LAB | Facility: OTHER | Age: 39
End: 2024-03-27
Attending: INTERNAL MEDICINE
Payer: COMMERCIAL

## 2024-03-27 ENCOUNTER — CLINICAL SUPPORT (OUTPATIENT)
Dept: REHABILITATION | Facility: OTHER | Age: 39
End: 2024-03-27
Payer: COMMERCIAL

## 2024-03-27 VITALS
WEIGHT: 168.19 LBS | HEIGHT: 69 IN | BODY MASS INDEX: 24.91 KG/M2 | SYSTOLIC BLOOD PRESSURE: 137 MMHG | HEART RATE: 78 BPM | DIASTOLIC BLOOD PRESSURE: 86 MMHG

## 2024-03-27 DIAGNOSIS — Z79.620 LONG TERM (CURRENT) USE OF IMMUNOSUPPRESSIVE BIOLOGIC: ICD-10-CM

## 2024-03-27 DIAGNOSIS — M45.8 ANKYLOSING SPONDYLITIS OF SACRAL REGION: ICD-10-CM

## 2024-03-27 DIAGNOSIS — D84.821 DRUG-INDUCED IMMUNODEFICIENCY: ICD-10-CM

## 2024-03-27 DIAGNOSIS — Z79.620 LONG TERM (CURRENT) USE OF IMMUNOSUPPRESSIVE BIOLOGIC: Primary | ICD-10-CM

## 2024-03-27 DIAGNOSIS — M46.1 SACROILIITIS: ICD-10-CM

## 2024-03-27 DIAGNOSIS — Z79.899 DRUG-INDUCED IMMUNODEFICIENCY: ICD-10-CM

## 2024-03-27 DIAGNOSIS — R29.898 DECREASED STRENGTH OF TRUNK AND BACK: Primary | ICD-10-CM

## 2024-03-27 LAB
ALBUMIN SERPL BCP-MCNC: 4 G/DL (ref 3.5–5.2)
ALP SERPL-CCNC: 25 U/L (ref 55–135)
ALT SERPL W/O P-5'-P-CCNC: 19 U/L (ref 10–44)
ANION GAP SERPL CALC-SCNC: 8 MMOL/L (ref 8–16)
AST SERPL-CCNC: 25 U/L (ref 10–40)
BASOPHILS # BLD AUTO: 0.02 K/UL (ref 0–0.2)
BASOPHILS NFR BLD: 0.4 % (ref 0–1.9)
BILIRUB SERPL-MCNC: 0.9 MG/DL (ref 0.1–1)
BUN SERPL-MCNC: 10 MG/DL (ref 6–20)
CALCIUM SERPL-MCNC: 9 MG/DL (ref 8.7–10.5)
CHLORIDE SERPL-SCNC: 108 MMOL/L (ref 95–110)
CO2 SERPL-SCNC: 24 MMOL/L (ref 23–29)
CREAT SERPL-MCNC: 0.9 MG/DL (ref 0.5–1.4)
CRP SERPL-MCNC: 0.6 MG/L (ref 0–8.2)
DIFFERENTIAL METHOD BLD: ABNORMAL
EOSINOPHIL # BLD AUTO: 0.1 K/UL (ref 0–0.5)
EOSINOPHIL NFR BLD: 2.7 % (ref 0–8)
ERYTHROCYTE [DISTWIDTH] IN BLOOD BY AUTOMATED COUNT: 11.6 % (ref 11.5–14.5)
ERYTHROCYTE [SEDIMENTATION RATE] IN BLOOD BY PHOTOMETRIC METHOD: 5 MM/HR (ref 0–36)
EST. GFR  (NO RACE VARIABLE): >60 ML/MIN/1.73 M^2
GLUCOSE SERPL-MCNC: 99 MG/DL (ref 70–110)
HCT VFR BLD AUTO: 40.2 % (ref 37–48.5)
HGB BLD-MCNC: 13 G/DL (ref 12–16)
IMM GRANULOCYTES # BLD AUTO: 0.02 K/UL (ref 0–0.04)
IMM GRANULOCYTES NFR BLD AUTO: 0.4 % (ref 0–0.5)
LYMPHOCYTES # BLD AUTO: 1.5 K/UL (ref 1–4.8)
LYMPHOCYTES NFR BLD: 34 % (ref 18–48)
MCH RBC QN AUTO: 29.9 PG (ref 27–31)
MCHC RBC AUTO-ENTMCNC: 32.3 G/DL (ref 32–36)
MCV RBC AUTO: 92 FL (ref 82–98)
MONOCYTES # BLD AUTO: 0.5 K/UL (ref 0.3–1)
MONOCYTES NFR BLD: 12 % (ref 4–15)
NEUTROPHILS # BLD AUTO: 2.3 K/UL (ref 1.8–7.7)
NEUTROPHILS NFR BLD: 50.5 % (ref 38–73)
NRBC BLD-RTO: 0 /100 WBC
PLATELET # BLD AUTO: 285 K/UL (ref 150–450)
PMV BLD AUTO: 9 FL (ref 9.2–12.9)
POTASSIUM SERPL-SCNC: 4.3 MMOL/L (ref 3.5–5.1)
PROT SERPL-MCNC: 7.4 G/DL (ref 6–8.4)
RBC # BLD AUTO: 4.35 M/UL (ref 4–5.4)
SODIUM SERPL-SCNC: 140 MMOL/L (ref 136–145)
WBC # BLD AUTO: 4.5 K/UL (ref 3.9–12.7)

## 2024-03-27 PROCEDURE — 3079F DIAST BP 80-89 MM HG: CPT | Mod: CPTII,S$GLB,, | Performed by: INTERNAL MEDICINE

## 2024-03-27 PROCEDURE — 1160F RVW MEDS BY RX/DR IN RCRD: CPT | Mod: CPTII,S$GLB,, | Performed by: INTERNAL MEDICINE

## 2024-03-27 PROCEDURE — 3008F BODY MASS INDEX DOCD: CPT | Mod: CPTII,S$GLB,, | Performed by: INTERNAL MEDICINE

## 2024-03-27 PROCEDURE — 36415 COLL VENOUS BLD VENIPUNCTURE: CPT | Performed by: INTERNAL MEDICINE

## 2024-03-27 PROCEDURE — 99999 PR PBB SHADOW E&M-EST. PATIENT-LVL IV: CPT | Mod: PBBFAC,,, | Performed by: INTERNAL MEDICINE

## 2024-03-27 PROCEDURE — 1159F MED LIST DOCD IN RCRD: CPT | Mod: CPTII,S$GLB,, | Performed by: INTERNAL MEDICINE

## 2024-03-27 PROCEDURE — 86140 C-REACTIVE PROTEIN: CPT | Performed by: INTERNAL MEDICINE

## 2024-03-27 PROCEDURE — 85652 RBC SED RATE AUTOMATED: CPT | Performed by: INTERNAL MEDICINE

## 2024-03-27 PROCEDURE — 85025 COMPLETE CBC W/AUTO DIFF WBC: CPT | Performed by: INTERNAL MEDICINE

## 2024-03-27 PROCEDURE — 97110 THERAPEUTIC EXERCISES: CPT | Mod: CQ

## 2024-03-27 PROCEDURE — 80053 COMPREHEN METABOLIC PANEL: CPT | Performed by: INTERNAL MEDICINE

## 2024-03-27 PROCEDURE — 3075F SYST BP GE 130 - 139MM HG: CPT | Mod: CPTII,S$GLB,, | Performed by: INTERNAL MEDICINE

## 2024-03-27 PROCEDURE — 99214 OFFICE O/P EST MOD 30 MIN: CPT | Mod: S$GLB,,, | Performed by: INTERNAL MEDICINE

## 2024-03-27 NOTE — PROGRESS NOTES
Chief Complaint   Patient presents with    Disease Management       History of presenting illness      38 year old female comes with low back pain-right lower back- for 9 weeks   Had an episode in may 2023- sudden in onset- subsided with physical therapy- lasted for 4 weeks    Pain :  Constant throb-right lower back  Cough and moving makes it worse  Rest makes it better,heat pack makes it better,sitting on a heat pack makes it better  EMS-eased by diclofenac-1 and half hour stiffness  Sitting at a desk- stiffness gets worse,moving around just casually eases  Rolling over -she has pain  When she wakes up when her child cries,she finds herself stiff and she doesn't feel comfortable    Diclofenac really helps  7 dropped down to 5  Takes robaxin 1/2 at night     Right elbow hurts  She fell in 2022- broke a bone   Pain since then comes and goes  Could be weather related-not sure  No swelling     Left elbow-no pain  Small joints no pain  No dactylitis      Has raynaud's  Small bumps-painful on the fingers- come and go- not related to anything  Red raised  Lasts for a week or two  Biopsy in the past-high school years- normal    Dry eyes+    GERD  Diarrhea and stomach cramping -recurrent episodes last for 5 days  May 2023- it lasted for 18 days  No blood and mucus     1 miscarriage in 2018-first   Post partum pre -eclampsia with the first child  Post partum bleeding after the second child     No skin rashes,malar rash,photosensitivity   No telangiectasias   No calcinosis   No psoriasis   No patchy alopecia   No oral and nasal ulcers   No dry mouth   No pleurisy or any cardiopulmonary complaints   No dysphagia,diplopia and dysphonia and muscle weakness   No n/v/d/c   No acid reflux+   No cytopenias   No renal issues   No blood clots   No fever,chills,night sweats,weight loss and loss of appetite   No pregnancy complications   No new onset headaches   No recurrent conjunctivitis or uveitis or scleritis or episcleritis   No  chronic or bloody diarrhea with no u colitis or crohn's /inflammatory bowel disease   No vaginal or urethral  d/c/STDs/no ulcers   No unexplained lymphadenopathy,parotitis   No seizures,strokes,psychosis  No sclerodactyly  No puffy hands  No perioral tightness     Labs     CBC,CMP nml    MRI LS spine and Sacrum and coccyx      3/2024    HLAB27 neg  ESR,CRP nml  Predmard labs negative    Humira was really helping  Within 4 shots she felt great  She doesn't need ice every night  She takes NSAIDs everyday  Healthy [facility] program has been helping    Woke up- acute onset of neck pain  Stretching helped  Ice helped  48 hours- she got full ROM    No morning stiffness  No early morning awakening    No recurrent infections  No injection site reactions    Associated symptoms :     Eye doctor- didn't see any inflammation  Dry eyes needing eye drops  Gastro symptoms got better  Colonoscopy- patchy area of inflammation in the sigmoid colon  Biopsy looked good   Hyperplastic rectal polyp-benign  Skin folliculitis current diagnosis          Past history  Anxiety and depression-on zoloft and wellbutrin    Family history  Rheumatoid arthritis- dad at age 65    Social history  No smoking,alcohol  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   HENT:  Negative for mouth sores and trouble swallowing.    Eyes:  Negative for redness.   Respiratory:  Negative for cough and shortness of breath.    Cardiovascular:  Negative for chest pain.   Gastrointestinal:  Negative for constipation and diarrhea.   Genitourinary:  Negative for dysuria and genital sores.   Skin:  Negative for rash.   Neurological:  Negative for headaches.   Hematological:  Does not bruise/bleed easily.             Severe tenderness lumbar spine   Tenderness -severe right SI joint  Forward flexion- low back/right SI joint pain    Physical Exam   Constitutional: She is oriented to person, place, and time.  No distress.   HENT:   Head: Normocephalic.   Mouth/Throat: Oropharynx is clear and moist.   Eyes: Pupils are equal, round, and reactive to light. Conjunctivae are normal. Right eye exhibits no discharge. Left eye exhibits no discharge. No scleral icterus.   Neck: No thyromegaly present.   Cardiovascular: Normal rate, regular rhythm and normal heart sounds.   Pulmonary/Chest: Effort normal and breath sounds normal. No stridor.   Abdominal: Soft. Bowel sounds are normal.   Musculoskeletal:         General: Normal range of motion.      Cervical back: Normal range of motion.   Lymphadenopathy:     She has no cervical adenopathy.   Neurological: She is alert and oriented to person, place, and time.   Skin: Skin is warm. No rash noted. She is not diaphoretic.   Psychiatric: Affect and judgment normal.           Assessment       Patient is a very pleasant 38-year-old female who comes with a 9 week history of right low back pain, she describes the pain as a constant throbbing in the right lower back.  The pain seems to get worse both at rest and with extreme exertion.  She does not report waking up early morning with severe low back pain but does wake up multiple times at night to care for her child when she finds herself stiff and uncomfortable.  The pain is worse with coughing and moving.  There is significant morning stiffness that lasts for up to 1-1/2 hour.  She does report significant improvement with anti-inflammatories.  Rheumatologic review of systems is significant for Raynaud's as well as dry eyes.  She mentions having bumps in the axillary region and her  who is a physician thinks she might have had hidradenitis.  She has also had gastrointestinal symptoms periodically, the most recent episode lasted for at least 20-30 days with a significant amount of weight loss due to diarrhea and stomach cramping, with no blood and mucus in stools.    Her past medical history is significant for anxiety and depression  it seems pretty well controlled.  Her pregnancies have been significant in that she has had 1 1st trimester miscarriage and 1 preeclampsia and 1 postpartum bleeding, she denies any large for birth weight babies.    Physical exam is significant for severe tenderness in the lumbar spine and pain in the right sacroiliac joint with provocative maneuvers.    She does not have any peripheral arthritis, enthesitis, dactylitis.  No psoriasis      1. Long term (current) use of immunosuppressive biologic    2. Sacroiliitis          Reviewed labs/xrays  Reviewed medications    Ordered labs /xrays    I have reviewed the MRIs with 2 radiologists independently, and have discussed the clinical findings, to correlate with the radiologic findings.    She does have sacroiliitis-because of the subchondral edema in the mark inferior aspect of the right sacroiliac joint, she does not have any erosions or synovitis in the site.  On the posterior inferior aspect of the right sacroiliac joint, there are somewhat similar but milder changes.  She does not have any other joint inflammation in the pelvis or the lumbar spine.  She does meet the MRI-ASAS classification criteria.    If we apply the AS AS classification criteria      I use the ASAS criteria on MRI to evaluate for active sacroiliitis. She has subchondral edema and enhancement on the right, which meets criteria for active sacroiliitis. It's a small region and there were no other signs of joint inflammation in the pelvis or L spine, so by nonspecific I mean I don't know the etiology     After discussing the case with 2 radiologists, her  who is a physician and the patient who is a registered nurse a shared decision making has been made to proceed with treating this as spondyloarthritis.          3/2024    HLAB27 neg  ESR,CRP nml  Humira was really helping  Within 4 shots she felt great  She doesn't need ice every night  She takes NSAIDs everyday  Healthy back program has  been helping    Woke up- acute onset of neck pain  Stretching helped  Ice helped  48 hours- she got full ROM    It was tender from the neck to upper back    No morning stiffness  No early morning awakening    No recurrent infections  No injection site reactions    Associated symptoms :     Eye doctor- didn't see any inflammation  Dry eyes needing eye drops  Gastro symptoms got better  Colonoscopy- patchy area of inflammation in the sigmoid colon  Biopsy looked good   Hyperplastic rectal polyp-benign  Skin folliculitis current diagnosis    ASDAS CRP using the CRP from 12/2023 is low  0.7    T spine tender today      F/u problem     Plan    If she has recurrent neck events then we can MRI the C/T spine    CBC,CMP ,ESR,CRP today    Humira to continue    Vaccinations discussed.    Return to clinic in 3 months.    Catie was seen today for disease management.    Diagnoses and all orders for this visit:    Long term (current) use of immunosuppressive biologic  -     Ambulatory referral/consult to Infectious Disease; Future  -     CBC Auto Differential; Future  -     Comprehensive Metabolic Panel; Future  -     Sedimentation rate; Future  -     C-Reactive Protein; Future    Sacroiliitis  -     CBC Auto Differential; Future  -     Comprehensive Metabolic Panel; Future  -     Sedimentation rate; Future  -     C-Reactive Protein; Future          Medication changes made  Refilled medications  Toxicity issues discussed    Old records reviewed and summarised  Records are from [facility]      Follow up in 3 months    Notes will be sent to PCP    Preventive medicine

## 2024-03-27 NOTE — PROGRESS NOTES
3/26/2024     9:27 PM   Rapid3 Question Responses and Scores   MDHAQ Score 0.2   Psychologic Score 3.3   Pain Score 4   When you awakened in the morning OVER THE LAST WEEK, did you feel stiff? Yes   If Yes, please indicate the number of hours until you are as limber as you will be for the day 48   Fatigue Score 0   Global Health Score 2   RAPID3 Score 2.22     Answers submitted by the patient for this visit:  Rheumatology Questionnaire (Submitted on 3/26/2024)  fever: No  eye redness: No  mouth sores: No  headaches: No  shortness of breath: No  chest pain: No  trouble swallowing: No  diarrhea: No  constipation: No  unexpected weight change: No  genital sore: No  dysuria: No  During the last 3 days, have you had a skin rash?: No  Bruises or bleeds easily: No  cough: No

## 2024-03-28 ENCOUNTER — PATIENT MESSAGE (OUTPATIENT)
Dept: RHEUMATOLOGY | Facility: CLINIC | Age: 39
End: 2024-03-28
Payer: COMMERCIAL

## 2024-04-09 ENCOUNTER — CLINICAL SUPPORT (OUTPATIENT)
Dept: REHABILITATION | Facility: OTHER | Age: 39
End: 2024-04-09
Payer: COMMERCIAL

## 2024-04-09 DIAGNOSIS — R29.898 DECREASED STRENGTH OF TRUNK AND BACK: Primary | ICD-10-CM

## 2024-04-09 PROCEDURE — 97750 PHYSICAL PERFORMANCE TEST: CPT | Mod: 32

## 2024-04-09 NOTE — PROGRESS NOTES
"[facility]  Physical Therapy Treatment Note     Name: Catie Monge  Clinic Number: [id number]    Therapy Diagnosis:   Encounter Diagnosis   Name Primary?    Decreased strength of trunk and back Yes       Physician: Day Chapa, *    Visit Date: 4/9/2024    Physician Orders: PT Eval and Treat  Medical Diagnosis from Referral:   M46.1 (ICD-10-CM) - Sacroiliitis   M53.3 (ICD-10-CM) - SI (sacroiliac) joint dysfunction   M45.9 (ICD-10-CM) - Ankylosing spondylitis, unspecified site of spine      Evaluation Date: 2/21/2024  Authorization Period Expiration: 1/28/2024   Plan of Care Expiration: 5/2/2024  Reassessment Due: 4/25/2024  Visit # / Visits authorized: 11/20  MedX testing visit 2    PTA Visit #: 0/5     Time In: 10:00 AM   Time Out: 10:55 AM   Total Billable Time: 55 minutes  INSURANCE and OUTCOMES: Program Benefit Group with Lumbar Outcomes (Oswestry and AQoL) 1/3    Precautions: standard/ankylosing spondylitis     Pattern of pain determined: movement responder    Subjective     Catie Monge states the back has been feeling good still has some fear of movement    Patient reports tolerating previous visit well  Patient reports their pain to be 2-3/10 on a 0-10 scale with 0 being no pain and 10 being the worst pain imaginable.  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  Leisure: read, long walks playing with kids, [employer]       Pt goals: "Increased flexibility, feel confident weight lifting, and exercising without fear of my back being re-injured"     Objective      Lumbar  Isometric Testing on Med X equipment: Testing administered by PT    Test Initial Baseline Midpoint Final   Date 2/23/2024 4/9/2024    ROM 0-48 deg 0-60    Max Peak Torque 106  110    Min Peak Torque 46  68    Flex/Ext Ratio 2.3:1 1.6:1    % below normative data 37 31    % gain from initial test Not available visit 1 11%      MOVEMENT " LOSS - Lumbar- Updated 3/25/25    Norms ROM Loss Initial   Flexion Fingers touch toes, sacral angle >/= 70 deg, uniform spinal curvature, posterior weight shift  minimal loss   Extension ASIS surpasses toes, spine of scapulae surpasses heels, uniform spinal curve Moderate loss   Side glide Right   WFL   Side glide Left   WFL   Rotation Right PT observes contralateral shoulder minimal loss   Rotation Left PT observes contralateral shoulder WFL       Starting weight 53 ft/lbs    Outcomes:  Intake Score: 18%  Visit 6 Score:   Visit 10 Score:   Discharge Score:  Goal Score: 8%         4/9/2024    11:05 AM 3/27/2024     9:26 AM 2/22/2024    12:57 PM   Oswestry Questionnaire Review   Pain Intensity 1 1 0   Personal Care (Washing, Dressing) 0 0 1   Lifting 1 1 2   Walking 0 0 0   Sitting 0 0 1   Standing 0 0 0   Sleeping 1 1 1   Social Life 0 0 2   Traveling 0 1 1   Employment/Homemaking 1 0 1   Score 4 4 9           Treatment     Catie received the treatments listed below:      Medical MedX Treatment as follows:  Patient received neuromuscular education for 0 minutes via participation on the Medical MedX Machine. Therapist assisted patient in isolating and engaging spinal stabilization musculature in order to improve functional ability and postural control. Patient performed exercise with therapist guidance in order to accurately use pacer function, avoid valsalva, and optimally exert effort within a safe and effective range via the Peyman Exertion Rating Scale. Patient instructed to perform at a midrange of exertion and to complete 15-20 repetitions within appropriate split time, with proper technique, and while maintaining safety.        Catie participated in neuromuscular re-education activities to improve balance, coordination, proprioception, motor control and/or posture for 00 minutes. The following activities were included:       Catie participated in therapeutic exercises to develop strength, endurance, ROM,  "flexibility, posture, and core stabilization for 55 minutes including:    Treadmill 5' -     LTR's x10  Open books 15x5" ea  EIL x 15 cue for controlled breathing for end range "sag"   Seated shld blade/thoracic stretch hands behind head bend forward (chicken wing) x10  Bridge x 10  Changed to arms down w/hip abd. GTB  +Bridge with March x10  Quad  Cat cow x10   Thread needle x 10 B for mobility, - NP   Bird dog x 10 ea   Supine marching 90/90 x 10  +TrA single knee isometric with ball x10        4/9/2024    11:04 AM   [facility]   Visit Number 11   VAS Pain Rating 0   Treadmill Time (in min.) 5 min   Extension in Lying 15   Lumbar Flexion 60   Lumbar Extension 0   Lumbar Peak Torque 10 ft. lbs.   Min Torque 68   Test Percent Below Normative Data 31 %   Test Percent Gain in Strength from Initial  11 %   Ice - Z Lie (in min.) 0           Peripheral muscle strengthening which included one set of 15-20 repetitions at a slow and controlled 10-13 second per rep pace focused on strengthening supporting musculature in order to improve body mechanics and functional mobility. Patient and therapist focused on proper form during treatment to ensure optimal strengthening of each targeted muscle group.  Machines utilized included:Torso rotation, Leg Ext, Leg Curl, Chest Press, and Rowing:Triceps, Biceps, Hip Abd, Hip Add, and Leg Press      Catie participated in dynamic functional therapeutic activities to improve functional performance and simulate household and community activities for 00 minutes. The following activities were included:    NP: Intro to lifting to   Hip hinge x 10  Deadlift 10 kb 2 x 5     Catie received manual therapy techniques for -  minutes. The following activities were included:      Pt given cold pack for 5 minutes to low back in z-lie.    Patient Education and Home Exercises     Home exercises include:  LTR  Open books   EIL  Quad - Cat cow, Bird Dogs, Thread the needle   Bridge   PPT >supine " marching     Cardio program (V5): -  Lifting education (V11):  3/20/24  Posture/Lumbar roll: acquired  [employer] Discharge handout (date given): -  Equipment at home/gym membership: yes    Education provided:     Written Home Exercises Provided: Patient instructed to cont prior HEP.  Exercises were reviewed and Catie was able to demonstrate them prior to the end of the session.  Catie demonstrated good  understanding of the education provided.     See EMR under Patient Instructions for exercises provided prior visit.    Assessment     Patient has attended 11 visits at [facility] which included MD evaluation, PT evaluation with isometric testing, and physical therapy treatment including HEP instruction, education, aerobic work, dynamic strengthening on med ex equipment for the spine, and whole body strengthening on med ex equipment with increasing weight loads.  Patient  is demonstrating increased ability to reduce symptoms, improved posture, increased  ROM, and increased strength on med ex test by 11%  average.      Patient is making good progress towards established goals.  Pt will continue to benefit from skilled outpatient physical therapy to address the deficits stated in the impairment chart, provide pt/family education and to maximize pt's level of independence in the home and community environment.     Anticipated Barriers for therapy: ankylosing spondylitis   Pt's spiritual, cultural and educational needs considered and pt agreeable to plan of care and goals as stated below:     GOALS: Pt is in agreement with the following goals.     Short term goals:  6 weeks or 10 visits   - Pt will demonstrate increased lumbar MedX ROM by at least 3 degrees from the initial ROM value with improvements noted in functional ROM and ability to perform ADLs. Met 4/9/2024  - Pt will demonstrate increased MedX average isometric strength value by 15% from initial test resulting in improved ability to perform  bending, lifting, and carrying activities safely, confidently. Partially Met  - Pt will report a reduction in worst pain score by 1-2 points for improved tolerance for childcare. Met 4/9/2024  - Pt able to perform HEP correctly with minimal cueing or supervision from therapist to encourage independent management of symptoms. Met 4/9/2024     Long term goals: 10 weeks or 20 visits   - Pt will demonstrate increased lumbar MedX ROM by at least 6 degrees from initial ROM value, resulting in improved ability to perform functional forward bending while standing and sitting. Met 4/9/2024  - Pt will demonstrate increased MedX average isometric strength value by 30% from initial test resulting in improved ability to perform bending, lifting, and carrying activities safely and confidently. Appropriate and Ongoing  - Pt to demonstrate ability to independently control and reduce their pain through posture positioning and mechanical movements throughout a typical day. Appropriate and Ongoing  - Pt will demonstrate reduced pain and improved functional outcomes as reported on the Oswestry Disability Index by reaching a score of 3 or less in order to demonstrate subjective improvement in pt's condition. . Appropriate and Ongoing  - Pt will demonstrate independence with the HEP at discharge. Appropriate and Ongoing  - Pt will be able to bend down and  her children without increased pain. Appropriate and Ongoing    Plan     Continue with established Plan of Care towards established PT goals.       Bianca Torre, PT  04/09/2024

## 2024-04-11 ENCOUNTER — LAB VISIT (OUTPATIENT)
Dept: LAB | Facility: OTHER | Age: 39
End: 2024-04-11
Attending: INTERNAL MEDICINE
Payer: COMMERCIAL

## 2024-04-11 ENCOUNTER — OFFICE VISIT (OUTPATIENT)
Dept: INTERNAL MEDICINE | Facility: CLINIC | Age: 39
End: 2024-04-11
Payer: COMMERCIAL

## 2024-04-11 ENCOUNTER — CLINICAL SUPPORT (OUTPATIENT)
Dept: REHABILITATION | Facility: OTHER | Age: 39
End: 2024-04-11
Payer: COMMERCIAL

## 2024-04-11 DIAGNOSIS — F41.8 DEPRESSION WITH ANXIETY: Primary | ICD-10-CM

## 2024-04-11 DIAGNOSIS — R29.898 DECREASED STRENGTH OF TRUNK AND BACK: Primary | ICD-10-CM

## 2024-04-11 DIAGNOSIS — F41.8 DEPRESSION WITH ANXIETY: ICD-10-CM

## 2024-04-11 PROCEDURE — 97750 PHYSICAL PERFORMANCE TEST: CPT | Mod: 32

## 2024-04-11 PROCEDURE — 36415 COLL VENOUS BLD VENIPUNCTURE: CPT | Performed by: INTERNAL MEDICINE

## 2024-04-11 PROCEDURE — 99214 OFFICE O/P EST MOD 30 MIN: CPT | Mod: 95,,, | Performed by: INTERNAL MEDICINE

## 2024-04-11 RX ORDER — BUPROPION HYDROCHLORIDE 300 MG/1
300 TABLET ORAL DAILY
Qty: 30 TABLET | Refills: 11 | Status: SHIPPED | OUTPATIENT
Start: 2024-04-11 | End: 2025-04-11

## 2024-04-11 NOTE — PROGRESS NOTES
Added.   The patient location is:  Patient Home   The chief complaint leading to consultation is:  No chief complaint on file.    Subjective:         Worsening depression, feels due to wt gain, considering changing her zoloft.   Had trouble onboarding zoloft 25mg but doing well for her anxiety. Seeing conselor weekly, up from bimonthly. + stress of family life, sick kids, etc still exercising and eating well. No si.      Catie Monge is a 38 y.o.  female who presents for No chief complaint on file.  .   Review of Systems   Constitutional:  Positive for unexpected weight change. Negative for activity change.   HENT:  Negative for hearing loss, rhinorrhea and trouble swallowing.    Eyes:  Negative for discharge and visual disturbance.   Respiratory:  Negative for chest tightness and wheezing.    Cardiovascular:  Negative for chest pain and palpitations.   Gastrointestinal:  Negative for blood in stool, constipation, diarrhea and vomiting.   Endocrine: Negative for polydipsia and polyuria.   Genitourinary:  Negative for difficulty urinating, dysuria, hematuria and menstrual problem.   Musculoskeletal:  Negative for arthralgias, joint swelling and neck pain.   Neurological:  Negative for weakness and headaches.   Psychiatric/Behavioral:  Positive for dysphoric mood. Negative for confusion.        Patient Active Problem List   Diagnosis    Depression with anxiety - zoloft daily    Wears contact lenses    Ankylosing spondylitis    Decreased strength of trunk and back    Drug-induced immunodeficiency       Past Medical History:   Diagnosis Date    Ankylosing spondylitis of unspecified sites in spine     Anxiety     No meds    Depression     Loose bowel movements        Past Surgical History:   Procedure Laterality Date    ANUS SURGERY      FISSURE    COLONOSCOPY N/A 12/22/2023    Procedure: COLONOSCOPY;  Surgeon: Brandy Chaney MD;  Location: [facility];  Service: Gastroenterology;  Laterality: N/A;    COLONOSCOPY W/  BIOPSIES AND POLYPECTOMY  12/22/2023    COSMETIC SURGERY      DILATION AND CURETTAGE OF UTERUS USING SUCTION N/A 09/13/2018    Procedure: DILATION AND CURETTAGE, UTERUS, USING SUCTION;  Surgeon: Joselo Landry Jr., MD;  Location: [facility];  Service: OB/GYN;  Laterality: N/A;    NASAL SEPTUM SURGERY      TONSILLECTOMY, ADENOIDECTOMY      WISDOM TOOTH EXTRACTION         Family History   Problem Relation Age of Onset    Frontotemporal dementia Mother 72    Glaucoma Father     Hypertension Father     Rheum arthritis Father     Arthritis Father     Kyphosis Brother     Bipolar disorder Brother     Anxiety disorder Brother     Diabetes Mellitus Maternal Grandfather     No Known Problems Paternal Grandmother     Melanoma Paternal Grandfather     Breast cancer Neg Hx     Cancer Neg Hx     Ovarian cancer Neg Hx     Colon cancer Neg Hx     Macular degeneration Neg Hx     Retinal detachment Neg Hx     Amblyopia Neg Hx     Blindness Neg Hx     Cataracts Neg Hx     Strabismus Neg Hx     Esophageal cancer Neg Hx        Social History     Tobacco Use    Smoking status: Never    Smokeless tobacco: Never   Substance Use Topics    Alcohol use: Yes     Alcohol/week: 1.0 standard drink of alcohol     Types: 1 Glasses of wine per week     Comment: Social    Drug use: No       Objective:   There were no vitals taken for this visit.     Physical Exam  Constitutional:       General: She is not in acute distress.     Appearance: She is well-developed. She is not diaphoretic.   HENT:      Head: Normocephalic and atraumatic.   Eyes:      General: No scleral icterus.        Right eye: No discharge.         Left eye: No discharge.   Pulmonary:      Effort: Pulmonary effort is normal. No respiratory distress.   Skin:     Coloration: Skin is not pale.      Findings: No erythema.   Neurological:      Mental Status: She is alert and oriented to person, place, and time.   Psychiatric:         Behavior: Behavior normal.         Thought Content:  Thought content normal.           Prior labs reviewed  Assessment/Plan:       1. Depression with anxiety  Overview:  Mood worsening with increased stress, consistent with her medication.  Sensitive to meds so remains on low dose zoloft.   Eating in a healthy way, exercising.  Add wellbutrin, refer to [facility] in pc.      Orders:  -     PHARMACOGENOMICS PANEL; Future; Expected date: 04/11/2024  -     buPROPion (WELLBUTRIN XL) 300 MG 24 hr tablet; Take 1 tablet (300 mg total) by mouth once daily.  Dispense: 30 tablet; Refill: 11            Visit type: Virtual visit with synchronous audio and video    Total time spent with patient: 20 minutes    Each patient to whom he or she provides medical services by telemedicine is:  (1) informed of the relationship between the physician and patient and the respective role of any other health care provider with respect to management of the patient; and (2) notified that he or she may decline to receive medical services by telemedicine and may withdraw from such care at any time.  Medication List with Changes/Refills   Current Medications    ADALIMUMAB 40 MG/0.4 ML PNKT    Inject 0.4 mLs (40 mg total) into the skin every 14 (fourteen) days.    CALCIUM CARBONATE 1250 MG CAPSULE    Take 1,250 mg by mouth 2 (two) times daily with meals.    CETIRIZINE (ZYRTEC) 10 MG TABLET    Take 10 mg by mouth every evening.    CLINDAMYCIN (CLEOCIN T) 1 % EXTERNAL SOLUTION    Apply topically 2 (two) times daily. To armpits    CYCLOSPORINE (RESTASIS) 0.05 % OPHTHALMIC EMULSION    Place 1 drop into both eyes 2 (two) times daily.    DICLOFENAC (VOLTAREN) 75 MG EC TABLET    Take 1 tablet (75 mg total) by mouth 2 (two) times daily as needed (back).    DICYCLOMINE (BENTYL) 20 MG TABLET    Take 1 tablet (20 mg total) by mouth 3 (three) times daily as needed (abdominal pain).    DIPHENOXYLATE-ATROPINE 2.5-0.025 MG (LOMOTIL) 2.5-0.025 MG PER TABLET    Take 1 tablet by mouth 4 (four) times daily as needed for  Diarrhea.    DROSPIRENONE-E.ESTRADIOL-LM.FA (BEYAZ/CALEB) 3-0.02-0.451 MG (24) (4) TAB    Take 1 tablet by mouth once daily.    GLYCOPYRRONIUM TOSYLATE 2.4 % TOWL    Apply 1 each topically every evening.    METHOCARBAMOL (ROBAXIN) 500 MG TAB    Take 1 tablet (500 mg total) by mouth 4 (four) times daily.    SERTRALINE (ZOLOFT) 25 MG TABLET    Take 1 tablet (25 mg total) by mouth once daily.   Changed and/or Refilled Medications    Modified Medication Previous Medication    BUPROPION (WELLBUTRIN XL) 300 MG 24 HR TABLET buPROPion (WELLBUTRIN XL) 150 MG TB24 tablet       Take 1 tablet (300 mg total) by mouth once daily.    Take 1 tablet (150 mg total) by mouth once daily.

## 2024-04-11 NOTE — Clinical Note
F/u on depression Got a pgx (pharmacogenic panel) Open smart set for depression and see the recs based on her testing results)

## 2024-04-11 NOTE — PROGRESS NOTES
"[facility]  Physical Therapy Treatment Note     Name: Catie Monge  Clinic Number: [id number]    Therapy Diagnosis:   Encounter Diagnosis   Name Primary?    Decreased strength of trunk and back Yes         Physician: Day Chapa, *    Visit Date: 4/11/2024    Physician Orders: PT Eval and Treat  Medical Diagnosis from Referral:   M46.1 (ICD-10-CM) - Sacroiliitis   M53.3 (ICD-10-CM) - SI (sacroiliac) joint dysfunction   M45.9 (ICD-10-CM) - Ankylosing spondylitis, unspecified site of spine      Evaluation Date: 2/21/2024  Authorization Period Expiration: 1/28/2024   Plan of Care Expiration: 5/2/2024  Reassessment Due: 4/25/2024  Visit # / Visits authorized: 11/20  MedX testing visit 2    PTA Visit #: 1/5     Time In: 10:05 AM   Time Out: 11:00 AM   Total Billable Time: 55 minutes  INSURANCE and OUTCOMES: Program Benefit Group with Lumbar Outcomes (Oswestry and AQoL) 1/3    Precautions: standard/ankylosing spondylitis     Pattern of pain determined: movement responder    Subjective     Catie Monge states the back has been feeling good still has some fear of movement.    Patient reports tolerating previous visit well  Patient reports their pain to be 2-3/10 on a 0-10 scale with 0 being no pain and 10 being the worst pain imaginable.  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  Leisure: read, long walks playing with kids, [employer]       Pt goals: "Increased flexibility, feel confident weight lifting, and exercising without fear of my back being re-injured"     Objective      Lumbar  Isometric Testing on Med X equipment: Testing administered by PT    Test Initial Baseline Midpoint Final   Date 2/23/2024 4/9/2024    ROM 0-48 deg 0-60    Max Peak Torque 106  110    Min Peak Torque 46  68    Flex/Ext Ratio 2.3:1 1.6:1    % below normative data 37 31    % gain from initial test Not available visit 1 11%  "     MOVEMENT LOSS - Lumbar- Updated 3/25/25    Norms ROM Loss Initial   Flexion Fingers touch toes, sacral angle >/= 70 deg, uniform spinal curvature, posterior weight shift  minimal loss   Extension ASIS surpasses toes, spine of scapulae surpasses heels, uniform spinal curve Moderate loss   Side glide Right   WFL   Side glide Left   WFL   Rotation Right PT observes contralateral shoulder minimal loss   Rotation Left PT observes contralateral shoulder WFL       Starting weight 53 ft/lbs    Outcomes:  Intake Score: 18%  Visit 6 Score:   Visit 10 Score:   Discharge Score:  Goal Score: 8%         4/9/2024    11:05 AM 3/27/2024     9:26 AM 2/22/2024    12:57 PM   Oswestry Questionnaire Review   Pain Intensity 1 1 0   Personal Care (Washing, Dressing) 0 0 1   Lifting 1 1 2   Walking 0 0 0   Sitting 0 0 1   Standing 0 0 0   Sleeping 1 1 1   Social Life 0 0 2   Traveling 0 1 1   Employment/Homemaking 1 0 1   Score 4 4 9           Treatment     Catie received the treatments listed below:      Medical MedX Treatment as follows:  Patient received neuromuscular education for 0 minutes via participation on the Medical MedX Machine. Therapist assisted patient in isolating and engaging spinal stabilization musculature in order to improve functional ability and postural control. Patient performed exercise with therapist guidance in order to accurately use pacer function, avoid valsalva, and optimally exert effort within a safe and effective range via the Peyman Exertion Rating Scale. Patient instructed to perform at a midrange of exertion and to complete 15-20 repetitions within appropriate split time, with proper technique, and while maintaining safety.        Catie participated in neuromuscular re-education activities to improve balance, coordination, proprioception, motor control and/or posture for 00 minutes. The following activities were included:       Catie participated in therapeutic exercises to develop strength,  "endurance, ROM, flexibility, posture, and core stabilization for 55 minutes including:    Treadmill 5' -     LTR's x10  Open books 15x5" ea  EIL x 15 cue for controlled breathing for end range "sag"   Seated shld blade/thoracic stretch hands behind head bend forward (chicken wing) x10  Bridge x 10  Changed to arms down w/hip abd. GTB  Bridge with March x10  Quad  Cat cow x10   Thread needle x 10 B for mobility, - NP   Bird dog x 10 ea   Supine marching 90/90 x 10  TrA single knee isometric with ball x10        4/11/2024    10:40 AM   [facility]   Visit Number 12   VAS Pain Rating 0   Treadmill Time (in min.) 5 min   Extension in Lying 15   Lumbar Weight 68 lbs   Repetitions 15   Rating of Perceived Exertion 4   Ice - Z Lie (in min.) 5       Peripheral muscle strengthening which included one set of 15-20 repetitions at a slow and controlled 10-13 second per rep pace focused on strengthening supporting musculature in order to improve body mechanics and functional mobility. Patient and therapist focused on proper form during treatment to ensure optimal strengthening of each targeted muscle group.  Machines utilized included:Torso rotation, Leg Ext, Leg Curl, Chest Press, and Rowing:Triceps, Biceps, Hip Abd, Hip Add, and Leg Press      Catie participated in dynamic functional therapeutic activities to improve functional performance and simulate household and community activities for 10 minutes. The following activities were included:    Hip hinge   Golfer's lift  Floor lift      Catie received manual therapy techniques for -  minutes. The following activities were included:      Pt given cold pack for 5 minutes to low back in z-lie.    Patient Education and Home Exercises     Home exercises include:  LTR  Open books   EIL  Quad - Cat cow, Bird Dogs, Thread the needle   Bridge   PPT >supine marching     Cardio program (V5): -  Lifting education (V11):  3/20/24  Posture/Lumbar roll: acquired  Fridge Magnet " Discharge handout (date given): -  Equipment at home/gym membership: yes    Education provided:     Written Home Exercises Provided: Patient instructed to cont prior HEP.  Exercises were reviewed and Catie was able to demonstrate them prior to the end of the session.  Catie demonstrated good  understanding of the education provided.     See EMR under Patient Instructions for exercises provided prior visit.    Assessment     Catie returns without pain complaints and pleased with improved objective measures. Treatment continued with lumbopelvic/core flexibility and strengthening ex's which she was able to perform without difficulty. Lumbar MedX resistance increased and pt completed 15 reps with 3/10 PRE. Resisted peripheral machines done without difficulty. Progress per HB protocol and pt's tolerance. Pt educated on proper lifting mechanics, given handout and performed trials.     Patient is making good progress towards established goals.  Pt will continue to benefit from skilled outpatient physical therapy to address the deficits stated in the impairment chart, provide pt/family education and to maximize pt's level of independence in the home and community environment.     Anticipated Barriers for therapy: ankylosing spondylitis   Pt's spiritual, cultural and educational needs considered and pt agreeable to plan of care and goals as stated below:     GOALS: Pt is in agreement with the following goals.     Short term goals:  6 weeks or 10 visits   - Pt will demonstrate increased lumbar MedX ROM by at least 3 degrees from the initial ROM value with improvements noted in functional ROM and ability to perform ADLs. Met 4/9/2024  - Pt will demonstrate increased MedX average isometric strength value by 15% from initial test resulting in improved ability to perform bending, lifting, and carrying activities safely, confidently. Partially Met  - Pt will report a reduction in worst pain score by 1-2 points for improved  tolerance for childcare. Met 4/9/2024  - Pt able to perform HEP correctly with minimal cueing or supervision from therapist to encourage independent management of symptoms. Met 4/9/2024     Long term goals: 10 weeks or 20 visits   - Pt will demonstrate increased lumbar MedX ROM by at least 6 degrees from initial ROM value, resulting in improved ability to perform functional forward bending while standing and sitting. Met 4/9/2024  - Pt will demonstrate increased MedX average isometric strength value by 30% from initial test resulting in improved ability to perform bending, lifting, and carrying activities safely and confidently. Appropriate and Ongoing  - Pt to demonstrate ability to independently control and reduce their pain through posture positioning and mechanical movements throughout a typical day. Appropriate and Ongoing  - Pt will demonstrate reduced pain and improved functional outcomes as reported on the Oswestry Disability Index by reaching a score of 3 or less in order to demonstrate subjective improvement in pt's condition. . Appropriate and Ongoing  - Pt will demonstrate independence with the HEP at discharge. Appropriate and Ongoing  - Pt will be able to bend down and  her children without increased pain. Appropriate and Ongoing    Plan     Continue with established Plan of Care towards established PT goals.       Nona Sargent, PTA  04/11/2024

## 2024-04-16 ENCOUNTER — CLINICAL SUPPORT (OUTPATIENT)
Dept: REHABILITATION | Facility: OTHER | Age: 39
End: 2024-04-16
Payer: COMMERCIAL

## 2024-04-16 ENCOUNTER — PATIENT MESSAGE (OUTPATIENT)
Dept: ADMINISTRATIVE | Facility: OTHER | Age: 39
End: 2024-04-16
Payer: COMMERCIAL

## 2024-04-16 DIAGNOSIS — R29.898 DECREASED STRENGTH OF TRUNK AND BACK: Primary | ICD-10-CM

## 2024-04-16 PROCEDURE — 97110 THERAPEUTIC EXERCISES: CPT

## 2024-04-16 NOTE — PROGRESS NOTES
"[facility]  Physical Therapy Treatment Note     Name: Catie Monge  Clinic Number: [id number]    Therapy Diagnosis:   Encounter Diagnosis   Name Primary?    Decreased strength of trunk and back Yes           Physician: Day Chapa, *    Visit Date: 4/16/2024    Physician Orders: PT Eval and Treat  Medical Diagnosis from Referral:   M46.1 (ICD-10-CM) - Sacroiliitis   M53.3 (ICD-10-CM) - SI (sacroiliac) joint dysfunction   M45.9 (ICD-10-CM) - Ankylosing spondylitis, unspecified site of spine      Evaluation Date: 2/21/2024  Authorization Period Expiration: 1/28/2024   Plan of Care Expiration: 5/2/2024  Reassessment Due: 4/25/2024  Visit # / Visits authorized: 13/20  MedX testing visit 2    PTA Visit #: 0/5     Time In: 10:00 AM   Time Out: 10:55 AM   Total Billable Time: 55 minutes   INSURANCE and OUTCOMES: Program Benefit Group with Lumbar Outcomes (Oswestry and AQoL) 1/3    Precautions: standard/ankylosing spondylitis     Pattern of pain determined: movement responder    Subjective     Catie Monge states her low back is "feeling better" with very minimal stiffness. She has been compliant with her HEP.     Patient reports tolerating previous visit well  Patient reports their pain to be 0/10 on a 0-10 scale with 0 being no pain and 10 being the worst pain imaginable.  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  Leisure: read, long walks playing with kids, [employer]       Pt goals: "Increased flexibility, feel confident weight lifting, and exercising without fear of my back being re-injured"     Objective      Lumbar  Isometric Testing on Med X equipment: Testing administered by PT    Test Initial Baseline Midpoint Final   Date 2/23/2024 4/9/2024    ROM 0-48 deg 0-60    Max Peak Torque 106  110    Min Peak Torque 46  68    Flex/Ext Ratio 2.3:1 1.6:1    % below normative data 37 31    % gain from initial " test Not available visit 1 11%      MOVEMENT LOSS - Lumbar- Updated 3/25/25    Norms ROM Loss Initial   Flexion Fingers touch toes, sacral angle >/= 70 deg, uniform spinal curvature, posterior weight shift  minimal loss   Extension ASIS surpasses toes, spine of scapulae surpasses heels, uniform spinal curve Moderate loss   Side glide Right   WFL   Side glide Left   WFL   Rotation Right PT observes contralateral shoulder minimal loss   Rotation Left PT observes contralateral shoulder WFL       Starting weight 53 ft/lbs    Outcomes:  Intake Score: 18%  Visit 6 Score:   Visit 10 Score:   Discharge Score:  Goal Score: 8%         4/9/2024    11:05 AM 3/27/2024     9:26 AM 2/22/2024    12:57 PM   Oswestry Questionnaire Review   Pain Intensity 1 1 0   Personal Care (Washing, Dressing) 0 0 1   Lifting 1 1 2   Walking 0 0 0   Sitting 0 0 1   Standing 0 0 0   Sleeping 1 1 1   Social Life 0 0 2   Traveling 0 1 1   Employment/Homemaking 1 0 1   Score 4 4 9           Treatment     Catie received the treatments listed below:      Medical MedX Treatment as follows:  Patient received neuromuscular education for 0 minutes via participation on the Medical MedX Machine. Therapist assisted patient in isolating and engaging spinal stabilization musculature in order to improve functional ability and postural control. Patient performed exercise with therapist guidance in order to accurately use pacer function, avoid valsalva, and optimally exert effort within a safe and effective range via the Peyman Exertion Rating Scale. Patient instructed to perform at a midrange of exertion and to complete 15-20 repetitions within appropriate split time, with proper technique, and while maintaining safety.        Catie participated in neuromuscular re-education activities to improve balance, coordination, proprioception, motor control and/or posture for 00 minutes. The following activities were included:       Catie participated in therapeutic  "exercises to develop strength, endurance, ROM, flexibility, posture, and core stabilization for 55 minutes including:    Treadmill 5' -     LTR's x10  Open books 15x5" ea  EIL x 15 cue for controlled breathing for end range "sag"   Seated shld blade/thoracic stretch hands behind head bend forward (chicken wing) x10  Bridge x 10  Changed to arms down w/hip abd. GTB  Bridge with March x10  Quad  Cat cow x10   Thread needle x 10 B for mobility, - NP   Bird dog x 10 ea   Supine marching 90/90 x 10  TrA single knee isometric with ball x10        4/16/2024     9:58 AM   [facility]   Visit Number 13   VAS Pain Rating 0   Treadmill Time (in min.) 5 min   Extension in Lying 15   Lumbar Weight 68 lbs   Repetitions 18   Rating of Perceived Exertion 4   Ice - Z Lie (in min.) 5        Peripheral muscle strengthening which included one set of 15-20 repetitions at a slow and controlled 10-13 second per rep pace focused on strengthening supporting musculature in order to improve body mechanics and functional mobility. Patient and therapist focused on proper form during treatment to ensure optimal strengthening of each targeted muscle group.  Machines utilized included:Torso rotation, Leg Ext, Leg Curl, Chest Press, and Rowing:Triceps, Biceps, Hip Abd, Hip Add, and Leg Press      Catie participated in dynamic functional therapeutic activities to improve functional performance and simulate household and community activities for 00 minutes. The following activities were included:    Hip hinge   Golfer's lift  Floor lift      Catie received manual therapy techniques for -  minutes. The following activities were included:      Pt given cold pack for 5 minutes to low back in z-lie.    Patient Education and Home Exercises     Home exercises include:  LTR  Open books   EIL  Quad - Cat cow, Bird Dogs, Thread the needle   Bridge   PPT >supine marching     Cardio program (V5): -  Lifting education (V11):  3/20/24  Posture/Lumbar " roll: acquired  Frie Magnet Discharge handout (date given): -  Equipment at home/gym membership: yes    Education provided:     Written Home Exercises Provided: Patient instructed to cont prior HEP.  Exercises were reviewed and Catie was able to demonstrate them prior to the end of the session.  Catie demonstrated good  understanding of the education provided.     See EMR under Patient Instructions for exercises provided prior visit.    Assessment   Catie returns without pain complaints. Treatment continued with lumbopelvic/core flexibility and strengthening ex's which she was able to perform without difficulty or adverse symptoms Lumbar MedX resistance increased and pt completed 18 reps with 4/10 PRE. Resisted peripheral machines done without difficulty and was able to progress slightly with increased reps. Will continue to monitor and progress per HB protocol and pt's tolerance.     Patient is making good progress towards established goals.  Pt will continue to benefit from skilled outpatient physical therapy to address the deficits stated in the impairment chart, provide pt/family education and to maximize pt's level of independence in the home and community environment.     Anticipated Barriers for therapy: ankylosing spondylitis   Pt's spiritual, cultural and educational needs considered and pt agreeable to plan of care and goals as stated below:     GOALS: Pt is in agreement with the following goals.     Short term goals:  6 weeks or 10 visits   - Pt will demonstrate increased lumbar MedX ROM by at least 3 degrees from the initial ROM value with improvements noted in functional ROM and ability to perform ADLs. Met 4/9/2024  - Pt will demonstrate increased MedX average isometric strength value by 15% from initial test resulting in improved ability to perform bending, lifting, and carrying activities safely, confidently. Partially Met  - Pt will report a reduction in worst pain score by 1-2 points for  improved tolerance for childcare. Met 4/9/2024  - Pt able to perform HEP correctly with minimal cueing or supervision from therapist to encourage independent management of symptoms. Met 4/9/2024     Long term goals: 10 weeks or 20 visits   - Pt will demonstrate increased lumbar MedX ROM by at least 6 degrees from initial ROM value, resulting in improved ability to perform functional forward bending while standing and sitting. Met 4/9/2024  - Pt will demonstrate increased MedX average isometric strength value by 30% from initial test resulting in improved ability to perform bending, lifting, and carrying activities safely and confidently. Appropriate and Ongoing  - Pt to demonstrate ability to independently control and reduce their pain through posture positioning and mechanical movements throughout a typical day. Appropriate and Ongoing  - Pt will demonstrate reduced pain and improved functional outcomes as reported on the Oswestry Disability Index by reaching a score of 3 or less in order to demonstrate subjective improvement in pt's condition. . Appropriate and Ongoing  - Pt will demonstrate independence with the HEP at discharge. Appropriate and Ongoing  - Pt will be able to bend down and  her children without increased pain. Appropriate and Ongoing    Plan     Continue with established Plan of Care towards established PT goals.       Gerda Saab, PT  04/16/2024

## 2024-04-19 LAB
ONEOME COMMENT: NORMAL
ONEOME METHOD: NORMAL

## 2024-04-22 ENCOUNTER — CLINICAL SUPPORT (OUTPATIENT)
Dept: REHABILITATION | Facility: OTHER | Age: 39
End: 2024-04-22
Payer: COMMERCIAL

## 2024-04-22 DIAGNOSIS — R29.898 DECREASED STRENGTH OF TRUNK AND BACK: Primary | ICD-10-CM

## 2024-04-22 PROCEDURE — 97750 PHYSICAL PERFORMANCE TEST: CPT | Mod: 32

## 2024-04-22 NOTE — PROGRESS NOTES
"[facility]  Physical Therapy Treatment Note     Name: Catie Monge  Clinic Number: [id number]    Therapy Diagnosis:   Encounter Diagnosis   Name Primary?    Decreased strength of trunk and back Yes         Physician: Day Chapa, *    Visit Date: 4/22/2024    Physician Orders: PT Eval and Treat  Medical Diagnosis from Referral:   M46.1 (ICD-10-CM) - Sacroiliitis   M53.3 (ICD-10-CM) - SI (sacroiliac) joint dysfunction   M45.9 (ICD-10-CM) - Ankylosing spondylitis, unspecified site of spine      Evaluation Date: 2/21/2024  Authorization Period Expiration: 1/28/2024   Plan of Care Expiration: 5/2/2024  Reassessment Due: 5/9/2024  Visit # / Visits authorized: 14/20  MedX testing visit 2    PTA Visit #: 0/5     Time In: 12:30 PM   Time Out: 1:30 PM   Total Billable Time: 55 minutes   INSURANCE and OUTCOMES: Program Benefit Group with Lumbar Outcomes (Oswestry and AQoL) 1/3    Precautions: standard/ankylosing spondylitis     Pattern of pain determined: movement responder    Subjective     Catie Monge reports she feels better now than she has in a long time, she has no pain.     Patient reports tolerating previous visit well  Patient reports their pain to be 0/10 on a 0-10 scale with 0 being no pain and 10 being the worst pain imaginable.  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  Leisure: read, long walks playing with kids, [employer]       Pt goals: "Increased flexibility, feel confident weight lifting, and exercising without fear of my back being re-injured"     Objective      Lumbar  Isometric Testing on Med X equipment: Testing administered by PT    Test Initial Baseline Midpoint Final   Date 2/23/2024 4/9/2024    ROM 0-48 deg 0-60    Max Peak Torque 106  110    Min Peak Torque 46  68    Flex/Ext Ratio 2.3:1 1.6:1    % below normative data 37 31    % gain from initial test Not available visit 1 11%  "     MOVEMENT LOSS - Lumbar- Updated 3/25/25    Norms ROM Loss Initial   Flexion Fingers touch toes, sacral angle >/= 70 deg, uniform spinal curvature, posterior weight shift  minimal loss   Extension ASIS surpasses toes, spine of scapulae surpasses heels, uniform spinal curve Moderate loss   Side glide Right   WFL   Side glide Left   WFL   Rotation Right PT observes contralateral shoulder minimal loss   Rotation Left PT observes contralateral shoulder WFL       Starting weight 53 ft/lbs    Outcomes:  Intake Score: 18%  Visit 6 Score:   Visit 10 Score:   Discharge Score:  Goal Score: 8%         4/9/2024    11:05 AM 3/27/2024     9:26 AM 2/22/2024    12:57 PM   Oswestry Questionnaire Review   Pain Intensity 1 1 0   Personal Care (Washing, Dressing) 0 0 1   Lifting 1 1 2   Walking 0 0 0   Sitting 0 0 1   Standing 0 0 0   Sleeping 1 1 1   Social Life 0 0 2   Traveling 0 1 1   Employment/Homemaking 1 0 1   Score 4 4 9           Treatment     Catie received the treatments listed below:      Medical MedX Treatment as follows:  Patient received neuromuscular education for 0 minutes via participation on the Medical MedX Machine. Therapist assisted patient in isolating and engaging spinal stabilization musculature in order to improve functional ability and postural control. Patient performed exercise with therapist guidance in order to accurately use pacer function, avoid valsalva, and optimally exert effort within a safe and effective range via the Peyman Exertion Rating Scale. Patient instructed to perform at a midrange of exertion and to complete 15-20 repetitions within appropriate split time, with proper technique, and while maintaining safety.        Catie participated in neuromuscular re-education activities to improve balance, coordination, proprioception, motor control and/or posture for 00 minutes. The following activities were included:       Catie participated in therapeutic exercises to develop strength,  "endurance, ROM, flexibility, posture, and core stabilization for 55 minutes includin/22/2024     1:02 PM   [facility] - [facility]   Visit Number 14   VAS Pain Rating 0   Treadmill Time (in min.) 5 min   Lumbar Weight 68 lbs   Repetitions 20   Rating of Perceived Exertion 4       Treadmill 5' -     Bridge with kick x10  Quad  Cat cow 10x3"   Multifidi lifts 10x3" ea   Fire hydrants 10x3" ea  Palloff press GTB x15 ea  Standing skiiers BTB x15  Standing BTB pull down w/ march x10 ea      NP:  LTR  Open books  Supine marching 90/90 x 10  TrA single knee isometric with ball x10  Thread needle x 10 B for mobility, - NP   Bird dog x 10 ea   Seated shld blade/thoracic stretch hands behind head bend forward (chicken wing) x10  Bridge x 10  Changed to arms down w/hip abd. GTB      Peripheral muscle strengthening which included one set of 15-20 repetitions at a slow and controlled 10-13 second per rep pace focused on strengthening supporting musculature in order to improve body mechanics and functional mobility. Patient and therapist focused on proper form during treatment to ensure optimal strengthening of each targeted muscle group.  Machines utilized included:Torso rotation, Leg Ext, Leg Curl, Chest Press, and Rowing:Triceps, Biceps, Hip Abd, Hip Add, and Leg Press      Catie participated in dynamic functional therapeutic activities to improve functional performance and simulate household and community activities for 00 minutes. The following activities were included:    Hip hinge   Golfer's lift  Floor lift      Catie received manual therapy techniques for -  minutes. The following activities were included:      Pt given cold pack for 5 minutes to low back in z-lie.    Patient Education and Home Exercises     Home exercises include:  LTR  Open books   EIL  Quad - Cat cow, Bird Dogs, Thread the needle   Bridge   PPT >supine marching     Cardio program (V5): -  Lifting education (V11):  " 3/20/24  Posture/Lumbar roll: acquired  Frie Magnet Discharge handout (date given): -  Equipment at home/gym membership: yes    Education provided:     Written Home Exercises Provided: Patient instructed to cont prior HEP.  Exercises were reviewed and Catie was able to demonstrate them prior to the end of the session.  Catie demonstrated good  understanding of the education provided.     See EMR under Patient Instructions for exercises provided prior visit.    Assessment   Catie presents today without complaints of pain. Progressed dynamic core and lumbar strengthening and stabilization exercises with good tolerance. Instructed to incorporate progressions in strengthening activities at home as needed. Medx resistance maintained at 68 ft/lbs and pt was able to complete 20 reps with 4/10 RPE. Progress 10% next visit.     Patient is making good progress towards established goals.  Pt will continue to benefit from skilled outpatient physical therapy to address the deficits stated in the impairment chart, provide pt/family education and to maximize pt's level of independence in the home and community environment.     Anticipated Barriers for therapy: ankylosing spondylitis   Pt's spiritual, cultural and educational needs considered and pt agreeable to plan of care and goals as stated below:     GOALS: Pt is in agreement with the following goals.     Short term goals:  6 weeks or 10 visits   - Pt will demonstrate increased lumbar MedX ROM by at least 3 degrees from the initial ROM value with improvements noted in functional ROM and ability to perform ADLs. Met 4/9/2024  - Pt will demonstrate increased MedX average isometric strength value by 15% from initial test resulting in improved ability to perform bending, lifting, and carrying activities safely, confidently. Partially Met  - Pt will report a reduction in worst pain score by 1-2 points for improved tolerance for childcare. Met 4/9/2024  - Pt able to perform HEP  correctly with minimal cueing or supervision from therapist to encourage independent management of symptoms. Met 4/9/2024     Long term goals: 10 weeks or 20 visits   - Pt will demonstrate increased lumbar MedX ROM by at least 6 degrees from initial ROM value, resulting in improved ability to perform functional forward bending while standing and sitting. Met 4/9/2024  - Pt will demonstrate increased MedX average isometric strength value by 30% from initial test resulting in improved ability to perform bending, lifting, and carrying activities safely and confidently. Appropriate and Ongoing  - Pt to demonstrate ability to independently control and reduce their pain through posture positioning and mechanical movements throughout a typical day. Appropriate and Ongoing  - Pt will demonstrate reduced pain and improved functional outcomes as reported on the Oswestry Disability Index by reaching a score of 3 or less in order to demonstrate subjective improvement in pt's condition. . Appropriate and Ongoing  - Pt will demonstrate independence with the HEP at discharge. Appropriate and Ongoing  - Pt will be able to bend down and  her children without increased pain. Appropriate and Ongoing    Plan     Continue with established Plan of Care towards established PT goals.       Mehrdad Barron, PT  04/22/2024

## 2024-04-23 NOTE — PROGRESS NOTES
"[facility]  Physical Therapy Treatment Note     Name: Catie Monge  Clinic Number: [id number]    Therapy Diagnosis:   Encounter Diagnosis   Name Primary?    Decreased strength of trunk and back Yes       Physician: Day Chapa, *    Visit Date: 4/24/2024    Physician Orders: PT Eval and Treat  Medical Diagnosis from Referral:   M46.1 (ICD-10-CM) - Sacroiliitis   M53.3 (ICD-10-CM) - SI (sacroiliac) joint dysfunction   M45.9 (ICD-10-CM) - Ankylosing spondylitis, unspecified site of spine      Evaluation Date: 2/21/2024  Authorization Period Expiration: 1/28/2024   Plan of Care Expiration: 5/2/2024  Reassessment Due: 5/9/2024  Visit # / Visits authorized: 15/20  MedX testing visit 2    PTA Visit #: 1/5     Time In: 1000AM  Time Out: 1050 AM  Total Billable Time: 50 minutes   INSURANCE and OUTCOMES: Program Benefit Group with Lumbar Outcomes (Oswestry and AQoL) 1/3    Precautions: standard/ankylosing spondylitis     Pattern of pain determined: movement responder    Subjective     Catie Monge continues to feel good. No issues. She finally feels like she can feel her back. She was feeling so go she went for a bike ride before the session today. She had been riding less due to having pain and now she is able to ride pain free.     Patient reports tolerating previous visit well  Patient reports their pain to be 0/10 on a 0-10 scale with 0 being no pain and 10 being the worst pain imaginable.  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  Leisure: read, long walks playing with kids, [employer]       Pt goals: "Increased flexibility, feel confident weight lifting, and exercising without fear of my back being re-injured"     Objective      Lumbar  Isometric Testing on Med X equipment: Testing administered by PT    Test Initial Baseline Midpoint Final   Date 2/23/2024 4/9/2024    ROM 0-48 deg 0-60    Max Peak Torque " 106  110    Min Peak Torque 46  68    Flex/Ext Ratio 2.3:1 1.6:1    % below normative data 37 31    % gain from initial test Not available visit 1 11%      MOVEMENT LOSS - Lumbar- Updated 3/25/25    Norms ROM Loss Initial   Flexion Fingers touch toes, sacral angle >/= 70 deg, uniform spinal curvature, posterior weight shift  minimal loss   Extension ASIS surpasses toes, spine of scapulae surpasses heels, uniform spinal curve Moderate loss   Side glide Right   WFL   Side glide Left   WFL   Rotation Right PT observes contralateral shoulder minimal loss   Rotation Left PT observes contralateral shoulder WFL       Starting weight 53 ft/lbs    Outcomes:  Intake Score: 18%  Visit 6 Score:   Visit 10 Score:   Discharge Score:  Goal Score: 8%         4/9/2024    11:05 AM 3/27/2024     9:26 AM 2/22/2024    12:57 PM   Oswestry Questionnaire Review   Pain Intensity 1 1 0   Personal Care (Washing, Dressing) 0 0 1   Lifting 1 1 2   Walking 0 0 0   Sitting 0 0 1   Standing 0 0 0   Sleeping 1 1 1   Social Life 0 0 2   Traveling 0 1 1   Employment/Homemaking 1 0 1   Score 4 4 9           Treatment     Catie received the treatments listed below:      Medical MedX Treatment as follows:  Patient received neuromuscular education for 0 minutes via participation on the Medical MedX Machine. Therapist assisted patient in isolating and engaging spinal stabilization musculature in order to improve functional ability and postural control. Patient performed exercise with therapist guidance in order to accurately use pacer function, avoid valsalva, and optimally exert effort within a safe and effective range via the Peyman Exertion Rating Scale. Patient instructed to perform at a midrange of exertion and to complete 15-20 repetitions within appropriate split time, with proper technique, and while maintaining safety.        Catie participated in neuromuscular re-education activities to improve balance, coordination, proprioception, motor  "control and/or posture for 00 minutes. The following activities were included:       Catie participated in therapeutic exercises to develop strength, endurance, ROM, flexibility, posture, and core stabilization for 55 minutes includin/24/2024    10:34 AM   [facility]   Visit Number 15   VAS Pain Rating 0   Treadmill Time (in min.) 5 min   Lumbar Weight 74 lbs   Repetitions 17   Rating of Perceived Exertion 4   Ice - Z Lie (in min.) 5           Treadmill 5' -     Bridge with kick x10  Quad  Cat cow 10x3"   Multifidi lifts 10x3" ea (c/ foam pad under knee)   Fire hydrants 10x3" ea  Palloff press GTB x15 ea  Standing skiiers BTB x15  Standing BTB pull down w/ march x10 ea      NP:  LTR  Open books  Supine marching 90/90 x 10  TrA single knee isometric with ball x10  Thread needle x 10 B for mobility, - NP   Bird dog x 10 ea   Seated shld blade/thoracic stretch hands behind head bend forward (chicken wing) x10  Bridge x 10  Changed to arms down w/hip abd. GTB      Peripheral muscle strengthening which included one set of 15-20 repetitions at a slow and controlled 10-13 second per rep pace focused on strengthening supporting musculature in order to improve body mechanics and functional mobility. Patient and therapist focused on proper form during treatment to ensure optimal strengthening of each targeted muscle group.  Machines utilized included:Torso rotation, Leg Ext, Leg Curl, Chest Press, and Rowing:Triceps, Biceps, Hip Abd, Hip Add, and Leg Press      Catie participated in dynamic functional therapeutic activities to improve functional performance and simulate household and community activities for 00 minutes. The following activities were included:    Hip hinge   Golfer's lift  Floor lift      Catie received manual therapy techniques for -  minutes. The following activities were included:      Pt given cold pack for 5 minutes to low back in z-lie.    Patient Education and Home Exercises "     Home exercises include:  LTR  Open books   EIL  Quad - Cat cow, Bird Dogs, Thread the needle   Bridge   PPT >supine marching     Cardio program (V5): -  Lifting education (V11):  3/20/24  Posture/Lumbar roll: acquired  Fridge Magnet Discharge handout (date given): -  Equipment at home/gym membership: yes    Education provided:     Written Home Exercises Provided: Patient instructed to cont prior HEP.  Exercises were reviewed and Catie was able to demonstrate them prior to the end of the session.  Catie demonstrated good  understanding of the education provided.     See EMR under Patient Instructions for exercises provided prior visit.    Assessment   Pt presents with improved symptoms overall. Pt reports improved tolerance for PA. Continued previous progressions. MedX was performed at 74ft lbs with 17 reps performed at an exertion rating of 4/10. No issues with peripherals.       Patient is making good progress towards established goals.  Pt will continue to benefit from skilled outpatient physical therapy to address the deficits stated in the impairment chart, provide pt/family education and to maximize pt's level of independence in the home and community environment.     Anticipated Barriers for therapy: ankylosing spondylitis   Pt's spiritual, cultural and educational needs considered and pt agreeable to plan of care and goals as stated below:     GOALS: Pt is in agreement with the following goals.     Short term goals:  6 weeks or 10 visits   - Pt will demonstrate increased lumbar MedX ROM by at least 3 degrees from the initial ROM value with improvements noted in functional ROM and ability to perform ADLs. Met 4/9/2024  - Pt will demonstrate increased MedX average isometric strength value by 15% from initial test resulting in improved ability to perform bending, lifting, and carrying activities safely, confidently. Partially Met  - Pt will report a reduction in worst pain score by 1-2 points for improved  tolerance for childcare. Met 4/9/2024  - Pt able to perform HEP correctly with minimal cueing or supervision from therapist to encourage independent management of symptoms. Met 4/9/2024     Long term goals: 10 weeks or 20 visits   - Pt will demonstrate increased lumbar MedX ROM by at least 6 degrees from initial ROM value, resulting in improved ability to perform functional forward bending while standing and sitting. Met 4/9/2024  - Pt will demonstrate increased MedX average isometric strength value by 30% from initial test resulting in improved ability to perform bending, lifting, and carrying activities safely and confidently. Appropriate and Ongoing  - Pt to demonstrate ability to independently control and reduce their pain through posture positioning and mechanical movements throughout a typical day. Appropriate and Ongoing  - Pt will demonstrate reduced pain and improved functional outcomes as reported on the Oswestry Disability Index by reaching a score of 3 or less in order to demonstrate subjective improvement in pt's condition. . Appropriate and Ongoing  - Pt will demonstrate independence with the HEP at discharge. Appropriate and Ongoing  - Pt will be able to bend down and  her children without increased pain. Appropriate and Ongoing    Plan     Continue with established Plan of Care towards established PT goals.       Harry Rincon, PTA  04/24/2024

## 2024-04-24 ENCOUNTER — CLINICAL SUPPORT (OUTPATIENT)
Dept: REHABILITATION | Facility: OTHER | Age: 39
End: 2024-04-24
Payer: COMMERCIAL

## 2024-04-24 DIAGNOSIS — R29.898 DECREASED STRENGTH OF TRUNK AND BACK: Primary | ICD-10-CM

## 2024-04-24 PROCEDURE — 97750 PHYSICAL PERFORMANCE TEST: CPT | Mod: 32

## 2024-04-25 ENCOUNTER — PATIENT MESSAGE (OUTPATIENT)
Dept: INTERNAL MEDICINE | Facility: CLINIC | Age: 39
End: 2024-04-25
Payer: COMMERCIAL

## 2024-04-25 NOTE — TELEPHONE ENCOUNTER
Avoid TCAs or reduce by 50%    Reduce Paroxetine by 50%    Reduce Fluvoxamine by 25-50%    Vortioxetine max dose 10 mg    Consider alternative to Venlafaxine   Es/Citalopram - Initiate es/citalopram with the recommended starting dose. If inadequate response, consider titrating to a higher maintenance dose or switching to a clinically appropriate alternative.   information from pgx panel

## 2024-04-26 ENCOUNTER — OFFICE VISIT (OUTPATIENT)
Dept: INFECTIOUS DISEASES | Facility: CLINIC | Age: 39
End: 2024-04-26
Payer: COMMERCIAL

## 2024-04-26 VITALS
WEIGHT: 165.38 LBS | BODY MASS INDEX: 24.5 KG/M2 | HEIGHT: 69 IN | DIASTOLIC BLOOD PRESSURE: 88 MMHG | SYSTOLIC BLOOD PRESSURE: 136 MMHG | HEART RATE: 81 BPM | TEMPERATURE: 99 F

## 2024-04-26 DIAGNOSIS — Z79.620 LONG TERM (CURRENT) USE OF IMMUNOSUPPRESSIVE BIOLOGIC: ICD-10-CM

## 2024-04-26 PROCEDURE — 99499 UNLISTED E&M SERVICE: CPT | Mod: S$GLB,,, | Performed by: INTERNAL MEDICINE

## 2024-04-26 PROCEDURE — 99999 PR PBB SHADOW E&M-EST. PATIENT-LVL IV: CPT | Mod: PBBFAC,,, | Performed by: INTERNAL MEDICINE

## 2024-04-26 RX ORDER — AZITHROMYCIN 500 MG/1
TABLET, FILM COATED ORAL
Qty: 2 TABLET | Refills: 0 | Status: SHIPPED | OUTPATIENT
Start: 2024-04-26

## 2024-04-26 NOTE — PROGRESS NOTES
Pre Biologic Response Modifier Therapy Consult  BMR Recipient Evaluation    Requesting Physician: Dr Balbuena    Reason for Visit: Vaccine counseling    History of Present Illness  38 y.o. female with advanced AS on humira, started January resents for vaccine counseling.      Patient denies any recent fever, chills, or infective infective illnesses.    Review of Symptoms:  Constitutional: Denies fevers, chills, or weakness.  Cardiovascular: Denies chest pain, palpitations  Respiratory: Denies shortness of breath, cough, hemoptysis  GI: Denies nausea/vomitting, abd pain  : Denies dysuria, incontinence, or hematuria.  Musculoskeletal: Denies joint pain or myalgias.  Skin/breast: Denies rashes, lumps, lesions, or discharge.  Neurologic: Denies headache, dizziness, vertigo, or paresthesias.    Past Medical History:   Diagnosis Date    Ankylosing spondylitis of unspecified sites in spine     Anxiety     No meds    Depression     Loose bowel movements        Past Surgical History:   Procedure Laterality Date    ANUS SURGERY      FISSURE    COLONOSCOPY N/A 12/22/2023    Procedure: COLONOSCOPY;  Surgeon: Brandy Chaney MD;  Location: [facility];  Service: Gastroenterology;  Laterality: N/A;    COLONOSCOPY W/ BIOPSIES AND POLYPECTOMY  12/22/2023    COSMETIC SURGERY      DILATION AND CURETTAGE OF UTERUS USING SUCTION N/A 09/13/2018    Procedure: DILATION AND CURETTAGE, UTERUS, USING SUCTION;  Surgeon: Joselo Landry Jr., MD;  Location: [facility];  Service: OB/GYN;  Laterality: N/A;    NASAL SEPTUM SURGERY      TONSILLECTOMY, ADENOIDECTOMY      WISDOM TOOTH EXTRACTION         Family History   Problem Relation Name Age of Onset    Frontotemporal dementia Mother  72    Glaucoma Father Shahzad     Hypertension Father Shahzad     Rheum arthritis Father Shahzad     Arthritis Father Shahzad     Kyphosis Brother      Bipolar disorder Brother      Anxiety disorder Brother      Diabetes Mellitus Maternal Grandfather      No Known Problems  Paternal Grandmother      Melanoma Paternal Grandfather      Breast cancer Neg Hx      Cancer Neg Hx      Ovarian cancer Neg Hx      Colon cancer Neg Hx      Macular degeneration Neg Hx      Retinal detachment Neg Hx      Amblyopia Neg Hx      Blindness Neg Hx      Cataracts Neg Hx      Strabismus Neg Hx      Esophageal cancer Neg Hx         Social History     Socioeconomic History    Marital status:      Spouse name: Rishabh    Number of children: 1   Occupational History    Occupation: [employer]   Tobacco Use    Smoking status: Never    Smokeless tobacco: Never   Substance and Sexual Activity    Alcohol use: Yes     Alcohol/week: 1.0 standard drink of alcohol     Types: 1 Glasses of wine per week     Comment: Social    Drug use: No    Sexual activity: Yes     Partners: Male     Birth control/protection: Partner-Vasectomy   Social History Narrative     Rishabh; attending in pulmonology at [employer]    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     Social Determinants of Health     Financial Resource Strain: Low Risk  (12/10/2023)    Overall Financial Resource Strain (CARDIA)     Difficulty of Paying Living Expenses: Not hard at all   Food Insecurity: No Food Insecurity (12/10/2023)    Hunger Vital Sign     Worried About Running Out of Food in the Last Year: Never true     Ran Out of Food in the Last Year: Never true   Transportation Needs: No Transportation Needs (12/10/2023)    PRAPARE - Transportation     Lack of Transportation (Medical): No     Lack of Transportation (Non-Medical): No   Physical Activity: Sufficiently Active (12/10/2023)    Exercise Vital Sign     Days of Exercise per Week: 5 days     Minutes of Exercise per Session: 30 min   Stress: Stress Concern Present (12/10/2023)    [employer] [place] [employer] - Occupational Stress Questionnaire     Feeling of Stress : To some extent   Social Connections: Unknown (12/10/2023)    Social Connection and Isolation  Panel [NHANES]     Frequency of Communication with Friends and Family: More than three times a week     Frequency of Social Gatherings with Friends and Family: Once a week     Active Member of Clubs or Organizations: No     Attends Club or Organization Meetings: Never     Marital Status:    Housing Stability: Low Risk  (12/10/2023)    Housing Stability Vital Sign     Unable to Pay for Housing in the Last Year: No     Number of Places Lived in the Last Year: 1     Unstable Housing in the Last Year: No       Review of patient's allergies indicates:  No Known Allergies    Medications:  Current Outpatient Medications on File Prior to Visit   Medication Sig Dispense Refill    adalimumab 40 mg/0.4 mL PnKt Inject 0.4 mLs (40 mg total) into the skin every 14 (fourteen) days. 2 pen 11    buPROPion (WELLBUTRIN XL) 300 MG 24 hr tablet Take 1 tablet (300 mg total) by mouth once daily. 30 tablet 11    calcium carbonate 1250 MG capsule Take 1,250 mg by mouth 2 (two) times daily with meals.      cetirizine (ZYRTEC) 10 MG tablet Take 10 mg by mouth every evening.      clindamycin (CLEOCIN T) 1 % external solution Apply topically 2 (two) times daily. To armpits 60 mL 5    cycloSPORINE (RESTASIS) 0.05 % ophthalmic emulsion Place 1 drop into both eyes 2 (two) times daily. 180 each 3    diclofenac (VOLTAREN) 75 MG EC tablet Take 1 tablet (75 mg total) by mouth 2 (two) times daily as needed (back). 60 tablet 11    dicyclomine (BENTYL) 20 mg tablet Take 1 tablet (20 mg total) by mouth 3 (three) times daily as needed (abdominal pain). 90 tablet 3    diphenoxylate-atropine 2.5-0.025 mg (LOMOTIL) 2.5-0.025 mg per tablet Take 1 tablet by mouth 4 (four) times daily as needed for Diarrhea. 60 tablet 1    drospirenone-e.estradioL-lm.FA (BEYAZ/CALEB) 3-0.02-0.451 mg (24) (4) Tab Take 1 tablet by mouth once daily. 28 tablet 8    glycopyrronium tosylate 2.4 % Towl Apply 1 each topically every evening. 30 each 5    methocarbamoL (ROBAXIN)  "500 MG Tab Take 1 tablet (500 mg total) by mouth 4 (four) times daily. 120 tablet 2    sertraline (ZOLOFT) 25 MG tablet Take 1 tablet (25 mg total) by mouth once daily. 90 tablet 3     No current facility-administered medications on file prior to visit.       Objective:   /88 (BP Location: Left arm)   Pulse 81   Temp 98.8 °F (37.1 °C) (Oral)   Ht 5' 9" (1.753 m)   Wt 75 kg (165 lb 5.5 oz)   BMI 24.42 kg/m²   General: Afebrile, alert, comfortable, no acute distress.   Pulmonary: Non labored  Extremities: Moves all extremities x 4.   Skin: No jaundice, rashes, or visible lesions.   Neurological:  Alert and oriented x 4.      1) Do you have a history of:         YES NO   Diabetes   []        [x]     Autoimmune disease  [x]        [] AS  Cancer               []        [x]   Surgical Removal of Spleen []        [x]       2) Have you had recurrent infections:             YES NO  Sinus infections  []        [x]   Lung infections  []        [x]              Urinary Tract Infections []        [x]                                              Intestinal Infections  []        [x]      Skin Infections   []        [x]       Musculoskeletal Infections    []        [x]   Reproductive Infections []        [x]   Periodontal Disease  []        [x]        3)Have you ever had: YES     NO       Chicken Pox   [x]         []          Shingles   []         [x]            Orolabial Herpes             [x]         []          Genital Herpes  []         [x]           Genital Warts   []         [x]             Cytomegalovirus  []         [x]          Cookie-Barr Virus  [x]         []              Hepatitis A   []         [x]          Hepatitis B   []         [x]          Hepatitis C   []         [x]            Syphilis   []         [x]          Gonorrhea   []         [x]         Chlamydia    []         [x]           Parasites / worms  []         [x]         Fungal Infections  []         [x]         Bloodstream Infections []         " [x]             4) Tuberculosis             YES NO  Exposure to person with active TB?  []         [x]   H/o homeless?    []         [x]   H/o imprisonment?    []         [x]   Have you ever had a positive PPD?      []         [x]   If yes, what treatment did you receive:        5) Travel    What states have you lived in? [place], [place], [place]     What countries have you visited for more than 2 weeks? 8 weeks [place], study abroad in [place], 6 weeks [place]                                  YES     NO  Did you have any associated infections?   []         [x]     Are you planning to travel outside of [place]?    [x]          [] [place] in june    6) Animal Exposure                  YES NO  Do you have pets living in your house?   [x]         [] Cat. Changes cat litter  If yes, describe:     Do you spend time or live on a farm?    []         [x]   If yes, which ones:    Do you have a fish tank?         []  [x]    Do you have a litter box?     [x]         []     Do you fish or hunt?      []         [x]   Do you clean or skin fish or animals?    []         [x]     Consume raw or undercooked meat, fish, shellfish?  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              YES     NO  Do you garden or otherwise work in the soil?  []         [x]   Do you hike, camp, or spend time in wooded areas?  []         [x]       9) The patient's immunization history was reviewed.     Have you ever received:  YES NO DATES  Routine Childhood vaccines  [x]         []       Influenza vaccine   [x]         []     Prevnar    []         [x]     Pneumovax    []         [x]     Tetanus-diptheria -pertussis  [x]         [] During pregnancy ~ 5 years ago    Hepatitis A vaccine series       [x]         []     Hepatitis B vaccine series         [x]         []     Meningitis vaccine   []         [x]     Zoster vaccine    []         [x]        Significant labs  "reviewed:  HepA Ab - vaccinated  HepBs Ab - vaccinated  HepBs Ag neg  HepBc Ab neg  HepC Ab neg    HIV neg  RPR neg  Quant gold neg    Pending labs:    HIV: No components found for: "HIV 1/2 AG/AB"  Hepatitis C IgG: No components found for: "HEPATITIS C"  Syphilis:   RPR   Date Value Ref Range Status   12/18/2023 Non-reactive Non-reactive Final   06/17/2019 Non-reactive Non-reactive Final   01/21/2019 Non-reactive Non-reactive Final       Hepatitis A IgG: No components found for: "HEPATITIS A IGG"  Hepatitis Bc IgG: No components found for: "HEPATITIS B CORE IGG"  Hepatitis Bs IgG:  Quantiferon: No results found for: "QUANTIFERON"  Toxoplasmosis: No results found for: "TOXOPLASMA"  VZV IgG: No components found for: "VARICELLA IGG"    No components found for: "SEDIMENTATION RATE"  No components found for: "C-REACTIVE PROTEIN"      Microbiology x 7d:   Microbiology Results (last 7 days)       ** No results found for the last 168 hours. **            Immunization History   Administered Date(s) Administered    COVID-19, MRNA, LN-S, PF (MODERNA FULL 0.5 ML DOSE) 02/02/2021, 03/04/2021, 10/27/2021    COVID-19, MRNA, LN-S, PF (Pfizer) (Gray Cap) 07/21/2022    COVID-19, mRNA, LNP-S, PF (Moderna 2023)Ages 12+ 12/18/2023    COVID-19, mRNA, LNP-S, bivalent booster, PF (Moderna Omicron)12 + YEARS 12/20/2022    COVID-19, mRNA, LNP-S, bivalent booster, PF (PFIZER OMICRON) 08/25/2023    Influenza - Quadrivalent - PF *Preferred* (6 months and older) 10/30/2017, 11/06/2018, 09/12/2019, 09/30/2020, 02/11/2021, 10/11/2021, 09/27/2022, 10/02/2023    Tdap 02/15/2017, 06/17/2019       Assessment:     Vaccine Counseling  Immunosupression    Plan:       Biologic Response Modifier Candidacy:   Based on available information, there are no identified significant barriers to BRMs from an infectious disease standpoint.    Except for inactivated influenza vaccine, vaccination during immune suppression might be suboptimal. Patients vaccinated " within a 14-day period before starting immunosuppressive therapy should be revaccination at least 3 months after immune suppressive therapy is discontinued     Counseling:  - I discussed with the patient the risk for increased susceptibility to infections following BRM therapy including increased risk for infection.    - Specific guidance has been provided to the patient regarding the patients occupation, hobbies and activities to avoid future infectious complications including but not limited to avoiding undercooked meats and seafood, proper hygiene, and contact with animals.  - The patients has been counseled on the importance of vaccinations including but not limited to a yearly flu vaccine.  - reviewed [employer] info on [place] and counseling provided    Immunizations:  Based on the patients immunization history and serologies, immunizations were ordered:  - Prevnar-20  - Shingrix 0, 2 months               The patient was encouraged to contact us about any problems that may develop after immunization and possible side effects were reviewed.       Soledad Lovell MD  Infectious Disease       - The following labs were ordered:  Orders Placed This Encounter   Procedures    (In Office Administered) Pneumococcal Conjugate Vaccine (20 Valent) (IM) (Preferred)     Standing Status:   Future     Standing Expiration Date:   4/26/2025    Zoster Recombinant Vaccine     Standing Status:   Future     Standing Expiration Date:   4/26/2025    Zoster Recombinant Vaccine     Standing Status:   Future     Standing Expiration Date:   4/26/2025    Toxoplasma Gondii IgG     Standing Status:   Future     Standing Expiration Date:   7/25/2025         I have sent communication to the referring  provider.    Follow up with infectious disease as needed.      The total time for evaluation and management services performed on 4/26/24 was greater than 20 minutes.  This includes face to face time and non-face to face time preparing to see the  patient (eg, review of tests), obtaining and/or reviewing separately obtained history, documenting clinical information in the electronic or other health record, independently interpreting results, and communicating results to the patient/family/caregiver, or care coordination.

## 2024-04-29 NOTE — PROGRESS NOTES
"[facility]  Physical Therapy Treatment Note     Name: Catie Monge  Clinic Number: [id number]    Therapy Diagnosis:   Encounter Diagnosis   Name Primary?    Decreased strength of trunk and back Yes         Physician: Day Chapa, *    Visit Date: 4/30/2024    Physician Orders: PT Eval and Treat  Medical Diagnosis from Referral:   M46.1 (ICD-10-CM) - Sacroiliitis   M53.3 (ICD-10-CM) - SI (sacroiliac) joint dysfunction   M45.9 (ICD-10-CM) - Ankylosing spondylitis, unspecified site of spine      Evaluation Date: 2/21/2024  Authorization Period Expiration: 1/28/2024   Plan of Care Expiration: 5/2/2024  Reassessment Due: 5/9/2024  Visit # / Visits authorized: 16/20  MedX testing visit 2    PTA Visit #: 2/5     Time In: 1000 AM   Time Out: 1055 AM  Total Billable Time: 55 minutes   INSURANCE and OUTCOMES: Program Benefit Group with Lumbar Outcomes (Oswestry and AQoL) 1/3    Precautions: standard/ankylosing spondylitis     Pattern of pain determined: movement responder    Subjective     Catie Monge her body feels great. She has been packing boxes along with lifting and moving boxes and she has not had any issues.     Patient reports tolerating previous visit without issue  Patient reports their pain to be 0/10 on a 0-10 scale with 0 being no pain and 10 being the worst pain imaginable.  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  Leisure: read, long walks playing with kids, [employer]       Pt goals: "Increased flexibility, feel confident weight lifting, and exercising without fear of my back being re-injured"     Objective      Lumbar  Isometric Testing on Med X equipment: Testing administered by PT    Test Initial Baseline Midpoint Final   Date 2/23/2024 4/9/2024    ROM 0-48 deg 0-60    Max Peak Torque 106  110    Min Peak Torque 46  68    Flex/Ext Ratio 2.3:1 1.6:1    % below normative data 37 31    % gain " from initial test Not available visit 1 11%      MOVEMENT LOSS - Lumbar- Updated 3/25/25    Norms ROM Loss Initial   Flexion Fingers touch toes, sacral angle >/= 70 deg, uniform spinal curvature, posterior weight shift  minimal loss   Extension ASIS surpasses toes, spine of scapulae surpasses heels, uniform spinal curve Moderate loss   Side glide Right   WFL   Side glide Left   WFL   Rotation Right PT observes contralateral shoulder minimal loss   Rotation Left PT observes contralateral shoulder WFL       Starting weight 53 ft/lbs    Outcomes:  Intake Score: 18%  Visit 6 Score:   Visit 10 Score:   Discharge Score:  Goal Score: 8%         4/9/2024    11:05 AM 3/27/2024     9:26 AM 2/22/2024    12:57 PM   Oswestry Questionnaire Review   Pain Intensity 1 1 0   Personal Care (Washing, Dressing) 0 0 1   Lifting 1 1 2   Walking 0 0 0   Sitting 0 0 1   Standing 0 0 0   Sleeping 1 1 1   Social Life 0 0 2   Traveling 0 1 1   Employment/Homemaking 1 0 1   Score 4 4 9           Treatment     Catie received the treatments listed below:      Medical MedX Treatment as follows:  Patient received neuromuscular education for 0 minutes via participation on the PresentigoX Machine. Therapist assisted patient in isolating and engaging spinal stabilization musculature in order to improve functional ability and postural control. Patient performed exercise with therapist guidance in order to accurately use pacer function, avoid valsalva, and optimally exert effort within a safe and effective range via the Peyman Exertion Rating Scale. Patient instructed to perform at a midrange of exertion and to complete 15-20 repetitions within appropriate split time, with proper technique, and while maintaining safety.        Catie participated in neuromuscular re-education activities to improve balance, coordination, proprioception, motor control and/or posture for 00 minutes. The following activities were included:       Catie participated in  "therapeutic exercises to develop strength, endurance, ROM, flexibility, posture, and core stabilization for 55 minutes includin/30/2024    10:18 AM   [facility]   Visit Number 16   VAS Pain Rating 0   Treadmill Time (in min.) 5 min   Lumbar Weight 74 lbs   Repetitions 18   Rating of Perceived Exertion 5   Ice - Z Lie (in min.) 5         Treadmill 5' -       Quad  Cat cow 10x3"   Multifidi lifts 10x3" ea (c/ foam pad under knee)  Fire hydrants  2 x10x3" ea   Palloff press BTB x15 ea  Standing skiiers BTB x15  Standing BTB pull down w/ march x10 ea        Peripheral muscle strengthening which included one set of 15-20 repetitions at a slow and controlled 10-13 second per rep pace focused on strengthening supporting musculature in order to improve body mechanics and functional mobility. Patient and therapist focused on proper form during treatment to ensure optimal strengthening of each targeted muscle group.  Machines utilized included:Torso rotation, Leg Ext, Leg Curl, Chest Press, and Rowing:Triceps, Biceps, Hip Abd, Hip Add, and Leg Press      Catie participated in dynamic functional therapeutic activities to improve functional performance and simulate household and community activities for 00 minutes. The following activities were included:    Hip hinge   Golfer's lift  Floor lift      Catie received manual therapy techniques for -  minutes. The following activities were included:      Pt given cold pack for 5 minutes to low back in z-lie.    Patient Education and Home Exercises     Home exercises include:  LTR  Open books   EIL  Quad - Cat cow, Bird Dogs, Thread the needle   Bridge   PPT >supine marching     Cardio program (V5): -  Lifting education (V11):  3/20/24  Posture/Lumbar roll: acquired  Frie [employer] Discharge handout (date given): -  Equipment at home/gym membership: yes    Education provided:     Written Home Exercises Provided: Patient instructed to cont prior HEP.  Exercises " were reviewed and Catie was able to demonstrate them prior to the end of the session.  Catie demonstrated good  understanding of the education provided.     See EMR under Patient Instructions for exercises provided prior visit.    Assessment   Pt continues to present with overall improved symptoms and activity tolerance. Progressed quad glut strengthening which pt tolerated well. MedX was performed at 74ft lbs with 18 reps performed at an exertion rating of 5/10. No issues with peripherals. Pt progression well toward DC.         Patient is making good progress towards established goals.  Pt will continue to benefit from skilled outpatient physical therapy to address the deficits stated in the impairment chart, provide pt/family education and to maximize pt's level of independence in the home and community environment.     Anticipated Barriers for therapy: ankylosing spondylitis   Pt's spiritual, cultural and educational needs considered and pt agreeable to plan of care and goals as stated below:     GOALS: Pt is in agreement with the following goals.     Short term goals:  6 weeks or 10 visits   - Pt will demonstrate increased lumbar MedX ROM by at least 3 degrees from the initial ROM value with improvements noted in functional ROM and ability to perform ADLs. Met 4/9/2024  - Pt will demonstrate increased MedX average isometric strength value by 15% from initial test resulting in improved ability to perform bending, lifting, and carrying activities safely, confidently. Partially Met  - Pt will report a reduction in worst pain score by 1-2 points for improved tolerance for childcare. Met 4/9/2024  - Pt able to perform HEP correctly with minimal cueing or supervision from therapist to encourage independent management of symptoms. Met 4/9/2024     Long term goals: 10 weeks or 20 visits   - Pt will demonstrate increased lumbar MedX ROM by at least 6 degrees from initial ROM value, resulting in improved ability to  perform functional forward bending while standing and sitting. Met 4/9/2024  - Pt will demonstrate increased MedX average isometric strength value by 30% from initial test resulting in improved ability to perform bending, lifting, and carrying activities safely and confidently. Appropriate and Ongoing  - Pt to demonstrate ability to independently control and reduce their pain through posture positioning and mechanical movements throughout a typical day. Appropriate and Ongoing  - Pt will demonstrate reduced pain and improved functional outcomes as reported on the Oswestry Disability Index by reaching a score of 3 or less in order to demonstrate subjective improvement in pt's condition. . Appropriate and Ongoing  - Pt will demonstrate independence with the HEP at discharge. Appropriate and Ongoing  - Pt will be able to bend down and  her children without increased pain. Appropriate and Ongoing    Plan     Continue with established Plan of Care towards established PT goals.       Harry Rincon, PTA  04/30/2024

## 2024-04-30 ENCOUNTER — CLINICAL SUPPORT (OUTPATIENT)
Dept: REHABILITATION | Facility: OTHER | Age: 39
End: 2024-04-30
Payer: COMMERCIAL

## 2024-04-30 DIAGNOSIS — R29.898 DECREASED STRENGTH OF TRUNK AND BACK: Primary | ICD-10-CM

## 2024-04-30 PROCEDURE — 97750 PHYSICAL PERFORMANCE TEST: CPT | Mod: 32

## 2024-05-02 ENCOUNTER — CLINICAL SUPPORT (OUTPATIENT)
Dept: REHABILITATION | Facility: OTHER | Age: 39
End: 2024-05-02
Payer: COMMERCIAL

## 2024-05-02 DIAGNOSIS — R29.898 DECREASED STRENGTH OF TRUNK AND BACK: Primary | ICD-10-CM

## 2024-05-02 PROCEDURE — 97750 PHYSICAL PERFORMANCE TEST: CPT | Mod: 32

## 2024-05-02 NOTE — PROGRESS NOTES
"[facility]  Physical Therapy Treatment Note     Name: Catie Monge  Clinic Number: [id number]    Therapy Diagnosis:   Encounter Diagnosis   Name Primary?    Decreased strength of trunk and back Yes           Physician: Day Chapa, *    Visit Date: 5/2/2024    Physician Orders: PT Eval and Treat  Medical Diagnosis from Referral:   M46.1 (ICD-10-CM) - Sacroiliitis   M53.3 (ICD-10-CM) - SI (sacroiliac) joint dysfunction   M45.9 (ICD-10-CM) - Ankylosing spondylitis, unspecified site of spine      Evaluation Date: 2/21/2024  Authorization Period Expiration: 1/28/2024   Plan of Care Expiration: 5/2/2024  Reassessment Due: 5/9/2024  Visit # / Visits authorized: 17/20  MedX testing visit 2    PTA Visit #: 3/5     Time In: 1000 AM   Time Out: 1050 AM   Total Billable Time: 50 minutes   INSURANCE and OUTCOMES: Program Benefit Group with Lumbar Outcomes (Oswestry and AQoL) 1/3    Precautions: standard/ankylosing spondylitis     Pattern of pain determined: movement responder    Subjective     Catie Monge she worked out on Tuesday after last session. She did squats and lunges with two 10lb DB's and had a little discomfort in R low back afterwards but after icing and stretches she felt better. She has some leg soreness today from the workout but otherwise she does not have any issues.     Patient reports tolerating previous visit without issue  Patient reports their pain to be 0/10 on a 0-10 scale with 0 being no pain and 10 being the worst pain imaginable.  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  Leisure: read, long walks playing with kids, [employer]       Pt goals: "Increased flexibility, feel confident weight lifting, and exercising without fear of my back being re-injured"     Objective      Lumbar  Isometric Testing on Med X equipment: Testing administered by PT    Test Initial Baseline Midpoint Final   Date " "2024    ROM 0-48 deg 0-60    Max Peak Torque 106  110    Min Peak Torque 46  68    Flex/Ext Ratio 2.3:1 1.6:1    % below normative data 37 31    % gain from initial test Not available visit 1 11%      MOVEMENT LOSS - Lumbar- Updated 3/25/25    Norms ROM Loss Initial   Flexion Fingers touch toes, sacral angle >/= 70 deg, uniform spinal curvature, posterior weight shift  minimal loss   Extension ASIS surpasses toes, spine of scapulae surpasses heels, uniform spinal curve Moderate loss   Side glide Right   WFL   Side glide Left   WFL   Rotation Right PT observes contralateral shoulder minimal loss   Rotation Left PT observes contralateral shoulder WFL       Starting weight 53 ft/lbs    Outcomes:  Intake Score: 18%  Visit 6 Score:   Visit 10 Score:   Discharge Score:  Goal Score: 8%         2024    11:05 AM 3/27/2024     9:26 AM 2024    12:57 PM   Oswestry Questionnaire Review   Pain Intensity 1 1 0   Personal Care (Washing, Dressing) 0 0 1   Lifting 1 1 2   Walking 0 0 0   Sitting 0 0 1   Standing 0 0 0   Sleeping 1 1 1   Social Life 0 0 2   Traveling 0 1 1   Employment/Homemaking 1 0 1   Score 4 4 9           Treatment     Catie received the treatments listed below:        Catie participated in therapeutic exercises to develop strength, endurance, ROM, flexibility, posture, and core stabilization for 35 minutes includin/2/2024     9:56 AM   [facility]   Visit Number 17   VAS Pain Rating 0   Treadmill Time (in min.) 5 min   Lumbar Weight 74 lbs   Repetitions 20   Rating of Perceived Exertion 4           Treadmill 5' -       Quad  Cat cow 10x3"   Multifidi lifts 10x3" ea (c/ foam pad under knee)  Standing Fire hydrants  2 x10x3" ea RTB   Palloff press BTB x15 ea  Standing skiiers BTB x15  Standing BTB pull down / march x10 ea        Peripheral muscle strengthening which included one set of 15-20 repetitions at a slow and controlled 10-13 second per rep pace focused on " strengthening supporting musculature in order to improve body mechanics and functional mobility. Patient and therapist focused on proper form during treatment to ensure optimal strengthening of each targeted muscle group.  Machines utilized included:Torso rotation, Leg Ext, Leg Curl, Chest Press, and Rowing:Triceps, Biceps, Hip Abd, Hip Add, and Leg Press      Catie participated in dynamic functional therapeutic activities to improve functional performance and simulate household and community activities for 15 minutes. The following activities were included:    Squat to chair 20#  x 10  Deadlift 20# x 10   Farmer carry 20# 2 laps ea     Catie received manual therapy techniques for -  minutes. The following activities were included:      Pt given cold pack for 5 minutes to low back in z-lie.    Patient Education and Home Exercises     Home exercises include:  LTR  Open books   EIL  Quad - Cat cow, Bird Dogs, Thread the needle   Bridge   PPT >supine marching     Cardio program (V5): -  Lifting education (V11):  3/20/24  Posture/Lumbar roll: acquired  Fridge Magnet Discharge handout (date given): -  Equipment at home/gym membership: yes    Education provided:     Written Home Exercises Provided: Patient instructed to cont prior HEP.  Exercises were reviewed and Catie was able to demonstrate them prior to the end of the session.  Catie demonstrated good  understanding of the education provided.     See EMR under Patient Instructions for exercises provided prior visit.    Assessment   Pt continues to present without c/o pain. Pt reports improvement in physical activity tolerance along with the ability to self manage symptoms. Introduced functional strengthening for carryover outside of therapy. Pt demos good mechanics with min cues required. No exacerbation of LBP with exercises. MedX was performed at 74ft lbs with 20 reps performed at an exertion rating of 3/10. No issues with peripherals.           Patient is  making good progress towards established goals.  Pt will continue to benefit from skilled outpatient physical therapy to address the deficits stated in the impairment chart, provide pt/family education and to maximize pt's level of independence in the home and community environment.     Anticipated Barriers for therapy: ankylosing spondylitis   Pt's spiritual, cultural and educational needs considered and pt agreeable to plan of care and goals as stated below:     GOALS: Pt is in agreement with the following goals.     Short term goals:  6 weeks or 10 visits   - Pt will demonstrate increased lumbar MedX ROM by at least 3 degrees from the initial ROM value with improvements noted in functional ROM and ability to perform ADLs. Met 4/9/2024  - Pt will demonstrate increased MedX average isometric strength value by 15% from initial test resulting in improved ability to perform bending, lifting, and carrying activities safely, confidently. Partially Met  - Pt will report a reduction in worst pain score by 1-2 points for improved tolerance for childcare. Met 4/9/2024  - Pt able to perform HEP correctly with minimal cueing or supervision from therapist to encourage independent management of symptoms. Met 4/9/2024     Long term goals: 10 weeks or 20 visits   - Pt will demonstrate increased lumbar MedX ROM by at least 6 degrees from initial ROM value, resulting in improved ability to perform functional forward bending while standing and sitting. Met 4/9/2024  - Pt will demonstrate increased MedX average isometric strength value by 30% from initial test resulting in improved ability to perform bending, lifting, and carrying activities safely and confidently. Appropriate and Ongoing  - Pt to demonstrate ability to independently control and reduce their pain through posture positioning and mechanical movements throughout a typical day. Appropriate and Ongoing  - Pt will demonstrate reduced pain and improved functional outcomes  as reported on the Oswestry Disability Index by reaching a score of 3 or less in order to demonstrate subjective improvement in pt's condition. . Appropriate and Ongoing  - Pt will demonstrate independence with the HEP at discharge. Appropriate and Ongoing  - Pt will be able to bend down and  her children without increased pain. Appropriate and Ongoing    Plan     Continue with established Plan of Care towards established PT goals.       Harry Rincon, PTA  05/02/2024

## 2024-05-06 ENCOUNTER — CLINICAL SUPPORT (OUTPATIENT)
Dept: INFECTIOUS DISEASES | Facility: CLINIC | Age: 39
End: 2024-05-06
Payer: COMMERCIAL

## 2024-05-06 DIAGNOSIS — Z79.899 DRUG-INDUCED IMMUNODEFICIENCY: Primary | ICD-10-CM

## 2024-05-06 DIAGNOSIS — D84.821 DRUG-INDUCED IMMUNODEFICIENCY: Primary | ICD-10-CM

## 2024-05-06 PROCEDURE — 90471 IMMUNIZATION ADMIN: CPT | Mod: S$GLB,,, | Performed by: INTERNAL MEDICINE

## 2024-05-06 PROCEDURE — 99999 PR PBB SHADOW E&M-EST. PATIENT-LVL I: CPT | Mod: PBBFAC,,,

## 2024-05-06 PROCEDURE — 90750 HZV VACC RECOMBINANT IM: CPT | Mod: S$GLB,,, | Performed by: INTERNAL MEDICINE

## 2024-05-06 PROCEDURE — 90677 PCV20 VACCINE IM: CPT | Mod: S$GLB,,, | Performed by: INTERNAL MEDICINE

## 2024-05-06 PROCEDURE — 90472 IMMUNIZATION ADMIN EACH ADD: CPT | Mod: S$GLB,,, | Performed by: INTERNAL MEDICINE

## 2024-05-06 NOTE — PROGRESS NOTES
Patient received Shingrix IM to the left deltoid.  And also Prevnar 20 IM to the right deltoid.  Tolerated well and left in NAD

## 2024-05-07 ENCOUNTER — CLINICAL SUPPORT (OUTPATIENT)
Dept: REHABILITATION | Facility: OTHER | Age: 39
End: 2024-05-07
Payer: COMMERCIAL

## 2024-05-07 DIAGNOSIS — R29.898 DECREASED STRENGTH OF TRUNK AND BACK: Primary | ICD-10-CM

## 2024-05-07 PROCEDURE — 97750 PHYSICAL PERFORMANCE TEST: CPT | Mod: 32

## 2024-05-07 NOTE — PROGRESS NOTES
"[facility]  Physical Therapy Treatment Note     Name: Catie Monge  Clinic Number: [id number]    Therapy Diagnosis:   Encounter Diagnosis   Name Primary?    Decreased strength of trunk and back Yes         Physician: Day Chapa, *    Visit Date: 5/7/2024    Physician Orders: PT Eval and Treat  Medical Diagnosis from Referral:   M46.1 (ICD-10-CM) - Sacroiliitis   M53.3 (ICD-10-CM) - SI (sacroiliac) joint dysfunction   M45.9 (ICD-10-CM) - Ankylosing spondylitis, unspecified site of spine      Evaluation Date: 2/21/2024  Authorization Period Expiration: 1/28/2024   Plan of Care Expiration: 5/2/2024  Reassessment Due: 5/9/2024  Visit # / Visits authorized: 18/20  MedX testing visit 2    PTA Visit #: 0/5     Time In: 1000 AM   Time Out: 1100 AM   Total Billable Time: 60 minutes   INSURANCE and OUTCOMES: Program Benefit Group with Lumbar Outcomes (Oswestry and AQoL) 1/3    Precautions: standard/ankylosing spondylitis     Pattern of pain determined: movement responder    Subjective     Catie Monge she started lifting weights at home and she is feeling better with that, however injured her hand and is limited with some exercises today. Currently the back is feeling good.      Patient reports tolerating previous visit without issue  Patient reports their pain to be 0/10 on a 0-10 scale with 0 being no pain and 10 being the worst pain imaginable.  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  Leisure: read, long walks playing with kids, [employer]       Pt goals: "Increased flexibility, feel confident weight lifting, and exercising without fear of my back being re-injured"     Objective      Lumbar  Isometric Testing on Med X equipment: Testing administered by PT    Test Initial Baseline Midpoint Final   Date 2/23/2024 4/9/2024    ROM 0-48 deg 0-60    Max Peak Torque 106  110    Min Peak Torque 46  68    Flex/Ext " "Ratio 2.3:1 1.6:1    % below normative data 37 31    % gain from initial test Not available visit 1 11%      MOVEMENT LOSS - Lumbar- Updated 3/25/25    Norms ROM Loss Initial   Flexion Fingers touch toes, sacral angle >/= 70 deg, uniform spinal curvature, posterior weight shift  minimal loss   Extension ASIS surpasses toes, spine of scapulae surpasses heels, uniform spinal curve Moderate loss   Side glide Right   WFL   Side glide Left   WFL   Rotation Right PT observes contralateral shoulder minimal loss   Rotation Left PT observes contralateral shoulder WFL       Starting weight 53 ft/lbs    Outcomes:  Intake Score: 18%  Visit 6 Score:   Visit 10 Score:   Discharge Score:  Goal Score: 8%         2024    11:05 AM 3/27/2024     9:26 AM 2024    12:57 PM   Oswestry Questionnaire Review   Pain Intensity 1 1 0   Personal Care (Washing, Dressing) 0 0 1   Lifting 1 1 2   Walking 0 0 0   Sitting 0 0 1   Standing 0 0 0   Sleeping 1 1 1   Social Life 0 0 2   Traveling 0 1 1   Employment/Homemaking 1 0 1   Score 4 4 9           Treatment     Catie received the treatments listed below:        Catie participated in therapeutic exercises to develop strength, endurance, ROM, flexibility, posture, and core stabilization for 35 minutes includin/7/2024    10:36 AM   [facility]   Visit Number 18   VAS Pain Rating 0   Treadmill Time (in min.) 5 min   Extension in Lying 10   Lumbar Weight 78 lbs   Repetitions 15   Rating of Perceived Exertion 4   Ice - Z Lie (in min.) 5             Treadmill 5' -       Quad  Cat cow 10x3"   Multifidi lifts 10x3" ea (c/ foam pad under knee)  Open book stretch  Extension in Lying x10    Standing Fire hydrants  2 x10x3" ea RTB   +Glut bridges with ball under shoulders x10  +glut bridges with ball under legs x10  +Lumbar extension with ball ( lifting chest) x10  +Lumbar extension with ball ( lifting legs) x10    Not performed 2024 :    Palloff press BTB x15 ea  Standing " skiiers BTB x15  Standing BTB pull down w/ march x10 ea        Peripheral muscle strengthening which included one set of 15-20 repetitions at a slow and controlled 10-13 second per rep pace focused on strengthening supporting musculature in order to improve body mechanics and functional mobility. Patient and therapist focused on proper form during treatment to ensure optimal strengthening of each targeted muscle group.  Machines utilized included:Torso rotation, Leg Ext, Leg Curl, Chest Press, and Rowing:Triceps, Biceps, Hip Abd, Hip Add, and Leg Press      Catie participated in dynamic functional therapeutic activities to improve functional performance and simulate household and community activities for 00 minutes. The following activities were included:    Squat to chair 20#  x 10  Deadlift 20# x 10   Farmer carry 20# 2 laps ea     Catie received manual therapy techniques for -  minutes. The following activities were included:      Pt given cold pack for 5 minutes to low back in z-lie.    Patient Education and Home Exercises     Home exercises include:  LTR  Open books   EIL  Quad - Cat cow, Bird Dogs, Thread the needle   Bridge   PPT >supine marching     Cardio program (V5): -  Lifting education (V11):  3/20/24  Posture/Lumbar roll: acquired  Fridge Magnet Discharge handout (date given): -  Equipment at home/gym membership: yes    Education provided:   Modification of exercises for home after discharge  Wellness program education      Written Home Exercises Provided: Patient instructed to cont prior HEP.  Exercises were reviewed and Catie was able to demonstrate them prior to the end of the session.  Catie demonstrated good  understanding of the education provided.     See EMR under Patient Instructions for exercises provided prior visit.    Assessment     Pt continues to present without c/o pain. Pt reports completing strengthening exercises at home and that is going well. Introduced modification of  exercises she can can complete at home to mimic the exercises in therapy, particularly the lumbar medx. She was able to complete them with good tolerance and appropriate challenge with cues as needed, HEP given for home.  MedX was performed at 78ft lbs with 15 reps performed at an exertion rating of 4/10. No issues with peripherals. Plant o continue with discharge planning next visit.        Patient is making good progress towards established goals.  Pt will continue to benefit from skilled outpatient physical therapy to address the deficits stated in the impairment chart, provide pt/family education and to maximize pt's level of independence in the home and community environment.     Anticipated Barriers for therapy: ankylosing spondylitis   Pt's spiritual, cultural and educational needs considered and pt agreeable to plan of care and goals as stated below:     GOALS: Pt is in agreement with the following goals.     Short term goals:  6 weeks or 10 visits   - Pt will demonstrate increased lumbar MedX ROM by at least 3 degrees from the initial ROM value with improvements noted in functional ROM and ability to perform ADLs. Met 4/9/2024  - Pt will demonstrate increased MedX average isometric strength value by 15% from initial test resulting in improved ability to perform bending, lifting, and carrying activities safely, confidently. Partially Met  - Pt will report a reduction in worst pain score by 1-2 points for improved tolerance for childcare. Met 4/9/2024  - Pt able to perform HEP correctly with minimal cueing or supervision from therapist to encourage independent management of symptoms. Met 4/9/2024     Long term goals: 10 weeks or 20 visits   - Pt will demonstrate increased lumbar MedX ROM by at least 6 degrees from initial ROM value, resulting in improved ability to perform functional forward bending while standing and sitting. Met 4/9/2024  - Pt will demonstrate increased MedX average isometric strength value  by 30% from initial test resulting in improved ability to perform bending, lifting, and carrying activities safely and confidently. Appropriate and Ongoing  - Pt to demonstrate ability to independently control and reduce their pain through posture positioning and mechanical movements throughout a typical day. Appropriate and Ongoing  - Pt will demonstrate reduced pain and improved functional outcomes as reported on the Oswestry Disability Index by reaching a score of 3 or less in order to demonstrate subjective improvement in pt's condition. . Appropriate and Ongoing  - Pt will demonstrate independence with the HEP at discharge. Appropriate and Ongoing  - Pt will be able to bend down and  her children without increased pain. Appropriate and Ongoing    Plan     Continue with established Plan of Care towards established PT goals.       Bianca Torre, PT  05/07/2024

## 2024-05-09 ENCOUNTER — CLINICAL SUPPORT (OUTPATIENT)
Dept: REHABILITATION | Facility: OTHER | Age: 39
End: 2024-05-09
Payer: COMMERCIAL

## 2024-05-09 DIAGNOSIS — R29.898 DECREASED STRENGTH OF TRUNK AND BACK: Primary | ICD-10-CM

## 2024-05-09 PROCEDURE — 97750 PHYSICAL PERFORMANCE TEST: CPT | Mod: 32

## 2024-05-09 NOTE — PROGRESS NOTES
"[facility]  Physical Therapy Treatment Note     Name: Catie Monge  Clinic Number: [id number]    Therapy Diagnosis:   Encounter Diagnosis   Name Primary?    Decreased strength of trunk and back Yes       Physician: Day Chapa, *    Visit Date: 5/9/2024    Physician Orders: PT Eval and Treat  Medical Diagnosis from Referral:   M46.1 (ICD-10-CM) - Sacroiliitis   M53.3 (ICD-10-CM) - SI (sacroiliac) joint dysfunction   M45.9 (ICD-10-CM) - Ankylosing spondylitis, unspecified site of spine      Evaluation Date: 2/21/2024  Authorization Period Expiration: 1/28/2024   Plan of Care Expiration: 5/2/2024  Reassessment Due: 5/9/2024  Visit # / Visits authorized: 19/20  MedX testing visit 2    PTA Visit #: 0/5     Time In: 10:30 AM   Time Out: 11:30 AM   Total Billable Time: 55 minutes   INSURANCE and OUTCOMES: Program Benefit Group with Lumbar Outcomes (Oswestry and AQoL) 1/3    Precautions: standard/ankylosing spondylitis     Pattern of pain determined: movement responder    Subjective     Catie Monge says that she plans to do the Wellness program, and then workout at home. She says that her back feels so good that it feels like a weight has been lifted off of her shoulders.    Patient reports tolerating previous visit without issue  Patient reports their pain to be 0/10 on a 0-10 scale with 0 being no pain and 10 being the worst pain imaginable.  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  Leisure: read, long walks playing with kids, [employer]       Pt goals: "Increased flexibility, feel confident weight lifting, and exercising without fear of my back being re-injured"     Objective      Lumbar  Isometric Testing on Med X equipment: Testing administered by PT    Test Initial Baseline Midpoint Final   Date 2/23/2024 4/9/2024    ROM 0-48 deg 0-60    Max Peak Torque 106  110    Min Peak Torque 46  68    Flex/Ext " "Ratio 2.3:1 1.6:1    % below normative data 37 31    % gain from initial test Not available visit 1 11%      MOVEMENT LOSS - Lumbar- Updated 3/25/25    Norms ROM Loss Initial   Flexion Fingers touch toes, sacral angle >/= 70 deg, uniform spinal curvature, posterior weight shift  minimal loss   Extension ASIS surpasses toes, spine of scapulae surpasses heels, uniform spinal curve Moderate loss   Side glide Right   WFL   Side glide Left   WFL   Rotation Right PT observes contralateral shoulder minimal loss   Rotation Left PT observes contralateral shoulder WFL       Starting weight 53 ft/lbs    Outcomes:  Intake Score: 18%  Visit 6 Score:   Visit 10 Score:   Discharge Score:  Goal Score: 8%         2024    11:05 AM 3/27/2024     9:26 AM 2024    12:57 PM   Oswestry Questionnaire Review   Pain Intensity 1 1 0   Personal Care (Washing, Dressing) 0 0 1   Lifting 1 1 2   Walking 0 0 0   Sitting 0 0 1   Standing 0 0 0   Sleeping 1 1 1   Social Life 0 0 2   Traveling 0 1 1   Employment/Homemaking 1 0 1   Score 4 4 9           Treatment     Catie received the treatments listed below:        Catie participated in therapeutic exercises to develop strength, endurance, ROM, flexibility, posture, and core stabilization for 40 minutes includin/9/2024    10:55 AM   [facility]   Visit Number 19   VAS Pain Rating 0   Treadmill Time (in min.) 5 min   Extension in Lying 10   Lumbar Weight 78 lbs   Repetitions 18   Ice - Z Lie (in min.) 5        Quad  Cat cow 10x3"   Multifidi lifts 10x3" ea (c/ foam pad under knee)  Open book x 10  Extension in Lying x10  Glut bridges with ball under legs x10    Not performed 2024 :    Palloff press BTB x15 ea  Standing skiiers BTB x15  Standing BTB pull down / march x10 ea  Glut bridges with ball under shoulders x10  Lumbar extension with ball ( lifting chest) x10  Lumbar extension with ball ( lifting legs) x10    Peripheral muscle strengthening which included one " "set of 15-20 repetitions at a slow and controlled 10-13 second per rep pace focused on strengthening supporting musculature in order to improve body mechanics and functional mobility. Patient and therapist focused on proper form during treatment to ensure optimal strengthening of each targeted muscle group.  Machines utilized included:Torso rotation, Leg Ext, Leg Curl, Chest Press, and Rowing:Triceps, Biceps, Hip Abd, Hip Add, and Leg Press      Catie participated in dynamic functional therapeutic activities to improve functional performance and simulate household and community activities for 20 minutes. The following activities were included:    Squat to chair 20#  x 10  Deadlift 20# x 10   Standing Fire hydrants  x10x3" ea RTB     NP: Alexander carry 20# 2 laps ea     Catie received manual therapy techniques for -  minutes. The following activities were included:      Pt given cold pack for 5 minutes to low back in z-lie.    Patient Education and Home Exercises     Home exercises include:  LTR  Open books   EIL  Quad - Cat cow, Bird Dogs, Thread the needle   Bridge   PPT >supine marching   + Deadlift & Squat w/20 lb.  + Standing fire hydrants    Cardio program (V5): -  Lifting education (V11):  3/20/24  Posture/Lumbar roll: acquired  Fridge Magnet Discharge handout (date given): -  Equipment at home/gym membership: yes    Education provided:   Modification of exercises for home after discharge  Wellness program education      Written Home Exercises Provided: Patient instructed to cont prior HEP.  Exercises were reviewed and Catie was able to demonstrate them prior to the end of the session.  Catie demonstrated good  understanding of the education provided.     See EMR under Patient Instructions for exercises provided prior visit.    Assessment     Patient presents on positive slope of progression with consistent 0/10 low back pain ratings. She was provided information about the Wellness program and plans to " participate. Reviewed and updated patient's advanced HEP. Lumbar MedX weight maintained to 78 ft.lbs. and patient able to complete 18 repetitions with an RPE of 4/10. Complete peripheral strength circuit performed without adverse effects and  modifications to avoid agitating recent left thumb stitches. Plant to continue with discharge planning next visit.    Patient is making good progress towards established goals.  Pt will continue to benefit from skilled outpatient physical therapy to address the deficits stated in the impairment chart, provide pt/family education and to maximize pt's level of independence in the home and community environment.     Anticipated Barriers for therapy: ankylosing spondylitis   Pt's spiritual, cultural and educational needs considered and pt agreeable to plan of care and goals as stated below:     GOALS: Pt is in agreement with the following goals.     Short term goals:  6 weeks or 10 visits   - Pt will demonstrate increased lumbar MedX ROM by at least 3 degrees from the initial ROM value with improvements noted in functional ROM and ability to perform ADLs. Met 4/9/2024  - Pt will demonstrate increased MedX average isometric strength value by 15% from initial test resulting in improved ability to perform bending, lifting, and carrying activities safely, confidently. Partially Met  - Pt will report a reduction in worst pain score by 1-2 points for improved tolerance for childcare. Met 4/9/2024  - Pt able to perform HEP correctly with minimal cueing or supervision from therapist to encourage independent management of symptoms. Met 4/9/2024     Long term goals: 10 weeks or 20 visits   - Pt will demonstrate increased lumbar MedX ROM by at least 6 degrees from initial ROM value, resulting in improved ability to perform functional forward bending while standing and sitting. Met 4/9/2024  - Pt will demonstrate increased MedX average isometric strength value by 30% from initial test  resulting in improved ability to perform bending, lifting, and carrying activities safely and confidently. Appropriate and Ongoing  - Pt to demonstrate ability to independently control and reduce their pain through posture positioning and mechanical movements throughout a typical day. Appropriate and Ongoing  - Pt will demonstrate reduced pain and improved functional outcomes as reported on the Oswestry Disability Index by reaching a score of 3 or less in order to demonstrate subjective improvement in pt's condition. . Appropriate and Ongoing  - Pt will demonstrate independence with the HEP at discharge. Appropriate and Ongoing  - Pt will be able to bend down and  her children without increased pain. Appropriate and Ongoing    Plan     Continue with established Plan of Care towards established PT goals.       Nora Kruger, PT  05/09/2024

## 2024-05-14 ENCOUNTER — PATIENT MESSAGE (OUTPATIENT)
Dept: ADMINISTRATIVE | Facility: OTHER | Age: 39
End: 2024-05-14
Payer: COMMERCIAL

## 2024-05-14 ENCOUNTER — CLINICAL SUPPORT (OUTPATIENT)
Dept: REHABILITATION | Facility: OTHER | Age: 39
End: 2024-05-14
Payer: COMMERCIAL

## 2024-05-14 DIAGNOSIS — R29.898 DECREASED STRENGTH OF TRUNK AND BACK: Primary | ICD-10-CM

## 2024-05-14 PROCEDURE — 97750 PHYSICAL PERFORMANCE TEST: CPT | Mod: 32

## 2024-05-14 NOTE — PROGRESS NOTES
"[facility]  Physical Therapy Treatment/Dishcarge Note     Name: Catie Monge  Clinic Number: [id number]    Therapy Diagnosis:   Encounter Diagnosis   Name Primary?    Decreased strength of trunk and back Yes       Physician: Day Chapa, *    Visit Date: 5/14/2024    Physician Orders: PT Eval and Treat  Medical Diagnosis from Referral:   M46.1 (ICD-10-CM) - Sacroiliitis   M53.3 (ICD-10-CM) - SI (sacroiliac) joint dysfunction   M45.9 (ICD-10-CM) - Ankylosing spondylitis, unspecified site of spine      Evaluation Date: 2/21/2024  Authorization Period Expiration: 1/28/2024   Plan of Care Expiration: 5/2/2024  Reassessment Due: 5/9/2024  Visit # / Visits authorized: 20/20  MedX testing visit 2    PTA Visit #: 0/5     Time In: 10:00 AM   Time Out: 10:50 AM   Total Billable Time: 50 minutes   INSURANCE and OUTCOMES: Program Benefit Group with Lumbar Outcomes (Oswestry and AQoL) 1/3    Precautions: standard/ankylosing spondylitis     Pattern of pain determined: movement responder    Subjective     Catie Monge reports feeling good and denies pain at this time and is feeling much better than when she started PT    Patient reports tolerating previous visit without issue  Patient reports their pain to be 0/10 on a 0-10 scale with 0 being no pain and 10 being the worst pain imaginable.  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  Leisure: read, long walks playing with kids, [employer]       Pt goals: "Increased flexibility, feel confident weight lifting, and exercising without fear of my back being re-injured"     Objective      Lumbar  Isometric Testing on Med X equipment: Testing administered by PT    Test Initial Baseline Midpoint Final   Date 2/23/2024 4/9/2024 5/14/2024   ROM 0-48 deg 0-60 0-60   Max Peak Torque 106  110 197   Min Peak Torque 46  68 66   Flex/Ext Ratio 2.3:1 1.6:1 2.9:1   % below normative data " "37 31 0   % gain from initial test Not available visit 1 11% 53     MOVEMENT LOSS - Lumbar- Updated 3/25/25    Norms ROM Loss Initial   Flexion Fingers touch toes, sacral angle >/= 70 deg, uniform spinal curvature, posterior weight shift  minimal loss   Extension ASIS surpasses toes, spine of scapulae surpasses heels, uniform spinal curve Moderate loss   Side glide Right   WFL   Side glide Left   WFL   Rotation Right PT observes contralateral shoulder minimal loss   Rotation Left PT observes contralateral shoulder WFL       Starting weight 53 ft/lbs        2024    10:54 AM 2024    11:05 AM 3/27/2024     9:26 AM 2024    12:57 PM   Oswestry Questionnaire Review   Pain Intensity 0 1 1 0   Personal Care (Washing, Dressing) 0 0 0 1   Lifting 0 1 1 2   Walking 0 0 0 0   Sitting 0 0 0 1   Standing 0 0 0 0   Sleeping 0 1 1 1   Social Life 0 0 0 2   Traveling 0 0 1 1   Employment/Homemaking 0 1 0 1   Score 0 4 4 9             Treatment     Catie received the treatments listed below:        Catie participated in therapeutic exercises to develop strength, endurance, ROM, flexibility, posture, and core stabilization for 40 minutes includin/14/2024    10:53 AM   [facility]   Visit Number 20   VAS Pain Rating 0   Treadmill Time (in min.) 5 min   Extension in Lying 10   Lumbar Flexion 60   Lumbar Extension 0   Lumbar Peak Torque 197 ft. lbs.   Min Torque 66   Test Percent Below Normative Data 0 %   Test Percent Gain in Strength from Initial  53 %   Ice - Z Lie (in min.) 5          Quad  Cat cow 10x3"   Multifidi lifts 10x3" ea (c/ foam pad under knee)  Open book x 10  Extension in Lying x10  Glut bridges with ball under legs x10    Not performed 2024 :    Palloff press BTB x15 ea  Standing skiiers BTB x15  Standing BTB pull down / march x10 ea  Glut bridges with ball under shoulders x10  Lumbar extension with ball ( lifting chest) x10  Lumbar extension with ball ( lifting legs) " "x10    Peripheral muscle strengthening which included one set of 15-20 repetitions at a slow and controlled 10-13 second per rep pace focused on strengthening supporting musculature in order to improve body mechanics and functional mobility. Patient and therapist focused on proper form during treatment to ensure optimal strengthening of each targeted muscle group.  Machines utilized included:Torso rotation, Leg Ext, Leg Curl, Chest Press, and Rowing:Triceps, Biceps, Hip Abd, Hip Add, and Leg Press      Catie participated in dynamic functional therapeutic activities to improve functional performance and simulate household and community activities for 20 minutes. The following activities were included:    Squat to chair 20#  x 10  Deadlift 20# x 10   Standing Fire hydrants  x10x3" ea RTB     NP: Alexander carry 20# 2 laps ea     Catie received manual therapy techniques for -  minutes. The following activities were included:      Pt given cold pack for 5 minutes to low back in z-lie.    Patient Education and Home Exercises     Home exercises include:  LTR  Open books   EIL  Quad - Cat cow, Bird Dogs, Thread the needle   Bridge   PPT >supine marching   + Deadlift & Squat w/20 lb.  + Standing fire hydrants    Cardio program (V5): -  Lifting education (V11):  3/20/24  Posture/Lumbar roll: acquired  Fridge Magnet Discharge handout (date given): -  Equipment at home/gym membership: yes    Education provided:   Modification of exercises for home after discharge  Wellness program education      Written Home Exercises Provided: Patient instructed to cont prior HEP.  Exercises were reviewed and Catie was able to demonstrate them prior to the end of the session.  Catie demonstrated good  understanding of the education provided.     See EMR under Patient Instructions for exercises provided prior visit.    Assessment     Patient has attended 20 visits of the [facility] program focusing aerobic training, isometric testing with " dynamic strengthening on MedX machine for spine, whole body strengthening on peripheral strengthening equipment, HEP, and patient education. Patient has completed the [facility] Program and is ready to be transitioned into wellness program. Educated on the importance of wellness program and attending weekly in order to maintain strength. Stressed the importance of continuing exercise and maintaining proper body mechanics and ergonomics in order to fully participate in activities of daily living, work, and leisure activities. At this time, patient demonstrates improvement in ability to reduce symptoms, improved posture, improved spinal ROM and improved strength. Discharge handout with HEP given and reviewed all information given. Patient able to demonstrate and verbalize understanding. Patient does plan to attend wellness and is appropriate for transition.     -Improved posture and is using lumbar roll.  -Improved l ROM, initially on MedX test 0-48 and currently 0-60.  -Improved strength at each test point on lumbar med ex IM test with 53% average improvement with reduced pain noted by patient.  -Initial outcome tool score 9 and current outcome tool score 0 indicating reduced pain and improved function.          Patient is making good progress towards established goals.  Pt will continue to benefit from skilled outpatient physical therapy to address the deficits stated in the impairment chart, provide pt/family education and to maximize pt's level of independence in the home and community environment.     Anticipated Barriers for therapy: ankylosing spondylitis   Pt's spiritual, cultural and educational needs considered and pt agreeable to plan of care and goals as stated below:     GOALS: Pt is in agreement with the following goals.     Short term goals:  6 weeks or 10 visits   - Pt will demonstrate increased lumbar MedX ROM by at least 3 degrees from the initial ROM value with improvements noted in functional ROM  and ability to perform ADLs. Met 4/9/2024  - Pt will demonstrate increased MedX average isometric strength value by 15% from initial test resulting in improved ability to perform bending, lifting, and carrying activities safely, confidently. Met 5/14/2024  - Pt will report a reduction in worst pain score by 1-2 points for improved tolerance for childcare. Met 4/9/2024  - Pt able to perform HEP correctly with minimal cueing or supervision from therapist to encourage independent management of symptoms. Met 4/9/2024     Long term goals: 10 weeks or 20 visits   - Pt will demonstrate increased lumbar MedX ROM by at least 6 degrees from initial ROM value, resulting in improved ability to perform functional forward bending while standing and sitting. Met 4/9/2024  - Pt will demonstrate increased MedX average isometric strength value by 30% from initial test resulting in improved ability to perform bending, lifting, and carrying activities safely and confidently. Met 5/14/2024  - Pt to demonstrate ability to independently control and reduce their pain through posture positioning and mechanical movements throughout a typical day. Met 5/14/2024  - Pt will demonstrate reduced pain and improved functional outcomes as reported on the Oswestry Disability Index by reaching a score of 3 or less in order to demonstrate subjective improvement in pt's condition. . Met 5/14/2024  - Pt will demonstrate independence with the HEP at discharge. Met 5/14/2024  - Pt will be able to bend down and  her children without increased pain. Met 5/14/2024    Plan     D/C to HEP      Bianca Torre, PT  05/14/2024

## 2024-05-14 NOTE — PATIENT INSTRUCTIONS
"HEALTHY BACK TOOLS        KEEP YOUR SPINE FEELING FINE   HEALTHY HABITS   Do your "GO TO" stretches 2/day   Get a good night's REST   Watch your POSTURE in sitting/standing Drink PLENTY of water   Use a lumbar roll Eat LOTS of fruits & vegetables   GET UP often (walk and/or stretch) Manage your STRESS   Make your workplace IDEAL FOR YOU  Don't smoke   Lift correctly EXERCISE   Practice positive self talk-talk to yourself kindly like you would your best friend                         WHAT TO DO WHEN SYMPTOMS FLARE UP  Back and neck pain may occasionally flare up.  If you experience a flare   up, remember your tools. Be encouraged, by remembering that flare-ups will   usually pass.   My Tools:    ~Use your "Go To" Stretches/Positions   ~Keep Moving-pain usually gets better if you move  ~Z lie (with or without ice)  10 min several times a day until symptoms reduce  ~Slowly resume normal activities   ~Practice Deep Breathing and Relaxation techniques                                                 MY EXERCISE   "

## 2024-05-20 ENCOUNTER — DOCUMENTATION ONLY (OUTPATIENT)
Dept: REHABILITATION | Facility: OTHER | Age: 39
End: 2024-05-20
Payer: COMMERCIAL

## 2024-05-20 NOTE — PROGRESS NOTES
Health  Wellness Visit Note    Name: Catie Monge  Clinic Number: [id number]  Physician: No ref. provider found  Diagnosis: No diagnosis found.  Past Medical History:   Diagnosis Date    Ankylosing spondylitis of unspecified sites in spine     Anxiety     No meds    Depression     Loose bowel movements      Visit Number: 21  Precautions: standard, ankylosing spondylitis       1st PT visit:  2/21/2024  Year of care end date:  2/21/2025  Mindbody plan:  Employee Plan  Patient level: C    Time In: 9:00 AM  Time Out: 9:45 AM  Total Treatment Time: 45 minutes    Wellness [employer] 2022  Handout on this week's wellness topic Learning provided  along with a discussion on what it means, the benefits, and suggestions for practice.  Reviewed last week's topic of NA patient first Wellness visit.    Subjective:   Patient reports minimal pain in her back. Patient keeps up with her stretching at night currently. Patient is currently moving things out her house, so she isn't able to keep up with her exercises on a normal bases currently. She is compliant with completing her HEP exercises. Patient uses ice when she experiences flare ups.     Objective:   Catie completed therapeutic stretches (EIL, RANDI) and the following MedX exercise machines: core lumbar, torso rotation l/r, leg extension, leg curl, upright row, chest press, biceps curl, triceps extension, leg press    See exercise log in patient folder for rate of exertion and repetitions completed.       Fitness Machine Education Key:  E=education on equipment initiated and further follow up and education needed  I=independent with  and exercise.  The patient:  Adjusts machines to his/her settings  Uses equipment levers, pins, weights safely  Maintains safe and correct posture while exercising  Moves through exercise with correct pace and control  Gets on and off equipment safely      Lumbar/Cervical Ext.  Torso Rotation  Leg Press    Leg Extension  Seated  Leg Curl  Chest Press    Seated Row  Hip ADD  Hip ABD    Triceps Extension  Bicep Curl  Other:      [x] Indicates exercise has been taught for home  Lumbar/Cervical Ext. [] Torso Rotation [] Leg Press []   Leg Extension [] Seated Leg Curl [] Chest Press []   Seated Row [] Hip ADD [] Hip ABD []   Triceps Extension [] Bicep Curl [] Other:        Assessment:   Patient tolerated Patient tolerated MedX Core Lumbar Strength and all other peripheral exercises without an increase in symptoms. Patient warmed up on treadmill for 5 minutes, stretched, and iced low back for 5 minutes after the workout.     Plan:  Continue with established plan of care towards wellness goals.     Health  : Beau Zee  5/20/2024

## 2024-06-06 ENCOUNTER — PATIENT MESSAGE (OUTPATIENT)
Dept: ADMINISTRATIVE | Facility: OTHER | Age: 39
End: 2024-06-06
Payer: COMMERCIAL

## 2024-06-10 ENCOUNTER — PATIENT MESSAGE (OUTPATIENT)
Dept: INTERNAL MEDICINE | Facility: CLINIC | Age: 39
End: 2024-06-10
Payer: COMMERCIAL

## 2024-06-23 DIAGNOSIS — Z30.41 ENCOUNTER FOR SURVEILLANCE OF CONTRACEPTIVE PILLS: ICD-10-CM

## 2024-06-23 RX ORDER — DROSPIRENONE, ETHINYL ESTRADIOL AND LEVOMEFOLATE CALCIUM AND LEVOMEFOLATE CALCIUM 3-0.02(24)
1 KIT ORAL DAILY
Qty: 84 TABLET | Refills: 1 | Status: SHIPPED | OUTPATIENT
Start: 2024-06-23

## 2024-06-24 NOTE — TELEPHONE ENCOUNTER
Refill Decision Note   Catie Saleem  is requesting a refill authorization.  Brief Assessment and Rationale for Refill:  Approve     Medication Therapy Plan:         Comments:     Note composed:8:04 PM 06/23/2024

## 2024-06-25 ENCOUNTER — DOCUMENTATION ONLY (OUTPATIENT)
Dept: REHABILITATION | Facility: OTHER | Age: 39
End: 2024-06-25
Payer: COMMERCIAL

## 2024-06-25 NOTE — PROGRESS NOTES
Health  Wellness Visit Note    Name: Catie Monge  Clinic Number: [id number]  Physician: No ref. provider found  Diagnosis: No diagnosis found.  Past Medical History:   Diagnosis Date    Ankylosing spondylitis of unspecified sites in spine     Anxiety     No meds    Depression     Loose bowel movements      Visit Number: 22  Precautions: standard, ankylosing spondylitis       1st PT visit:  2/21/2024  Year of care end date:  2/21/2025  Mindbody plan:  Employee Plan  Patient level: C    Time In: 10:00 AM  Time Out: 10:45 AM  Total Treatment Time: 45 minutes    Wellness [employer] 2022  Handout on this week's wellness topic Emotional Expression provided  along with a discussion on what it means, the benefits, and suggestions for practice.  Reviewed last week's topic of NA patient first Wellness visit.    Subjective:   Patient reports no pain in her back. Patient did some stretching when she got back home from her trip. Patient uses ice when she experiences flare ups.     Objective:   Catie completed therapeutic stretches (EIL, RANDI) and the following MedX exercise machines: core lumbar, torso rotation l/r, leg extension, leg curl, upright row, chest press, biceps curl, triceps extension, leg press    See exercise log in patient folder for rate of exertion and repetitions completed.       Fitness Machine Education Key:  E=education on equipment initiated and further follow up and education needed  I=independent with  and exercise.  The patient:  Adjusts machines to his/her settings  Uses equipment levers, pins, weights safely  Maintains safe and correct posture while exercising  Moves through exercise with correct pace and control  Gets on and off equipment safely      Lumbar/Cervical Ext.  Torso Rotation  Leg Press E   Leg Extension E Seated Leg Curl  Chest Press    Seated Row  Hip ADD  Hip ABD E   Triceps Extension  Bicep Curl  Other:      [x] Indicates exercise has been taught for  home  Lumbar/Cervical Ext. [] Torso Rotation [] Leg Press []   Leg Extension [] Seated Leg Curl [] Chest Press []   Seated Row [] Hip ADD [] Hip ABD []   Triceps Extension [] Bicep Curl [] Other:        Assessment:   Patient tolerated Patient tolerated MedX Core Lumbar Strength and all other peripheral exercises without an increase in symptoms. Patient warmed up on treadmill for 5 minutes, stretched, and iced low back for 5 minutes after the workout.     Plan:  Continue with established plan of care towards wellness goals.     Health  : Beau Zee  6/25/2024

## 2024-07-09 ENCOUNTER — CLINICAL SUPPORT (OUTPATIENT)
Dept: INFECTIOUS DISEASES | Facility: CLINIC | Age: 39
End: 2024-07-09
Payer: COMMERCIAL

## 2024-07-09 ENCOUNTER — DOCUMENTATION ONLY (OUTPATIENT)
Dept: REHABILITATION | Facility: OTHER | Age: 39
End: 2024-07-09
Payer: COMMERCIAL

## 2024-07-09 DIAGNOSIS — Z00.00 HEALTHCARE MAINTENANCE: Primary | ICD-10-CM

## 2024-07-09 PROCEDURE — 90750 HZV VACC RECOMBINANT IM: CPT | Mod: S$GLB,,, | Performed by: INTERNAL MEDICINE

## 2024-07-09 PROCEDURE — 99999 PR PBB SHADOW E&M-EST. PATIENT-LVL I: CPT | Mod: PBBFAC,,,

## 2024-07-09 PROCEDURE — 90471 IMMUNIZATION ADMIN: CPT | Mod: S$GLB,,, | Performed by: INTERNAL MEDICINE

## 2024-07-09 NOTE — PROGRESS NOTES
Health  Wellness Visit Note    Name: Catie Monge  Clinic Number: [id number]  Physician: No ref. provider found  Diagnosis: No diagnosis found.  Past Medical History:   Diagnosis Date    Ankylosing spondylitis of unspecified sites in spine     Anxiety     No meds    Depression     Loose bowel movements      Visit Number: 23  Precautions: standard, ankylosing spondylitis       1st PT visit:  2/21/2024  Year of care end date:  2/21/2025  Mindbody plan:  Employee Plan  Patient level: C    Time In: 9:50 AM  Time Out: 10:34 AM  Total Treatment Time: 44 minutes    Wellness [employer] 2022  Handout on this week's wellness topic Play provided  along with a discussion on what it means, the benefits, and suggestions for practice.  Reviewed last week's topic of NA patient missed last week.    Subjective:   Patient reports no pain in her back. Patient stretched every day this past week. Patient feels the stretching has helped with her mental health. Patient uses ice when she experiences flare ups during the week.     Objective:   Catie completed therapeutic stretches (EIL, RANDI) and the following MedX exercise machines: core lumbar, torso rotation l/r, leg extension, leg curl, upright row, chest press, biceps curl, triceps extension, leg press    See exercise log in patient folder for rate of exertion and repetitions completed.       Fitness Machine Education Key:  E=education on equipment initiated and further follow up and education needed  I=independent with  and exercise.  The patient:  Adjusts machines to his/her settings  Uses equipment levers, pins, weights safely  Maintains safe and correct posture while exercising  Moves through exercise with correct pace and control  Gets on and off equipment safely      Lumbar/Cervical Ext.  Torso Rotation  Leg Press E   Leg Extension E Seated Leg Curl E Chest Press    Seated Row E Hip ADD  Hip ABD E   Triceps Extension  Bicep Curl  Other:      [x] Indicates  exercise has been taught for home  Lumbar/Cervical Ext. [] Torso Rotation [] Leg Press []   Leg Extension [] Seated Leg Curl [] Chest Press []   Seated Row [] Hip ADD [] Hip ABD []   Triceps Extension [] Bicep Curl [] Other:        Assessment:   Patient tolerated Patient tolerated MedX Core Lumbar Strength and all other peripheral exercises without an increase in symptoms. Patient warmed up on treadmill for 5 minutes, stretched, and iced low back for 5 minutes after the workout.     Plan:  Continue with established plan of care towards wellness goals.     Health  : Beau Zee  7/9/2024

## 2024-07-16 ENCOUNTER — OFFICE VISIT (OUTPATIENT)
Dept: OBSTETRICS AND GYNECOLOGY | Facility: CLINIC | Age: 39
End: 2024-07-16
Payer: COMMERCIAL

## 2024-07-16 ENCOUNTER — DOCUMENTATION ONLY (OUTPATIENT)
Dept: REHABILITATION | Facility: OTHER | Age: 39
End: 2024-07-16
Payer: COMMERCIAL

## 2024-07-16 VITALS — HEIGHT: 69 IN | WEIGHT: 166 LBS | BODY MASS INDEX: 24.59 KG/M2

## 2024-07-16 DIAGNOSIS — N76.0 ACUTE VAGINITIS: Primary | ICD-10-CM

## 2024-07-16 PROCEDURE — 99213 OFFICE O/P EST LOW 20 MIN: CPT | Mod: S$GLB,,, | Performed by: ADVANCED PRACTICE MIDWIFE

## 2024-07-16 PROCEDURE — 1159F MED LIST DOCD IN RCRD: CPT | Mod: CPTII,S$GLB,, | Performed by: ADVANCED PRACTICE MIDWIFE

## 2024-07-16 PROCEDURE — 81514 NFCT DS BV&VAGINITIS DNA ALG: CPT | Performed by: ADVANCED PRACTICE MIDWIFE

## 2024-07-16 PROCEDURE — 99999 PR PBB SHADOW E&M-EST. PATIENT-LVL III: CPT | Mod: PBBFAC,,, | Performed by: ADVANCED PRACTICE MIDWIFE

## 2024-07-16 PROCEDURE — 3008F BODY MASS INDEX DOCD: CPT | Mod: CPTII,S$GLB,, | Performed by: ADVANCED PRACTICE MIDWIFE

## 2024-07-16 RX ORDER — FLUCONAZOLE 200 MG/1
200 TABLET ORAL EVERY OTHER DAY
Qty: 2 TABLET | Refills: 0 | Status: SHIPPED | OUTPATIENT
Start: 2024-07-16 | End: 2024-07-20

## 2024-07-16 NOTE — PROGRESS NOTES
Catie Monge is a 39 y.o. female  [facility] with complaint of vaginal itching x 3 days    ROS:  GENERAL: No fever, chills, fatigability or weight loss.  VULVAR: No pain, no lesions and no itching.  VAGINAL: No relaxation, no abnormal bleeding and no lesions.Reports itching and irritation.  ABDOMEN: No abdominal pain. Denies nausea. Denies vomiting. No diarrhea. No constipation  URINARY: No incontinence, no nocturia, no frequency and no dysuria.    Review of patient's allergies indicates:  No Known Allergies    Current Outpatient Medications:     adalimumab 40 mg/0.4 mL PnKt, Inject 0.4 mLs (40 mg total) into the skin every 14 (fourteen) days., Disp: 2 pen , Rfl: 11    azithromycin (ZITHROMAX) 500 MG tablet, Take 2 tablets once if needed for severe diarrhea, Disp: 2 tablet, Rfl: 0    buPROPion (WELLBUTRIN XL) 300 MG 24 hr tablet, Take 1 tablet (300 mg total) by mouth once daily., Disp: 30 tablet, Rfl: 11    calcium carbonate 1250 MG capsule, Take 1,250 mg by mouth 2 (two) times daily with meals., Disp: , Rfl:     cetirizine (ZYRTEC) 10 MG tablet, Take 10 mg by mouth every evening., Disp: , Rfl:     clindamycin (CLEOCIN T) 1 % external solution, Apply topically 2 (two) times daily. To armpits, Disp: 60 mL, Rfl: 5    cycloSPORINE (RESTASIS) 0.05 % ophthalmic emulsion, Place 1 drop into both eyes 2 (two) times daily., Disp: 180 each, Rfl: 3    diclofenac (VOLTAREN) 75 MG EC tablet, Take 1 tablet (75 mg total) by mouth 2 (two) times daily as needed (back)., Disp: 60 tablet, Rfl: 11    dicyclomine (BENTYL) 20 mg tablet, Take 1 tablet (20 mg total) by mouth 3 (three) times daily as needed (abdominal pain)., Disp: 90 tablet, Rfl: 3    diphenoxylate-atropine 2.5-0.025 mg (LOMOTIL) 2.5-0.025 mg per tablet, Take 1 tablet by mouth 4 (four) times daily as needed for Diarrhea., Disp: 60 tablet, Rfl: 1    drospirenone-e.estradioL-lm.FA (BEYAZ/CALEB) 3-0.02-0.451 mg (24) (4) Tab, Take 1 tablet by  "mouth once daily., Disp: 84 tablet, Rfl: 1    glycopyrronium tosylate 2.4 % Towl, Apply 1 each topically every evening., Disp: 30 each, Rfl: 5    methocarbamoL (ROBAXIN) 500 MG Tab, Take 1 tablet (500 mg total) by mouth 4 (four) times daily., Disp: 120 tablet, Rfl: 2    sertraline (ZOLOFT) 25 MG tablet, Take 1 tablet (25 mg total) by mouth once daily., Disp: 90 tablet, Rfl: 3    Past Medical History:   Diagnosis Date    Ankylosing spondylitis of unspecified sites in spine     Anxiety     No meds    Depression     Loose bowel movements      Past Surgical History:   Procedure Laterality Date    ANUS SURGERY      FISSURE    COLONOSCOPY N/A 2023    Procedure: COLONOSCOPY;  Surgeon: Brandy Chaney MD;  Location: [facility];  Service: Gastroenterology;  Laterality: N/A;    COLONOSCOPY W/ BIOPSIES AND POLYPECTOMY  2023    COSMETIC SURGERY      DILATION AND CURETTAGE OF UTERUS USING SUCTION N/A 2018    Procedure: DILATION AND CURETTAGE, UTERUS, USING SUCTION;  Surgeon: Joselo Landry Jr., MD;  Location: [facility] OR;  Service: OB/GYN;  Laterality: N/A;    NASAL SEPTUM SURGERY      TONSILLECTOMY, ADENOIDECTOMY      WISDOM TOOTH EXTRACTION       Social History     Tobacco Use    Smoking status: Never    Smokeless tobacco: Never   Substance Use Topics    Alcohol use: Yes     Alcohol/week: 1.0 standard drink of alcohol     Types: 1 Glasses of wine per week     Comment: Social    Drug use: No     OB History    Para Term  AB Living   3 2 2 0 1 2   SAB IAB Ectopic Multiple Live Births   1 0 0 0 2      # Outcome Date GA Lbr Roger/2nd Weight Sex Type Anes PTL Lv   3 Term 19 39w1d 03:40 / 00:25 3.572 kg (7 lb 14 oz) M Vag-Spont None N OSIRIS   2 SAB 2018              Birth Comments: missed ab, failed cytotec, D&C   1 Term 17 41w2d 14:20 / 01:09 3.38 kg (7 lb 7.2 oz) F Vag-Spont EPI N OSIRIS      Birth Comments: postpartum preE with postpartum magnesium       Ht 5' 9" (1.753 m)   Wt 75.3 kg (166 lb " 0.1 oz)   LMP 07/06/2024   BMI 24.51 kg/m²     PHYSICAL EXAM:  GENERAL: Calm and appropriate affect, alert, oriented x4  SKIN: Color appropriate for race, warm and dry, clean and intact with no rashes.  RESP: Even, unlabored breathing  ABDOMEN: Soft, nontender, no masses.  :   Normal external female genitalia without lesions. Normal hair distribution. Adequate perineal body, normal urethral meatus.  Vagina pink and well rugated, no lesions, vaginal discharge - slight, white  No significant cystocele or rectocele.  Cervix -no cervical motion tenderness.      ASSESSMENT / PLAN:    ICD-10-CM ICD-9-CM    1. Acute vaginitis  N76.0 616.10         Acute vaginitis  -     Vaginosis Screen by DNA Probe  -     fluconazole (DIFLUCAN) 200 MG Tab; Take 1 tablet (200 mg total) by mouth every other day. for 2 doses  Dispense: 2 tablet; Refill: 0        Patient was counseled today on vaginitis prevention including :  a. avoiding feminine products such as deoderant soaps, body wash, bubble bath, douches, scented toilet paper, deoderant tampons or pads, feminine wipes, chronic pad use, etc.  b. avoiding other vulvovaginal irritants such as long hot baths, humidity, tight, synthetic clothing, chlorine and sitting around in wet bathing suits  c. wearing cotton underwear, avoiding thong underwear and no underwear to bed  d. taking showers instead of baths and use a hair dryer on cool setting afterwards to dry  e. wearing cotton to exercise and shower immediately after exercise and change clothes  f. using polyurethane condoms without spermicide if sexually active and symptoms are triggered by intercourse  g. Discussed use of vagisil, along with repHresh and probiotics    FOLLOW UP:   Pending lab results, PRN lack of improvement.

## 2024-07-16 NOTE — PROGRESS NOTES
Health  Wellness Visit Note    Name: Catie Monge  Clinic Number: [id number]  Physician: No ref. provider found  Diagnosis: No diagnosis found.  Past Medical History:   Diagnosis Date    Ankylosing spondylitis of unspecified sites in spine     Anxiety     No meds    Depression     Loose bowel movements      Visit Number: 23  Precautions: standard, ankylosing spondylitis       1st PT visit:  2/21/2024  Year of care end date:  2/21/2025  Mindbody plan:  Employee Plan  Patient level: C    Time In: 9:55 AM  Time Out: 10:40 AM  Total Treatment Time: 45 minutes    Wellness Vision 2022  Handout on this week's wellness topic Laughter provided  along with a discussion on what it means, the benefits, and suggestions for practice.  Reviewed last week's topic of Play.    Subjective:   Patient reports no pain in her back. Patient stretched every day this past week. Patient feels the stretching has helped with her mental health. Patient uses ice when she experiences flare ups during the week.     Objective:   Catie completed therapeutic stretches (EIL, RANDI) and the following MedX exercise machines: core lumbar, torso rotation l/r, leg extension, leg curl, upright row, chest press, biceps curl, triceps extension, leg press    See exercise log in patient folder for rate of exertion and repetitions completed.       Fitness Machine Education Key:  E=education on equipment initiated and further follow up and education needed  I=independent with  and exercise.  The patient:  Adjusts machines to his/her settings  Uses equipment levers, pins, weights safely  Maintains safe and correct posture while exercising  Moves through exercise with correct pace and control  Gets on and off equipment safely      Lumbar/Cervical Ext. E Torso Rotation E Leg Press E   Leg Extension E Seated Leg Curl E Chest Press E   Seated Row E Hip ADD X Hip ABD E   Triceps Extension E Bicep Curl E Other:      [x] Indicates exercise has been  taught for home  Lumbar/Cervical Ext. [] Torso Rotation [] Leg Press []   Leg Extension [] Seated Leg Curl [] Chest Press []   Seated Row [] Hip ADD [] Hip ABD []   Triceps Extension [] Bicep Curl [] Other:        Assessment:   Patient tolerated Patient tolerated MedX Core Lumbar Strength and all other peripheral exercises without an increase in symptoms. Patient warmed up on treadmill for 5 minutes, stretched, and iced low back for 5 minutes after the workout.     Plan:  Continue with established plan of care towards wellness goals.     Health  : Beau Zee  7/16/2024

## 2024-07-17 ENCOUNTER — PATIENT MESSAGE (OUTPATIENT)
Dept: OBSTETRICS AND GYNECOLOGY | Facility: CLINIC | Age: 39
End: 2024-07-17
Payer: COMMERCIAL

## 2024-07-17 LAB
BACTERIAL VAGINOSIS DNA: NEGATIVE
CANDIDA GLABRATA DNA: NEGATIVE
CANDIDA KRUSEI DNA: NEGATIVE
CANDIDA RRNA VAG QL PROBE: POSITIVE
T VAGINALIS RRNA GENITAL QL PROBE: NEGATIVE

## 2024-07-23 ENCOUNTER — DOCUMENTATION ONLY (OUTPATIENT)
Dept: REHABILITATION | Facility: OTHER | Age: 39
End: 2024-07-23
Payer: COMMERCIAL

## 2024-07-23 NOTE — PROGRESS NOTES
Health  Wellness Visit Note    Name: Catie Monge  Clinic Number: [id number]  Physician: No ref. provider found  Diagnosis: No diagnosis found.  Past Medical History:   Diagnosis Date    Ankylosing spondylitis of unspecified sites in spine     Anxiety     No meds    Depression     Loose bowel movements      Visit Number: 24  Precautions: standard, ankylosing spondylitis       1st PT visit:  2/21/2024  Year of care end date:  2/21/2025  Mindbody plan:  Employee Plan  Patient level: C    Time In: 9:55 AM  Time Out: 10:39 AM  Total Treatment Time: 44 minutes    Wellness Vision 2022  Handout on this week's wellness topic Self provided  along with a discussion on what it means, the benefits, and suggestions for practice.  Reviewed last week's topic of Laughter.    Subjective:   Patient reports no pain in her back. Patient stretched every day this past week. Patient feels the stretching has helped with her mental health. Patient uses ice when she experiences flare ups during the week.     Objective:   Catie completed therapeutic stretches (EIL, RANDI) and the following MedX exercise machines: core lumbar, torso rotation l/r, leg extension, leg curl, upright row, chest press, biceps curl, triceps extension, leg press    See exercise log in patient folder for rate of exertion and repetitions completed.       Fitness Machine Education Key:  E=education on equipment initiated and further follow up and education needed  I=independent with  and exercise.  The patient:  Adjusts machines to his/her settings  Uses equipment levers, pins, weights safely  Maintains safe and correct posture while exercising  Moves through exercise with correct pace and control  Gets on and off equipment safely      Lumbar/Cervical Ext. E Torso Rotation E Leg Press E   Leg Extension E Seated Leg Curl E Chest Press E   Seated Row E Hip ADD X Hip ABD E   Triceps Extension E Bicep Curl E Other:      [x] Indicates exercise has been  taught for home  Lumbar/Cervical Ext. [] Torso Rotation [] Leg Press []   Leg Extension [] Seated Leg Curl [] Chest Press []   Seated Row [] Hip ADD [] Hip ABD []   Triceps Extension [] Bicep Curl [] Other:        Assessment:   Patient tolerated Patient tolerated MedX Core Lumbar Strength and all other peripheral exercises without an increase in symptoms. Patient warmed up on treadmill for 5 minutes, stretched, and iced low back for 5 minutes after the workout.     Plan:  Continue with established plan of care towards wellness goals.     Health  : Beau Zee  7/23/2024

## 2024-08-07 ENCOUNTER — PATIENT MESSAGE (OUTPATIENT)
Dept: ADMINISTRATIVE | Facility: OTHER | Age: 39
End: 2024-08-07
Payer: COMMERCIAL

## 2024-08-20 ENCOUNTER — DOCUMENTATION ONLY (OUTPATIENT)
Dept: REHABILITATION | Facility: OTHER | Age: 39
End: 2024-08-20
Payer: COMMERCIAL

## 2024-08-20 NOTE — PROGRESS NOTES
Health  Wellness Visit Note    Name: Catie Monge  Clinic Number: [id number]  Physician: No ref. provider found  Diagnosis: No diagnosis found.  Past Medical History:   Diagnosis Date    Ankylosing spondylitis of unspecified sites in spine     Anxiety     No meds    Depression     Loose bowel movements      Visit Number: 25  Precautions: standard, ankylosing spondylitis       1st PT visit:  2/21/2024  Year of care end date:  2/21/2025  Mindbody plan:  Employee Plan  Patient level: C    Time In: 10:01 AM  Time Out: 10:54 AM  Total Treatment Time: 53 minutes    Wellness Vision 2022  Handout on this week's wellness topic Stages of Change provided  along with a discussion on what it means, the benefits, and suggestions for practice.  Reviewed last week's topic of NA patient last week.    Subjective:   Patient reports no pain in her back.  Patient feels the stretching has helped with her mental health. Patient uses ice when she experiences flare ups during the week. Patient will start learning to read weight card.    Objective:   Catie completed therapeutic stretches (EIL, RANDI) and the following MedX exercise machines: core lumbar, torso rotation l/r, leg extension, leg curl, upright row, chest press, biceps curl, triceps extension, leg press    See exercise log in patient folder for rate of exertion and repetitions completed.       Fitness Machine Education Key:  E=education on equipment initiated and further follow up and education needed  I=independent with  and exercise.  The patient:  Adjusts machines to his/her settings  Uses equipment levers, pins, weights safely  Maintains safe and correct posture while exercising  Moves through exercise with correct pace and control  Gets on and off equipment safely      Lumbar/Cervical Ext. E Torso Rotation E Leg Press E   Leg Extension E Seated Leg Curl E Chest Press E   Seated Row E Hip ADD X Hip ABD E   Triceps Extension E Bicep Curl E Other:      [x]  Indicates exercise has been taught for home  Lumbar/Cervical Ext. [] Torso Rotation [] Leg Press []   Leg Extension [] Seated Leg Curl [] Chest Press []   Seated Row [] Hip ADD [] Hip ABD []   Triceps Extension [] Bicep Curl [] Other:        Assessment:   Patient tolerated Patient tolerated MedX Core Lumbar Strength and all other peripheral exercises without an increase in symptoms. Patient warmed up on treadmill for 5 minutes, stretched, and iced low back for 5 minutes after the workout.     Plan:  Continue with established plan of care towards wellness goals.     Health  : Beau Zee  8/20/2024

## 2024-08-27 ENCOUNTER — DOCUMENTATION ONLY (OUTPATIENT)
Dept: REHABILITATION | Facility: OTHER | Age: 39
End: 2024-08-27
Payer: COMMERCIAL

## 2024-08-27 ENCOUNTER — OFFICE VISIT (OUTPATIENT)
Dept: OBSTETRICS AND GYNECOLOGY | Facility: CLINIC | Age: 39
End: 2024-08-27
Payer: COMMERCIAL

## 2024-08-27 VITALS
DIASTOLIC BLOOD PRESSURE: 98 MMHG | WEIGHT: 170.31 LBS | HEIGHT: 69 IN | SYSTOLIC BLOOD PRESSURE: 130 MMHG | BODY MASS INDEX: 25.22 KG/M2

## 2024-08-27 DIAGNOSIS — Z30.41 ENCOUNTER FOR SURVEILLANCE OF CONTRACEPTIVE PILLS: ICD-10-CM

## 2024-08-27 DIAGNOSIS — Z01.419 ENCOUNTER FOR WELL WOMAN EXAM: Primary | ICD-10-CM

## 2024-08-27 DIAGNOSIS — Z11.51 SCREENING FOR HPV (HUMAN PAPILLOMAVIRUS): ICD-10-CM

## 2024-08-27 DIAGNOSIS — Z12.4 SCREENING FOR CERVICAL CANCER: ICD-10-CM

## 2024-08-27 PROCEDURE — 99999 PR PBB SHADOW E&M-EST. PATIENT-LVL III: CPT | Mod: PBBFAC,,, | Performed by: STUDENT IN AN ORGANIZED HEALTH CARE EDUCATION/TRAINING PROGRAM

## 2024-08-27 PROCEDURE — 3075F SYST BP GE 130 - 139MM HG: CPT | Mod: CPTII,S$GLB,, | Performed by: STUDENT IN AN ORGANIZED HEALTH CARE EDUCATION/TRAINING PROGRAM

## 2024-08-27 PROCEDURE — 1159F MED LIST DOCD IN RCRD: CPT | Mod: CPTII,S$GLB,, | Performed by: STUDENT IN AN ORGANIZED HEALTH CARE EDUCATION/TRAINING PROGRAM

## 2024-08-27 PROCEDURE — 87624 HPV HI-RISK TYP POOLED RSLT: CPT | Performed by: STUDENT IN AN ORGANIZED HEALTH CARE EDUCATION/TRAINING PROGRAM

## 2024-08-27 PROCEDURE — 3008F BODY MASS INDEX DOCD: CPT | Mod: CPTII,S$GLB,, | Performed by: STUDENT IN AN ORGANIZED HEALTH CARE EDUCATION/TRAINING PROGRAM

## 2024-08-27 PROCEDURE — 99395 PREV VISIT EST AGE 18-39: CPT | Mod: S$GLB,,, | Performed by: STUDENT IN AN ORGANIZED HEALTH CARE EDUCATION/TRAINING PROGRAM

## 2024-08-27 PROCEDURE — 88175 CYTOPATH C/V AUTO FLUID REDO: CPT | Performed by: STUDENT IN AN ORGANIZED HEALTH CARE EDUCATION/TRAINING PROGRAM

## 2024-08-27 PROCEDURE — 3080F DIAST BP >= 90 MM HG: CPT | Mod: CPTII,S$GLB,, | Performed by: STUDENT IN AN ORGANIZED HEALTH CARE EDUCATION/TRAINING PROGRAM

## 2024-08-27 RX ORDER — DROSPIRENONE, ETHINYL ESTRADIOL AND LEVOMEFOLATE CALCIUM AND LEVOMEFOLATE CALCIUM 3-0.02(24)
1 KIT ORAL DAILY
Qty: 84 TABLET | Refills: 4 | Status: SHIPPED | OUTPATIENT
Start: 2024-08-27

## 2024-08-27 NOTE — PROGRESS NOTES
Health  Wellness Visit Note    Name: Catie Monge  Clinic Number: [id number]  Physician: No ref. provider found  Diagnosis: No diagnosis found.  Past Medical History:   Diagnosis Date    Ankylosing spondylitis of unspecified sites in spine     Anxiety     No meds    Depression     Loose bowel movements      Visit Number: 26  Precautions: standard, ankylosing spondylitis       1st PT visit:  2/21/2024  Year of care end date:  2/21/2025  Mindbody plan:  Employee Plan  Patient level: C    Time In: 9:56 AM  Time Out: 10:38 AM  Total Treatment Time: 42 minutes    Wellness [employer] 2022  Handout on this week's wellness topic Journaling provided  along with a discussion on what it means, the benefits, and suggestions for practice.  Reviewed last week's topic of Stages of Change    Subjective:   Patient reports no pain in her back. Patient feels the stretching has helped with her mental health. Patient uses a [employer] bike and an samantha that gives her light weight exercises. Patient hasn't been using ice at all this past week. Patient will start learning to read weight card.     Objective:   Catie completed therapeutic stretches (EIL, RANDI) and the following MedX exercise machines: core lumbar, torso rotation l/r, leg extension, leg curl, upright row, chest press, biceps curl, triceps extension, leg press    See exercise log in patient folder for rate of exertion and repetitions completed.       Fitness Machine Education Key:  E=education on equipment initiated and further follow up and education needed  I=independent with  and exercise.  The patient:  Adjusts machines to his/her settings  Uses equipment levers, pins, weights safely  Maintains safe and correct posture while exercising  Moves through exercise with correct pace and control  Gets on and off equipment safely      Lumbar/Cervical Ext. E Torso Rotation E Leg Press E   Leg Extension E Seated Leg Curl E Chest Press E   Seated Row E Hip ADD X Hip  ABD E   Triceps Extension E Bicep Curl E Other:      [x] Indicates exercise has been taught for home  Lumbar/Cervical Ext. [] Torso Rotation [] Leg Press []   Leg Extension [] Seated Leg Curl [] Chest Press []   Seated Row [] Hip ADD [] Hip ABD []   Triceps Extension [] Bicep Curl [] Other:        Assessment:   Patient tolerated Patient tolerated MedX Core Lumbar Strength and all other peripheral exercises without an increase in symptoms. Patient warmed up on treadmill for 5 minutes, stretched, and iced low back for 5 minutes after the workout.     Plan:  Continue with established plan of care towards wellness goals.     Health  : Beau Zee  8/27/2024

## 2024-08-27 NOTE — PROGRESS NOTES
"OBSTETRICS AND GYNECOLOGY    Chief Complaint:  Well Woman Exam     HPI:      Catie Monge is a 39 y.o.  who presents today for well woman exam.    LMP: Patient's last menstrual period was 2024. Specifically, patient denies abnormal vaginal bleeding, abnormal discharge/odor, pelvic pain, or dysuria/hematuria. Ms. Monge is currently sexually active with a single male partner. She is currently using partner's vasectomy for contraception. She declines STD screening today. PMS symptoms had been well controlled, however last 2 menses with heavy bleeding and mood changes. She denies additional issues, problems, or complaints.     Ms. Monge confirms that she wears her seatbelt when riding in the car.     OB History          3    Para   2    Term   2       0    AB   1    Living   2         SAB   1    IAB   0    Ectopic   0    Multiple   0    Live Births   2               ROS:     GENERAL: Feeling well overall.   BREASTS: Denies breast skin changes, lumps or nipple discharge.   URINARY: Denies dysuria, hematuria.    Physical Exam:      PHYSICAL EXAM:  BP (!) 130/98   Ht 5' 9" (1.753 m)   Wt 77.3 kg (170 lb 4.9 oz)   LMP 2024   BMI 25.15 kg/m²   Body mass index is 25.15 kg/m².     APPEARANCE:  Well nourished, well developed, in no acute distress.  Able to smile appropriately during our encounter. Makes eye contact. Pleasant.  PSYCH: Appropriate mood and affect.  SKIN:   No acne or hirsutism.  CARDIOVASCULAR:  No edema of peripheral extremities. Well perfused throughout.  RESP:  No accessory muscle use to breathe. Speaking comfortably in complete sentences.   BREASTS:  Symmetrical, with no visible skin lesions or scars, no palpable masses. No nipple discharge or peau d'orange bilaterally. No palpable axillary LAD.  ABDOMEN:  Soft. Nonacute.    PELVIC:  Normal external genitalia without lesions. Normal hair distribution. Adequate perineal body, normal urethral meatus. Vagina moist and " well rugated. Without lesions, without discharge. Cervix 3x4cm, parous. Cervix pink, without ectocervical lesions, discharge or tenderness.  No ectropion. No significant cystocele or rectocele.  Bimanual exam shows uterus to be normal size, regular, mobile and nontender.  Adnexa without masses or tenderness.      Female chaperone present.    Assessment/Plan:     Well Woman Exam  -- Counseled patient regarding healthy diet and regular exercise, daily seat belt use.   -- BP as above, had 3 cups of coffee this morning  No diagnosis of cHTN  Normotensive at last office visit  Also just came from exercising  Reviewed contraindication if cHTN diagnosis as unacceptably high risks with OCPs, pt verbalized understanding  -- She denies abuse and feels safe at home.  -- Pap smear:  obtained  NILM, HPV(-) 6/2021  -- Contraception:  vasectomy of SO  OCP  Discussed options, including increasing zoloft dose, increasing luteal phase zoloft dose, continuous OCPs, other OCP, other modality of contraception  Will trial continuous OCPs at this time  To keep me posted  Denies combination OCP contraindications  I specifically told her to seek medical attention should she develop shortness of breath, chest pain, severe headache, painful leg swelling as Estrogen administration can increases risk of VTE, patient verbalized understanding.  -- STD screening:  declines   -- Pt of Dr. Frausto/Gladys    Follow up in about 1 year (around 8/27/2025) for WWE.    Counseling:     Patient was counseled today on current ASCCP pap guidelines, the recommendation for yearly physical exams, safe driving habits, and breast self awareness. She is to see her PCP for other health maintenance.     Use of the [facility] Patient Portal discussed and encouraged during today's visit.                 As of April 1, 2021, the Cures Act has been passed nationally. This new law requires that all doctors progress notes, lab results, pathology reports and radiology reports be  released IMMEDIATELY to the patient in the patient portal. That means that the results are released to you at the EXACT same time they are released to me. Therefore, with all of the patients that I have I am not able to reply to each patient exactly when the results come in. So there will be a delay from when you see the results to when I see them and have time to come up with a response to send you. Also I only see these results when I am on the computer at work. So if the results come in over the weekend or after 5 pm of a work day, I will not see them until the next business day. As you can tell, this is a challenge as a physician to give every patient the quick response they hope for and deserve. So please be patient!   Thanks for your understanding and patience.

## 2024-08-28 LAB
CLINICAL INFO: NORMAL
DATE OF PREVIOUS PAP: NORMAL
DATE PREVIOUS BX: NO
LMP START DATE: NORMAL
SPECIMEN SOURCE CVX/VAG CYTO: NORMAL

## 2024-08-30 ENCOUNTER — PATIENT MESSAGE (OUTPATIENT)
Dept: ADMINISTRATIVE | Facility: OTHER | Age: 39
End: 2024-08-30
Payer: COMMERCIAL

## 2024-09-03 ENCOUNTER — DOCUMENTATION ONLY (OUTPATIENT)
Dept: REHABILITATION | Facility: OTHER | Age: 39
End: 2024-09-03
Payer: COMMERCIAL

## 2024-09-03 ENCOUNTER — PATIENT MESSAGE (OUTPATIENT)
Dept: RHEUMATOLOGY | Facility: CLINIC | Age: 39
End: 2024-09-03
Payer: COMMERCIAL

## 2024-09-03 NOTE — PROGRESS NOTES
Health  Wellness Visit Note    Name: Catie Monge  Clinic Number: [id number]  Physician: No ref. provider found  Diagnosis: No diagnosis found.  Past Medical History:   Diagnosis Date    Ankylosing spondylitis of unspecified sites in spine     Anxiety     No meds    Depression     Loose bowel movements      Visit Number: 27  Precautions: standard, ankylosing spondylitis       1st PT visit:  2/21/2024  Year of care end date:  2/21/2025  Mindbody plan:  Employee Plan  Patient level: C    Time In: 10:00 AM  Time Out: 10:42 AM  Total Treatment Time: 42 minutes    Wellness [employer] 2022  Handout on this week's wellness topic Body Image provided  along with a discussion on what it means, the benefits, and suggestions for practice.  Reviewed last week's topic of Journaling    Subjective:   Patient reports no pain in her back. Patient usually ices if she feels even a little pain or tightening in her back. Patient uses a [employer] bike and an samantha that gives her light weight exercises. Patient added a hip abductor exercise that HC showed her last week. Patient hasn't been using ice at all this past week. Patient will start learning to read weight card.     Objective:   Catie completed therapeutic stretches (EIL, RANDI) and the following MedX exercise machines: core lumbar, torso rotation l/r, leg extension, leg curl, upright row, chest press, biceps curl, triceps extension, leg press    See exercise log in patient folder for rate of exertion and repetitions completed.       Fitness Machine Education Key:  E=education on equipment initiated and further follow up and education needed  I=independent with  and exercise.  The patient:  Adjusts machines to his/her settings  Uses equipment levers, pins, weights safely  Maintains safe and correct posture while exercising  Moves through exercise with correct pace and control  Gets on and off equipment safely      Lumbar/Cervical Ext. I Torso Rotation I Leg Press I    Leg Extension I Seated Leg Curl I Chest Press I   Seated Row I Hip ADD X Hip ABD I   Triceps Extension I Bicep Curl I Other:      [x] Indicates exercise has been taught for home  Lumbar/Cervical Ext. [] Torso Rotation [] Leg Press []   Leg Extension [] Seated Leg Curl [] Chest Press []   Seated Row [] Hip ADD [] Hip ABD []   Triceps Extension [] Bicep Curl [] Other:        Assessment:   Patient tolerated Patient tolerated MedX Core Lumbar Strength and all other peripheral exercises without an increase in symptoms. Patient warmed up on treadmill for 5 minutes, stretched, and iced low back for 5 minutes after the workout.     Plan:  Continue with established plan of care towards wellness goals.     Health  : Beau Zee  9/3/2024

## 2024-09-10 ENCOUNTER — DOCUMENTATION ONLY (OUTPATIENT)
Dept: REHABILITATION | Facility: OTHER | Age: 39
End: 2024-09-10
Payer: COMMERCIAL

## 2024-09-10 NOTE — PROGRESS NOTES
Health  Wellness Visit Note    Name: Catie Monge  Clinic Number: [id number]  Physician: No ref. provider found  Diagnosis: No diagnosis found.  Past Medical History:   Diagnosis Date    Ankylosing spondylitis of unspecified sites in spine     Anxiety     No meds    Depression     Loose bowel movements      Visit Number: 28  Precautions: standard, ankylosing spondylitis       1st PT visit:  2/21/2024  Year of care end date:  2/21/2025  Mindbody plan:  Employee Plan  Patient level: C    Time In: 10:00 AM  Time Out: 10:42 AM  Total Treatment Time: 42 minutes    Wellness [employer] 2022  Handout on this week's wellness topic Preventing Burnout provided  along with a discussion on what it means, the benefits, and suggestions for practice.  Reviewed last week's topic of Body Image    Subjective:   Patient reports a little more stiffness in her back this past week. Patient usually ices if she feels even a little pain or tightening in her back. Patient uses a [employer] bike and an samantha that gives her light weight exercises. Patient has used ice on once this past week.     Objective:   Catie completed therapeutic stretches (EIL, RANDI) and the following MedX exercise machines: core lumbar, torso rotation l/r, leg extension, leg curl, upright row, chest press, biceps curl, triceps extension, leg press    See exercise log in patient folder for rate of exertion and repetitions completed.       Fitness Machine Education Key:  E=education on equipment initiated and further follow up and education needed  I=independent with  and exercise.  The patient:  Adjusts machines to his/her settings  Uses equipment levers, pins, weights safely  Maintains safe and correct posture while exercising  Moves through exercise with correct pace and control  Gets on and off equipment safely      Lumbar/Cervical Ext. I Torso Rotation I Leg Press I   Leg Extension I Seated Leg Curl I Chest Press I   Seated Row I Hip ADD X Hip ABD I    Triceps Extension I Bicep Curl I Other:      [x] Indicates exercise has been taught for home  Lumbar/Cervical Ext. [] Torso Rotation [] Leg Press []   Leg Extension [] Seated Leg Curl [] Chest Press []   Seated Row [] Hip ADD [] Hip ABD []   Triceps Extension [] Bicep Curl [] Other:        Assessment:   Patient tolerated Patient tolerated MedX Core Lumbar Strength and all other peripheral exercises without an increase in symptoms. Patient warmed up on treadmill for 5 minutes, stretched, and iced low back for 5 minutes after the workout.     Plan:  Continue with established plan of care towards wellness goals.     Health  : Beau Zee  9/10/2024

## 2024-09-16 DIAGNOSIS — L74.510 HYPERHIDROSIS OF AXILLA: ICD-10-CM

## 2024-09-17 ENCOUNTER — DOCUMENTATION ONLY (OUTPATIENT)
Dept: REHABILITATION | Facility: OTHER | Age: 39
End: 2024-09-17
Payer: COMMERCIAL

## 2024-09-17 RX ORDER — GLYCOPYRRONIUM 2.4 G/100G
1 CLOTH TOPICAL NIGHTLY
Qty: 30 EACH | Refills: 5 | Status: SHIPPED | OUTPATIENT
Start: 2024-09-17 | End: 2025-09-17

## 2024-09-17 NOTE — TELEPHONE ENCOUNTER
Encounter date: 01/31/2024    Hyperhidrosis of axilla  Pt has significant hyperhidrosis that affects daily life. Previously tried treatments include prescription strength Deoderant and drysol solution, which caused considerable pain and burning. The hyperhidrosis is likely a contributing factor to the folliculitis flares. Recommend starting Qbrexza wipes once nightly. Sent to [facility].   -     glycopyrronium tosylate 2.4 % Towl; Apply 1 each topically every evening.  Dispense: 30 each; Refill: 5

## 2024-09-17 NOTE — PROGRESS NOTES
Health  Wellness Visit Note    Name: Catie Monge  Clinic Number: [id number]  Physician: No ref. provider found  Diagnosis: No diagnosis found.  Past Medical History:   Diagnosis Date    Ankylosing spondylitis of unspecified sites in spine     Anxiety     No meds    Depression     Loose bowel movements      Visit Number: 30  Precautions: standard, ankylosing spondylitis       1st PT visit:  2/21/2024  Year of care end date:  2/21/2025  Mindbody plan:  Employee Plan  Patient level: C    Time In: 9:55 AM  Time Out: 10:36 AM  Total Treatment Time:  minutes    Wellness [employer] 2022  Handout on this week's wellness topic Taking Ownership provided  along with a discussion on what it means, the benefits, and suggestions for practice.  Reviewed last week's topic of Preventing Burnout    Subjective:   Patient reports no problems with her back. Patient usually ices if she feels even a little pain or tightening in her back. Patient uses a [employer] bike and an samantha that gives her light weight exercises. Patient has used ice on once this past week.     Objective:   Catie completed therapeutic stretches (EIL, RANDI) and the following MedX exercise machines: core lumbar, torso rotation l/r, leg extension, leg curl, upright row, chest press, biceps curl, triceps extension, leg press    See exercise log in patient folder for rate of exertion and repetitions completed.       Fitness Machine Education Key:  E=education on equipment initiated and further follow up and education needed  I=independent with  and exercise.  The patient:  Adjusts machines to his/her settings  Uses equipment levers, pins, weights safely  Maintains safe and correct posture while exercising  Moves through exercise with correct pace and control  Gets on and off equipment safely      Lumbar/Cervical Ext. I Torso Rotation I Leg Press I   Leg Extension I Seated Leg Curl I Chest Press I   Seated Row I Hip ADD X Hip ABD I   Triceps Extension I  Bicep Curl I Other:      [x] Indicates exercise has been taught for home  Lumbar/Cervical Ext. [] Torso Rotation [] Leg Press []   Leg Extension [] Seated Leg Curl [] Chest Press []   Seated Row [] Hip ADD [] Hip ABD []   Triceps Extension [] Bicep Curl [] Other:        Assessment:   Patient tolerated Patient tolerated MedX Core Lumbar Strength and all other peripheral exercises without an increase in symptoms. Patient warmed up on treadmill for 5 minutes, stretched, and iced low back for 5 minutes after the workout.     Plan:  Continue with established plan of care towards wellness goals.     Health  : Beau Zee  9/17/2024

## 2024-09-24 ENCOUNTER — DOCUMENTATION ONLY (OUTPATIENT)
Dept: REHABILITATION | Facility: OTHER | Age: 39
End: 2024-09-24
Payer: COMMERCIAL

## 2024-09-24 NOTE — PROGRESS NOTES
Health  Wellness Visit Note    Name: Catie Monge  Clinic Number: [id number]  Physician: No ref. provider found  Diagnosis: No diagnosis found.  Past Medical History:   Diagnosis Date    Ankylosing spondylitis of unspecified sites in spine     Anxiety     No meds    Depression     Loose bowel movements      Visit Number: 30  Precautions: standard, ankylosing spondylitis       1st PT visit:  2/21/2024  Year of care end date:  2/21/2025  Mindbody plan:  Employee Plan  Patient level: C    Time In: 10:00 AM  Time Out: 10:40 AM  Total Treatment Time: 40 minutes    [employer] 2022  Handout on this week's wellness topic Addiction provided  along with a discussion on what it means, the benefits, and suggestions for practice.  Reviewed last week's topic of Taking Ownership    Subjective:   Patient reports no problems with her back. Patient usually ices if she feels even a little pain or tightening in her back. Patient uses a [employer] bike and an samantha that gives her light weight exercises. Patient has used ice on once this past week.     Objective:   Catie completed therapeutic stretches (EIL, RANDI) and the following MedX exercise machines: core lumbar, torso rotation l/r, leg extension, leg curl, upright row, chest press, biceps curl, triceps extension, leg press    See exercise log in patient folder for rate of exertion and repetitions completed.       Fitness Machine Education Key:  E=education on equipment initiated and further follow up and education needed  I=independent with  and exercise.  The patient:  Adjusts machines to his/her settings  Uses equipment levers, pins, weights safely  Maintains safe and correct posture while exercising  Moves through exercise with correct pace and control  Gets on and off equipment safely      Lumbar/Cervical Ext. I Torso Rotation I Leg Press I   Leg Extension I Seated Leg Curl I Chest Press I   Seated Row I Hip ADD X Hip ABD I   Triceps Extension I Bicep  Curl I Other:      [x] Indicates exercise has been taught for home  Lumbar/Cervical Ext. [] Torso Rotation [] Leg Press []   Leg Extension [] Seated Leg Curl [] Chest Press []   Seated Row [] Hip ADD [] Hip ABD []   Triceps Extension [] Bicep Curl [] Other:        Assessment:   Patient tolerated Patient tolerated MedX Core Lumbar Strength and all other peripheral exercises without an increase in symptoms. Patient warmed up on treadmill for 5 minutes, stretched, and iced low back for 5 minutes after the workout.     Plan:  Continue with established plan of care towards wellness goals.     Health  : Beau Zee  9/24/2024

## 2024-09-27 ENCOUNTER — PATIENT MESSAGE (OUTPATIENT)
Dept: ADMINISTRATIVE | Facility: OTHER | Age: 39
End: 2024-09-27
Payer: COMMERCIAL

## 2024-10-01 ENCOUNTER — DOCUMENTATION ONLY (OUTPATIENT)
Dept: REHABILITATION | Facility: OTHER | Age: 39
End: 2024-10-01
Payer: COMMERCIAL

## 2024-10-01 NOTE — PROGRESS NOTES
Health  Wellness Visit Note    Name: Catie Monge  Clinic Number: [id number]  Physician: No ref. provider found  Diagnosis: No diagnosis found.  Past Medical History:   Diagnosis Date    Ankylosing spondylitis of unspecified sites in spine     Anxiety     No meds    Depression     Loose bowel movements      Visit Number: 31  Precautions: standard, ankylosing spondylitis       1st PT visit:  2/21/2024  Year of care end date:  2/21/2025  Mindbody plan:  Employee Plan  Patient level: C    Time In: 10:00 AM  Time Out: 10:30 AM  Total Treatment Time: 30 minutes    Wellness [employer] 2022  Handout on this week's wellness topic Relationships provided  along with a discussion on what it means, the benefits, and suggestions for practice.  Reviewed last week's topic of Addiction    Subjective:   Patient reports no problems with her back. Patient uses a [employer] bike and an samantha that gives her light weight exercises. Patient did not ice this past week.     Objective:   Catie completed therapeutic stretches (EIL, RANDI) and the following MedX exercise machines: core lumbar, torso rotation l/r, leg extension, leg curl, upright row, chest press, biceps curl, triceps extension, leg press    See exercise log in patient folder for rate of exertion and repetitions completed.       Fitness Machine Education Key:  E=education on equipment initiated and further follow up and education needed  I=independent with  and exercise.  The patient:  Adjusts machines to his/her settings  Uses equipment levers, pins, weights safely  Maintains safe and correct posture while exercising  Moves through exercise with correct pace and control  Gets on and off equipment safely      Lumbar/Cervical Ext. I Torso Rotation I Leg Press I   Leg Extension I Seated Leg Curl I Chest Press I   Seated Row I Hip ADD X Hip ABD I   Triceps Extension I Bicep Curl I Other:      [x] Indicates exercise has been taught for home  Lumbar/Cervical Ext. []  Torso Rotation [] Leg Press []   Leg Extension [] Seated Leg Curl [] Chest Press []   Seated Row [] Hip ADD [] Hip ABD []   Triceps Extension [] Bicep Curl [] Other:        Assessment:   Patient tolerated Patient tolerated MedX Core Lumbar Strength and all other peripheral exercises without an increase in symptoms. Patient warmed up on treadmill for 5 minutes, stretched, and iced low back for 5 minutes after the workout.     Plan:  Continue with established plan of care towards wellness goals.     Health  : Beau Zee  10/1/2024

## 2024-10-07 NOTE — PROGRESS NOTES
I have personally taken the history and examined the patient and agree with the resident,s note as stated above        Schober's: 5 cm  Lumbar lateral flexion nl  Lumbar extension nl  Fabere neg bilat  ASIS neg  Prone sacral compression Positive  Chest expansion 9 cm  Neck ROM nl        Latest Reference Range & Units 12/18/23 09:25   Hepatitis A Antibody IgG  Reactive   Hep B Core Total Ab Non-reactive  Non-reactive   Hep B S Ab mIU/mL  -  31.67  Reactive   Hepatitis B Surface Ag Non-reactive  Non-reactive   Hepatitis C Ab Non-reactive  Non-reactive   Mitogen - Nil IU/mL 9.973   NIL IU/mL 0.74434   TB Gold Plus Negative  Negative   TB1 - Nil IU/mL 0.004   TB2 - Nil IU/mL 0.003   HIV 1/2 Ag/Ab Non-reactive  Non-reactive      Latest Reference Range & Units 03/27/24 09:51   Sed Rate 0 - 36 mm/Hr 5      Latest Reference Range & Units 03/27/24 09:51   CRP 0.0 - 8.2 mg/L 0.6      12/11/23 08:27   HLA B27 Result Negative   EXAMINATION:  MRI LUMBAR SPINE W WO CONTRAST; MRI SACRUM/COCCYX (BONY) W WO CONTRAST     CLINICAL HISTORY:  Low back pain, symptoms persist with > 6wks conservative treatment;; suspect disk herniation L5/S1;  Dorsalgia, unspecified     TECHNIQUE:  MRI of the lumbar spine and sacrum/coccyx before and after intravenous administration of 8 mL Gadavist.     COMPARISON:  None     FINDINGS:  Lumbar spine:     ALIGNMENT: Normal.     BONE: No compression fractures.  No marrow replacing lesions.     JOINT: Intervertebral discs are well hydrated without height loss.  Facet joints are unremarkable.  Mild edema in the posterior aspect of the S1 superior endplate.     SPINAL CANAL: The conus medullaris has a normal appearance and terminates at the L1-2 level.  Cauda equina nerve roots are unremarkable.  No mass or collection. No abnormal enhancement.     PARASPINAL SOFT TISSUES: Unremarkable.     SIGNIFICANT FINDINGS BY LEVEL:     T12-L1 through L4-5: Unremarkable.     L5-S1: Small central protrusion, encroaching  upon the right descending S1 nerve root (13:42).  No foraminal stenosis.     Sacrum/coccyx:     SACROILIAC JOINTS: There is subchondral bone marrow edema and enhancement involving the iliac side of the right sacroiliac joint (04:11).  No joint effusion, synovitis, or capsulitis. No erosions or ankylosis.     OTHER JOINTS: Visualized hip joints are unremarkable.     BONE: No fracture, osteonecrosis, or marrow replacing lesion.     TENDONS: Regional tendons are intact.  No bursal collection.     SOFT TISSUES: Normal muscle bulk and signal intensity. The sciatic nerves are unremarkable.     MISCELLANEOUS: No visceral pelvic soft tissue abnormality.     Impression:     Small central disc protrusion at L5-S1, encroaching upon the right descending S1 nerve root.     Mild active sacroiliitis on the right, which could be degenerative or inflammatory.  No chronic structural lesions.        Electronically signed by:Yung Leonard  Date:                                            12/01/2023  Time:                                           13:41          axSpA: based on : inflammatory back pain with early morning awakening, morning stiffness( much improved ), excellent response to NSAIDs,  alternating buttocks pain. No plain films, B27 negative, CRP nl There is subchondral bone marrow edema and enhancement involving the iliac side of the right sacroiliac joint (04:11).  No joint effusion, synovitis, or capsulitis. No erosions or ankylosis.  Was able to stop oral diclofenac within 2 months of starting adalimumab in Jan 2024  ASDAS-CRP: pending CRP  BASDAI: 1(LDA)  BASFI : 0.3(nl)  No personal or family history of uveits, IBD, psoriasis      CBC, CMP, ESR, CRP lipid panel  X-ray sacroiliac joint after urinary pregnancy test  *new Covid vaccine  Cont stretches, and aerobics, gym daily  On OCP,  s/p vasectomy, not planning future pregnancies  RTC  6 months

## 2024-10-08 ENCOUNTER — TELEPHONE (OUTPATIENT)
Dept: RHEUMATOLOGY | Facility: CLINIC | Age: 39
End: 2024-10-08

## 2024-10-08 ENCOUNTER — LAB VISIT (OUTPATIENT)
Dept: LAB | Facility: HOSPITAL | Age: 39
End: 2024-10-08
Attending: INTERNAL MEDICINE
Payer: COMMERCIAL

## 2024-10-08 ENCOUNTER — OFFICE VISIT (OUTPATIENT)
Dept: RHEUMATOLOGY | Facility: CLINIC | Age: 39
End: 2024-10-08
Payer: COMMERCIAL

## 2024-10-08 VITALS
DIASTOLIC BLOOD PRESSURE: 86 MMHG | SYSTOLIC BLOOD PRESSURE: 116 MMHG | HEART RATE: 87 BPM | BODY MASS INDEX: 25.73 KG/M2 | HEIGHT: 69 IN | WEIGHT: 173.75 LBS

## 2024-10-08 DIAGNOSIS — M46.1 SACROILIITIS: ICD-10-CM

## 2024-10-08 DIAGNOSIS — E83.39 HYPOPHOSPHATASIA: Primary | ICD-10-CM

## 2024-10-08 DIAGNOSIS — Z13.220 SCREENING CHOLESTEROL LEVEL: ICD-10-CM

## 2024-10-08 DIAGNOSIS — Z79.620 ADALIMUMAB LONG-TERM USE: Primary | ICD-10-CM

## 2024-10-08 DIAGNOSIS — Z79.620 ADALIMUMAB LONG-TERM USE: ICD-10-CM

## 2024-10-08 LAB
CHOLEST SERPL-MCNC: 157 MG/DL (ref 120–199)
CHOLEST/HDLC SERPL: 2.2 {RATIO} (ref 2–5)
HDLC SERPL-MCNC: 73 MG/DL (ref 40–75)
HDLC SERPL: 46.5 % (ref 20–50)
LDLC SERPL CALC-MCNC: 61 MG/DL (ref 63–159)
NONHDLC SERPL-MCNC: 84 MG/DL
TRIGL SERPL-MCNC: 115 MG/DL (ref 30–150)

## 2024-10-08 PROCEDURE — 80061 LIPID PANEL: CPT | Performed by: INTERNAL MEDICINE

## 2024-10-08 PROCEDURE — 36415 COLL VENOUS BLD VENIPUNCTURE: CPT | Performed by: INTERNAL MEDICINE

## 2024-10-08 PROCEDURE — 99999 PR PBB SHADOW E&M-EST. PATIENT-LVL IV: CPT | Mod: PBBFAC,,, | Performed by: INTERNAL MEDICINE

## 2024-10-08 PROCEDURE — 3008F BODY MASS INDEX DOCD: CPT | Mod: CPTII,S$GLB,, | Performed by: INTERNAL MEDICINE

## 2024-10-08 PROCEDURE — 1159F MED LIST DOCD IN RCRD: CPT | Mod: CPTII,S$GLB,, | Performed by: INTERNAL MEDICINE

## 2024-10-08 PROCEDURE — 3079F DIAST BP 80-89 MM HG: CPT | Mod: CPTII,S$GLB,, | Performed by: INTERNAL MEDICINE

## 2024-10-08 PROCEDURE — 3074F SYST BP LT 130 MM HG: CPT | Mod: CPTII,S$GLB,, | Performed by: INTERNAL MEDICINE

## 2024-10-08 PROCEDURE — 99214 OFFICE O/P EST MOD 30 MIN: CPT | Mod: S$GLB,,, | Performed by: INTERNAL MEDICINE

## 2024-10-08 ASSESSMENT — ROUTINE ASSESSMENT OF PATIENT INDEX DATA (RAPID3)
FATIGUE SCORE: 0.5
TOTAL RAPID3 SCORE: 0
WHEN YOU AWAKENED IN THE MORNING OVER THE LAST WEEK, PLEASE INDICATE THE AMOUNT OF TIME IT TAKES UNTIL YOU ARE AS LIMBER AS YOU WILL BE FOR THE DAY: 0.5
AM STIFFNESS SCORE: 1, YES
PSYCHOLOGICAL DISTRESS SCORE: 1.1
MDHAQ FUNCTION SCORE: 0
PAIN SCORE: 0
PATIENT GLOBAL ASSESSMENT SCORE: 0

## 2024-10-08 ASSESSMENT — ANKYLOSING SPONDYLITIS DISEASE ACTIVITY SCORE (ASDAS-CRP)
NBH_PAIN: 0
PAIN_SWELLING: 0
MORNING_STIFFNESS: 2
GLOBAL_ACTIVITY: 0

## 2024-10-08 NOTE — PROGRESS NOTES
10/7/2024     2:51 PM   Rapid3 Question Responses and Scores   MDHAQ Score 0   Psychologic Score 1.1   Pain Score 0   When you awakened in the morning OVER THE LAST WEEK, did you feel stiff? Yes   If Yes, please indicate the number of hours until you are as limber as you will be for the day 0.5   Fatigue Score 0.5   Global Health Score 0   RAPID3 Score 0        Answers submitted by the patient for this visit:  Rheumatology Questionnaire (Submitted on 10/7/2024)  fever: No  eye redness: No  mouth sores: No  headaches: No  shortness of breath: No  chest pain: No  trouble swallowing: No  diarrhea: No  constipation: No  unexpected weight change: No  genital sore: No  dysuria: No  During the last 3 days, have you had a skin rash?: No  Bruises or bleeds easily: No  cough: No

## 2024-10-08 NOTE — PROGRESS NOTES
Patient ID:  Catie Monge    YOB: 1985     MRN:  [id number]     Subjective:     Chief Complaint: Sacroiliitis      History of Present Illness:  Pt is a 39 y.o. female with a PMHx as listed below who presents today for initial evaluation of the above complaint. The paint said she started having low back pain about a year a ago. She was unable to determine if the pain was from working out at the gym as she is very active or if it started out of no where. She was prescribed PT by Dr HICKYE and it did not help at the time. She was waking up in the middle of the night because of the pain even with the slightest movement. She was given Diclofenac and Robaxin which significantly helped her pain. She would wake up every morning with stiffness that would not get better unless she was active. Daily stretching significantly helped her pain/stiffness. She was in the healthy back program at [facility] and now apart of their wellness program. She works out everyday riding her pelaton and doing piliates. She reports having diarrhea but this is something she's had prior to her low back pain starting. Colonoscopy back in December was within normal limits. She reports having dry eyes and dry mouth. Family history is significant for RA with her father. She has not reported not needing the diclofenac . Currently she is on Humera which has helped her tremendously.    Denies hair loss,, vision changes,  oral/nasal ulcers, trouble swallowing, new rashes, joint swelling, or GI disturbances.      Review of Systems   Review of Systems   Constitutional:  Negative for fever and unexpected weight change.   HENT:  Negative for mouth sores and trouble swallowing.    Eyes:  Negative for redness.   Respiratory:  Negative for cough and shortness of breath.    Cardiovascular:  Negative for chest pain.   Gastrointestinal:  Negative for constipation and diarrhea.   Genitourinary:  Negative for dysuria and genital sores.   Skin:  Negative  for rash.   Neurological:  Negative for headaches.   Hematological:  Does not bruise/bleed easily.        Past Medical History:  Past Medical History:   Diagnosis Date    Ankylosing spondylitis of unspecified sites in spine     Anxiety     No meds    Depression     Loose bowel movements         Past Surgical History:  Past Surgical History:   Procedure Laterality Date    ANUS SURGERY      FISSURE    COLONOSCOPY N/A 12/22/2023    Procedure: COLONOSCOPY;  Surgeon: Brandy Chaney MD;  Location: [facility] ENDO;  Service: Gastroenterology;  Laterality: N/A;    COLONOSCOPY W/ BIOPSIES AND POLYPECTOMY  12/22/2023    COSMETIC SURGERY      DILATION AND CURETTAGE OF UTERUS USING SUCTION N/A 09/13/2018    Procedure: DILATION AND CURETTAGE, UTERUS, USING SUCTION;  Surgeon: Joselo Landry Jr., MD;  Location: [facility] OR;  Service: OB/GYN;  Laterality: N/A;    NASAL SEPTUM SURGERY      TONSILLECTOMY, ADENOIDECTOMY      WISDOM TOOTH EXTRACTION           Current Medications:    Current Outpatient Medications:     adalimumab 40 mg/0.4 mL PnKt, Inject 0.4 mLs (40 mg total) into the skin every 14 (fourteen) days., Disp: 2 pen , Rfl: 11    buPROPion (WELLBUTRIN XL) 300 MG 24 hr tablet, Take 1 tablet (300 mg total) by mouth once daily., Disp: 30 tablet, Rfl: 11    calcium carbonate 1250 MG capsule, Take 1,250 mg by mouth 2 (two) times daily with meals., Disp: , Rfl:     cetirizine (ZYRTEC) 10 MG tablet, Take 10 mg by mouth every evening., Disp: , Rfl:     clindamycin (CLEOCIN T) 1 % external solution, Apply topically 2 (two) times daily. To armpits, Disp: 60 mL, Rfl: 5    cycloSPORINE (RESTASIS) 0.05 % ophthalmic emulsion, Place 1 drop into both eyes 2 (two) times daily., Disp: 180 each, Rfl: 3    drospirenone-e.estradioL-lm.FA (BEYAZ/CALEB) 3-0.02-0.451 mg (24) (4) Tab, Take 1 tablet by mouth once daily., Disp: 84 tablet, Rfl: 4    glycopyrronium tosylate (QBREXZA) 2.4 % Towl, Apply 1 each topically every evening., Disp: 30 each, Rfl: 5     methocarbamoL (ROBAXIN) 500 MG Tab, Take 1 tablet (500 mg total) by mouth 4 (four) times daily., Disp: 120 tablet, Rfl: 2    sertraline (ZOLOFT) 25 MG tablet, Take 1 tablet (25 mg total) by mouth once daily., Disp: 90 tablet, Rfl: 3    diclofenac (VOLTAREN) 75 MG EC tablet, Take 1 tablet (75 mg total) by mouth 2 (two) times daily as needed (back). (Patient not taking: Reported on 10/8/2024), Disp: 60 tablet, Rfl: 11    dicyclomine (BENTYL) 20 mg tablet, Take 1 tablet (20 mg total) by mouth 3 (three) times daily as needed (abdominal pain). (Patient not taking: Reported on 10/8/2024), Disp: 90 tablet, Rfl: 3    diphenoxylate-atropine 2.5-0.025 mg (LOMOTIL) 2.5-0.025 mg per tablet, Take 1 tablet by mouth 4 (four) times daily as needed for Diarrhea. (Patient not taking: Reported on 10/8/2024), Disp: 60 tablet, Rfl: 1    Objective:     Vitals:    10/08/24 0707   BP: 116/86   Pulse: 87      Body mass index is 25.65 kg/m².     Physical Exam   Constitutional: She is oriented to person, place, and time. normal appearance.   HENT:   Head: Normocephalic and atraumatic.   Cardiovascular: Normal rate, regular rhythm, normal heart sounds and normal pulses.   Pulmonary/Chest: Effort normal and breath sounds normal.   Abdominal: Soft. Bowel sounds are normal.   Neurological: She is alert and oriented to person, place, and time.       Right Side Rheumatological Exam     Muscle Strength (0-5 scale):  Neck Flexion:  5  Neck Extension: 5  Deltoid:  5  Biceps: 5/5   Triceps:  5  : 5/5   Iliopsoas: 5  Quadriceps:  5     Left Side Rheumatological Exam     Muscle Strength (0-5 scale):  Neck Flexion:  5  Neck Extension: 5  Deltoid:  5  Biceps: 5/5   Triceps:  5  :  5/5   Iliopsoas: 5  Quadriceps:  5       Back/Neck Exam   Tenderness Right paramedian tenderness of the SI Joint.Left paramedian tenderness of the SI Joint.    Back Range of Motion   Extension:  normal  Flexion:  normal  Lateral Bend Right:  normal  Lateral Bend Left:   normal  Rotation Right:  normal  Rotation Left:  normal    Neck Range of Motion   Flexion:  Normal  Extension:  Normal  Right Lateral Bend: normal  Left Lateral Bend: normal  Right Rotation: normal  Left Rotation: normal    Comments:  FADIR elicited SI joint pain bilaterally  FABERE negative bilaterally  SI compression test positive bilaterally  Schober test 10-15cm  Chest wall expansion : 9cm           12/11/2023 3/27/2024   Tender (FERRER-28) 0 / 28  0 / 28    Swollen (FERRER-28) 0 / 28  0 / 28    Provider Global -- --   Patient Global 20 / 100 20 / 100   ESR 3 mm/hr 5 mm/hr   CRP 1.3 mg/L 0.6 mg/L   FERRER-28 (ESR) 1.05 (Remission) 1.41 (Remission)   FERRER-28 (CRP) 1.54 (Remission) 1.41 (Remission)   CDAI Score -- --        There is currently no information documented on the homunculus. Go to the Rheumatology activity and complete the homunculus joint exam.      Assessment:     1. Adalimumab long-term use    2. Sacroiliitis    3. Screening cholesterol level       axSpA: based on : inflammatory back pain with early morning awakening, morning stiffness( much improved ), excellent response to NSAIDs,  alternating buttocks pain. No plain films, B27 negative, CRP nl There is subchondral bone marrow edema and enhancement involving the iliac side of the right sacroiliac joint (04:11).  No joint effusion, synovitis, or capsulitis. No erosions or ankylosis.  Was able to stop oral diclofenac within 2 months of starting adalimumab in Jan 2024  ASDAS-CRP: pending CRP  BASDAI: 1(LDA)  BASFI : 0.3(nl)  No personal or family history of uveits, IBD, psoriasis     Plan:      Problem List Items Addressed This Visit    None  Visit Diagnoses       Adalimumab long-term use    -  Primary    Relevant Orders    Sedimentation rate    C-Reactive Protein    CBC Auto Differential    Comprehensive Metabolic Panel    Pregnancy, urine rapid    LIPID PANEL    Pregnancy, urine rapid    Sacroiliitis        Relevant Orders    X-Ray Sacroiliac Joints 3 Views     LIPID PANEL    Pregnancy, urine rapid    Screening cholesterol level        Relevant Orders    LIPID PANEL              CBC, CMP, ESR, CRP lipid panel  X-ray sacroiliac joint after urinary pregnancy test  *new Covid vaccine  Cont stretches, and aerobics, gym daily  On OCP,  s/p vasectomy, not planning future pregnancies  RTC  6 months    Rebecca Zacarias, DO  PGY-1  [employer] [facility]

## 2024-10-11 ENCOUNTER — TELEPHONE (OUTPATIENT)
Dept: RHEUMATOLOGY | Facility: CLINIC | Age: 39
End: 2024-10-11
Payer: COMMERCIAL

## 2024-10-11 ENCOUNTER — HOSPITAL ENCOUNTER (OUTPATIENT)
Dept: RADIOLOGY | Facility: OTHER | Age: 39
Discharge: HOME | End: 2024-10-11
Attending: INTERNAL MEDICINE
Payer: COMMERCIAL

## 2024-10-11 DIAGNOSIS — E83.39 HYPOPHOSPHATASIA: ICD-10-CM

## 2024-10-11 DIAGNOSIS — E83.41 HYPERMAGNESEMIA: Primary | ICD-10-CM

## 2024-10-11 PROCEDURE — 77080 DXA BONE DENSITY AXIAL: CPT | Mod: 26,,, | Performed by: RADIOLOGY

## 2024-10-11 PROCEDURE — 77080 DXA BONE DENSITY AXIAL: CPT | Mod: TC

## 2024-10-14 ENCOUNTER — TELEPHONE (OUTPATIENT)
Dept: RHEUMATOLOGY | Facility: CLINIC | Age: 39
End: 2024-10-14
Payer: COMMERCIAL

## 2024-10-15 ENCOUNTER — HOSPITAL ENCOUNTER (OUTPATIENT)
Dept: RADIOLOGY | Facility: HOSPITAL | Age: 39
Discharge: HOME | End: 2024-10-15
Attending: INTERNAL MEDICINE
Payer: COMMERCIAL

## 2024-10-15 DIAGNOSIS — M46.1 SACROILIITIS: ICD-10-CM

## 2024-10-15 PROCEDURE — 72200 X-RAY EXAM SI JOINTS: CPT | Mod: TC

## 2024-10-15 PROCEDURE — 72200 X-RAY EXAM SI JOINTS: CPT | Mod: 26,,, | Performed by: RADIOLOGY

## 2024-10-18 ENCOUNTER — PATIENT MESSAGE (OUTPATIENT)
Dept: RHEUMATOLOGY | Facility: CLINIC | Age: 39
End: 2024-10-18
Payer: COMMERCIAL

## 2024-10-29 ENCOUNTER — DOCUMENTATION ONLY (OUTPATIENT)
Dept: REHABILITATION | Facility: OTHER | Age: 39
End: 2024-10-29
Payer: COMMERCIAL

## 2024-10-31 ENCOUNTER — PATIENT MESSAGE (OUTPATIENT)
Dept: RHEUMATOLOGY | Facility: CLINIC | Age: 39
End: 2024-10-31
Payer: COMMERCIAL

## 2024-10-31 ENCOUNTER — PATIENT MESSAGE (OUTPATIENT)
Dept: REHABILITATION | Facility: OTHER | Age: 39
End: 2024-10-31
Payer: COMMERCIAL

## 2024-11-05 ENCOUNTER — HOSPITAL ENCOUNTER (EMERGENCY)
Facility: OTHER | Age: 39
Discharge: HOME | End: 2024-11-05
Attending: EMERGENCY MEDICINE
Payer: COMMERCIAL

## 2024-11-05 ENCOUNTER — DOCUMENTATION ONLY (OUTPATIENT)
Dept: REHABILITATION | Facility: OTHER | Age: 39
End: 2024-11-05
Payer: COMMERCIAL

## 2024-11-05 VITALS
DIASTOLIC BLOOD PRESSURE: 93 MMHG | TEMPERATURE: 98 F | RESPIRATION RATE: 16 BRPM | HEART RATE: 82 BPM | OXYGEN SATURATION: 100 % | HEIGHT: 69 IN | WEIGHT: 173 LBS | SYSTOLIC BLOOD PRESSURE: 160 MMHG | BODY MASS INDEX: 25.62 KG/M2

## 2024-11-05 DIAGNOSIS — M54.6 ACUTE BILATERAL THORACIC BACK PAIN: ICD-10-CM
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
## 2024-11-05 DIAGNOSIS — M54.50 ACUTE BILATERAL LOW BACK PAIN WITHOUT SCIATICA: ICD-10-CM

## 2024-11-05 DIAGNOSIS — T14.8XXA MUSCLE STRAIN: ICD-10-CM

## 2024-11-05 LAB
B-HCG UR QL: NEGATIVE
CTP QC/QA: YES

## 2024-11-05 PROCEDURE — 81025 URINE PREGNANCY TEST: CPT | Performed by: NURSE PRACTITIONER

## 2024-11-05 PROCEDURE — 99284 EMERGENCY DEPT VISIT MOD MDM: CPT | Mod: 25
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
## 2024-11-12 ENCOUNTER — DOCUMENTATION ONLY (OUTPATIENT)
Dept: REHABILITATION | Facility: OTHER | Age: 39
End: 2024-11-12
Payer: COMMERCIAL

## 2024-11-12 NOTE — PROGRESS NOTES
Health  Wellness Visit Note    Name: Catie Monge  Clinic Number: [id number]  Physician: No ref. provider found  Diagnosis: No diagnosis found.  Past Medical History:   Diagnosis Date    Ankylosing spondylitis of unspecified sites in spine     Anxiety     No meds    Depression     Loose bowel movements      Visit Number: 35  Precautions: standard, ankylosing spondylitis       1st PT visit:  2/21/2024  Year of care end date:  2/21/2025  Mindbody plan:  Employee Plan  Patient level: C    Time In: 10:00 AM  Time Out: 10:40 AM  Total Treatment Time: 40 minutes    Wellness [employer] 2022  Handout on this week's wellness topic Positions provided  along with a discussion on what it means, the benefits, and suggestions for practice.  Reviewed last week's topic of Requirements    Subjective:   Patient reports low back pain today.  Patient uses a [place] bike and an samantha that gives her light weight exercises. Patient has taken medicine and used ice to help manage her back pain.     Objective:   Catie completed therapeutic stretches (EIL, RANDI) and the following MedX exercise machines: core lumbar, torso rotation l/r, leg extension, leg curl, upright row, chest press, biceps curl, triceps extension, leg press    See exercise log in patient folder for rate of exertion and repetitions completed.       Fitness Machine Education Key:  E=education on equipment initiated and further follow up and education needed  I=independent with  and exercise.  The patient:  Adjusts machines to his/her settings  Uses equipment levers, pins, weights safely  Maintains safe and correct posture while exercising  Moves through exercise with correct pace and control  Gets on and off equipment safely      Lumbar/Cervical Ext. I Torso Rotation I Leg Press I   Leg Extension I Seated Leg Curl I Chest Press I   Seated Row I Hip ADD X Hip ABD I   Triceps Extension I Bicep Curl I Other:      [x] Indicates exercise has been taught for  home  Lumbar/Cervical Ext. [] Torso Rotation [] Leg Press []   Leg Extension [] Seated Leg Curl [] Chest Press []   Seated Row [] Hip ADD [] Hip ABD []   Triceps Extension [] Bicep Curl [] Other:        Assessment:   Patient tolerated Patient tolerated MedX Core Lumbar Strength and all other peripheral exercises without an increase in symptoms. Patient warmed up on treadmill for 5 minutes, stretched, and iced low back for 5 minutes after the workout.     Plan:  Continue with established plan of care towards wellness goals.     Health  : Beau Zee  11/12/2024

## 2024-11-27 DIAGNOSIS — M46.1 SACROILIITIS: ICD-10-CM

## 2024-11-30 RX ORDER — ADALIMUMAB 40MG/0.4ML
40 KIT SUBCUTANEOUS
Qty: 6 PEN | Refills: 1 | Status: SHIPPED | OUTPATIENT
Start: 2024-11-30 | End: 2025-11-01

## 2025-01-13 DIAGNOSIS — F41.8 DEPRESSION WITH ANXIETY: ICD-10-CM

## 2025-01-13 RX ORDER — SERTRALINE HYDROCHLORIDE 25 MG/1
25 TABLET, FILM COATED ORAL DAILY
Qty: 90 TABLET | Refills: 0 | Status: SHIPPED | OUTPATIENT
Start: 2025-01-13

## 2025-01-13 NOTE — TELEPHONE ENCOUNTER
Refill Routing Note   Medication(s) are not appropriate for processing by [facility] for the following reason(s):        Drug-drug interaction  ED/Hospital Visit since last OV with provider    [facility] action(s):  Defer      Medication Therapy Plan: Drug-Drug: sertraline and diclofenac    Pharmacist review requested: Yes     Appointments  past 12m or future 3m with PCP    Date Provider   Last Visit   4/11/2024 June Christianson MD   Next Visit   Visit date not found June Christianson MD   ED visits in past 90 days: 1        Note composed:12:39 PM 01/13/2025

## 2025-01-13 NOTE — TELEPHONE ENCOUNTER
Provider Staff:  Action required. This patient has received emergency care.     Please schedule patient for a follow up appointment.     Thanks!  [facility]     Appointments      Date Provider   Last Visit   4/11/2024 June Christianson MD   Next Visit   Visit date not found June Christianson MD          Refill Decision Note   Catie Monge  is requesting a refill authorization.  Brief Assessment and Rationale for Refill:  Approve     Medication Therapy Plan:         Pharmacist review requested: Yes   Comments:     Note composed:1:07 PM 01/13/2025

## 2025-01-13 NOTE — TELEPHONE ENCOUNTER
No care due was identified.  [facility] Embedded Care Due Messages. Reference number: [id number].   1/13/2025 8:31:36 AM CST

## 2025-01-21 ENCOUNTER — PATIENT MESSAGE (OUTPATIENT)
Dept: ADMINISTRATIVE | Facility: OTHER | Age: 40
End: 2025-01-21
Payer: COMMERCIAL

## 2025-02-18 ENCOUNTER — OFFICE VISIT (OUTPATIENT)
Dept: SPORTS MEDICINE | Facility: CLINIC | Age: 40
End: 2025-02-18
Payer: COMMERCIAL

## 2025-02-18 ENCOUNTER — HOSPITAL ENCOUNTER (OUTPATIENT)
Dept: RADIOLOGY | Facility: HOSPITAL | Age: 40
Discharge: HOME | End: 2025-02-18
Attending: PHYSICIAN ASSISTANT
Payer: COMMERCIAL

## 2025-02-18 ENCOUNTER — PATIENT MESSAGE (OUTPATIENT)
Dept: ADMINISTRATIVE | Facility: OTHER | Age: 40
End: 2025-02-18
Payer: COMMERCIAL

## 2025-02-18 VITALS
HEIGHT: 69 IN | BODY MASS INDEX: 25.86 KG/M2 | DIASTOLIC BLOOD PRESSURE: 90 MMHG | SYSTOLIC BLOOD PRESSURE: 147 MMHG | HEART RATE: 89 BPM | WEIGHT: 174.63 LBS

## 2025-02-18 DIAGNOSIS — S76.219A STRAIN OF ADDUCTOR MUSCLE OF THIGH: Primary | ICD-10-CM

## 2025-02-18 DIAGNOSIS — M25.551 RIGHT HIP PAIN: ICD-10-CM

## 2025-02-18 PROCEDURE — 73521 X-RAY EXAM HIPS BI 2 VIEWS: CPT | Mod: TC

## 2025-02-18 NOTE — PROGRESS NOTES
CC: Right hip pain    CHIEF COMPLAINT:  - Right hip and groin pain    HPI:  Catie presents with right hip pain that has progressively worsened over the past three weeks following a 10-mile race. She reports pain in her right hip and groin area, radiating down to her knee. Pain is constant, rated 4/10 with medication and 8-9/10 when medication wears off. When the pain medication wears off, she has significant difficulty moving. Pain affects her sleep, causing her to wake up nightly when medication wears off. Despite this, she still gets 7-8 hours of sleep by going to bed early.    Catie has stopped running and biking for 10 days without improvement. She notes difficulty with walking and stairs, stating she constantly feels the pain. Pain impacts her daily activities and mental health. She emphasizes that when her pain is severe, it significantly affects her mood and ability to function.    She has been self-managing pain with Advil, Tylenol, and diclofenac (taken twice daily, despite being prescribed for her back). She also uses ice and heat for pain relief. Catie reports pain during activities such as walking, using stairs, and even when using her Peloton bike.    Catie has a history of ankylosing spondylitis and a herniated disc diagnosed in 2023. Her condition had been well-managed with Humira, allowing her to resume running in October 2024. Prior to this incident, she generally felt good.    Catie denies any catching or locking in her knee, feeling like her knee or leg is about to give out, or any pain in her back. Catie denies any history of labral tear in her hip.    Pain Score:   5    REVIEW OF SYSTEMS:   Constitution: Negative. Negative for chills, fever and night sweats.    Hematologic/Lymphatic: Negative for bleeding problem. Does not bruise/bleed easily.   Skin: Negative for dry skin, itching and rash.   Musculoskeletal: Negative for falls. Positive for right hip and groin pain and muscle weakness.  "    All other review of symptoms were reviewed and found to be noncontributory.     PAST MEDICAL HISTORY:   Past Medical History:   Diagnosis Date    Ankylosing spondylitis of unspecified sites in spine     Anxiety     No meds    Depression     Loose bowel movements        PAST SURGICAL HISTORY:   Past Surgical History:   Procedure Laterality Date    ANUS SURGERY      FISSURE    COLONOSCOPY N/A 12/22/2023    Procedure: COLONOSCOPY;  Surgeon: Brandy Chaney MD;  Location: [facility];  Service: Gastroenterology;  Laterality: N/A;    COLONOSCOPY W/ BIOPSIES AND POLYPECTOMY  12/22/2023    COSMETIC SURGERY      DILATION AND CURETTAGE OF UTERUS USING SUCTION N/A 09/13/2018    Procedure: DILATION AND CURETTAGE, UTERUS, USING SUCTION;  Surgeon: Joselo Landry Jr., MD;  Location: [facility];  Service: OB/GYN;  Laterality: N/A;    NASAL SEPTUM SURGERY      TONSILLECTOMY, ADENOIDECTOMY      WISDOM TOOTH EXTRACTION         FAMILY HISTORY:   Family History   Problem Relation Name Age of Onset    Frontotemporal dementia Mother  72    Glaucoma Father Shahzad     Hypertension Father Shahzad     Rheum arthritis Father Shahzad     Arthritis Father Shahzad     Kyphosis Brother      Bipolar disorder Brother      Anxiety disorder Brother      Diabetes Mellitus Maternal Grandfather      No Known Problems Paternal Grandmother      Melanoma Paternal Grandfather      Breast cancer Neg Hx      Cancer Neg Hx      Ovarian cancer Neg Hx      Colon cancer Neg Hx      Macular degeneration Neg Hx      Retinal detachment Neg Hx      Amblyopia Neg Hx      Blindness Neg Hx      Cataracts Neg Hx      Strabismus Neg Hx      Esophageal cancer Neg Hx         SOCIAL HISTORY:   Social History[1]    MEDICATIONS:   Current Medications[2]    ALLERGIES:   Review of patient's allergies indicates:  No Known Allergies     PHYSICAL EXAMINATION:  BP (!) 147/90   Pulse 89   Ht 5' 9" (1.753 m)   Wt 79.2 kg (174 lb 9.7 oz)   BMI 25.78 kg/m²   General: Well-developed " well-nourished 39 y.o. femalein no acute distress   Cardiovascular: Regular rhythm by palpation of distal pulse, normal color and temperature, no concerning varicosities on symptomatic side   Lungs: No labored breathing or wheezing appreciated   Neuro: Alert and oriented ×3   Psychiatric: well oriented to person, place and time, demonstrates normal mood and affect   Skin: No rashes, lesions or ulcers, normal temperature, turgor, and texture on involved extremity    General    Vitals reviewed.  Constitutional: She is oriented to person, place, and time. She appears well-developed. No distress.   HENT:   Head: Normocephalic and atraumatic.   Eyes: EOM are normal. Pupils are equal, round, and reactive to light.   Cardiovascular:  Normal rate and intact distal pulses.            Pulmonary/Chest: Effort normal. No respiratory distress.   Abdominal: Soft. She exhibits no distension.   Neurological: She is alert and oriented to person, place, and time.   Psychiatric: She has a normal mood and affect. Her behavior is normal.     General Musculoskeletal Exam   Gait: normal         Right Hip Exam     Inspection   Swelling: absent  No deformity of hip.  Quadriceps Atrophy:  Negative    Tenderness   The patient tender to palpation of the adductor insertion.    Range of Motion   Abduction:  45   Adduction:  30 (pain)   Extension:  0   Flexion:  120   External rotation:  60   Internal rotation:  15 (pain)     Tests   Pain w/ forced internal rotation (BENY): absent  Pain w/ forced external rotation (FADIR): present  Trendelenburg Test: positive  Log Roll: negative  Resisted sit-up pain: negative  Resisted sit-up pain with adductor contraction pain: positive    Other   Sensation: normal  Back (L-Spine & T-Spine) / Neck (C-Spine) Exam     Back (L-Spine & T-Spine) Tests   Right Side Tests  Straight leg raise: + at 90 deg            Muscle Strength   Right Lower Extremity   Hip Abduction: 5/5   Hip Adduction: 4/5   Hip Flexion: 5/5    Ankle Dorsiflexion:  5/5     Vascular Exam       Edema  Right Upper Leg: absent    IMAGING:  X-rays of the RIGHT hip and AP pelvis ordered and images reviewed by me show:    FINDINGS:  Hip joint spaces are normally maintained, without significant narrowing on either side.  No conventional radiographic evidence to specifically suggest avascular necrosis of either femoral head.  No evidence of recent or healing fracture, lytic destructive process, or femoroacetabular impingement.  SI joints appear unremarkable.  Lumbosacral vertebral segment is incidentally noted to be transitional in nature.    ASSESSMENT:    Acute right hip and groin pain  Adductor strain  Discussed plan of care with the patient moving forward. She does seem to have an adductor strain but also has deep intraarticular pain raising concern for possible labral pathology. We will start with conservative treatment.  - Continue diclofenac twice daily.  - Use Voltaren gel on the affected area.  - Referred to physical therapy for hip and groin pain.  - Discussed possibility of MRI to further evaluate hip for labral tear depending on how she is doing at her follow up in 6 weeks.  - Avoid high-resistance activities and running.  - Perform low-resistance exercises, seated upper body workouts, or recumbent bike if tolerable.  - Alternate ice and heat therapy.        This note was generated with the assistance of ambient listening technology. Verbal consent was obtained by the patient and accompanying visitor(s) for the recording of patient appointment to facilitate this note. I attest to having reviewed and edited the generated note for accuracy, though some syntax or spelling errors may persist. Please contact the author of this note for any clarification.            [1]   Social History  Socioeconomic History    Marital status:      Spouse name: [place]    Number of children: 1   Occupational History    Occupation: [employer]   Tobacco Use    Smoking status:  Never    Smokeless tobacco: Never   Substance and Sexual Activity    Alcohol use: Yes     Alcohol/week: 1.0 standard drink of alcohol     Types: 1 Glasses of wine per week     Comment: Social    Drug use: No    Sexual activity: Yes     Partners: Male     Birth control/protection: Partner-Vasectomy, OCP   Social History Narrative     Rishabh; attending in pulmonology at [employer]    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     Social Drivers of Health     Financial Resource Strain: Low Risk  (2025)    Overall Financial Resource Strain (CARDIA)     Difficulty of Paying Living Expenses: Not hard at all   Food Insecurity: No Food Insecurity (2025)    Hunger Vital Sign     Worried About Running Out of Food in the Last Year: Never true     Ran Out of Food in the Last Year: Never true   Transportation Needs: No Transportation Needs (2025)    PRAPARE - Transportation     Lack of Transportation (Medical): No     Lack of Transportation (Non-Medical): No   Physical Activity: Sufficiently Active (2025)    Exercise Vital Sign     Days of Exercise per Week: 7 days     Minutes of Exercise per Session: 40 min   Stress: No Stress Concern Present (2025)    [place] [employer] - Occupational Stress Questionnaire     Feeling of Stress : Only a little   Housing Stability: Low Risk  (2025)    Housing Stability Vital Sign     Unable to Pay for Housing in the Last Year: No     Number of Times Moved in the Last Year: 1     Homeless in the Last Year: No   [2]   Current Outpatient Medications:     adalimumab (HUMIRA,CF, PEN) 40 mg/0.4 mL PnKt, Inject 0.4 mLs (40 mg total) into the skin every 14 (fourteen) days., Disp: 6 pen , Rfl: 1    buPROPion (WELLBUTRIN XL) 300 MG 24 hr tablet, Take 1 tablet (300 mg total) by mouth once daily., Disp: 30 tablet, Rfl: 11    calcium carbonate 1250 MG capsule, Take 1,250 mg by mouth 2 (two) times daily with meals., Disp: ,  Rfl:     cetirizine (ZYRTEC) 10 MG tablet, Take 10 mg by mouth every evening., Disp: , Rfl:     cycloSPORINE (RESTASIS) 0.05 % ophthalmic emulsion, Place 1 drop into both eyes 2 (two) times daily., Disp: 180 each, Rfl: 3    drospirenone-e.estradioL-lm.FA (BEYAZ/CALEB) 3-0.02-0.451 mg (24) (4) Tab, Take 1 tablet by mouth once daily., Disp: 84 tablet, Rfl: 4    glycopyrronium tosylate (QBREXZA) 2.4 % Towl, Apply 1 each topically every evening., Disp: 30 each, Rfl: 5    methocarbamoL (ROBAXIN) 500 MG Tab, Take 1 tablet (500 mg total) by mouth 4 (four) times daily., Disp: 120 tablet, Rfl: 2    sertraline (ZOLOFT) 25 MG tablet, Take 1 tablet (25 mg total) by mouth once daily., Disp: 90 tablet, Rfl: 0    clindamycin (CLEOCIN T) 1 % external solution, Apply topically 2 (two) times daily. To armpits, Disp: 60 mL, Rfl: 5    diclofenac (VOLTAREN) 75 MG EC tablet, Take 1 tablet (75 mg total) by mouth 2 (two) times daily as needed (back). (Patient not taking: Reported on 8/27/2024), Disp: 60 tablet, Rfl: 11    dicyclomine (BENTYL) 20 mg tablet, Take 1 tablet (20 mg total) by mouth 3 (three) times daily as needed (abdominal pain). (Patient not taking: Reported on 8/27/2024), Disp: 90 tablet, Rfl: 3    diphenoxylate-atropine 2.5-0.025 mg (LOMOTIL) 2.5-0.025 mg per tablet, Take 1 tablet by mouth 4 (four) times daily as needed for Diarrhea. (Patient not taking: Reported on 8/27/2024), Disp: 60 tablet, Rfl: 1

## 2025-02-24 ENCOUNTER — PATIENT MESSAGE (OUTPATIENT)
Dept: SPORTS MEDICINE | Facility: CLINIC | Age: 40
End: 2025-02-24
Payer: COMMERCIAL

## 2025-02-24 DIAGNOSIS — M25.551 RIGHT HIP PAIN: Primary | ICD-10-CM

## 2025-02-24 DIAGNOSIS — S76.219A STRAIN OF ADDUCTOR MUSCLE OF THIGH: ICD-10-CM

## 2025-03-03 ENCOUNTER — HOSPITAL ENCOUNTER (EMERGENCY)
Facility: OTHER | Age: 40
Discharge: HOME | End: 2025-03-03
Payer: COMMERCIAL

## 2025-03-03 VITALS
DIASTOLIC BLOOD PRESSURE: 77 MMHG | BODY MASS INDEX: 25.48 KG/M2 | SYSTOLIC BLOOD PRESSURE: 126 MMHG | OXYGEN SATURATION: 100 % | WEIGHT: 172 LBS | RESPIRATION RATE: 17 BRPM | TEMPERATURE: 99 F | HEIGHT: 69 IN | HEART RATE: 83 BPM

## 2025-03-03 DIAGNOSIS — R11.0 NAUSEA: Primary | ICD-10-CM

## 2025-03-03 DIAGNOSIS — R10.13 EPIGASTRIC PAIN: ICD-10-CM

## 2025-03-03 DIAGNOSIS — R19.7 DIARRHEA, UNSPECIFIED TYPE: ICD-10-CM

## 2025-03-03 PROBLEM — M25.651 DECREASED RANGE OF RIGHT HIP MOVEMENT: Status: ACTIVE | Noted: 2025-03-03

## 2025-03-03 PROBLEM — M25.551 RIGHT HIP PAIN: Status: ACTIVE | Noted: 2025-03-03

## 2025-03-03 PROBLEM — S76.219A STRAIN OF ADDUCTOR MUSCLE OF THIGH: Status: ACTIVE | Noted: 2025-03-03

## 2025-03-03 PROBLEM — R29.898 WEAKNESS OF RIGHT LOWER EXTREMITY: Status: ACTIVE | Noted: 2025-03-03

## 2025-03-03 LAB
ALBUMIN SERPL BCP-MCNC: 3.6 G/DL (ref 3.5–5.2)
ALP SERPL-CCNC: 33 U/L (ref 40–150)
ALT SERPL W/O P-5'-P-CCNC: 23 U/L (ref 10–44)
ANION GAP SERPL CALC-SCNC: 9 MMOL/L (ref 8–16)
AST SERPL-CCNC: 26 U/L (ref 10–40)
B-HCG UR QL: NEGATIVE
BACTERIA #/AREA URNS HPF: NORMAL /HPF
BASOPHILS # BLD AUTO: 0.02 K/UL (ref 0–0.2)
BASOPHILS NFR BLD: 0.2 % (ref 0–1.9)
BILIRUB SERPL-MCNC: 0.8 MG/DL (ref 0.1–1)
BILIRUB UR QL STRIP: NEGATIVE
BUN SERPL-MCNC: 10 MG/DL (ref 6–20)
CALCIUM SERPL-MCNC: 8.6 MG/DL (ref 8.7–10.5)
CHLORIDE SERPL-SCNC: 108 MMOL/L (ref 95–110)
CLARITY UR: CLEAR
CO2 SERPL-SCNC: 20 MMOL/L (ref 23–29)
COLOR UR: YELLOW
CREAT SERPL-MCNC: 0.7 MG/DL (ref 0.5–1.4)
CREAT SERPL-MCNC: 0.8 MG/DL (ref 0.5–1.4)
CTP QC/QA: YES
DIFFERENTIAL METHOD BLD: ABNORMAL
EOSINOPHIL # BLD AUTO: 0.1 K/UL (ref 0–0.5)
EOSINOPHIL NFR BLD: 1.3 % (ref 0–8)
ERYTHROCYTE [DISTWIDTH] IN BLOOD BY AUTOMATED COUNT: 12.3 % (ref 11.5–14.5)
EST. GFR  (NO RACE VARIABLE): >60 ML/MIN/1.73 M^2
GLUCOSE SERPL-MCNC: 120 MG/DL (ref 70–110)
GLUCOSE UR QL STRIP: NEGATIVE
HCT VFR BLD AUTO: 40.9 % (ref 37–48.5)
HGB BLD-MCNC: 13.6 G/DL (ref 12–16)
HGB UR QL STRIP: NEGATIVE
IMM GRANULOCYTES # BLD AUTO: 0.04 K/UL (ref 0–0.04)
IMM GRANULOCYTES NFR BLD AUTO: 0.4 % (ref 0–0.5)
KETONES UR QL STRIP: NEGATIVE
LEUKOCYTE ESTERASE UR QL STRIP: ABNORMAL
LIPASE SERPL-CCNC: 30 U/L (ref 4–60)
LYMPHOCYTES # BLD AUTO: 0.4 K/UL (ref 1–4.8)
LYMPHOCYTES NFR BLD: 4.7 % (ref 18–48)
MCH RBC QN AUTO: 30.8 PG (ref 27–31)
MCHC RBC AUTO-ENTMCNC: 33.3 G/DL (ref 32–36)
MCV RBC AUTO: 93 FL (ref 82–98)
MICROSCOPIC COMMENT: NORMAL
MONOCYTES # BLD AUTO: 0.8 K/UL (ref 0.3–1)
MONOCYTES NFR BLD: 8.7 % (ref 4–15)
NEUTROPHILS # BLD AUTO: 7.8 K/UL (ref 1.8–7.7)
NEUTROPHILS NFR BLD: 84.7 % (ref 38–73)
NITRITE UR QL STRIP: NEGATIVE
NRBC BLD-RTO: 0 /100 WBC
PH UR STRIP: 6 [PH] (ref 5–8)
PLATELET # BLD AUTO: 282 K/UL (ref 150–450)
PMV BLD AUTO: 8.9 FL (ref 9.2–12.9)
POC MOLECULAR INFLUENZA A AGN: NEGATIVE
POC MOLECULAR INFLUENZA B AGN: NEGATIVE
POTASSIUM SERPL-SCNC: 3.8 MMOL/L (ref 3.5–5.1)
PROT SERPL-MCNC: 7.2 G/DL (ref 6–8.4)
PROT UR QL STRIP: ABNORMAL
RBC # BLD AUTO: 4.42 M/UL (ref 4–5.4)
RBC #/AREA URNS HPF: 1 /HPF (ref 0–4)
SAMPLE: NORMAL
SARS-COV-2 RDRP RESP QL NAA+PROBE: NEGATIVE
SODIUM SERPL-SCNC: 137 MMOL/L (ref 136–145)
SP GR UR STRIP: 1.02 (ref 1–1.03)
SQUAMOUS #/AREA URNS HPF: 5 /HPF
URN SPEC COLLECT METH UR: ABNORMAL
UROBILINOGEN UR STRIP-ACNC: NEGATIVE EU/DL
WBC # BLD AUTO: 9.16 K/UL (ref 3.9–12.7)
WBC #/AREA URNS HPF: 2 /HPF (ref 0–5)

## 2025-03-03 PROCEDURE — 96375 TX/PRO/DX INJ NEW DRUG ADDON: CPT

## 2025-03-03 PROCEDURE — 99284 EMERGENCY DEPT VISIT MOD MDM: CPT | Mod: 25

## 2025-03-03 PROCEDURE — 85025 COMPLETE CBC W/AUTO DIFF WBC: CPT | Performed by: PHYSICIAN ASSISTANT

## 2025-03-03 PROCEDURE — 25000003 PHARM REV CODE 250: Performed by: PHYSICIAN ASSISTANT

## 2025-03-03 PROCEDURE — 83690 ASSAY OF LIPASE: CPT | Performed by: PHYSICIAN ASSISTANT

## 2025-03-03 PROCEDURE — 87635 SARS-COV-2 COVID-19 AMP PRB: CPT

## 2025-03-03 PROCEDURE — 96361 HYDRATE IV INFUSION ADD-ON: CPT

## 2025-03-03 PROCEDURE — 80053 COMPREHEN METABOLIC PANEL: CPT | Performed by: PHYSICIAN ASSISTANT

## 2025-03-03 PROCEDURE — 96374 THER/PROPH/DIAG INJ IV PUSH: CPT

## 2025-03-03 PROCEDURE — 96372 THER/PROPH/DIAG INJ SC/IM: CPT | Performed by: PHYSICIAN ASSISTANT

## 2025-03-03 PROCEDURE — 63600175 PHARM REV CODE 636 W HCPCS: Performed by: PHYSICIAN ASSISTANT

## 2025-03-03 PROCEDURE — 81000 URINALYSIS NONAUTO W/SCOPE: CPT | Performed by: NURSE PRACTITIONER

## 2025-03-03 PROCEDURE — 81025 URINE PREGNANCY TEST: CPT | Performed by: NURSE PRACTITIONER

## 2025-03-03 RX ORDER — DICYCLOMINE HYDROCHLORIDE 10 MG/ML
20 INJECTION INTRAMUSCULAR
Status: COMPLETED | OUTPATIENT
Start: 2025-03-03 | End: 2025-03-03

## 2025-03-03 RX ORDER — ONDANSETRON 4 MG/1
4 TABLET, ORALLY DISINTEGRATING ORAL EVERY 6 HOURS PRN
Qty: 15 TABLET | Refills: 0 | Status: SHIPPED | OUTPATIENT
Start: 2025-03-03 | End: 2025-03-09

## 2025-03-03 RX ORDER — ACETAMINOPHEN 500 MG
1000 TABLET ORAL
Status: COMPLETED | OUTPATIENT
Start: 2025-03-03 | End: 2025-03-03

## 2025-03-03 RX ORDER — KETOROLAC TROMETHAMINE 30 MG/ML
15 INJECTION, SOLUTION INTRAMUSCULAR; INTRAVENOUS
Status: COMPLETED | OUTPATIENT
Start: 2025-03-03 | End: 2025-03-03

## 2025-03-03 RX ORDER — FAMOTIDINE 20 MG/1
20 TABLET, FILM COATED ORAL 2 TIMES DAILY
Qty: 30 TABLET | Refills: 0 | Status: SHIPPED | OUTPATIENT
Start: 2025-03-03 | End: 2025-03-20

## 2025-03-03 RX ORDER — ONDANSETRON HYDROCHLORIDE 2 MG/ML
8 INJECTION, SOLUTION INTRAVENOUS
Status: COMPLETED | OUTPATIENT
Start: 2025-03-03 | End: 2025-03-03

## 2025-03-03 RX ORDER — PROMETHAZINE HYDROCHLORIDE 25 MG/1
25 TABLET ORAL EVERY 6 HOURS PRN
Qty: 15 TABLET | Refills: 0 | Status: SHIPPED | OUTPATIENT
Start: 2025-03-03

## 2025-03-03 RX ORDER — FAMOTIDINE 10 MG/ML
20 INJECTION INTRAVENOUS
Status: COMPLETED | OUTPATIENT
Start: 2025-03-03 | End: 2025-03-03

## 2025-03-03 RX ORDER — DICYCLOMINE HYDROCHLORIDE 20 MG/1
20 TABLET ORAL 4 TIMES DAILY
Qty: 28 TABLET | Refills: 0 | Status: SHIPPED | OUTPATIENT
Start: 2025-03-03 | End: 2025-03-12

## 2025-03-03 RX ADMIN — KETOROLAC TROMETHAMINE 15 MG: 30 INJECTION, SOLUTION INTRAMUSCULAR; INTRAVENOUS at 08:03

## 2025-03-03 RX ADMIN — ACETAMINOPHEN 1000 MG: 500 TABLET, FILM COATED ORAL at 09:03

## 2025-03-03 RX ADMIN — FAMOTIDINE 20 MG: 10 INJECTION, SOLUTION INTRAVENOUS at 08:03

## 2025-03-03 RX ADMIN — DICYCLOMINE HYDROCHLORIDE 20 MG: 10 INJECTION, SOLUTION INTRAMUSCULAR at 08:03

## 2025-03-03 RX ADMIN — ONDANSETRON 8 MG: 2 INJECTION INTRAMUSCULAR; INTRAVENOUS at 08:03

## 2025-03-03 RX ADMIN — SODIUM CHLORIDE 1000 ML: 0.9 INJECTION, SOLUTION INTRAVENOUS at 08:03

## 2025-03-04 NOTE — FIRST PROVIDER EVALUATION
Emergency Department TeleTriage Encounter Note      CHIEF COMPLAINT    Chief Complaint   Patient presents with    COVID-19 Concerns     Body aches, fever, abd pain w/ diarrhea     Nausea     Reports extreme nausea but never actively vomiting        VITAL SIGNS   Initial Vitals [03/03/25 1918]   BP Pulse Resp Temp SpO2   (!) 141/87 90 17 99.1 °F (37.3 °C) 99 %      MAP       --            ALLERGIES    Review of patient's allergies indicates:  No Known Allergies    PROVIDER TRIAGE NOTE  Verbal consent for the teletriage evaluation was given by the patient at the start of the evaluation.  All efforts will be made to maintain patient's privacy during the evaluation.      This is a teletriage evaluation of a 39 y.o. female presenting to the ED with c/o diarrhea, abd pain, nausea, body aches, fever (temp 101).  Advil taken PTA. Limited physical exam via telehealth: The patient is awake, alert, answering questions appropriately and is not in respiratory distress.  As the Teletriage provider, I performed an initial assessment and ordered appropriate labs and imaging studies, if any, to facilitate the patient's care once placed in the ED. Once a room is available, care and a full evaluation will be completed by an alternate ED provider.  Any additional orders and the final disposition will be determined by that provider.  All imaging and labs will not be followed-up by the Teletriage Team, including myself.      ORDERS  Labs Reviewed   URINALYSIS, REFLEX TO URINE CULTURE   SARS-COV-2 RDRP GENE   POCT INFLUENZA A/B MOLECULAR   POCT URINE PREGNANCY       ED Orders (720h ago, onward)      Start Ordered     Status Ordering Provider    03/03/25 1925 03/03/25 1924  Urinalysis, Reflex to Urine Culture Urine, Clean Catch  STAT         Ordered NILAY WILLIAM    03/03/25 1924 03/03/25 1923  POCT urine pregnancy  Once         Ordered NILAY WILLIAM    03/03/25 1923 03/03/25 1922  POCT COVID-19 Rapid Screening  Once         Acknowledged  VITO THORNE    03/03/25 1923 03/03/25 1922  POCT Influenza A/B Molecular  Once         Acknowledged VITO THORNE              Virtual Visit Note: The provider triage portion of this emergency department evaluation and documentation was performed via [employer], a HIPAA-compliant telemedicine application, in concert with a tele-presenter in the room. A face to face patient evaluation with one of my colleagues will occur once the patient is placed in an emergency department room.      DISCLAIMER: This note was prepared with [employer] voice recognition transcription software. Garbled syntax, mangled pronouns, and other bizarre constructions may be attributed to that software system.

## 2025-03-04 NOTE — ED TRIAGE NOTES
Pt reports she started with diarrhea at 0400 this am. She then started having abd discomfort. She has had nausea with no vomiting. She has had diarrhea two times an hour for the past twelve hours. She is now having left sided chest tightness which makes her feel like she needs to vomit but can't. She is also having bodyaches. Denies cough or runny nose.

## 2025-03-04 NOTE — DISCHARGE INSTRUCTIONS

## 2025-03-05 LAB
OHS QRS DURATION: 76 MS
OHS QTC CALCULATION: 564 MS

## 2025-03-05 NOTE — ED PROVIDER NOTES
Encounter Date: 3/3/2025       History     Chief Complaint   Patient presents with    COVID-19 Concerns     Body aches, fever, abd pain w/ diarrhea     Nausea     Reports extreme nausea but never actively vomiting      Chief complaint:  Abdominal pain, nausea    HPI:   38 y/o F with no pertinent history presenting for evaluation of diarrhea 1-2 times per hours since this morning.  She reports intermittent severe epigastric pain and nausea.  She does endorse drinking alcohol over the last couple of days.  She denies history of similar episodes previously.  She denies history of acid reflux, peptic ulcer disease or gastritis.  Reports associated subjective fever chills and myalgias.  Denies any recent travel melena hematochezia or ingestion of undercooked food.  No recent sick contacts.  No attempted treatment    The history is provided by the patient.     Review of patient's allergies indicates:  No Known Allergies  Past Medical History:   Diagnosis Date    Ankylosing spondylitis of unspecified sites in spine     Anxiety     No meds    Depression     Loose bowel movements      Past Surgical History:   Procedure Laterality Date    ANUS SURGERY      FISSURE    COLONOSCOPY N/A 12/22/2023    Procedure: COLONOSCOPY;  Surgeon: Brandy Chaney MD;  Location: [facility];  Service: Gastroenterology;  Laterality: N/A;    COLONOSCOPY W/ BIOPSIES AND POLYPECTOMY  12/22/2023    COSMETIC SURGERY      DILATION AND CURETTAGE OF UTERUS USING SUCTION N/A 09/13/2018    Procedure: DILATION AND CURETTAGE, UTERUS, USING SUCTION;  Surgeon: Joselo Landry Jr., MD;  Location: [facility];  Service: OB/GYN;  Laterality: N/A;    NASAL SEPTUM SURGERY      TONSILLECTOMY, ADENOIDECTOMY      WISDOM TOOTH EXTRACTION       Family History   Problem Relation Name Age of Onset    Frontotemporal dementia Mother  72    Glaucoma Father Shahzad     Hypertension Father Shahzad     Rheum arthritis Father Shahzad     Arthritis Father Shahzad     Kyphosis Brother       Bipolar disorder Brother      Anxiety disorder Brother      Diabetes Mellitus Maternal Grandfather      No Known Problems Paternal Grandmother      Melanoma Paternal Grandfather      Breast cancer Neg Hx      Cancer Neg Hx      Ovarian cancer Neg Hx      Colon cancer Neg Hx      Macular degeneration Neg Hx      Retinal detachment Neg Hx      Amblyopia Neg Hx      Blindness Neg Hx      Cataracts Neg Hx      Strabismus Neg Hx      Esophageal cancer Neg Hx       Social History[1]  Review of Systems   Constitutional:  Negative for chills and fever.   HENT:  Negative for congestion, ear pain, nosebleeds, rhinorrhea, sore throat and trouble swallowing.    Eyes:  Negative for redness.   Respiratory:  Negative for cough, shortness of breath and stridor.    Cardiovascular:  Negative for chest pain.   Gastrointestinal:  Positive for abdominal pain and diarrhea. Negative for blood in stool, constipation, nausea and vomiting.   Genitourinary:  Negative for decreased urine volume, dysuria, frequency, hematuria and urgency.   Musculoskeletal:  Negative for back pain and neck pain.   Skin:  Negative for rash and wound.   Neurological:  Negative for dizziness, speech difficulty, weakness, light-headedness, numbness and headaches.   Hematological:  Does not bruise/bleed easily.   Psychiatric/Behavioral:  Negative for confusion.        Physical Exam     Initial Vitals [03/03/25 1918]   BP Pulse Resp Temp SpO2   (!) 141/87 90 17 99.1 °F (37.3 °C) 99 %      MAP       --         Physical Exam    Nursing note and vitals reviewed.  Constitutional: She appears well-developed and well-nourished. No distress.   HENT:   Head: Normocephalic.   Right Ear: External ear normal.   Left Ear: External ear normal.   Eyes: Conjunctivae are normal. Right eye exhibits no discharge. Left eye exhibits no discharge. No scleral icterus.   Neck: No tracheal deviation present.   Cardiovascular:  Normal rate and regular rhythm.     Exam reveals no gallop and  no friction rub.       No murmur heard.  Pulmonary/Chest: Breath sounds normal. No stridor. No respiratory distress. She has no wheezes. She has no rhonchi. She has no rales.   Abdominal: Abdomen is soft. She exhibits no distension. There is abdominal tenderness in the epigastric area.   No right CVA tenderness.  No left CVA tenderness. There is no rebound, no guarding, no tenderness at McBurney's point and negative López's sign. negative Rovsing's sign  Musculoskeletal:         General: Normal range of motion.     Neurological: She is alert.   Skin: Skin is warm and dry. No rash noted. No erythema.   Psychiatric: She has a normal mood and affect. Her behavior is normal. Judgment and thought content normal.         ED Course   Procedures  Labs Reviewed   URINALYSIS, REFLEX TO URINE CULTURE - Abnormal       Result Value    Specimen UA Urine, Clean Catch      Color, UA Yellow      Appearance, UA Clear      pH, UA 6.0      Specific Gravity, UA 1.020      Protein, UA Trace (*)     Glucose, UA Negative      Ketones, UA Negative      Bilirubin (UA) Negative      Occult Blood UA Negative      Nitrite, UA Negative      Urobilinogen, UA Negative      Leukocytes, UA Trace (*)     Narrative:     Specimen Source->Urine   CBC W/ AUTO DIFFERENTIAL - Abnormal    WBC 9.16      RBC 4.42      Hemoglobin 13.6      Hematocrit 40.9      MCV 93      MCH 30.8      MCHC 33.3      RDW 12.3      Platelets 282      MPV 8.9 (*)     Immature Granulocytes 0.4      Gran # (ANC) 7.8 (*)     Immature Grans (Abs) 0.04      Lymph # 0.4 (*)     Mono # 0.8      Eos # 0.1      Baso # 0.02      nRBC 0      Gran % 84.7 (*)     Lymph % 4.7 (*)     Mono % 8.7      Eosinophil % 1.3      Basophil % 0.2      Differential Method Automated     COMPREHENSIVE METABOLIC PANEL - Abnormal    Sodium 137      Potassium 3.8      Chloride 108      CO2 20 (*)     Glucose 120 (*)     BUN 10      Creatinine 0.8      Calcium 8.6 (*)     Total Protein 7.2      Albumin 3.6       Total Bilirubin 0.8      Alkaline Phosphatase 33 (*)     AST 26      ALT 23      eGFR >60      Anion Gap 9     URINALYSIS MICROSCOPIC    RBC, UA 1      WBC, UA 2      Bacteria Rare      Squam Epithel, UA 5      Microscopic Comment SEE COMMENT      Narrative:     Specimen Source->Urine   LIPASE    Lipase 30     SARS-COV-2 RDRP GENE    POC Rapid COVID Negative       Acceptable Yes     POCT INFLUENZA A/B MOLECULAR    POC Molecular Influenza A Ag Negative      POC Molecular Influenza B Ag Negative       Acceptable Yes     POCT URINE PREGNANCY    POC Preg Test, Ur Negative       Acceptable Yes     ISTAT CREATININE    POC Creatinine 0.7      Sample VENOUS            Imaging Results    None          Medications   sodium chloride 0.9% bolus 1,000 mL 1,000 mL (0 mLs Intravenous Stopped 3/3/25 2136)   ondansetron injection 8 mg (8 mg Intravenous Given 3/3/25 2028)   famotidine (PF) injection 20 mg (20 mg Intravenous Given 3/3/25 2032)   ketorolac injection 15 mg (15 mg Intravenous Given 3/3/25 2030)   dicyclomine injection 20 mg (20 mg Intramuscular Given 3/3/25 2037)   acetaminophen tablet 1,000 mg (1,000 mg Oral Given 3/3/25 2101)     Medical Decision Making  39-year-old female presenting for evaluation of epigastric pain with associated nausea and diarrhea.  Reports 1 or 2 episodes per hour since this morning.  Patient is afebrile nontoxic appearing in no distress.  Exam above.  Mild epigastric tenderness palpation with no peritoneal signs.  Does endorse alcohol use over the last couple of days.  CBC without leukocytosis or significant anemia.  CMP with no renal insufficiency or significant electrolyte abnormality.  Lipase normal.  Doubt pancreatitis.  Patient was given IV fluids, IV Zofran Pepcid Toradol IM Bentyl and Tylenol p.o..  She is feeling much better.  No longer having abdominal pain nausea.  No indication for stool cultures at this time.  Discussed ER return  precautions with the patient.  Will have her increase fluid intake.  Discharge with medications for symptomatic treatment.  Follow up with primary care in 2 days return ER for worsening or as needed.    Amount and/or Complexity of Data Reviewed  Labs: ordered.    Risk  OTC drugs.  Prescription drug management.                                      Clinical Impression:  Final diagnoses:  [R11.0] Nausea (Primary)  [R19.7] Diarrhea, unspecified type  [R10.13] Epigastric pain          ED Disposition Condition    Discharge Stable          ED Prescriptions       Medication Sig Dispense Start Date End Date Auth. Provider    dicyclomine (BENTYL) 20 mg tablet Take 1 tablet (20 mg total) by mouth 4 (four) times daily. for 7 days 28 tablet 3/3/2025 3/12/2025 Naila Bryant PA-C    famotidine (PEPCID) 20 MG tablet Take 1 tablet (20 mg total) by mouth 2 (two) times daily. for 15 days 30 tablet 3/3/2025 3/20/2025 Naila Bryant PA-C    ondansetron (ZOFRAN-ODT) 4 MG TbDL Take 1 tablet (4 mg total) by mouth every 6 (six) hours as needed. 15 tablet 3/3/2025 3/9/2025 Naila Bryant PA-C    promethazine (PHENERGAN) 25 MG tablet Take 1 tablet (25 mg total) by mouth every 6 (six) hours as needed for Nausea. 15 tablet 3/3/2025 -- Naila Bryant PA-C          Follow-up Information       Follow up With Specialties Details Why Contact Info Additional Information    June Christianson MD Internal Medicine   [address]  [facility] [address]  [phone]       [facility] [address] Gastroenterology Schedule an appointment as soon as possible for a visit in 2 days for follow up [address] [facility] [address] [phone] [facility] - Main Building, 4th Floor Please park in [facility] and take [address] elevator    [facility] Emergency Dept Emergency Medicine Go to  As needed, If symptoms worsen [address] [facility]  [address]  [phone]                [1]   Social History  Tobacco Use    Smoking status: Never    Smokeless tobacco: Never   Substance Use Topics    Alcohol use: Yes     Alcohol/week: 1.0 standard drink of alcohol     Types: 1 Glasses of wine per week     Comment: Social    Drug use: No        Naila Bryant PA-C  03/05/25 4721

## 2025-03-12 ENCOUNTER — CLINICAL SUPPORT (OUTPATIENT)
Dept: REHABILITATION | Facility: OTHER | Age: 40
End: 2025-03-12
Payer: COMMERCIAL

## 2025-03-12 DIAGNOSIS — Z74.09 DECREASED STRENGTH, ENDURANCE, AND MOBILITY: ICD-10-CM

## 2025-03-12 DIAGNOSIS — M25.551 CHRONIC RIGHT HIP PAIN: Primary | ICD-10-CM

## 2025-03-12 DIAGNOSIS — R53.1 DECREASED STRENGTH, ENDURANCE, AND MOBILITY: ICD-10-CM

## 2025-03-12 DIAGNOSIS — R68.89 DECREASED STRENGTH, ENDURANCE, AND MOBILITY: ICD-10-CM

## 2025-03-12 DIAGNOSIS — G89.29 CHRONIC RIGHT HIP PAIN: Primary | ICD-10-CM

## 2025-03-12 PROCEDURE — 97530 THERAPEUTIC ACTIVITIES: CPT | Mod: PN

## 2025-03-12 PROCEDURE — 97140 MANUAL THERAPY 1/> REGIONS: CPT | Mod: PN

## 2025-03-12 PROCEDURE — 97162 PT EVAL MOD COMPLEX 30 MIN: CPT | Mod: PN

## 2025-03-12 NOTE — PATIENT INSTRUCTIONS
What To Expect After Functional Dry Needling ® Treatments           How will I feel after a session of FDN?    You may feel some soreness immediately after treatment in the area of the body you were treated. This does not always occur but should be expected and is considered normal. It can also take up to a few hours, or even until the next day, to feel an onset of soreness. The soreness may vary from person to person and based on the area of the body that was treated, but it typically feels like you had an intense workout at the gym. Soreness typically lasts 24-48 hours. Make sure to indicate to your provider at a follow-up appointment how long the soreness lasted.  Bruising from the treatment is possible, somehow uncommon, but it is not of concern. Some areas are more likely to bruise than others, including the shoulders, chest, face, and portions of the extremities. Large bruising rarely occurs, but is possible. Use ice to help decrease the bruising and if you feel concern, please call your provider.   It is common to feel tired/fatigued, energized, emotional, giggly, or out of it after treatment. This is a normal response that can last up to an hour or two after treatment. If this lasts beyond a day, contact your provider as a precaution.  There are times when treatment may actually exacerbate your symptoms. This is normal and may indicate that you need to follow up sooner with your practitioner to continue treatment. If this continues past the 24-48 hour window, keep note of it, as this can help your provider adjust your treatment plan if needed based on your report. This does not mean that FDN cannot help your condition.    What should I do after my treatment and what is recommended?    We highly recommend increasing your water intake for the next 24 hours after treatment to help avoid or reduce soreness. We also recommend soaking in a hot bath or hot tub to help relieve post treatment soreness and to  soften the symptoms associated with the treatment you received. After dry needling treatment, you may do the following based on your comfort level. Please note that if it hurts or exacerbates your symptoms, then discontinuing the activity is best.    Work out and/or stretch.  Participate in normal physical activity.  Massage the area.  Use heat or ice as preferred for post treatment soreness.  Stay hydrated.   If you have prescription medicines, continue to take them as prescribed.    What should I avoid after treatment?    Unfamiliar physical activities or sports.  Doing more than you normally do.  Excessive alcohol intake.    If you are feeling light headed, or experience difficulty breathing, chest pain, or any other concerning symptoms after treatment, call us immediately. If you are unable to get a hold of us, please call your physician.

## 2025-03-12 NOTE — PROGRESS NOTES
[facility]   Physical Therapy Initial Evaluation      Name: Catie Monge  Clinic Number: [id number]    Therapy Diagnosis:   Encounter Diagnoses   Name Primary?    Chronic right hip pain Yes    Decreased strength, endurance, and mobility         Physician: Dick Licona*    Physician Orders: PT Eval and Treat   Medical Diagnosis from Referral:   M25.551 (ICD-10-CM) - Pain in right hip,    S76.219A (ICD-10-CM) - Strain of adductor muscle, fascia and tendon of unspecified thigh, initial encounter     Evaluation Date: 3/12/2025  Authorization Period Expiration: 12/31/2025  Plan of Care Expiration: 5/12/2025  Progress Note Due: 4/12/2025  Date of Surgery: n/a  Visit # / Visits authorized: 1/ 1   FOTO: 1/ 3    Precautions: Standard, Osteopenia, Ankylosing Spondylitis     Time In: 11:15  Time Out: 12:20  Total Billable Time: 65 minutes    Subjective     Date of onset: jan 2025    History of current condition - Catie reports: Her c/c today R hip pain/groin.     Reports that she has recently returned to running and has begun training for a full marathon in October 2025 in [place]. Recently completed a 10-mile race in January 2025 and started having R groin/hip pain. She reports intermittent and rare pain prior to January race, but says that pain has been constant since the 10-mile race. Took month off from running due to severe groin pain, pain with sleeping, run/walking/stairs. Was painfree after a month off but went for a 3 mi run yesterday and now has constant pain. She also notes clicking in medial R knee when the groin hurts. Groin pain is localized; Denies pain radiating below the knee. Denies clicking, locking, or popping in the hip.     Prior to onset of pain, pt was running ~15 miles per week; XT included peloton, pilates 1x/week; Stretching but not maybe not as consistently as before - meds and stretching daily due to recent AS dx in 2023 (did [facility] for back  pain).    Falls: none    Imaging: bone scan films, lumbar spine: Dextrocurvature of the lumbar spine. No acute fracture or listhesis. Mild L5-S1 discogenic disease. Mild lower lumbar facet arthrosis.    Xray, hips: No significant conventional radiographic abnormality referable to the hips is appreciated.    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  Prior Level of Function: indep  Current Level of Function: pain and difficulty with all functional activities that involve shearing at the pelvis    Pain:  Current 3/10, worst 9/10, best 3/10   Location: R hip pain/groin  Description: Aching, Dull, Throbbing, Tight, and occasional shooting to medial knee  Aggravating Factors: Standing, Walking, Getting out of bed/chair, and sleeping on R side, getting in/out of bed/car, stepping in/out of bathtub/shower, stair climbing, running  Easing Factors: no change w/ ice, voltarin gel, stretching, OTC meds    Patients goals: resolve pain, return to work and running     Medical History:   Past Medical History:   Diagnosis Date    Ankylosing spondylitis of unspecified sites in spine     Anxiety     No meds    Depression     Loose bowel movements        Surgical History:   Catie Monge  has a past surgical history that includes TONSILLECTOMY, ADENOIDECTOMY; [place] tooth extraction; Nasal septum surgery; Anus surgery; Dilation and curettage of uterus using suction (N/A, 09/13/2018); Cosmetic surgery; Colonoscopy w/ biopsies and polypectomy (12/22/2023); and Colonoscopy (N/A, 12/22/2023).    Medications:   Catie has a current medication list which includes the following prescription(s): humira(cf) pen, bupropion, calcium carbonate, cetirizine, clindamycin, cyclosporine, diclofenac,  "diphenoxylate-atropine 2.5-0.025 mg, drospirenone-e.estradiol-lm.fa, famotidine, qbrexza, methocarbamol, promethazine, and sertraline.    Allergies:   Review of patient's allergies indicates:  No Known Allergies     Objective      Observation: good muscle tone, ectomorph  Palpation: mod TTP with significant increased tone of R ADDuctors; concordant pain found with palpation to ADD tendon distal to pubic bone    Trunk AROM: WFL 2* Hx AS  Trunk strength:    Extensors = fair (rev Hyperext x 30")   Abdominals = TA good but with s/s instability with ASLR (poor plank, mod side plank)    Hip  Right   Left  Pain/Dysfunction with Movement    AROM PROM MMT AROM PROM MMT    Flexion 100 120 4* 100 120 5    Extension - knee ext 0 10 4 0 10 5    Extension - knee flex - - 4- - - 4+    Abduction WFL - 4- WFL - 4+    Adduction Past midline - 3+ Past midline - 5    Internal rotation 40 40 3+ 40 40 5    External rotation 40 40 4- 40 40 5      Knee ROM: grossly WFL  Knee strength: quads = 5/5, hamstrings = 5/5  Ankle ROM: grossly WFL  Ankle strength: DF = 5/5, PF = 5/5    Flexibility:  Josr test: Hip flexors: WFL  Ely's: Quads: R = 110 deg, L = 110 deg  Leatha test: ITB: WFL  BENY: WNL  FADIR: WNL  Hamstring (SLR): 80 deg evelia    Special tests:  Quadrant test: (--)  Scour test: (--)  ASIS compression: (--)  ASIS distraction: (--)  SLR: (--)  Contralateral SLR: (--)  NTT: (--)      Functional Movement Analysis:   Gait: I  Sit<>stand: I  Bed mobility: I  Stair climbing: I but with pain, mild contralat pelvic drop in R SLS  DL squat: I      Balance:  SLS EO: 10"  SLS EC: 10" with increased sway    Lateral Step Down Test     Scoring for Lateral Step-Down Test - (+) one point when compensation observed.  Criterion Interpretation Score   Arm strategy Removes hands from waist 1   Trunk alignment Leaning in any direction 1   Pelvic plane Loss of horizontal plane 1   Knee posture Tibial tuberosity medial to 2nd toe 1    Tibial tuberosity medial " "to medial border of foot 2   Steady Stepping down on non-tested limb, or wavering from side to side 1     8" Box on Involved Limb x 5 reps   Right: 5/5  Left: 6/5    Intake Outcome Measure for FOTO hip Survey    Therapist reviewed FOTO scores for Catie Monge on 3/12/2025.   FOTO report - see Media section or FOTO account episode details.    HOOS Jr = 77%         Treatment     Total Treatment time (time-based codes) separate from Evaluation: 38 minutes     Catie received the treatments listed below:      therapeutic exercises to develop strength, endurance, ROM, flexibility, posture, and core stabilization for 00 minutes including:  None today    manual therapy techniques: Joint mobilizations, Manual traction, Myofacial release, and Soft tissue Mobilization were applied to the: leg/hip for 15 minutes, including:  Dry needling to R hip, thigh x 15 min - see note by Cathryn Reynoso, PT    neuromuscular re-education activities to improve: Balance, Coordination, Kinesthetic, Sense, Proprioception, and Posture for 00 minutes. The following activities were included:  None today    therapeutic activities to improve functional performance for 23  minutes, including:  Pt edu on dx, pathogenesis, prognosis, PT POC.  Pt edu on purpose, benefits of dry needling, side effects and how to modulate sx. Reviewed consent form, signed and witnessed today.  Pt edu on continuation of home stretch program as it is excellent and thorough. Updated HEP to promote strength, pelvic stability with ADLs:   SL clams x3x15 (RTB, YTB given today), SL hip abd (against wall) x3x15, SL bridge x3x10, sustained bridge march x3x10        Patient Education and Home Exercises     Education provided:   -Patient educated regarding surgical procedure, pathogenesis, diagnosis, protocol, prognosis, POC, and HEP, including use of visual assistance for understanding of anatomy and dysfunction. Written Home Exercises Provided with written and verbal " instructions for frequency and duration of the following exercises: see list above. Pt educated on HEP and activity modifications to reduce c/o pain and improve overall function.   -Patient received education regarding proper posture and body mechanics. Alec heard tried, recommended, and purchase information was provided. Heavy emphasis on upright posture while sitting to improve spinal alignment, restore lumbar curvature, and reduce stress/strain on cervical spine. Continued to recommend pt perform HEP intermittently throughout the day to reduce c/o stiffness and pain, particularly cervical retractions while sitting in car with TTC from headrest. Pt also educated on use of modalities prn to reduce c/o pain and dysfunction. Catie demo good understanding of the education provided. Catie  demonstrated good return demonstration of activities.   -Pt also educated on use of modalities prn to reduce c/o pain and dysfunction.         Written Home Exercises Provided: Yes. Exercises were reviewed and Catie was able to demonstrate them prior to the end of the session.  Catie demonstrated good  understanding of the education provided. See EMR under Patient Instructions for exercises provided during therapy sessions.    Assessment     Catie is a 39 y.o. female referred to outpatient Physical Therapy with a medical diagnosis of pain in Right hip, Adductor strain. Patient presents with marked limitations in ROM, joint and myofascial mobility, flexibility, strength, postural awareness/endurance, motor control and coordination. S/s associated with referring diagnosis. Impairments limit pt with all functional activities including activities that increase shear forces at the pelvis including transitional movements (sit to stand), stairs, running, walking, as well as sleeping.      Patient prognosis is Good.   Patient will benefit from skilled outpatient Physical Therapy to address the deficits stated above and in the chart  below, provide patient /family education, and to maximize patientt's level of independence.     Plan of care discussed with patient: Yes  Patient's spiritual, cultural and educational needs considered and patient is agreeable to the plan of care and goals as stated below:     Anticipated Barriers for therapy: standard, PMHx AS    Medical Necessity is demonstrated by the following  History  Co-morbidities and personal factors that may impact the plan of care [] LOW: no personal factors / co-morbidities  [] MODERATE: 1-2 personal factors / co-morbidities  [x] HIGH: 3+ personal factors / co-morbidities    Moderate / High Support Documentation:   Co-morbidities affecting plan of care: AS, osteopenia    Personal Factors:   social background     Examination  Body Structures and Functions, activity limitations and participation restrictions that may impact the plan of care [] LOW: addressing 1-2 elements  [] MODERATE: 3+ elements  [x] HIGH: 4+ elements (please support below)    Moderate / High Support Documentation: myofascial and joint mobility, flexibility, strength, endurance, ROM, motor control/coordination, community amb and participation, running, stair climbing, getting in/out of car, chair, tub/bed, sleeping     Clinical Presentation [] LOW: stable  [x] MODERATE: Evolving  [] HIGH: Unstable     Decision Making/ Complexity Score: moderate       Goals:  Short Term Goals (4 Weeks):  1. Pt able to walk >=30 minutes with household chores with <3/10 pain.  2.  Pt able to transfer in/out of chairs of various heights with <3/10 pain.  3. Pt able to ascend/descend curb, independently, 1 handrail, with <3/10 pain.  4. Pt able to sleep a full night without pain disturbance.    Long Term Goals (8 Weeks):  1. Pt able to run >=1 hour with household chores with <1/10 pain.  2.  Pt able to squat/lift 25# from floor to waist with <1/10 pain.  3. Pt able to ascend/descend 1 flight of stairs, independently, 1 handrail, with <1/10  pain.  4. Pt is independent with all bed mobility.  5. Pt able to return to full work / recreational activities with min difficulty and pain <1/10.  6. Pt will be independent with HEP and self management of symptoms.     Plan     Plan of care Certification: 3/12/2025 to 5/12/2025.    Outpatient Physical Therapy 2 times weekly for 8 weeks to include the following interventions: Aquatic Therapy, Cervical/Lumbar Traction, Electrical Stimulation prn, Gait Training, Iontophoresis (with dexamethasone prn), Manual Therapy, Moist Heat/ Ice, Neuromuscular Re-ed, Patient Education, Self Care, Therapeutic Activities, and Therapeutic Exercise. Progress HEP towards D/C. Recommend F/U with MD if symptoms worsen or do not resolve. Patient may be seen by a PTA for treatment to carry out their plan of care.  Face-to-face conferences will be held.     Cathryn Pearson, PT        Physician's Signature: _________________________________________ Date: ________________

## 2025-03-13 ENCOUNTER — PATIENT MESSAGE (OUTPATIENT)
Dept: SPORTS MEDICINE | Facility: CLINIC | Age: 40
End: 2025-03-13
Payer: COMMERCIAL

## 2025-03-13 RX ORDER — DICLOFENAC SODIUM 75 MG/1
75 TABLET, DELAYED RELEASE ORAL 2 TIMES DAILY PRN
Qty: 60 TABLET | Refills: 1 | Status: SHIPPED | OUTPATIENT
Start: 2025-03-13

## 2025-03-20 ENCOUNTER — PATIENT MESSAGE (OUTPATIENT)
Dept: ADMINISTRATIVE | Facility: OTHER | Age: 40
End: 2025-03-20
Payer: COMMERCIAL

## 2025-03-24 PROBLEM — R53.1 DECREASED STRENGTH, ENDURANCE, AND MOBILITY: Status: ACTIVE | Noted: 2025-03-24

## 2025-03-24 PROBLEM — R29.898 DECREASED STRENGTH OF TRUNK AND BACK: Status: RESOLVED | Noted: 2024-02-22 | Resolved: 2025-03-24

## 2025-03-24 PROBLEM — G89.29 CHRONIC RIGHT HIP PAIN: Status: ACTIVE | Noted: 2025-03-24

## 2025-03-24 PROBLEM — Z74.09 DECREASED STRENGTH, ENDURANCE, AND MOBILITY: Status: ACTIVE | Noted: 2025-03-24

## 2025-03-24 PROBLEM — M25.551 CHRONIC RIGHT HIP PAIN: Status: ACTIVE | Noted: 2025-03-24

## 2025-03-24 PROBLEM — R68.89 DECREASED STRENGTH, ENDURANCE, AND MOBILITY: Status: ACTIVE | Noted: 2025-03-24

## 2025-03-24 NOTE — PROGRESS NOTES
Physical Therapy Functional Dry Needling Note      Date:  3/12/2025    Name: Catie Monge  Clinic Number: [id number]    Therapy Diagnosis:   Encounter Diagnoses   Name Primary?    Chronic right hip pain Yes    Decreased strength, endurance, and mobility      Physician: Dick Licona*    Total Time:  15 min    Subjective      Pt reports: constant groin pain (R) since yesterday's run but original flare up occurred in January 2025 following a 10-mile run/race. Pain localized at proximal adductors  See note by Cathryn Pearson, PT     Pain: 3/10  Location: right hip/groin      Objective      See EMR under MEDIA for written consent provided, signed, dated on 3/24/2025     Palpation Assessment to determine the necessity for Functional Dry Needling - see findings on eval on 3/12/25.     Catie received the following manual therapy techniques: dry needling to adductors/R hip/thigh with 2-3 in needles with no adverse effects.    Homeostatic points:  1. Deep Radial  2. Greater Auricular  3. Spinal Accessory  4. Saphenous  5. Deep Fibular  6. Tibial  7. Greater Occipital  8. Suprascapular ( infraspinatus)  9. Lateral Antebrachial Cutaneous  10. Sural  11. Lateral Popliteal  12. Superficial Radial  13. Dorsal Scapular  14. Superior Cluneal  15. Posterior Cutaneous L 2  16. Inferior Gluteal  17. Lateral Pectoral  18. Ilitotibal  19. Infraorbital  20. Spinous process T7  21. Posterior cutaneous  T6  22. Posterior cutaneous L 5  23. Supraorbital  24. Common fibular    Paravertebral Points:  none    Symptomatic Points:   Pt supine - ADD x 3 (2 AP, 1 ML)  Pt prone - ADD Nicolas x1    Assessment      Patient demonstrated appropriate response to Functional Dry Needling. Large twitch response with needling to prox ADD attachment and ADD nicolas with pt in prone. Winding technique used every 5 minutes. Mod soreness in groin, thigh by end of session, but with normalized amb and squat.        Home Exercises and Patient  "Education      Education provided:   Purpose, benefits, and potential side effects of dry needling.   Educated pt to use heat following treatment sessions to reduce c/o pain or soreness and to improve circulation to needled sites.   Encouraged pt to continue with HEP to maintain flexibility, ROM, and functional mobility.    Written Handout on response to FDN provided: none provided today.    Catie demonstrated good understanding of the education provided.     See EMR under "Patient Instructions" for exercises provided.    Plan      Monitor response to Functional Dry Needling. Continue with Functional Dry Needling in POC as appropriate.       Cathryn Pearson, PT  3/24/2025            "

## 2025-03-26 ENCOUNTER — CLINICAL SUPPORT (OUTPATIENT)
Dept: REHABILITATION | Facility: OTHER | Age: 40
End: 2025-03-26
Payer: COMMERCIAL

## 2025-03-26 DIAGNOSIS — G89.29 CHRONIC RIGHT HIP PAIN: ICD-10-CM

## 2025-03-26 DIAGNOSIS — R53.1 DECREASED STRENGTH, ENDURANCE, AND MOBILITY: ICD-10-CM

## 2025-03-26 DIAGNOSIS — M25.651 DECREASED RANGE OF RIGHT HIP MOVEMENT: ICD-10-CM

## 2025-03-26 DIAGNOSIS — S76.219A STRAIN OF ADDUCTOR MUSCLE OF THIGH: ICD-10-CM

## 2025-03-26 DIAGNOSIS — R29.898 WEAKNESS OF RIGHT LOWER EXTREMITY: ICD-10-CM

## 2025-03-26 DIAGNOSIS — Z74.09 DECREASED STRENGTH, ENDURANCE, AND MOBILITY: ICD-10-CM

## 2025-03-26 DIAGNOSIS — M25.551 RIGHT HIP PAIN: Primary | ICD-10-CM

## 2025-03-26 DIAGNOSIS — R68.89 DECREASED STRENGTH, ENDURANCE, AND MOBILITY: ICD-10-CM

## 2025-03-26 DIAGNOSIS — M25.551 CHRONIC RIGHT HIP PAIN: ICD-10-CM

## 2025-03-26 PROCEDURE — 97140 MANUAL THERAPY 1/> REGIONS: CPT | Mod: PN

## 2025-03-26 PROCEDURE — 97530 THERAPEUTIC ACTIVITIES: CPT | Mod: PN

## 2025-03-26 PROCEDURE — 97112 NEUROMUSCULAR REEDUCATION: CPT | Mod: PN

## 2025-03-26 NOTE — PROGRESS NOTES
"[facility]   Physical Therapy Treatment Note      Name: Catie Monge  Clinic Number: [id number]    Therapy Diagnosis:   Encounter Diagnoses   Name Primary?    Right hip pain Yes    Strain of adductor muscle of thigh     Decreased range of right hip movement     Weakness of right lower extremity     Chronic right hip pain     Decreased strength, endurance, and mobility      Physician: Dick Licona*    Visit Date: 3/26/2025  Physician Orders: PT Eval and Treat     Medical Diagnosis from Referral:   M25.551 (ICD-10-CM) - Pain in right hip,    S76.219A (ICD-10-CM) - Strain of adductor muscle, fascia and tendon of unspecified thigh, initial encounter      Evaluation Date: 3/12/2025  Authorization Period Expiration: 12/31/2025  Plan of Care Expiration: 5/12/2025  Progress Note Due: 4/12/2025  Date of Surgery: n/a  Visit # / Visits authorized: 2/ 1   FOTO: 1/ 3     Precautions: Standard, Osteopenia, Ankylosing Spondylitis      Time In: 915a  Time Out: 1015a  Total Billable Time: 60 minutes      PTA Visit #: 0/5       Subjective     Patient reports: "I was so sore after the first visit that I had to go home and take ibuprofen and put heat on it." Pt reports that by the end of the day the soreness resolved and she remained sx free for several days. Pt reports her proximal groin pain came back this morning but 50% improved compared to the initial visit. Says the dry needling helped and hopes to do it again today. Has not tried running yet but says she has been able to walk and go on bike rides with her kids without pain flare ups.   She was compliant with home exercise program.  Response to previous treatment: good   Functional change: resolution of pain for several days after last tx session, not return to running yet    Pain: 2/10  Location: R hip pain/groin    Objective      Objective Measures updated at progress report unless specified.     Treatment     Catie received the " "treatments listed below:      therapeutic exercises to develop strength, endurance, ROM, flexibility, posture, and core stabilization for 00 minutes including:  None today     manual therapy techniques: Joint mobilizations, Manual traction, Myofacial release, and Soft tissue Mobilization were applied to the: leg/hip for 15 minutes, including:  Dry needling to R hip, thigh x 15 min - see note by Cathryn Reynoso, PT     neuromuscular re-education activities to improve: Balance, Coordination, Kinesthetic, Sense, Proprioception, and Posture for 15 minutes. The following activities were included:  SL clams x3x15x medium loop at knees  - reviewed for HEP  SL hip abd (against wall) x3x15 - reviewed for HEP  SL bridge x3x10 - reviewed for HEP  sustained bridge march x3x10 - reviewed for HEP     therapeutic activities to improve functional performance for 30  minutes, including:  +SL squat with slider abd x2x15 x 10# KB  +Side stepping x2x60 ft x medium loop at ankles  +wall clams x2x 10 B  +[place] side plank x3x10" B       Patient Education and Home Exercises       Education provided:   - none today    Written Home Exercises Provided: Pt instructed to continue prior HEP. Exercises were reviewed and Catie was able to demonstrate them prior to the end of the session.  Catie demonstrated good  understanding of the education provided. See Electronic Medical Record under Patient Instructions for exercises provided during therapy sessions    Assessment     Resumed dry needling per pt request due to good improvements from previous session. Progressed functional strength and stability program with mod fatigue but good overall tolerance. Good return technique with HEP indicating good compliance at home.    Catie Is progressing well towards her goals.   Patient prognosis is Good.     Patient will continue to benefit from skilled outpatient physical therapy to address the deficits listed in the problem list box on initial " evaluation, provide pt/family education and to maximize pt's level of independence in the home and community environment.     Patient's spiritual, cultural and educational needs considered and pt agreeable to plan of care and goals.     Anticipated barriers to physical therapy: standard, PMHx AS    Goals:   Short Term Goals (4 Weeks):  1. Pt able to walk >=30 minutes with household chores with <3/10 pain.  2.  Pt able to transfer in/out of chairs of various heights with <3/10 pain.  3. Pt able to ascend/descend curb, independently, 1 handrail, with <3/10 pain.  4. Pt able to sleep a full night without pain disturbance.     Long Term Goals (8 Weeks):  1. Pt able to run >=1 hour with household chores with <1/10 pain.  2.  Pt able to squat/lift 25# from floor to waist with <1/10 pain.  3. Pt able to ascend/descend 1 flight of stairs, independently, 1 handrail, with <1/10 pain.  4. Pt is independent with all bed mobility.  5. Pt able to return to full work / recreational activities with min difficulty and pain <1/10.  6. Pt will be independent with HEP and self management of symptoms.   Plan     Cont with POC for mobility, strength, dynamic stability.  POC ends: 5/12/2025    Cathryn Pearson, PT

## 2025-03-28 ENCOUNTER — CLINICAL SUPPORT (OUTPATIENT)
Dept: REHABILITATION | Facility: OTHER | Age: 40
End: 2025-03-28
Payer: COMMERCIAL

## 2025-03-28 DIAGNOSIS — M25.551 RIGHT HIP PAIN: Primary | ICD-10-CM

## 2025-03-28 DIAGNOSIS — R68.89 DECREASED STRENGTH, ENDURANCE, AND MOBILITY: ICD-10-CM

## 2025-03-28 DIAGNOSIS — Z74.09 DECREASED STRENGTH, ENDURANCE, AND MOBILITY: ICD-10-CM

## 2025-03-28 DIAGNOSIS — M25.651 DECREASED RANGE OF RIGHT HIP MOVEMENT: ICD-10-CM

## 2025-03-28 DIAGNOSIS — S76.219A STRAIN OF ADDUCTOR MUSCLE OF THIGH: ICD-10-CM

## 2025-03-28 DIAGNOSIS — R29.898 WEAKNESS OF RIGHT LOWER EXTREMITY: ICD-10-CM

## 2025-03-28 DIAGNOSIS — M25.551 CHRONIC RIGHT HIP PAIN: ICD-10-CM

## 2025-03-28 DIAGNOSIS — R53.1 DECREASED STRENGTH, ENDURANCE, AND MOBILITY: ICD-10-CM

## 2025-03-28 DIAGNOSIS — G89.29 CHRONIC RIGHT HIP PAIN: ICD-10-CM

## 2025-03-28 PROCEDURE — 97530 THERAPEUTIC ACTIVITIES: CPT | Mod: PN

## 2025-03-28 PROCEDURE — 97112 NEUROMUSCULAR REEDUCATION: CPT | Mod: PN

## 2025-03-28 PROCEDURE — 97140 MANUAL THERAPY 1/> REGIONS: CPT | Mod: PN

## 2025-03-28 NOTE — PROGRESS NOTES
[facility]   Physical Therapy Treatment Note      Name: Catie Nevarez [facility]  Clinic Number: [id number]    Therapy Diagnosis:   Encounter Diagnoses   Name Primary?    Right hip pain Yes    Strain of adductor muscle of thigh     Decreased range of right hip movement     Weakness of right lower extremity     Chronic right hip pain     Decreased strength, endurance, and mobility        Physician: Dick Licona*    Visit Date: 3/28/2025  Physician Orders: PT Eval and Treat     Medical Diagnosis from Referral:   M25.551 (ICD-10-CM) - Pain in right hip,    S76.219A (ICD-10-CM) - Strain of adductor muscle, fascia and tendon of unspecified thigh, initial encounter      Evaluation Date: 3/12/2025  Authorization Period Expiration: 12/31/2025  Plan of Care Expiration: 5/12/2025  Progress Note Due: 4/12/2025  Date of Surgery: n/a  Visit # / Visits authorized: 3/ 1   FOTO: 1/ 3     Precautions: Standard, Osteopenia, Ankylosing Spondylitis      Time In: 9:00  Time Out: 10:00  Total Billable Time: 60 minutes      PTA Visit #: 0/5       Subjective     Patient reports: she was able to jog x 2 mi w/o pain, but noted stiffness and mild soreness afterwards. Feeling really encouraged today.   She was compliant with home exercise program.  Response to previous treatment: good   Functional change: resolution of pain for several days after last tx session, not return to running yet    Pain: 2/10  Location: R hip pain/groin    Objective      Objective Measures updated at progress report unless specified.     Treatment     Catie received the treatments listed below:      therapeutic exercises to develop strength, endurance, ROM, flexibility, posture, and core stabilization for 00 minutes including:  None today     manual therapy techniques: Joint mobilizations, Manual traction, Myofacial release, and Soft tissue Mobilization were applied to the: leg/hip for 23 minutes, including:  Dry needling to R hip,  "thigh x 23 min - see note by Cathryn Reynoso, PT     neuromuscular re-education activities to improve: Balance, Coordination, Kinesthetic, Sense, Proprioception, and Posture for 12 minutes. The following activities were included:  SL clams x3x15 x heavy loop at knees  SL hip abd (against wall) x3x15 - NP  SL bridge x3x10  sustained bridge march x3x10 - NP     therapeutic activities to improve functional performance for 25  minutes, including:  SL squat with slider abd x2x15 x 10# KB  Side stepping x2x60 ft x medium loop at ankles  +monster walks x2x60 ft x medium loop at ankles  wall clams x2x 10 B  [place] side plank x3x10" B       Patient Education and Home Exercises       Education provided:   - none today     Written Home Exercises Provided: Pt instructed to continue prior HEP. Exercises were reviewed and Catie was able to demonstrate them prior to the end of the session.  Catie demonstrated good  understanding of the education provided. See Electronic Medical Record under Patient Instructions for exercises provided during therapy sessions    Assessment     Multiple twitches to prox adductors adjacent to pubic bone attachment today but with good improvements in soft tissue quality by end of session. Appropriate training effect noted with therex program today. Plan to progress nv and add core strengthening.    Catie Is progressing well towards her goals.   Patient prognosis is Good.     Patient will continue to benefit from skilled outpatient physical therapy to address the deficits listed in the problem list box on initial evaluation, provide pt/family education and to maximize pt's level of independence in the home and community environment.     Patient's spiritual, cultural and educational needs considered and pt agreeable to plan of care and goals.     Anticipated barriers to physical therapy: standard, PMHx AS    Goals:   Short Term Goals (4 Weeks):  1. Pt able to walk >=30 minutes with household " chores with <3/10 pain.  2.  Pt able to transfer in/out of chairs of various heights with <3/10 pain.  3. Pt able to ascend/descend curb, independently, 1 handrail, with <3/10 pain.  4. Pt able to sleep a full night without pain disturbance.     Long Term Goals (8 Weeks):  1. Pt able to run >=1 hour with household chores with <1/10 pain.  2.  Pt able to squat/lift 25# from floor to waist with <1/10 pain.  3. Pt able to ascend/descend 1 flight of stairs, independently, 1 handrail, with <1/10 pain.  4. Pt is independent with all bed mobility.  5. Pt able to return to full work / recreational activities with min difficulty and pain <1/10.  6. Pt will be independent with HEP and self management of symptoms.   Plan     Cont with POC for mobility, strength, dynamic stability.  POC ends: 5/12/2025    Cathryn Pearson, PT

## 2025-04-01 NOTE — PROGRESS NOTES
Physical Therapy Functional Dry Needling Note      Date:  3/28/2025    Name: Catie Monge  Clinic Number: [id number]    Therapy Diagnosis:   Encounter Diagnoses   Name Primary?    Right hip pain Yes    Strain of adductor muscle of thigh     Decreased range of right hip movement     Weakness of right lower extremity     Chronic right hip pain     Decreased strength, endurance, and mobility      Physician: Dick Licona*    Total Time:  23 min    Subjective      Pt reports: slow but good improvements in ADD flexibility since beginning needling.   See note by Cathryn Pearson, PT     Pain: 2/10  Location: right hip/groin      Objective      See EMR under MEDIA for written consent provided, signed, dated on 4/1/2025     Palpation Assessment to determine the necessity for Functional Dry Needling - see findings on eval on 3/12/25.     Catie received the following manual therapy techniques: dry needling to adductors/R hip/thigh with 2-3 in needles with no adverse effects.    Homeostatic points:  1. Deep Radial  2. Greater Auricular  3. Spinal Accessory  4. Saphenous  5. Deep Fibular  6. Tibial  7. Greater Occipital  8. Suprascapular ( infraspinatus)  9. Lateral Antebrachial Cutaneous  10. Sural  11. Lateral Popliteal  12. Superficial Radial  13. Dorsal Scapular  14. Superior Cluneal  15. Posterior Cutaneous L 2  16. Inferior Gluteal  17. Lateral Pectoral  18. Ilitotibal  19. Infraorbital  20. Spinous process T7  21. Posterior cutaneous  T6  22. Posterior cutaneous L 5  23. Supraorbital  24. Common fibular    Paravertebral Points:  none    Symptomatic Points:   Pt supine - ADD x 3 (2 AP, 1 ML)  Pt prone - ADD Nicolas x1    Assessment      Patient demonstrated appropriate response to Functional Dry Needling. Large twitch response with needling to prox ADD attachment and ADD nicolas with pt in prone. Winding technique used every 5 minutes for distal needles. Intramuscular estim used at 2 Hz, level 4.0 for 10  "minutes, x 1 channels. good  rhythmical contractions observed with estim to treated muscle groups.   Mod soreness in groin, thigh by end of session, but with normalized amb and squat.        Home Exercises and Patient Education      Education provided:   Purpose, benefits, and potential side effects of dry needling.   Educated pt to use heat following treatment sessions to reduce c/o pain or soreness and to improve circulation to needled sites.   Encouraged pt to continue with HEP to maintain flexibility, ROM, and functional mobility.    Written Handout on response to FDN provided: none provided today.    Catie demonstrated good understanding of the education provided.     See EMR under "Patient Instructions" for exercises provided.    Plan      Monitor response to Functional Dry Needling. Continue with Functional Dry Needling in POC as appropriate.       Cathryn Pearson, PT  4/1/2025                "

## 2025-04-01 NOTE — PROGRESS NOTES
Physical Therapy Functional Dry Needling Note      Date:  3/26/2025    Name: Catie Monge  [facility] Number: [id number]    Therapy Diagnosis:   Encounter Diagnoses   Name Primary?    Right hip pain Yes    Strain of adductor muscle of thigh     Decreased range of right hip movement     Weakness of right lower extremity     Chronic right hip pain     Decreased strength, endurance, and mobility      Physician: Dick Licona*    Total Time:  15 min    Subjective      Pt reports: constant groin pain (R) since yesterday's run but original flare up occurred in January 2025 following a 10-mile run/race. Pain localized at proximal adductors  See note by Cathryn Pearson, PT     Pain: 3/10  Location: right hip/groin      Objective      See EMR under MEDIA for written consent provided, signed, dated on 4/1/2025     Palpation Assessment to determine the necessity for Functional Dry Needling - see findings on eval on 3/12/25.     Catie received the following manual therapy techniques: dry needling to adductors/R hip/thigh with 2-3 in needles with no adverse effects.    Homeostatic points:  1. Deep Radial  2. Greater Auricular  3. Spinal Accessory  4. Saphenous  5. Deep Fibular  6. Tibial  7. Greater Occipital  8. Suprascapular ( infraspinatus)  9. Lateral Antebrachial Cutaneous  10. Sural  11. Lateral Popliteal  12. Superficial Radial  13. Dorsal Scapular  14. Superior Cluneal  15. Posterior Cutaneous L 2  16. Inferior Gluteal  17. Lateral Pectoral  18. Ilitotibal  19. Infraorbital  20. Spinous process T7  21. Posterior cutaneous  T6  22. Posterior cutaneous L 5  23. Supraorbital  24. Common fibular    Paravertebral Points:  none    Symptomatic Points:   Pt supine - ADD x 3 (2 AP, 1 ML)  Pt prone - ADD Nicolas x1    Assessment      Patient demonstrated appropriate response to Functional Dry Needling. Large twitch response with needling to prox ADD attachment and ADD nicolas with pt in prone. Winding technique used  "every 5 minutes for distal needles. Intramuscular estim used at 2 Hz, level 4.0 for 10 minutes, x 1 channels. good  rhythmical contractions observed with estim to treated muscle groups.   Mod soreness in groin, thigh by end of session, but with normalized amb and squat.        Home Exercises and Patient Education      Education provided:   Purpose, benefits, and potential side effects of dry needling.   Educated pt to use heat following treatment sessions to reduce c/o pain or soreness and to improve circulation to needled sites.   Encouraged pt to continue with HEP to maintain flexibility, ROM, and functional mobility.    Written Handout on response to FDN provided: none provided today.    Catie demonstrated good understanding of the education provided.     See EMR under "Patient Instructions" for exercises provided.    Plan      Monitor response to Functional Dry Needling. Continue with Functional Dry Needling in POC as appropriate.       Cathryn Pearson, PT  4/1/2025              "

## 2025-04-07 ENCOUNTER — PATIENT MESSAGE (OUTPATIENT)
Dept: OPTOMETRY | Facility: CLINIC | Age: 40
End: 2025-04-07
Payer: COMMERCIAL

## 2025-04-09 ENCOUNTER — CLINICAL SUPPORT (OUTPATIENT)
Dept: REHABILITATION | Facility: OTHER | Age: 40
End: 2025-04-09
Payer: COMMERCIAL

## 2025-04-09 DIAGNOSIS — R53.1 DECREASED STRENGTH, ENDURANCE, AND MOBILITY: ICD-10-CM

## 2025-04-09 DIAGNOSIS — S76.219A STRAIN OF ADDUCTOR MUSCLE OF THIGH: ICD-10-CM

## 2025-04-09 DIAGNOSIS — Z74.09 DECREASED STRENGTH, ENDURANCE, AND MOBILITY: ICD-10-CM

## 2025-04-09 DIAGNOSIS — M25.651 DECREASED RANGE OF RIGHT HIP MOVEMENT: ICD-10-CM

## 2025-04-09 DIAGNOSIS — G89.29 CHRONIC RIGHT HIP PAIN: ICD-10-CM

## 2025-04-09 DIAGNOSIS — M25.551 RIGHT HIP PAIN: Primary | ICD-10-CM

## 2025-04-09 DIAGNOSIS — R68.89 DECREASED STRENGTH, ENDURANCE, AND MOBILITY: ICD-10-CM

## 2025-04-09 DIAGNOSIS — M25.551 CHRONIC RIGHT HIP PAIN: ICD-10-CM

## 2025-04-09 DIAGNOSIS — R29.898 WEAKNESS OF RIGHT LOWER EXTREMITY: ICD-10-CM

## 2025-04-09 PROCEDURE — 97112 NEUROMUSCULAR REEDUCATION: CPT | Mod: PN

## 2025-04-09 PROCEDURE — 97530 THERAPEUTIC ACTIVITIES: CPT | Mod: PN

## 2025-04-09 PROCEDURE — 97140 MANUAL THERAPY 1/> REGIONS: CPT | Mod: PN

## 2025-04-09 NOTE — PROGRESS NOTES
[facility]   Physical Therapy Treatment Note      Name: Catie Nevarez [facility]  Clinic Number: [id number]    Therapy Diagnosis:   Encounter Diagnoses   Name Primary?    Right hip pain Yes    Strain of adductor muscle of thigh     Decreased range of right hip movement     Weakness of right lower extremity     Chronic right hip pain     Decreased strength, endurance, and mobility          Physician: Dick Licona*    Visit Date: 4/9/2025  Physician Orders: PT Eval and Treat     Medical Diagnosis from Referral:   M25.551 (ICD-10-CM) - Pain in right hip,    S76.219A (ICD-10-CM) - Strain of adductor muscle, fascia and tendon of unspecified thigh, initial encounter      Evaluation Date: 3/12/2025  Authorization Period Expiration: 12/31/2025  Plan of Care Expiration: 5/12/2025  Progress Note Due: 4/12/2025  Date of Surgery: n/a  Visit # / Visits authorized: 3/ 1   FOTO: 1/ 3     Precautions: Standard, Osteopenia, Ankylosing Spondylitis      Time In: 9:15  Time Out: 10:15  Total Billable Time: 60 minutes      PTA Visit #: 0/5       Subjective     Patient reports: inconsistent with running but says that she is able to run 1-2 mi at a time with soreness afterwards.   She was compliant with home exercise program.  Response to previous treatment: good   Functional change: resolution of pain for several days after last tx session, not return to running yet    Pain: 2/10  Location: R hip pain/groin    Objective      Objective Measures updated at progress report unless specified.     Treatment     Catie received the treatments listed below:      therapeutic exercises to develop strength, endurance, ROM, flexibility, posture, and core stabilization for 00 minutes including:  None today     manual therapy techniques: Joint mobilizations, Manual traction, Myofacial release, and Soft tissue Mobilization were applied to the: leg/hip for 23 minutes, including:  Dry needling to R hip, thigh x 23 min -  "see note by Cathryn Reynoso, PT     neuromuscular re-education activities to improve: Balance, Coordination, Kinesthetic, Sense, Proprioception, and Posture for 12 minutes. The following activities were included:  SL clams x3x15 x heavy loop at knees  SL hip abd (against wall) x3x15 - NP  SL bridge x3x10  sustained bridge march x3x10 - NP     therapeutic activities to improve functional performance for 25  minutes, including:  SL squat with slider abd x2x15 x 10# KB  Side stepping x2x60 ft x medium loop at ankles  +monster walks x2x60 ft x medium loop at ankles  wall clams x2x 10 B  [place] side plank x3x10" B  +glute burners x 2x15 x medium loop at ankles       Patient Education and Home Exercises       Education provided:   - none today     Written Home Exercises Provided: Pt instructed to continue prior HEP. Exercises were reviewed and Catie was able to demonstrate them prior to the end of the session.  Catie demonstrated good  understanding of the education provided. See Electronic Medical Record under Patient Instructions for exercises provided during therapy sessions    Assessment     Multiple twitches to prox adductors adjacent to pubic bone attachment today but with good improvements in soft tissue quality by end of session. Appropriate training effect noted with therex program today. Plan to progress nv and add core strengthening.    Catie Is progressing well towards her goals.   Patient prognosis is Good.     Patient will continue to benefit from skilled outpatient physical therapy to address the deficits listed in the problem list box on initial evaluation, provide pt/family education and to maximize pt's level of independence in the home and community environment.     Patient's spiritual, cultural and educational needs considered and pt agreeable to plan of care and goals.     Anticipated barriers to physical therapy: standard, PMHx AS    Goals:   Short Term Goals (4 Weeks):  1. Pt able to walk " >=30 minutes with household chores with <3/10 pain.  2.  Pt able to transfer in/out of chairs of various heights with <3/10 pain.  3. Pt able to ascend/descend curb, independently, 1 handrail, with <3/10 pain.  4. Pt able to sleep a full night without pain disturbance.     Long Term Goals (8 Weeks):  1. Pt able to run >=1 hour with household chores with <1/10 pain.  2.  Pt able to squat/lift 25# from floor to waist with <1/10 pain.  3. Pt able to ascend/descend 1 flight of stairs, independently, 1 handrail, with <1/10 pain.  4. Pt is independent with all bed mobility.  5. Pt able to return to full work / recreational activities with min difficulty and pain <1/10.  6. Pt will be independent with HEP and self management of symptoms.   Plan     Cont with POC for mobility, strength, dynamic stability.  POC ends: 5/12/2025    Cathryn Pearson, PT

## 2025-04-14 ENCOUNTER — PATIENT MESSAGE (OUTPATIENT)
Dept: ADMINISTRATIVE | Facility: OTHER | Age: 40
End: 2025-04-14
Payer: COMMERCIAL

## 2025-04-22 DIAGNOSIS — F41.8 DEPRESSION WITH ANXIETY: ICD-10-CM

## 2025-04-22 RX ORDER — SERTRALINE HYDROCHLORIDE 25 MG/1
25 TABLET, FILM COATED ORAL DAILY
Qty: 90 TABLET | Refills: 0 | Status: SHIPPED | OUTPATIENT
Start: 2025-04-22

## 2025-04-22 NOTE — TELEPHONE ENCOUNTER
Care Due:                  Date            Visit Type   Department     Provider  --------------------------------------------------------------------------------                                ESTABLISHED                              PATIENT -    [facility]  June Ferraro  Last Visit: 04-      [facility]  Next Visit: None Scheduled  None         None Found                                                            Last  Test          Frequency    Reason                     Performed    Due Date  --------------------------------------------------------------------------------    Office Visit  15 months..  buPROPion, sertraline....  04- 07-    [facility] Embedded Care Due Messages. Reference number: [id number].   4/22/2025 10:48:50 AM CDT

## 2025-04-22 NOTE — TELEPHONE ENCOUNTER
Provider Staff:  Action required for this patient    Requires appointment      Please see care gap opportunities below in Care Due Message.    Thanks!  [facility]     Appointments      Date Provider   Last Visit   4/11/2024 June Christianson MD   Next Visit   Visit date not found June Christianson MD     Refill Decision Note   Catie Monge  is requesting a refill authorization.  Brief Assessment and Rationale for Refill:  Approve     Medication Therapy Plan:         Extended chart review required: Yes   Comments:     Note composed:5:08 PM 04/22/2025

## 2025-04-25 ENCOUNTER — OFFICE VISIT (OUTPATIENT)
Dept: OPTOMETRY | Facility: CLINIC | Age: 40
End: 2025-04-25
Payer: COMMERCIAL

## 2025-04-25 DIAGNOSIS — F41.8 DEPRESSION WITH ANXIETY: ICD-10-CM

## 2025-04-25 DIAGNOSIS — M45.9 ANKYLOSING SPONDYLITIS, UNSPECIFIED SITE OF SPINE: ICD-10-CM

## 2025-04-25 DIAGNOSIS — H52.13 MYOPIA, BILATERAL: Primary | ICD-10-CM

## 2025-04-25 DIAGNOSIS — Z46.0 FITTING AND ADJUSTMENT OF SPECTACLES AND CONTACT LENSES: ICD-10-CM

## 2025-04-25 DIAGNOSIS — Z46.0 FITTING AND ADJUSTMENT OF SPECTACLES AND CONTACT LENSES: Primary | ICD-10-CM

## 2025-04-25 DIAGNOSIS — H04.123 DRY EYE SYNDROME, BILATERAL: ICD-10-CM

## 2025-04-25 DIAGNOSIS — Z97.3 WEARS CONTACT LENSES: ICD-10-CM

## 2025-04-25 PROCEDURE — 99999 PR PBB SHADOW E&M-EST. PATIENT-LVL III: CPT | Mod: PBBFAC,,, | Performed by: OPTOMETRIST

## 2025-04-25 RX ORDER — BUPROPION HYDROCHLORIDE 300 MG/1
300 TABLET ORAL DAILY
Qty: 30 TABLET | Refills: 11 | Status: SHIPPED | OUTPATIENT
Start: 2025-04-25 | End: 2026-04-25

## 2025-04-25 NOTE — PROGRESS NOTES
Contact lens exam done, see exam of same date for documentation.    I had the pleasure of seeing Shelly in the preop area to discuss her right ureteral stone, symptomatic last week and stented.  She has toelrated stent adequately without signs of infection.  Here today for ureteroscopic stone management.  Risks, benefits, alterantives reviewed.  Her preference is to have no stent at the end of the procedure.

## 2025-04-25 NOTE — PROGRESS NOTES
HPI    JUSTIN: 1/5/2024 - Dr. Caputo     CC: Pt is here today for a routine eye exam with cl fit. Pt states that   her vision has been stable since her last exam.     (+)Dryness  (-)Burning  (-)Itchiness  (-)Tearing  (-)Flashes  (-)Floaters   (-)Photophobia  (-)Eye Pain      Diabetic: no    Contact Lens: yes                           Sleep in CL?: no                            Daily wear time: 10hrs                           Days worn before discarded: 1 day                           Solution: none    Eye Meds:   Restasis PRN     PD: 61.0   Last edited by Marva Lu MA on 4/25/2025  8:58 AM.            Assessment /Plan     For exam results, see Encounter Report.      Myopia, bilateral  Fitting and adjustment of spectacles and contact lenses  Wears contact lenses               Change power OU               Gave trials of new Rx in my day toric               Remove nightly, replace daily               Ok to order if happy with vision and comfort OU     White without pressure of peripheral retina of both eyes  Disease ruled out after examination   Good overall ocular health, monitor yearly with DFE     Ankylosing spondylitis, unspecified site of spine  Dry eye syndrome, bilateral  -  Continue with   cycloSPORINE (RESTASIS) 0.05 % ophthalmic emulsion; Place 1 drop into both eyes 2 (two) times daily.  Dispense: 180 each; Refill: 3    RTC 1 year

## 2025-04-25 NOTE — TELEPHONE ENCOUNTER
No care due was identified.  [facility] Embedded Care Due Messages. Reference number: [id number].   4/25/2025 10:34:29 AM CDT

## 2025-05-01 ENCOUNTER — PATIENT MESSAGE (OUTPATIENT)
Dept: REHABILITATION | Facility: OTHER | Age: 40
End: 2025-05-01
Payer: COMMERCIAL

## 2025-05-05 ENCOUNTER — CLINICAL SUPPORT (OUTPATIENT)
Dept: REHABILITATION | Facility: OTHER | Age: 40
End: 2025-05-05
Payer: COMMERCIAL

## 2025-05-05 DIAGNOSIS — M25.551 RIGHT HIP PAIN: Primary | ICD-10-CM

## 2025-05-05 DIAGNOSIS — R29.898 WEAKNESS OF RIGHT LOWER EXTREMITY: ICD-10-CM

## 2025-05-05 DIAGNOSIS — S76.219A STRAIN OF ADDUCTOR MUSCLE OF THIGH: ICD-10-CM

## 2025-05-05 DIAGNOSIS — R68.89 DECREASED STRENGTH, ENDURANCE, AND MOBILITY: ICD-10-CM

## 2025-05-05 DIAGNOSIS — M25.651 DECREASED RANGE OF RIGHT HIP MOVEMENT: ICD-10-CM

## 2025-05-05 DIAGNOSIS — R53.1 DECREASED STRENGTH, ENDURANCE, AND MOBILITY: ICD-10-CM

## 2025-05-05 DIAGNOSIS — Z74.09 DECREASED STRENGTH, ENDURANCE, AND MOBILITY: ICD-10-CM

## 2025-05-05 DIAGNOSIS — M25.551 CHRONIC RIGHT HIP PAIN: ICD-10-CM

## 2025-05-05 DIAGNOSIS — G89.29 CHRONIC RIGHT HIP PAIN: ICD-10-CM

## 2025-05-05 PROCEDURE — 97140 MANUAL THERAPY 1/> REGIONS: CPT | Mod: PN

## 2025-05-05 PROCEDURE — 97530 THERAPEUTIC ACTIVITIES: CPT | Mod: PN

## 2025-05-12 DIAGNOSIS — M46.1 SACROILIITIS: ICD-10-CM

## 2025-05-13 ENCOUNTER — PATIENT MESSAGE (OUTPATIENT)
Dept: RHEUMATOLOGY | Facility: CLINIC | Age: 40
End: 2025-05-13
Payer: COMMERCIAL

## 2025-05-13 RX ORDER — ADALIMUMAB 40MG/0.4ML
40 KIT SUBCUTANEOUS
Qty: 6 PEN | Refills: 1 | Status: ACTIVE | OUTPATIENT
Start: 2025-05-13 | End: 2026-04-14

## 2025-05-13 NOTE — PROGRESS NOTES
[facility]   Physical Therapy Treatment Note      Name: Catie Monge  Clinic Number: [id number]    Therapy Diagnosis:   Encounter Diagnoses   Name Primary?    Right hip pain Yes    Strain of adductor muscle of thigh     Decreased range of right hip movement     Weakness of right lower extremity     Chronic right hip pain     Decreased strength, endurance, and mobility          Physician: Dick Licona*    Visit Date: 5/5/2025  Physician Orders: PT Eval and Treat     Medical Diagnosis from Referral:   M25.551 (ICD-10-CM) - Pain in right hip,    S76.219A (ICD-10-CM) - Strain of adductor muscle, fascia and tendon of unspecified thigh, initial encounter      Evaluation Date: 3/12/2025  Authorization Period Expiration: 12/31/2025  Plan of Care Expiration: 5/12/2025  Progress Note Due: 5/12/2025  Date of Surgery: n/a  Visit # / Visits authorized: 4/ 1   FOTO: 1/ 3     Precautions: Standard, Osteopenia, Ankylosing Spondylitis      Time In: 10:15  Time Out: 11:15  Total Billable Time: 60 minutes      PTA Visit #: 0/5       Subjective     Patient reports: overall is feeling better. Able to run several miles at a time now without increased pain. Started having medial groin tension over the weekend and wanted to come in for a check in. Stiffness in anterior hip today, but has been feeling good overall. Min pain with squatting, stairs, short periods of running, and playing with kids at home. Getting in/out of bed or the car is now painfree. Stepping out of the shower is stiff today but has been good prior to this weekend. Has not started marathon training officially yet but hopes to start soon.    She was compliant with home exercise program.  Response to previous treatment: good   Functional change: resolution of pain for several days after last tx session    Pain: <1/10  Location: R hip pain/groin    Objective      Objective Measures updated at progress report unless specified.  "    Updated 5/5/2025    Palpation: mild-mod TTP prox adductors     Trunk AROM: WFL 2* Hx AS  Trunk strength:               Extensors = fair (rev Hyperext x 30")              Abdominals = TA good but with s/s instability with ASLR (fair plank, mod side plank)     Hip   Right     Left   Pain/Dysfunction with Movement     AROM PROM MMT AROM PROM MMT     Flexion 100 120 4+ 100 120 5     Extension - knee ext 0 10 4+ 0 10 5     Extension - knee flex - - 4 - - 4+     Abduction WFL - 4 WFL - 4+     Adduction Past midline - 4 Past midline - 5     Internal rotation 40 40 4 40 40 5     External rotation 40 40 4+ 40 40 5           Lateral Step Down Test      Scoring for Lateral Step-Down Test - (+) one point when compensation observed.  Criterion Interpretation Score   Arm strategy Removes hands from waist 1   Trunk alignment Leaning in any direction 1   Pelvic plane Loss of horizontal plane 1   Knee posture Tibial tuberosity medial to 2nd toe 1     Tibial tuberosity medial to medial border of foot 2   Steady Stepping down on non-tested limb, or wavering from side to side 1      8" Box on Involved Limb x 5 reps   Right: 5/5  Left: 6/5       Assess next visit::::  Intake Outcome Measure for FOTO hip Survey     Therapist reviewed FOTO scores for Catie Monge on 3/12/2025.   FOTO report - see Media section or FOTO account episode details.     HOOS Jr = 77%              Treatment     Catie received the treatments listed below:      therapeutic exercises to develop strength, endurance, ROM, flexibility, posture, and core stabilization for 00 minutes including:  None today     manual therapy techniques: Joint mobilizations, Manual traction, Myofacial release, and Soft tissue Mobilization were applied to the: leg/hip for 22 minutes, including:  Dry needling to R hip, thigh x 22 min - see note by Cathryn Reynoso, PT     neuromuscular re-education activities to improve: Balance, Coordination, Kinesthetic, Sense, Proprioception, " "and Posture for 00 minutes. The following activities were included:  Bear plank   SL clams x3x15 x heavy loop at knees  SL hip abd (against wall) x3x15 - NP  SL bridge x3x10  sustained bridge march x3x10 - NP     therapeutic activities to improve functional performance for 38  minutes, including:  Reassessment  Reviewed return to running protocol - see pt media for file/HEP  SL squat with slider abd x2x15 x 10# KB  Side stepping x2x60 ft x medium loop at ankles  monster walks x2x60 ft x medium loop at ankles  wall clams x2x 10 B  Budanhagen side plank x3x10" B  glute burners x 2x15 x medium loop at ankles  +wall deadlifts with inside leg stabilized x2x10 x 10# KB    Patient Education and Home Exercises       Education provided:   - none today     Written Home Exercises Provided: Pt instructed to continue prior HEP. Exercises were reviewed and Catie was able to demonstrate them prior to the end of the session.  Catie demonstrated good  understanding of the education provided. See Electronic Medical Record under Patient Instructions for exercises provided during therapy sessions    Assessment     Monthly reassessment performed today. Pt has been absent from PT clinic x 4 weeks, but reports marked improvement in tolerance with ADLs overall, despite recent flare up over the weekend. Stiffness in groin with progression of running. Good improvements in strength overall, but continues to lack sufficient core, multidirectional pelvic strength, motor control/coordination (seen with lateral step down test) that might increase overuse of adductors/flexors with progression of running program. Reviewed return to running, importance of compliance/consistency with cross training, and slow re-introduction to running as part of marathon training. Good improvements overall. Plan to update POC next visit.    Catie Is progressing well towards her goals.   Patient prognosis is Good.     Patient will continue to benefit from skilled " outpatient physical therapy to address the deficits listed in the problem list box on initial evaluation, provide pt/family education and to maximize pt's level of independence in the home and community environment.     Patient's spiritual, cultural and educational needs considered and pt agreeable to plan of care and goals.     Anticipated barriers to physical therapy: standard, PMHx AS    Goals:   Short Term Goals (4 Weeks):  1. Pt able to walk >=30 minutes with household chores with <3/10 pain.  2.  Pt able to transfer in/out of chairs of various heights with <3/10 pain.  3. Pt able to ascend/descend curb, independently, 1 handrail, with <3/10 pain.  4. Pt able to sleep a full night without pain disturbance.     Long Term Goals (8 Weeks):  1. Pt able to run >=1 hour with household chores with <1/10 pain.  2.  Pt able to squat/lift 25# from floor to waist with <1/10 pain.  3. Pt able to ascend/descend 1 flight of stairs, independently, 1 handrail, with <1/10 pain.  4. Pt is independent with all bed mobility.  5. Pt able to return to full work / recreational activities with min difficulty and pain <1/10.  6. Pt will be independent with HEP and self management of symptoms.   Plan     Cont with POC for mobility, strength, dynamic stability.  POC ends: 5/12/2025    Cathryn Pearson, PT

## 2025-05-13 NOTE — PROGRESS NOTES
Physical Therapy Functional Dry Needling Note      Date:  5/5/2025    Name: Catie Monge  Clinic Number: [id number]    Therapy Diagnosis:   Encounter Diagnoses   Name Primary?    Right hip pain Yes    Strain of adductor muscle of thigh     Decreased range of right hip movement     Weakness of right lower extremity     Chronic right hip pain     Decreased strength, endurance, and mobility      Physician: Dick Licona*    Total Time:  22 min    Subjective      Pt reports: slow but good improvements in ADD flexibility since beginning needling.   See note by Cathryn Pearson, PT     Pain: 2/10  Location: right hip/groin      Objective      See EMR under MEDIA for written consent provided, signed, dated on 5/13/2025     Palpation Assessment to determine the necessity for Functional Dry Needling - see findings on eval on 3/12/25.     Catie received the following manual therapy techniques: dry needling to adductors/R hip/thigh with 2-3 in needles with no adverse effects.    Homeostatic points:  1. Deep Radial  2. Greater Auricular  3. Spinal Accessory  4. Saphenous  5. Deep Fibular  6. Tibial  7. Greater Occipital  8. Suprascapular ( infraspinatus)  9. Lateral Antebrachial Cutaneous  10. Sural  11. Lateral Popliteal  12. Superficial Radial  13. Dorsal Scapular  14. Superior Cluneal  15. Posterior Cutaneous L 2  16. Inferior Gluteal  17. Lateral Pectoral  18. Ilitotibal  19. Infraorbital  20. Spinous process T7  21. Posterior cutaneous  T6  22. Posterior cutaneous L 5  23. Supraorbital  24. Common fibular    Paravertebral Points:  none    Symptomatic Points:   Pt supine - ADD x 3 (2 AP, 1 ML)  Pt prone - ADD Nicolas x1    Assessment      Patient demonstrated appropriate response to Functional Dry Needling. Large twitch response with needling to prox ADD attachment and ADD nicolas with pt in prone. Winding technique used every 5 minutes for distal needles. Intramuscular estim used at 2 Hz, level 4.0 for 10  "minutes, x 1 channels. good  rhythmical contractions observed with estim to treated muscle groups.   Mod soreness in groin, thigh by end of session, but with normalized amb and squat.        Home Exercises and Patient Education      Education provided:   Purpose, benefits, and potential side effects of dry needling.   Educated pt to use heat following treatment sessions to reduce c/o pain or soreness and to improve circulation to needled sites.   Encouraged pt to continue with HEP to maintain flexibility, ROM, and functional mobility.    Written Handout on response to FDN provided: none provided today.    Catie demonstrated good understanding of the education provided.     See EMR under "Patient Instructions" for exercises provided.    Plan      Monitor response to Functional Dry Needling. Continue with Functional Dry Needling in POC as appropriate.       Cathryn Pearson, PT  5/13/2025                  "

## 2025-05-13 NOTE — PATIENT INSTRUCTIONS
Below are the Physical Therapist's goals that indicate, when met and are painfree, a patient is appropriate to return to running:          Below is a general, comprehensive guide for patients that are interested in return to running and where to begin their [id number] journey:                          Below is an excellent exercise guide for runners that includes what to incorporate for general strength maintenance and injury prevention, great DYNAMIC warm up ideas to perform prior to training runs or running events (vs. STATIC stretches that are more often recommended after a run to elongate an already warmed up muscle group), and progression of drills to eventually improve footwork, speed/timing, distance, and also injury prevention:

## 2025-06-05 ENCOUNTER — PATIENT MESSAGE (OUTPATIENT)
Dept: ADMINISTRATIVE | Facility: OTHER | Age: 40
End: 2025-06-05
Payer: COMMERCIAL

## 2025-06-24 ENCOUNTER — OFFICE VISIT (OUTPATIENT)
Dept: RHEUMATOLOGY | Facility: CLINIC | Age: 40
End: 2025-06-24
Payer: COMMERCIAL

## 2025-06-24 VITALS
HEIGHT: 69 IN | DIASTOLIC BLOOD PRESSURE: 79 MMHG | WEIGHT: 177.25 LBS | HEART RATE: 79 BPM | BODY MASS INDEX: 26.25 KG/M2 | SYSTOLIC BLOOD PRESSURE: 117 MMHG

## 2025-06-24 DIAGNOSIS — M46.1 SACROILIITIS: ICD-10-CM

## 2025-06-24 DIAGNOSIS — E83.51 HYPOCALCEMIA: Primary | ICD-10-CM

## 2025-06-24 PROCEDURE — 3078F DIAST BP <80 MM HG: CPT | Mod: CPTII,S$GLB,, | Performed by: INTERNAL MEDICINE

## 2025-06-24 PROCEDURE — 99999 PR PBB SHADOW E&M-EST. PATIENT-LVL III: CPT | Mod: PBBFAC,,, | Performed by: INTERNAL MEDICINE

## 2025-06-24 PROCEDURE — 99214 OFFICE O/P EST MOD 30 MIN: CPT | Mod: S$GLB,,, | Performed by: INTERNAL MEDICINE

## 2025-06-24 PROCEDURE — 1159F MED LIST DOCD IN RCRD: CPT | Mod: CPTII,S$GLB,, | Performed by: INTERNAL MEDICINE

## 2025-06-24 PROCEDURE — 3008F BODY MASS INDEX DOCD: CPT | Mod: CPTII,S$GLB,, | Performed by: INTERNAL MEDICINE

## 2025-06-24 PROCEDURE — 3074F SYST BP LT 130 MM HG: CPT | Mod: CPTII,S$GLB,, | Performed by: INTERNAL MEDICINE

## 2025-06-24 RX ORDER — ADALIMUMAB 40MG/0.4ML
40 KIT SUBCUTANEOUS
Qty: 2 PEN | Refills: 5 | Status: ACTIVE | OUTPATIENT
Start: 2025-06-24 | End: 2026-05-26

## 2025-06-24 ASSESSMENT — ANKYLOSING SPONDYLITIS DISEASE ACTIVITY SCORE (ASDAS-CRP)
MORNING_STIFFNESS: 1
GLOBAL_ACTIVITY: 0
PAIN_SWELLING: 1
NBH_PAIN: 2

## 2025-06-24 ASSESSMENT — ROUTINE ASSESSMENT OF PATIENT INDEX DATA (RAPID3)
TOTAL RAPID3 SCORE: 0
AM STIFFNESS SCORE: 0, NO
PATIENT GLOBAL ASSESSMENT SCORE: 0
PAIN SCORE: 0
MDHAQ FUNCTION SCORE: 0
PSYCHOLOGICAL DISTRESS SCORE: 0
FATIGUE SCORE: 0

## 2025-06-24 NOTE — PROGRESS NOTES
6/23/2025     1:39 PM   Rapid3 Question Responses and Scores   MDHAQ Score 0   Psychologic Score 0   Pain Score 0   When you awakened in the morning OVER THE LAST WEEK, did you feel stiff? No   Fatigue Score 0   Global Health Score 0   RAPID3 Score 0    Answers submitted by the patient for this visit:  Rheumatology Questionnaire (Submitted on 6/23/2025)  fever: No  eye redness: No  mouth sores: No  headaches: No  shortness of breath: No  chest pain: No  trouble swallowing: No  diarrhea: No  constipation: No  unexpected weight change: No  genital sore: No  dysuria: No  During the last 3 days, have you had a skin rash?: No  Bruises or bleeds easily: No  cough: No

## 2025-06-24 NOTE — PROGRESS NOTES
Subjective:      Patient ID: Catie Monge is a 39 y.o. female.    Chief Complaint: nr-SpA    HPI Feels great ever since starting Humira 40mg sc q 14 days  Brief am stiffness. Back does not awaken from sleep  No uveitis, change in bowel habits, diarrhea, cramps, blood in stool  No rash/psoriasis  No enthesitis  Runs, spins  Some neck pain with rotation, but just started, thinks she just slept wrong   Review of Systems   Constitutional:  Negative for fever and unexpected weight change.   HENT:  Negative for mouth sores and trouble swallowing.    Eyes:  Negative for redness.   Respiratory:  Negative for cough and shortness of breath.    Cardiovascular:  Negative for chest pain.   Gastrointestinal:  Negative for constipation and diarrhea.   Genitourinary:  Negative for dysuria and genital sores.   Skin:  Negative for rash.   Neurological:  Negative for headaches.   Hematological:  Does not bruise/bleed easily.        Objective:   There were no vitals taken for this visit.  Physical Exam  Schober's: 4.5 cm  Lumbar lateral flexion nl  Lumbar extension nl  Fabere neg bilat  ASIS neg  Chest expansion 8 cm  Neck ROM mild decrease lateral flexion and rotation    12/11/2023 3/27/2024   Tender (FERRER-28) 0 / 28  0 / 28    Swollen (FERRER-28) 0 / 28  0 / 28    Provider Global -- --   Patient Global 20 / 100 20 / 100   ESR 3 mm/hr 5 mm/hr   CRP 1.3 mg/L 0.6 mg/L   FERRER-28 (ESR) 1.05 (Remission) 1.41 (Remission)   FERRER-28 (CRP) 1.54 (Remission) 1.41 (Remission)   CDAI Score -- --         Latest Reference Range & Units 03/03/25 20:20   WBC 3.90 - 12.70 K/uL 9.16   RBC 4.00 - 5.40 M/uL 4.42   Hemoglobin 12.0 - 16.0 g/dL 13.6   Hematocrit 37.0 - 48.5 % 40.9   MCV 82 - 98 fL 93   MCH 27.0 - 31.0 pg 30.8   MCHC 32.0 - 36.0 g/dL 33.3   RDW 11.5 - 14.5 % 12.3   Platelet Count 150 - 450 K/uL 282   MPV 9.2 - 12.9 fL 8.9 (L)   Gran % 38.0 - 73.0 % 84.7 (H)   Lymph % 18.0 - 48.0 % 4.7 (L)   Mono % 4.0 - 15.0 % 8.7   Eos % 0.0 - 8.0 % 1.3    Basophil % 0.0 - 1.9 % 0.2   Immature Granulocytes 0.0 - 0.5 % 0.4   Gran # (ANC) 1.8 - 7.7 K/uL 7.8 (H)   Lymph # 1.0 - 4.8 K/uL 0.4 (L)   Mono # 0.3 - 1.0 K/uL 0.8   Eos # 0.0 - 0.5 K/uL 0.1   Baso # 0.00 - 0.20 K/uL 0.02   Immature Grans (Abs) 0.00 - 0.04 K/uL 0.04   nRBC 0 /100 WBC 0   Differential Method  Automated   Sodium 136 - 145 mmol/L 137   Potassium 3.5 - 5.1 mmol/L 3.8   Chloride 95 - 110 mmol/L 108   CO2 23 - 29 mmol/L 20 (L)   Anion Gap 8 - 16 mmol/L 9   BUN 6 - 20 mg/dL 10   Creatinine 0.5 - 1.4 mg/dL 0.8   eGFR >60 mL/min/1.73 m^2 >60   Glucose 70 - 110 mg/dL 120 (H)   Calcium 8.7 - 10.5 mg/dL 8.6 (L)   ALP 40 - 150 U/L 33 (L)   PROTEIN TOTAL 6.0 - 8.4 g/dL 7.2   Albumin 3.5 - 5.2 g/dL 3.6   BILIRUBIN TOTAL 0.1 - 1.0 mg/dL 0.8   AST 10 - 40 U/L 26   ALT 10 - 44 U/L 23   Lipase 4 - 60 U/L 30   (L): Data is abnormally low  (H): Data is abnormally high    Latest Reference Range & Units 12/18/23 09:25   Hepatitis A Antibody IgG   Reactive   Hep B Core Total Ab Non-reactive  Non-reactive   Hep B S Ab mIU/mL  -  31.67  Reactive   Hepatitis B Surface Ag Non-reactive  Non-reactive   Hepatitis C Ab Non-reactive  Non-reactive   Mitogen - Nil IU/mL 9.973   NIL IU/mL 0.16596   TB Gold Plus Negative  Negative   TB1 - Nil IU/mL 0.004   TB2 - Nil IU/mL 0.003   HIV 1/2 Ag/Ab Non-reactive  Non-reactive        Latest Reference Range & Units 03/27/24 09:51   Sed Rate 0 - 36 mm/Hr 5        Latest Reference Range & Units 03/27/24 09:51   CRP 0.0 - 8.2 mg/L 0.6        12/11/23 08:27   HLA B27 Result Negative   EXAMINATION:  MRI LUMBAR SPINE W WO CONTRAST; MRI SACRUM/COCCYX (BONY) W WO CONTRAST     CLINICAL HISTORY:  Low back pain, symptoms persist with > 6wks conservative treatment;; suspect disk herniation L5/S1;  Dorsalgia, unspecified     TECHNIQUE:  MRI of the lumbar spine and sacrum/coccyx before and after intravenous administration of 8 mL Gadavist.     COMPARISON:  None     FINDINGS:  Lumbar spine:      ALIGNMENT: Normal.     BONE: No compression fractures.  No marrow replacing lesions.     JOINT: Intervertebral discs are well hydrated without height loss.  Facet joints are unremarkable.  Mild edema in the posterior aspect of the S1 superior endplate.     SPINAL CANAL: The conus medullaris has a normal appearance and terminates at the L1-2 level.  Cauda equina nerve roots are unremarkable.  No mass or collection. No abnormal enhancement.     PARASPINAL SOFT TISSUES: Unremarkable.     SIGNIFICANT FINDINGS BY LEVEL:     T12-L1 through L4-5: Unremarkable.     L5-S1: Small central protrusion, encroaching upon the right descending S1 nerve root (13:42).  No foraminal stenosis.     Sacrum/coccyx:     SACROILIAC JOINTS: There is subchondral bone marrow edema and enhancement involving the iliac side of the right sacroiliac joint (04:11).  No joint effusion, synovitis, or capsulitis. No erosions or ankylosis.     OTHER JOINTS: Visualized hip joints are unremarkable.     BONE: No fracture, osteonecrosis, or marrow replacing lesion.     TENDONS: Regional tendons are intact.  No bursal collection.     SOFT TISSUES: Normal muscle bulk and signal intensity. The sciatic nerves are unremarkable.     MISCELLANEOUS: No visceral pelvic soft tissue abnormality.     Impression:     Small central disc protrusion at L5-S1, encroaching upon the right descending S1 nerve root.     Mild active sacroiliitis on the right, which could be degenerative or inflammatory.  No chronic structural lesions.        Electronically signed by:Yung Leonard  Date:                                            12/01/2023  Time:                                           13:41    EXAMINATION:  XR SACROILIAC JOINTS 3 VIEWS     CLINICAL HISTORY:  Sacroiliitis, not elsewhere classified     TECHNIQUE:  Three views SI joints     COMPARISON:  MRI SI joints 12/01/2023     FINDINGS:  Evidence of transitional sacral development L5 level vertebra.  Minimal mild DJD SI  joints particularly inferiorly and on left.  Localized inflammatory change in neck portion right upper SI joint on previous MRI shows no corresponding well-defined abnormality on radiographs.  Minor DJD symphysis pubis.     Impression:     No significant inflammatory arthritis changes sacroiliac joints.  Additional findings above.        Electronically signed by:Marcus Mathias MD  Date:                                            10/15/2024  Time:                                           11:15    Assessment:   Nr-axSpA: based on : inflammatory back pain with early morning awakening, morning stiffness( much improved ), excellent response to NSAIDs,  alternating buttocks pain. No plain films, B27 negative, CRP nl There is subchondral bone marrow edema and enhancement involving the iliac side of the right sacroiliac joint (04:11).  No joint effusion, synovitis, or capsulitis. No erosions or ankylosis.  Was able to stop oral diclofenac within 2 months of starting adalimumab in Jan 2024  ASDAS-CRP: pending CRP  BASDAI: 2.5 (LDA)  BASFI : 0.4(nl)neck just started  No personal or family history of uveits, IBD, psoriasis  DXA 10/11/24:within expected range for age      Plan:   CBC, CMP, ESR, CRP  hypocalcemia labs  Continue Humira-CF 40 mg sc q 14 days refilled to [facility]  *Covid vaccine  Cont stretches, and aerobics, gym daily  On OCP,  s/p vasectomy, not planning future pregnancies  RTC  6 months

## 2025-07-03 ENCOUNTER — PATIENT MESSAGE (OUTPATIENT)
Dept: ADMINISTRATIVE | Facility: OTHER | Age: 40
End: 2025-07-03
Payer: COMMERCIAL

## 2025-07-21 DIAGNOSIS — F41.8 DEPRESSION WITH ANXIETY: ICD-10-CM

## 2025-07-21 RX ORDER — SERTRALINE HYDROCHLORIDE 25 MG/1
25 TABLET, FILM COATED ORAL DAILY
Qty: 90 TABLET | Refills: 3 | Status: SHIPPED | OUTPATIENT
Start: 2025-07-21

## 2025-07-21 NOTE — TELEPHONE ENCOUNTER
No care due was identified.  [facility] Embedded Care Due Messages. Reference number: [id number].   7/21/2025 9:13:21 AM CDT

## 2025-08-07 ENCOUNTER — PATIENT MESSAGE (OUTPATIENT)
Dept: RHEUMATOLOGY | Facility: CLINIC | Age: 40
End: 2025-08-07
Payer: COMMERCIAL

## 2025-08-20 ENCOUNTER — LAB VISIT (OUTPATIENT)
Dept: LAB | Facility: HOSPITAL | Age: 40
End: 2025-08-20
Attending: INTERNAL MEDICINE
Payer: COMMERCIAL

## 2025-08-20 ENCOUNTER — OFFICE VISIT (OUTPATIENT)
Dept: INTERNAL MEDICINE | Facility: CLINIC | Age: 40
End: 2025-08-20
Payer: COMMERCIAL

## 2025-08-20 VITALS
HEIGHT: 69 IN | WEIGHT: 177 LBS | OXYGEN SATURATION: 99 % | HEART RATE: 67 BPM | SYSTOLIC BLOOD PRESSURE: 148 MMHG | RESPIRATION RATE: 16 BRPM | DIASTOLIC BLOOD PRESSURE: 98 MMHG | BODY MASS INDEX: 26.22 KG/M2

## 2025-08-20 DIAGNOSIS — E67.3 HYPERVITAMINOSIS D: ICD-10-CM

## 2025-08-20 DIAGNOSIS — F32.5 MAJOR DEPRESSIVE DISORDER IN FULL REMISSION, UNSPECIFIED WHETHER RECURRENT: ICD-10-CM

## 2025-08-20 DIAGNOSIS — E83.51 HYPOCALCEMIA: ICD-10-CM

## 2025-08-20 DIAGNOSIS — Z00.00 ANNUAL PHYSICAL EXAM: ICD-10-CM

## 2025-08-20 DIAGNOSIS — Z00.00 ANNUAL PHYSICAL EXAM: Primary | ICD-10-CM

## 2025-08-20 DIAGNOSIS — M45.6 ANKYLOSING SPONDYLITIS LUMBAR REGION: ICD-10-CM

## 2025-08-20 DIAGNOSIS — R03.0 ELEVATED BLOOD-PRESSURE READING, WITHOUT DIAGNOSIS OF HYPERTENSION: ICD-10-CM

## 2025-08-20 DIAGNOSIS — F41.1 GENERALIZED ANXIETY DISORDER: ICD-10-CM

## 2025-08-20 LAB
25(OH)D3+25(OH)D2 SERPL-MCNC: 54 NG/ML (ref 30–96)
EAG (OHS): 103 MG/DL (ref 68–131)
HBA1C MFR BLD: 5.2 % (ref 4–5.6)
MAGNESIUM SERPL-MCNC: 2 MG/DL (ref 1.6–2.6)
PHOSPHATE SERPL-MCNC: 4.2 MG/DL (ref 2.7–4.5)
PTH-INTACT SERPL-MCNC: 42.5 PG/ML (ref 9–77)
TSH SERPL-ACNC: 0.76 UIU/ML (ref 0.4–4)

## 2025-08-20 PROCEDURE — 36415 COLL VENOUS BLD VENIPUNCTURE: CPT

## 2025-08-20 PROCEDURE — 99999 PR PBB SHADOW E&M-EST. PATIENT-LVL IV: CPT | Mod: PBBFAC,,, | Performed by: INTERNAL MEDICINE

## 2025-08-20 PROCEDURE — 82306 VITAMIN D 25 HYDROXY: CPT

## 2025-08-20 PROCEDURE — 84100 ASSAY OF PHOSPHORUS: CPT

## 2025-08-20 PROCEDURE — 83036 HEMOGLOBIN GLYCOSYLATED A1C: CPT

## 2025-08-20 PROCEDURE — 86334 IMMUNOFIX E-PHORESIS SERUM: CPT | Mod: ,,, | Performed by: PATHOLOGY

## 2025-08-20 PROCEDURE — 84443 ASSAY THYROID STIM HORMONE: CPT

## 2025-08-20 PROCEDURE — 83970 ASSAY OF PARATHORMONE: CPT

## 2025-08-20 PROCEDURE — 83735 ASSAY OF MAGNESIUM: CPT

## 2025-08-20 PROCEDURE — 86334 IMMUNOFIX E-PHORESIS SERUM: CPT

## 2025-08-21 LAB — PATHOLOGIST INTERPRETATION - IFE SERUM (OHS): NORMAL

## 2025-08-27 ENCOUNTER — CLINICAL SUPPORT (OUTPATIENT)
Dept: INTERNAL MEDICINE | Facility: CLINIC | Age: 40
End: 2025-08-27
Payer: COMMERCIAL

## 2025-08-27 VITALS
DIASTOLIC BLOOD PRESSURE: 70 MMHG | RESPIRATION RATE: 16 BRPM | SYSTOLIC BLOOD PRESSURE: 128 MMHG | OXYGEN SATURATION: 98 % | HEART RATE: 69 BPM

## 2025-08-27 DIAGNOSIS — R03.0 ELEVATED BLOOD-PRESSURE READING, WITHOUT DIAGNOSIS OF HYPERTENSION: Primary | ICD-10-CM

## 2025-08-27 PROCEDURE — 99999 PR PBB SHADOW E&M-EST. PATIENT-LVL II: CPT | Mod: PBBFAC,,,

## (undated) DEVICE — SOL 9P NACL IRR PIC IL

## (undated) DEVICE — DRAPE UNDER BUTTOCKS WITH POUCH

## (undated) DEVICE — GLOVE TRIFLEX ORTHO SURG 7 1/2

## (undated) DEVICE — VACURETTE 10MM CURVED

## (undated) DEVICE — HANDLE CURETTE W/TUBING

## (undated) DEVICE — TRAY DRY SKIN SCRUB PREP

## (undated) DEVICE — Device

## (undated) DEVICE — SOL PVP-I SCRUB 7.5% 4OZ

## (undated) DEVICE — PAD PREP 50/CA

## (undated) DEVICE — SCRUB 10% POVIDONE IODINE 4OZ

## (undated) DEVICE — SOL IRR NACL .9% 3000ML